# Patient Record
Sex: FEMALE | Race: WHITE | NOT HISPANIC OR LATINO | Employment: OTHER | URBAN - METROPOLITAN AREA
[De-identification: names, ages, dates, MRNs, and addresses within clinical notes are randomized per-mention and may not be internally consistent; named-entity substitution may affect disease eponyms.]

---

## 2018-01-05 ENCOUNTER — CONVERSION ENCOUNTER (OUTPATIENT)
Dept: INTERNAL MEDICINE | Facility: CLINIC | Age: 68
End: 2018-01-05

## 2018-01-05 ENCOUNTER — OFFICE VISIT CONVERTED (OUTPATIENT)
Dept: INTERNAL MEDICINE | Facility: CLINIC | Age: 68
End: 2018-01-05
Attending: INTERNAL MEDICINE

## 2018-03-19 ENCOUNTER — OFFICE VISIT CONVERTED (OUTPATIENT)
Dept: INTERNAL MEDICINE | Facility: CLINIC | Age: 68
End: 2018-03-19
Attending: INTERNAL MEDICINE

## 2018-06-27 ENCOUNTER — OFFICE VISIT CONVERTED (OUTPATIENT)
Dept: INTERNAL MEDICINE | Facility: CLINIC | Age: 68
End: 2018-06-27
Attending: INTERNAL MEDICINE

## 2018-07-30 ENCOUNTER — CONVERSION ENCOUNTER (OUTPATIENT)
Dept: SURGERY | Facility: CLINIC | Age: 68
End: 2018-07-30

## 2018-07-30 ENCOUNTER — OFFICE VISIT CONVERTED (OUTPATIENT)
Dept: UROLOGY | Facility: CLINIC | Age: 68
End: 2018-07-30
Attending: UROLOGY

## 2018-09-24 ENCOUNTER — OFFICE VISIT CONVERTED (OUTPATIENT)
Dept: INTERNAL MEDICINE | Facility: CLINIC | Age: 68
End: 2018-09-24
Attending: INTERNAL MEDICINE

## 2018-10-26 ENCOUNTER — OFFICE VISIT CONVERTED (OUTPATIENT)
Dept: PULMONOLOGY | Facility: CLINIC | Age: 68
End: 2018-10-26
Attending: INTERNAL MEDICINE

## 2018-12-27 ENCOUNTER — CONVERSION ENCOUNTER (OUTPATIENT)
Dept: INTERNAL MEDICINE | Facility: CLINIC | Age: 68
End: 2018-12-27

## 2018-12-27 ENCOUNTER — OFFICE VISIT CONVERTED (OUTPATIENT)
Dept: INTERNAL MEDICINE | Facility: CLINIC | Age: 68
End: 2018-12-27
Attending: INTERNAL MEDICINE

## 2019-01-25 ENCOUNTER — OFFICE VISIT CONVERTED (OUTPATIENT)
Dept: INTERNAL MEDICINE | Facility: CLINIC | Age: 69
End: 2019-01-25
Attending: PHYSICIAN ASSISTANT

## 2019-03-27 ENCOUNTER — OFFICE VISIT CONVERTED (OUTPATIENT)
Dept: INTERNAL MEDICINE | Facility: CLINIC | Age: 69
End: 2019-03-27
Attending: PHYSICIAN ASSISTANT

## 2019-03-27 ENCOUNTER — CONVERSION ENCOUNTER (OUTPATIENT)
Dept: INTERNAL MEDICINE | Facility: CLINIC | Age: 69
End: 2019-03-27

## 2019-04-18 ENCOUNTER — OFFICE VISIT CONVERTED (OUTPATIENT)
Dept: INTERNAL MEDICINE | Facility: CLINIC | Age: 69
End: 2019-04-18
Attending: NURSE PRACTITIONER

## 2019-04-18 ENCOUNTER — CONVERSION ENCOUNTER (OUTPATIENT)
Dept: INTERNAL MEDICINE | Facility: CLINIC | Age: 69
End: 2019-04-18

## 2019-06-18 ENCOUNTER — OFFICE VISIT CONVERTED (OUTPATIENT)
Dept: INTERNAL MEDICINE | Facility: CLINIC | Age: 69
End: 2019-06-18
Attending: PHYSICIAN ASSISTANT

## 2019-06-18 ENCOUNTER — CONVERSION ENCOUNTER (OUTPATIENT)
Dept: INTERNAL MEDICINE | Facility: CLINIC | Age: 69
End: 2019-06-18

## 2019-07-19 ENCOUNTER — HOSPITAL ENCOUNTER (OUTPATIENT)
Dept: CARDIOLOGY | Facility: HOSPITAL | Age: 69
Discharge: HOME | End: 2019-07-19
Attending: SPECIALIST

## 2019-09-18 ENCOUNTER — OFFICE VISIT CONVERTED (OUTPATIENT)
Dept: INTERNAL MEDICINE | Facility: CLINIC | Age: 69
End: 2019-09-18
Attending: INTERNAL MEDICINE

## 2019-09-18 ENCOUNTER — CONVERSION ENCOUNTER (OUTPATIENT)
Dept: INTERNAL MEDICINE | Facility: CLINIC | Age: 69
End: 2019-09-18

## 2019-09-18 ENCOUNTER — HOSPITAL ENCOUNTER (OUTPATIENT)
Dept: OTHER | Facility: HOSPITAL | Age: 69
Discharge: HOME | End: 2019-09-18
Attending: INTERNAL MEDICINE

## 2019-09-18 LAB
APPEARANCE UR: CLEAR
BILIRUB UR QL: NEGATIVE
COLOR UR: YELLOW
CONV BACTERIA: NEGATIVE
CONV COLLECTION SOURCE (UA): ABNORMAL
CONV UROBILINOGEN IN URINE BY AUTOMATED TEST STRIP: 0.2 {EHRLICHU}/DL (ref 0.1–1)
GLUCOSE UR QL: >=1000 MG/DL
HGB UR QL STRIP: ABNORMAL
KETONES UR QL STRIP: NEGATIVE MG/DL
LEUKOCYTE ESTERASE UR QL STRIP: ABNORMAL
NITRITE UR QL STRIP: NEGATIVE
PH UR STRIP.AUTO: 7.5 [PH] (ref 5–8)
PROT UR QL: NEGATIVE MG/DL
RBC #/AREA URNS HPF: ABNORMAL /[HPF]
SP GR UR: 1.01 (ref 1–1.03)
SQUAMOUS SPT QL MICRO: ABNORMAL /[HPF]
WBC #/AREA URNS HPF: ABNORMAL /[HPF]

## 2019-09-20 LAB — BACTERIA UR CULT: NORMAL

## 2019-09-21 LAB
C TRACH RRNA CVX QL NAA+PROBE: NOT DETECTED
CONV HIV COMBO AG/AB (HIV-1/O/2) WITH REFLEX: NEGATIVE
CONV TREPONEMA PALLIDUM (RPR) WITH FTA-ABS, TP-PA REFLEXES: NON REACTIVE
HSV I/II IGM: <0.91 RATIO (ref 0–0.9)
HSV1 IGG SER IA-ACNC: 54.1 INDEX (ref 0–0.9)
HSV2 IGG SER IA-ACNC: 21.1 INDEX (ref 0–0.9)
N GONORRHOEA DNA SPEC QL NAA+PROBE: NOT DETECTED

## 2019-10-29 ENCOUNTER — OFFICE VISIT CONVERTED (OUTPATIENT)
Dept: PULMONOLOGY | Facility: CLINIC | Age: 69
End: 2019-10-29
Attending: PHYSICIAN ASSISTANT

## 2019-10-30 ENCOUNTER — OFFICE VISIT CONVERTED (OUTPATIENT)
Dept: INTERNAL MEDICINE | Facility: CLINIC | Age: 69
End: 2019-10-30
Attending: INTERNAL MEDICINE

## 2019-10-30 ENCOUNTER — CONVERSION ENCOUNTER (OUTPATIENT)
Dept: INTERNAL MEDICINE | Facility: CLINIC | Age: 69
End: 2019-10-30

## 2019-10-30 ENCOUNTER — HOSPITAL ENCOUNTER (OUTPATIENT)
Dept: OTHER | Facility: HOSPITAL | Age: 69
Discharge: HOME | End: 2019-10-30
Attending: INTERNAL MEDICINE

## 2019-11-07 ENCOUNTER — HOSPITAL ENCOUNTER (OUTPATIENT)
Dept: GENERAL RADIOLOGY | Facility: HOSPITAL | Age: 69
Discharge: HOME | End: 2019-11-07
Attending: INTERNAL MEDICINE

## 2020-01-08 ENCOUNTER — OFFICE VISIT CONVERTED (OUTPATIENT)
Dept: INTERNAL MEDICINE | Facility: CLINIC | Age: 70
End: 2020-01-08
Attending: PHYSICIAN ASSISTANT

## 2020-01-08 ENCOUNTER — HOSPITAL ENCOUNTER (OUTPATIENT)
Dept: OTHER | Facility: HOSPITAL | Age: 70
Discharge: HOME | End: 2020-01-08
Attending: PHYSICIAN ASSISTANT

## 2020-01-11 LAB
AMOXICILLIN+CLAV SUSC ISLT: 4
AMPICILLIN SUSC ISLT: >=32
AMPICILLIN+SULBAC SUSC ISLT: >=32
BACTERIA UR CULT: ABNORMAL
CEFAZOLIN SUSC ISLT: <=4
CEFEPIME SUSC ISLT: <=1
CEFTAZIDIME SUSC ISLT: <=1
CEFTRIAXONE SUSC ISLT: <=1
CEFUROXIME ORAL SUSC ISLT: 4
CEFUROXIME PARENTER SUSC ISLT: 4
CIPROFLOXACIN SUSC ISLT: <=0.25
ERTAPENEM SUSC ISLT: <=0.5
GENTAMICIN SUSC ISLT: <=1
LEVOFLOXACIN SUSC ISLT: <=0.12
NITROFURANTOIN SUSC ISLT: <=16
TETRACYCLINE SUSC ISLT: <=1
TMP SMX SUSC ISLT: <=20
TOBRAMYCIN SUSC ISLT: <=1

## 2020-01-28 ENCOUNTER — CONVERSION ENCOUNTER (OUTPATIENT)
Dept: SURGERY | Facility: CLINIC | Age: 70
End: 2020-01-28

## 2020-01-28 ENCOUNTER — OFFICE VISIT CONVERTED (OUTPATIENT)
Dept: SURGERY | Facility: CLINIC | Age: 70
End: 2020-01-28
Attending: NURSE PRACTITIONER

## 2020-01-30 ENCOUNTER — HOSPITAL ENCOUNTER (OUTPATIENT)
Dept: GENERAL RADIOLOGY | Facility: HOSPITAL | Age: 70
Discharge: HOME | End: 2020-01-30
Attending: NURSE PRACTITIONER

## 2020-01-30 LAB
CREAT BLD-MCNC: 1 MG/DL (ref 0.6–1.4)
GFR SERPLBLD BASED ON 1.73 SQ M-ARVRAT: 58 ML/MIN/{1.73_M2}

## 2020-02-28 ENCOUNTER — PROCEDURE VISIT CONVERTED (OUTPATIENT)
Dept: UROLOGY | Facility: CLINIC | Age: 70
End: 2020-02-28
Attending: UROLOGY

## 2020-03-11 ENCOUNTER — OFFICE VISIT CONVERTED (OUTPATIENT)
Dept: CARDIOLOGY | Facility: CLINIC | Age: 70
End: 2020-03-11
Attending: INTERNAL MEDICINE

## 2020-03-18 ENCOUNTER — OFFICE VISIT CONVERTED (OUTPATIENT)
Dept: INTERNAL MEDICINE | Facility: CLINIC | Age: 70
End: 2020-03-18
Attending: INTERNAL MEDICINE

## 2020-03-26 ENCOUNTER — TELEMEDICINE CONVERTED (OUTPATIENT)
Dept: CARDIOLOGY | Facility: CLINIC | Age: 70
End: 2020-03-26
Attending: INTERNAL MEDICINE

## 2020-06-12 ENCOUNTER — HOSPITAL ENCOUNTER (OUTPATIENT)
Dept: OTHER | Facility: HOSPITAL | Age: 70
Discharge: HOME | End: 2020-06-12
Attending: INTERNAL MEDICINE

## 2020-06-12 LAB
ALBUMIN SERPL-MCNC: 4.6 G/DL (ref 3.5–5)
ALBUMIN/GLOB SERPL: 1.6 {RATIO} (ref 1.4–2.6)
ALP SERPL-CCNC: 67 U/L (ref 43–160)
ALT SERPL-CCNC: 19 U/L (ref 10–40)
ANION GAP SERPL CALC-SCNC: 15 MMOL/L (ref 8–19)
AST SERPL-CCNC: 22 U/L (ref 15–50)
BASOPHILS # BLD AUTO: 0.03 10*3/UL (ref 0–0.2)
BASOPHILS NFR BLD AUTO: 0.5 % (ref 0–3)
BILIRUB SERPL-MCNC: 0.42 MG/DL (ref 0.2–1.3)
BUN SERPL-MCNC: 26 MG/DL (ref 5–25)
BUN/CREAT SERPL: 23 {RATIO} (ref 6–20)
CALCIUM SERPL-MCNC: 9.7 MG/DL (ref 8.7–10.4)
CHLORIDE SERPL-SCNC: 103 MMOL/L (ref 99–111)
CHOLEST SERPL-MCNC: 145 MG/DL (ref 107–200)
CHOLEST/HDLC SERPL: 2.4 {RATIO} (ref 3–6)
CONV ABS IMM GRAN: 0.02 10*3/UL (ref 0–0.2)
CONV CO2: 25 MMOL/L (ref 22–32)
CONV IMMATURE GRAN: 0.3 % (ref 0–1.8)
CONV TOTAL PROTEIN: 7.4 G/DL (ref 6.3–8.2)
CREAT UR-MCNC: 1.11 MG/DL (ref 0.5–0.9)
DEPRECATED RDW RBC AUTO: 46.1 FL (ref 36.4–46.3)
EOSINOPHIL # BLD AUTO: 0.1 10*3/UL (ref 0–0.7)
EOSINOPHIL # BLD AUTO: 1.6 % (ref 0–7)
ERYTHROCYTE [DISTWIDTH] IN BLOOD BY AUTOMATED COUNT: 13.2 % (ref 11.7–14.4)
GFR SERPLBLD BASED ON 1.73 SQ M-ARVRAT: 51 ML/MIN/{1.73_M2}
GLOBULIN UR ELPH-MCNC: 2.8 G/DL (ref 2–3.5)
GLUCOSE SERPL-MCNC: 112 MG/DL (ref 65–99)
HCT VFR BLD AUTO: 35.8 % (ref 37–47)
HDLC SERPL-MCNC: 60 MG/DL (ref 40–60)
HGB BLD-MCNC: 11.1 G/DL (ref 12–16)
LDLC SERPL CALC-MCNC: 71 MG/DL (ref 70–100)
LYMPHOCYTES # BLD AUTO: 1.64 10*3/UL (ref 1–5)
LYMPHOCYTES NFR BLD AUTO: 25.4 % (ref 20–45)
MCH RBC QN AUTO: 29.6 PG (ref 27–31)
MCHC RBC AUTO-ENTMCNC: 31 G/DL (ref 33–37)
MCV RBC AUTO: 95.5 FL (ref 81–99)
MONOCYTES # BLD AUTO: 0.54 10*3/UL (ref 0.2–1.2)
MONOCYTES NFR BLD AUTO: 8.4 % (ref 3–10)
NEUTROPHILS # BLD AUTO: 4.12 10*3/UL (ref 2–8)
NEUTROPHILS NFR BLD AUTO: 63.8 % (ref 30–85)
NRBC CBCN: 0 % (ref 0–0.7)
OSMOLALITY SERPL CALC.SUM OF ELEC: 292 MOSM/KG (ref 273–304)
PLATELET # BLD AUTO: 241 10*3/UL (ref 130–400)
PMV BLD AUTO: 10.1 FL (ref 9.4–12.3)
POTASSIUM SERPL-SCNC: 5 MMOL/L (ref 3.5–5.3)
RBC # BLD AUTO: 3.75 10*6/UL (ref 4.2–5.4)
SODIUM SERPL-SCNC: 138 MMOL/L (ref 135–147)
TRIGL SERPL-MCNC: 72 MG/DL (ref 40–150)
TSH SERPL-ACNC: 1 M[IU]/L (ref 0.27–4.2)
VLDLC SERPL-MCNC: 14 MG/DL (ref 5–37)
WBC # BLD AUTO: 6.45 10*3/UL (ref 4.8–10.8)

## 2020-06-18 ENCOUNTER — TELEMEDICINE CONVERTED (OUTPATIENT)
Dept: INTERNAL MEDICINE | Facility: CLINIC | Age: 70
End: 2020-06-18
Attending: INTERNAL MEDICINE

## 2020-06-29 ENCOUNTER — OFFICE VISIT CONVERTED (OUTPATIENT)
Dept: CARDIOLOGY | Facility: CLINIC | Age: 70
End: 2020-06-29
Attending: INTERNAL MEDICINE

## 2020-09-22 ENCOUNTER — HOSPITAL ENCOUNTER (OUTPATIENT)
Dept: OTHER | Facility: HOSPITAL | Age: 70
Discharge: HOME | End: 2020-09-22
Attending: INTERNAL MEDICINE

## 2020-09-22 LAB
BASOPHILS # BLD AUTO: 0.04 10*3/UL (ref 0–0.2)
BASOPHILS NFR BLD AUTO: 0.6 % (ref 0–3)
CONV ABS IMM GRAN: 0.02 10*3/UL (ref 0–0.2)
CONV IMMATURE GRAN: 0.3 % (ref 0–1.8)
DEPRECATED RDW RBC AUTO: 43.4 FL (ref 36.4–46.3)
EOSINOPHIL # BLD AUTO: 0.13 10*3/UL (ref 0–0.7)
EOSINOPHIL # BLD AUTO: 1.8 % (ref 0–7)
ERYTHROCYTE [DISTWIDTH] IN BLOOD BY AUTOMATED COUNT: 12.5 % (ref 11.7–14.4)
HCT VFR BLD AUTO: 37 % (ref 37–47)
HGB BLD-MCNC: 11.5 G/DL (ref 12–16)
LYMPHOCYTES # BLD AUTO: 1.75 10*3/UL (ref 1–5)
LYMPHOCYTES NFR BLD AUTO: 24.1 % (ref 20–45)
MCH RBC QN AUTO: 29.2 PG (ref 27–31)
MCHC RBC AUTO-ENTMCNC: 31.1 G/DL (ref 33–37)
MCV RBC AUTO: 93.9 FL (ref 81–99)
MONOCYTES # BLD AUTO: 0.6 10*3/UL (ref 0.2–1.2)
MONOCYTES NFR BLD AUTO: 8.3 % (ref 3–10)
NEUTROPHILS # BLD AUTO: 4.72 10*3/UL (ref 2–8)
NEUTROPHILS NFR BLD AUTO: 64.9 % (ref 30–85)
NRBC CBCN: 0 % (ref 0–0.7)
PLATELET # BLD AUTO: 286 10*3/UL (ref 130–400)
PMV BLD AUTO: 9.6 FL (ref 9.4–12.3)
RBC # BLD AUTO: 3.94 10*6/UL (ref 4.2–5.4)
T4 FREE SERPL-MCNC: 0.8 NG/DL (ref 0.9–1.8)
TSH SERPL-ACNC: 1.99 M[IU]/L (ref 0.27–4.2)
WBC # BLD AUTO: 7.26 10*3/UL (ref 4.8–10.8)

## 2020-09-23 LAB
ALBUMIN SERPL-MCNC: 4.7 G/DL (ref 3.5–5)
ALBUMIN/GLOB SERPL: 1.8 {RATIO} (ref 1.4–2.6)
ALP SERPL-CCNC: 66 U/L (ref 43–160)
ALT SERPL-CCNC: 17 U/L (ref 10–40)
ANION GAP SERPL CALC-SCNC: 19 MMOL/L (ref 8–19)
AST SERPL-CCNC: 23 U/L (ref 15–50)
BILIRUB SERPL-MCNC: 0.42 MG/DL (ref 0.2–1.3)
BUN SERPL-MCNC: 20 MG/DL (ref 5–25)
BUN/CREAT SERPL: 19 {RATIO} (ref 6–20)
CALCIUM SERPL-MCNC: 9.7 MG/DL (ref 8.7–10.4)
CHLORIDE SERPL-SCNC: 100 MMOL/L (ref 99–111)
CHOLEST SERPL-MCNC: 156 MG/DL (ref 107–200)
CHOLEST/HDLC SERPL: 2.1 {RATIO} (ref 3–6)
CONV CO2: 26 MMOL/L (ref 22–32)
CONV TOTAL PROTEIN: 7.3 G/DL (ref 6.3–8.2)
CREAT UR-MCNC: 1.08 MG/DL (ref 0.5–0.9)
EST. AVERAGE GLUCOSE BLD GHB EST-MCNC: 137 MG/DL
GFR SERPLBLD BASED ON 1.73 SQ M-ARVRAT: 53 ML/MIN/{1.73_M2}
GLOBULIN UR ELPH-MCNC: 2.6 G/DL (ref 2–3.5)
GLUCOSE SERPL-MCNC: 103 MG/DL (ref 65–99)
HBA1C MFR BLD: 6.4 % (ref 3.5–5.7)
HDLC SERPL-MCNC: 75 MG/DL (ref 40–60)
LDLC SERPL CALC-MCNC: 65 MG/DL (ref 70–100)
OSMOLALITY SERPL CALC.SUM OF ELEC: 293 MOSM/KG (ref 273–304)
POTASSIUM SERPL-SCNC: 4.7 MMOL/L (ref 3.5–5.3)
SODIUM SERPL-SCNC: 140 MMOL/L (ref 135–147)
TRIGL SERPL-MCNC: 78 MG/DL (ref 40–150)
VLDLC SERPL-MCNC: 16 MG/DL (ref 5–37)

## 2020-09-28 ENCOUNTER — HOSPITAL ENCOUNTER (OUTPATIENT)
Dept: OTHER | Facility: HOSPITAL | Age: 70
Discharge: HOME | End: 2020-09-28
Attending: NURSE PRACTITIONER

## 2020-09-28 ENCOUNTER — CONVERSION ENCOUNTER (OUTPATIENT)
Dept: INTERNAL MEDICINE | Facility: CLINIC | Age: 70
End: 2020-09-28

## 2020-09-28 ENCOUNTER — OFFICE VISIT CONVERTED (OUTPATIENT)
Dept: INTERNAL MEDICINE | Facility: CLINIC | Age: 70
End: 2020-09-28
Attending: NURSE PRACTITIONER

## 2020-09-28 LAB
BASOPHILS # BLD AUTO: 0.03 10*3/UL (ref 0–0.2)
BASOPHILS NFR BLD AUTO: 0.4 % (ref 0–3)
CONV ABS IMM GRAN: 0.02 10*3/UL (ref 0–0.2)
CONV IMMATURE GRAN: 0.3 % (ref 0–1.8)
DEPRECATED RDW RBC AUTO: 44.1 FL (ref 36.4–46.3)
EOSINOPHIL # BLD AUTO: 0.12 10*3/UL (ref 0–0.7)
EOSINOPHIL # BLD AUTO: 1.7 % (ref 0–7)
ERYTHROCYTE [DISTWIDTH] IN BLOOD BY AUTOMATED COUNT: 12.6 % (ref 11.7–14.4)
FOLATE SERPL-MCNC: >20 NG/ML (ref 4.8–20)
HCT VFR BLD AUTO: 35 % (ref 37–47)
HGB BLD-MCNC: 10.6 G/DL (ref 12–16)
IRON SATN MFR SERPL: 13 % (ref 20–55)
IRON SERPL-MCNC: 51 UG/DL (ref 60–170)
LYMPHOCYTES # BLD AUTO: 1.71 10*3/UL (ref 1–5)
LYMPHOCYTES NFR BLD AUTO: 24.3 % (ref 20–45)
MCH RBC QN AUTO: 28.9 PG (ref 27–31)
MCHC RBC AUTO-ENTMCNC: 30.3 G/DL (ref 33–37)
MCV RBC AUTO: 95.4 FL (ref 81–99)
MONOCYTES # BLD AUTO: 0.61 10*3/UL (ref 0.2–1.2)
MONOCYTES NFR BLD AUTO: 8.7 % (ref 3–10)
NEUTROPHILS # BLD AUTO: 4.55 10*3/UL (ref 2–8)
NEUTROPHILS NFR BLD AUTO: 64.6 % (ref 30–85)
NRBC CBCN: 0 % (ref 0–0.7)
PLATELET # BLD AUTO: 267 10*3/UL (ref 130–400)
PMV BLD AUTO: 10 FL (ref 9.4–12.3)
RBC # BLD AUTO: 3.67 10*6/UL (ref 4.2–5.4)
TIBC SERPL-MCNC: 408 UG/DL (ref 245–450)
TRANSFERRIN SERPL-MCNC: 285 MG/DL (ref 250–380)
VIT B12 SERPL-MCNC: 452 PG/ML (ref 211–911)
WBC # BLD AUTO: 7.04 10*3/UL (ref 4.8–10.8)

## 2020-11-02 ENCOUNTER — OFFICE VISIT CONVERTED (OUTPATIENT)
Dept: CARDIOLOGY | Facility: CLINIC | Age: 70
End: 2020-11-02
Attending: INTERNAL MEDICINE

## 2020-11-02 ENCOUNTER — CONVERSION ENCOUNTER (OUTPATIENT)
Dept: CARDIOLOGY | Facility: CLINIC | Age: 70
End: 2020-11-02

## 2020-11-09 ENCOUNTER — HOSPITAL ENCOUNTER (OUTPATIENT)
Dept: GENERAL RADIOLOGY | Facility: HOSPITAL | Age: 70
Discharge: HOME | End: 2020-11-09
Attending: INTERNAL MEDICINE

## 2020-11-09 ENCOUNTER — OFFICE VISIT CONVERTED (OUTPATIENT)
Dept: PULMONOLOGY | Facility: CLINIC | Age: 70
End: 2020-11-09
Attending: INTERNAL MEDICINE

## 2020-11-12 ENCOUNTER — HOSPITAL ENCOUNTER (OUTPATIENT)
Dept: PREADMISSION TESTING | Facility: HOSPITAL | Age: 70
Discharge: HOME | End: 2020-11-12
Attending: INTERNAL MEDICINE

## 2020-11-14 LAB — SARS-COV-2 RNA SPEC QL NAA+PROBE: NOT DETECTED

## 2020-11-17 ENCOUNTER — HOSPITAL ENCOUNTER (OUTPATIENT)
Dept: INFUSION THERAPY | Facility: HOSPITAL | Age: 70
Setting detail: HOSPITAL OUTPATIENT SURGERY
Discharge: HOME | End: 2020-11-17
Attending: INTERNAL MEDICINE

## 2020-11-17 LAB
ALBUMIN SERPL-MCNC: 4.5 G/DL (ref 3.5–5)
ALBUMIN/GLOB SERPL: 1.6 {RATIO} (ref 1.4–2.6)
ALP SERPL-CCNC: 66 U/L (ref 43–160)
ALT SERPL-CCNC: 16 U/L (ref 10–40)
ANION GAP SERPL CALC-SCNC: 14 MMOL/L (ref 8–19)
AST SERPL-CCNC: 19 U/L (ref 15–50)
BASE EXCESS BLD CALC-SCNC: -3.4 MMOL/L
BASE EXCESS BLD CALC-SCNC: -5.1 MMOL/L
BASE EXCESS BLD CALC-SCNC: -5.6 MMOL/L
BASOPHILS # BLD AUTO: 0.04 10*3/UL (ref 0–0.2)
BASOPHILS NFR BLD AUTO: 0.6 % (ref 0–3)
BILIRUB SERPL-MCNC: 0.26 MG/DL (ref 0.2–1.3)
BNP SERPL-MCNC: 135 PG/ML (ref 0–900)
BUN SERPL-MCNC: 24 MG/DL (ref 5–25)
BUN/CREAT SERPL: 21 {RATIO} (ref 6–20)
CALCIUM SERPL-MCNC: 10 MG/DL (ref 8.7–10.4)
CHLORIDE SERPL-SCNC: 104 MMOL/L (ref 99–111)
COHGB MFR BLD: 0.3 % (ref 0–1.5)
CONV ABS IMM GRAN: 0.02 10*3/UL (ref 0–0.2)
CONV CO2: 25 MMOL/L (ref 22–32)
CONV FHHB: 1.4 % (ref 0–5)
CONV FHHB: 21.8 % (ref 0–5)
CONV FHHB: 23 % (ref 0–5)
CONV IMMATURE GRAN: 0.3 % (ref 0–1.8)
CONV SITE: ABNORMAL
CONV TOTAL PROTEIN: 7.3 G/DL (ref 6.3–8.2)
CREAT UR-MCNC: 1.17 MG/DL (ref 0.5–0.9)
DEPRECATED RDW RBC AUTO: 48.4 FL (ref 36.4–46.3)
EOSINOPHIL # BLD AUTO: 0.1 10*3/UL (ref 0–0.7)
EOSINOPHIL # BLD AUTO: 1.6 % (ref 0–7)
ERYTHROCYTE [DISTWIDTH] IN BLOOD BY AUTOMATED COUNT: 14.1 % (ref 11.7–14.4)
GFR SERPLBLD BASED ON 1.73 SQ M-ARVRAT: 48 ML/MIN/{1.73_M2}
GLOBULIN UR ELPH-MCNC: 2.8 G/DL (ref 2–3.5)
GLUCOSE SERPL-MCNC: 116 MG/DL (ref 65–99)
HBA1C MFR BLD: 11 % (ref 11.7–14.6)
HBA1C MFR BLD: 11.2 % (ref 11.7–14.6)
HBA1C MFR BLD: 11.4 % (ref 11.7–14.6)
HCO3 BLDA-SCNC: 19.8 MMOL/L (ref 22–26)
HCO3 BLDA-SCNC: 20.3 MMOL/L (ref 22–26)
HCO3 BLDA-SCNC: 22.9 MMOL/L (ref 22–26)
HCT VFR BLD AUTO: 34.1 % (ref 37–47)
HGB BLD-MCNC: 11 G/DL (ref 12–16)
INR PPP: 0.95 (ref 2–3)
LITERS PER MINUTE: 2 L/MIN
LYMPHOCYTES # BLD AUTO: 1.81 10*3/UL (ref 1–5)
LYMPHOCYTES NFR BLD AUTO: 29.2 % (ref 20–45)
Lab: ABNORMAL
MCH RBC QN AUTO: 30.4 PG (ref 27–31)
MCHC RBC AUTO-ENTMCNC: 32.3 G/DL (ref 33–37)
MCV RBC AUTO: 94.2 FL (ref 81–99)
METHGB MFR BLD: 0.3 % (ref 0–1.5)
METHGB MFR BLD: 0.4 % (ref 0–1.5)
METHGB MFR BLD: 0.5 % (ref 0–1.5)
MONOCYTES # BLD AUTO: 0.49 10*3/UL (ref 0.2–1.2)
MONOCYTES NFR BLD AUTO: 7.9 % (ref 3–10)
NEUTROPHILS # BLD AUTO: 3.73 10*3/UL (ref 2–8)
NEUTROPHILS NFR BLD AUTO: 60.4 % (ref 30–85)
NRBC CBCN: 0 % (ref 0–0.7)
OSMOLALITY SERPL CALC.SUM OF ELEC: 293 MOSM/KG (ref 273–304)
OXYHGB MFR BLD: 76.4 % (ref 94–98)
OXYHGB MFR BLD: 77.4 % (ref 94–98)
OXYHGB MFR BLD: 97.9 % (ref 94–98)
PCO2 BLD: 36.1 MM[HG] (ref 35–45)
PCO2 BLD: 41.7 MM[HG] (ref 35–45)
PCO2 BLD: 46.5 MM[HG] (ref 35–45)
PH UR: 7.31 [PH] (ref 7.35–7.45)
PH UR: 7.31 [PH] (ref 7.35–7.45)
PH UR: 7.36 [PH] (ref 7.35–7.45)
PLATELET # BLD AUTO: 229 10*3/UL (ref 130–400)
PMV BLD AUTO: 8.7 FL (ref 9.4–12.3)
PO2 BLD: 161.6 MM[HG] (ref 80–100)
PO2 BLD: 45.9 MM[HG] (ref 80–100)
PO2 BLD: 46.3 MM[HG] (ref 80–100)
POTASSIUM SERPL-SCNC: 3.9 MMOL/L (ref 3.5–5.3)
PROTHROMBIN TIME: 10.4 S (ref 9.4–12)
RBC # BLD AUTO: 3.62 10*6/UL (ref 4.2–5.4)
SAO2 % BLDCOA: 76.9 % (ref 95–99)
SAO2 % BLDCOA: 78 % (ref 95–99)
SAO2 % BLDCOA: 98.6 % (ref 95–99)
SODIUM SERPL-SCNC: 139 MMOL/L (ref 135–147)
SPECIMEN SOURCE: ABNORMAL
T4 FREE SERPL-MCNC: 0.8 NG/DL (ref 0.9–1.8)
TSH SERPL-ACNC: 1.64 M[IU]/L (ref 0.27–4.2)
WBC # BLD AUTO: 6.19 10*3/UL (ref 4.8–10.8)

## 2020-11-19 DIAGNOSIS — I35.0 AORTIC VALVE STENOSIS, ETIOLOGY OF CARDIAC VALVE DISEASE UNSPECIFIED: Primary | ICD-10-CM

## 2020-12-03 ENCOUNTER — OFFICE VISIT (OUTPATIENT)
Dept: CARDIOLOGY | Facility: CLINIC | Age: 70
End: 2020-12-03

## 2020-12-03 VITALS
WEIGHT: 156 LBS | HEART RATE: 73 BPM | BODY MASS INDEX: 26.63 KG/M2 | HEIGHT: 64 IN | SYSTOLIC BLOOD PRESSURE: 120 MMHG | DIASTOLIC BLOOD PRESSURE: 82 MMHG

## 2020-12-03 DIAGNOSIS — I10 ESSENTIAL HYPERTENSION: ICD-10-CM

## 2020-12-03 DIAGNOSIS — I35.0 AORTIC VALVE STENOSIS, ETIOLOGY OF CARDIAC VALVE DISEASE UNSPECIFIED: Primary | ICD-10-CM

## 2020-12-03 PROCEDURE — 99204 OFFICE O/P NEW MOD 45 MIN: CPT | Performed by: INTERNAL MEDICINE

## 2020-12-03 PROCEDURE — 93000 ELECTROCARDIOGRAM COMPLETE: CPT | Performed by: INTERNAL MEDICINE

## 2020-12-03 RX ORDER — ALENDRONATE SODIUM 70 MG/1
1 TABLET ORAL WEEKLY
COMMUNITY
Start: 2020-09-30 | End: 2021-08-02 | Stop reason: SDUPTHER

## 2020-12-03 RX ORDER — LANOLIN ALCOHOL/MO/W.PET/CERES
1 CREAM (GRAM) TOPICAL DAILY
COMMUNITY
Start: 2020-10-05 | End: 2021-06-15 | Stop reason: SDUPTHER

## 2020-12-03 RX ORDER — FERROUS SULFATE 325(65) MG
1 TABLET ORAL DAILY
COMMUNITY
Start: 2020-11-12 | End: 2021-08-02 | Stop reason: SDUPTHER

## 2020-12-03 RX ORDER — METOPROLOL TARTRATE 50 MG/1
1 TABLET, FILM COATED ORAL 2 TIMES DAILY
COMMUNITY
Start: 2020-11-02 | End: 2021-02-08 | Stop reason: HOSPADM

## 2020-12-03 RX ORDER — MULTIPLE VITAMINS W/ MINERALS TAB 9MG-400MCG
1 TAB ORAL DAILY
COMMUNITY

## 2020-12-03 RX ORDER — LORATADINE 10 MG/1
1 TABLET ORAL DAILY
COMMUNITY
Start: 2020-10-05 | End: 2021-06-15 | Stop reason: SDUPTHER

## 2020-12-03 RX ORDER — CHLORPHENIRAMINE MALEATE 4 MG/1
1 TABLET ORAL DAILY
COMMUNITY
Start: 2020-10-05 | End: 2021-08-02 | Stop reason: SDUPTHER

## 2020-12-03 RX ORDER — THYROID 60 MG
1 TABLET ORAL DAILY
COMMUNITY
Start: 2020-11-02 | End: 2021-10-21 | Stop reason: SDUPTHER

## 2020-12-03 RX ORDER — BIOTIN 10 MG
10 TABLET ORAL DAILY
COMMUNITY
End: 2023-01-20

## 2020-12-03 RX ORDER — AMITRIPTYLINE HYDROCHLORIDE 25 MG/1
1 TABLET, FILM COATED ORAL NIGHTLY
COMMUNITY
Start: 2020-11-04 | End: 2022-01-26 | Stop reason: SDUPTHER

## 2020-12-03 RX ORDER — ASPIRIN 81 MG/1
1 TABLET, COATED ORAL DAILY
COMMUNITY
Start: 2020-11-02 | End: 2021-02-08 | Stop reason: HOSPADM

## 2020-12-03 RX ORDER — LISINOPRIL 5 MG/1
1 TABLET ORAL EVERY EVENING
COMMUNITY
Start: 2020-11-02 | End: 2021-02-08 | Stop reason: HOSPADM

## 2020-12-03 RX ORDER — ATORVASTATIN CALCIUM 10 MG/1
1 TABLET, FILM COATED ORAL NIGHTLY
COMMUNITY
Start: 2020-09-29 | End: 2021-08-02 | Stop reason: SDUPTHER

## 2020-12-03 RX ORDER — FLUTICASONE PROPIONATE 50 MCG
1 SPRAY, SUSPENSION (ML) NASAL AS NEEDED
COMMUNITY
Start: 2020-09-29 | End: 2021-08-02 | Stop reason: SDUPTHER

## 2020-12-03 RX ORDER — METFORMIN HYDROCHLORIDE 500 MG/1
1 TABLET, EXTENDED RELEASE ORAL 2 TIMES DAILY
COMMUNITY
Start: 2020-11-06 | End: 2021-07-07 | Stop reason: SDUPTHER

## 2020-12-03 RX ORDER — CHLORAL HYDRATE 500 MG
1 CAPSULE ORAL DAILY
COMMUNITY
Start: 2020-10-05 | End: 2021-01-29

## 2020-12-03 NOTE — PROGRESS NOTES
Amelie Anderson  1950  Date of Office Visit: 12/03/20  Encounter Provider: Nayan Munoz MD  Place of Service: [facility] CARDIOLOGY      CHIEF COMPLAINT:  Severe degenerative aortic valve stenosis  Dyspnea on exertion    HISTORY OF PRESENT ILLNESS:This is a 70-year-old female with a family history reportedly of aortic valve disease and replacement, along with premature coronary artery disease in her family, hyperlipidemia, hypertension, who presents to me for evaluation of her aortic valve stenosis by Dr. Pederson. She has underwent evaluation including transthoracic echocardiogram with evidence of severe degenerative aortic valve stenosis. The mean gradient on my review on echo looks like in its worse envelope is around 35 mmHg with a peak velocity of about 3.6 m/s. The DOI is low at 0.23. The valve area is decreased as well at 0.7 cm2. The patient underwent a catheterization with measurement of the left ventricular pressure and gradient, and looks like had a mean gradient on that of around 40 mmHg on my review of the report. I do not have the wave forms for review.    She states that over the past 2 or 3 months, she has noticed worsening dyspnea on exertion that is at least moderate in intensity, and requires her to rest after walking about 200 feet. She has no dyspnea at rest. She denies orthopnea or PND. She has not had any chest pain. She is here for evaluation today of her aortic valve stenosis.         Review of Systems   Constitution: Negative for fever and malaise/fatigue.   HENT: Negative for nosebleeds and sore throat.    Eyes: Negative for blurred vision and double vision.   Cardiovascular: Positive for dyspnea on exertion. Negative for chest pain, claudication, palpitations and syncope.   Respiratory: Negative for cough, shortness of breath and snoring.    Endocrine: Negative for cold intolerance, heat intolerance and polydipsia.   Skin: Negative for itching,  poor wound healing and rash.   Musculoskeletal: Negative for joint pain, joint swelling, muscle weakness and myalgias.   Gastrointestinal: Negative for abdominal pain, melena, nausea and vomiting.   Neurological: Negative for light-headedness, loss of balance, seizures, vertigo and weakness.   Psychiatric/Behavioral: Negative for altered mental status and depression.       Past Medical History:   Diagnosis Date   • Iron deficiency    • Kidney cysts        The following portions of the patient's history were reviewed and updated as appropriate: Social history , Family history and Surgical history     Current Outpatient Medications on File Prior to Visit   Medication Sig Dispense Refill   • alendronate (FOSAMAX) 70 MG tablet Take 1 tablet by mouth 1 (One) Time Per Week.     • amitriptyline (ELAVIL) 25 MG tablet Take 1 tablet by mouth Daily.     • [place] Thyroid 60 MG tablet Take 1 tablet by mouth Daily.     • Aspirin Low Dose 81 MG EC tablet Take 1 tablet by mouth Daily.     • atorvastatin (LIPITOR) 10 MG tablet Take 1 tablet by mouth Daily.     • Biotin 10 MG tablet Take  by mouth.     • Calcium Carbonate-Vitamin D (calcium-vitamin D) 500-200 MG-UNIT tablet per tablet Take 1 tablet by mouth Daily.     • chlorpheniramine (CHLOR-TRIMETON) 4 MG tablet Take 1 tablet by mouth 2 (two) times a day.     • FeroSul 325 (65 Fe) MG tablet Take 1 tablet by mouth Daily.     • fluticasone (FLONASE) 50 MCG/ACT nasal spray      • lisinopril (PRINIVIL,ZESTRIL) 5 MG tablet Take 1 tablet by mouth Daily.     • loratadine (CLARITIN) 10 MG tablet Take 1 tablet by mouth Daily.     • metFORMIN ER (GLUCOPHAGE-XR) 500 MG 24 hr tablet Take 1 tablet by mouth 2 (two) times a day.     • metoprolol tartrate (LOPRESSOR) 50 MG tablet Take 1 tablet by mouth 2 (two) times a day.     • multivitamin with minerals tablet tablet Take 1 tablet by mouth Daily.     • Omega-3 Fatty Acids (fish oil) 1000 MG capsule capsule Take 1 capsule by mouth Daily.     •  "Potassium 99 MG tablet Take  by mouth.     • sertraline (ZOLOFT) 50 MG tablet Take 1 tablet by mouth Daily.     • vitamin B-6 (PYRIDOXINE) 50 MG tablet Take 1 tablet by mouth Daily.       No current facility-administered medications on file prior to visit.        No Known Allergies    Vitals:    12/03/20 1510   BP: 120/82   Pulse: 73   Weight: 70.8 kg (156 lb)   Height: 162.6 cm (64\")     Constitutional:       Appearance: Well-developed.   Eyes:      General: No scleral icterus.     Conjunctiva/sclera: Conjunctivae normal.   HENT:      Head: Normocephalic and atraumatic.   Neck:      Musculoskeletal: Normal range of motion and neck supple.      Thyroid: No thyromegaly.      Vascular: Normal carotid pulses. No carotid bruit, hepatojugular reflux or JVD.      Trachea: No tracheal deviation.   Pulmonary:      Effort: No respiratory distress.      Breath sounds: Normal breath sounds. No decreased breath sounds. No wheezing. No rhonchi. No rales.   Chest:      Chest wall: Not tender to palpatation.   Cardiovascular:      Normal rate. Regular rhythm.      No gallop.   Pulses:     Carotid: 2+ bilaterally.     Radial: 2+ bilaterally.     Femoral: 2+ bilaterally.     Dorsalis pedis: 2+ bilaterally.     Posterior tibial: 2+ bilaterally.  Edema:     Peripheral edema absent.   Abdominal:      General: Bowel sounds are normal. There is no distension.      Palpations: Abdomen is soft.      Tenderness: There is no abdominal tenderness. There is no rebound.   Musculoskeletal:         General: No deformity.   Skin:     Findings: No erythema or rash.   Neurological:      Mental Status: Alert and oriented to person, place, and time.      Sensory: No sensory deficit.   Psychiatric:         Behavior: Behavior normal.             ECG 12 Lead    Date/Time: 12/3/2020 3:44 PM  Performed by: Nayan Munoz MD  Authorized by: Nayan Munoz MD   Comparison: compared with previous ECG from 11/2/2020  Similar to previous " ECG  Rhythm: sinus rhythm  Rate: normal  QRS axis: normal    Clinical impression: normal ECG                  DISCUSSION/SUMMARYVery pleasant 70-year-old female who presents to me for evaluation of aortic valve stenosis. She does, in my opinion, have severe aortic valve stenosis. It is difficult to say, however, I think her valve looks tricuspid. It is a little unusual at the age of 70 to have severe degenerative AS and a tricuspid valve, but that is what it looks like based on the initial evaluation. She has no flow limiting CAD that needs revascularization.     I think the first step for us would to have her see Dr. Eckert or Dr. Coronado as part of our valve team. I do worry about transcatheter aortic valve replacement in her as she is young and smaller, and I think that we are going to have to consider not only our first valve approach, but remain thoughtful about what is going to have to happen when she needs a valve-in-valve, as that is likely with her young age.     I think that in my opinion the first approach should be surgical; however, I will defer that to the surgical team. I discussed my concerns with her about placing a transcatheter valve and she is aware.

## 2020-12-09 ENCOUNTER — OFFICE VISIT (OUTPATIENT)
Dept: CARDIAC SURGERY | Facility: CLINIC | Age: 70
End: 2020-12-09

## 2020-12-09 VITALS
HEART RATE: 70 BPM | DIASTOLIC BLOOD PRESSURE: 75 MMHG | WEIGHT: 157.5 LBS | HEIGHT: 64 IN | SYSTOLIC BLOOD PRESSURE: 108 MMHG | RESPIRATION RATE: 20 BRPM | TEMPERATURE: 97.3 F | BODY MASS INDEX: 26.89 KG/M2 | OXYGEN SATURATION: 99 %

## 2020-12-09 DIAGNOSIS — I50.32 CHRONIC DIASTOLIC CONGESTIVE HEART FAILURE (HCC): ICD-10-CM

## 2020-12-09 DIAGNOSIS — I35.0 AORTIC VALVE STENOSIS, SEVERE: Primary | ICD-10-CM

## 2020-12-09 DIAGNOSIS — I10 ESSENTIAL HYPERTENSION: ICD-10-CM

## 2020-12-09 PROCEDURE — 99205 OFFICE O/P NEW HI 60 MIN: CPT | Performed by: THORACIC SURGERY (CARDIOTHORACIC VASCULAR SURGERY)

## 2020-12-12 NOTE — PROGRESS NOTES
BCS CONSULT    Reason for Consultation: Surgical opinion regarding aortic valve stenosis.    History of Present Illness:     This is a very pleasant 70 year old woman with worsening dyspnea on even mild exertion (NYHA III); her symptoms resolve with rest. She has also noted periodic lower extremity edema over the last two months that improves with leg elevation. She denies any chest pain, diaphoresis, nausea, emesis, light headedness, syncope, disorientation, or difficulties with concentration.     A transthoracic echocardiogram on 11/2/2020 (I reviewed and independently interpreted these images) showed a mean aortic valve gradient over 30 mmHg, a Vmax over 3.5 m/s, and an ROSIE of 0.7 cm2. Images also showed mild AI, mild MR, no TR, and a LVEF of 50%.    Right and left heart catheterization on 11/17/2020 (I reviewed and interpreted these images independently) showed a dominant right coronary system and no significant coronary artery disease. The aortic valve appears to be trileaflet and there does not appear to be any regurgitation on root injection. The RVSBP was measures at 28 mmHg and the PA pressures were measured at 22/10 mmHg. This was performed by right femoral access.    Review of Systems   Constitutional: Positive for activity change and fatigue. Negative for appetite change, chills, diaphoresis, fever and unexpected weight change.   HENT: Positive for dental problem. Negative for congestion, drooling, ear discharge, ear pain, facial swelling, hearing loss, mouth sores, nosebleeds, postnasal drip, rhinorrhea, sinus pressure, sinus pain, sneezing, sore throat, tinnitus, trouble swallowing and voice change.    Eyes: Negative for photophobia, pain, discharge, redness, itching and visual disturbance.   Respiratory: Positive for chest tightness and shortness of breath. Negative for apnea, cough, choking, wheezing and stridor.    Cardiovascular: Positive for leg swelling. Negative for chest pain and palpitations.    Gastrointestinal: Negative for abdominal distention, abdominal pain, anal bleeding, blood in stool, constipation, diarrhea, nausea, rectal pain and vomiting.   Endocrine: Negative for cold intolerance, heat intolerance, polydipsia, polyphagia and polyuria.   Genitourinary: Negative for difficulty urinating, dysuria, flank pain and hematuria.   Musculoskeletal: Negative for arthralgias, back pain, gait problem, joint swelling, myalgias, neck pain and neck stiffness.   Skin: Negative for color change, pallor, rash and wound.   Allergic/Immunologic: Negative for environmental allergies, food allergies and immunocompromised state.   Neurological: Negative for dizziness, tremors, seizures, syncope, facial asymmetry, speech difficulty, weakness, light-headedness, numbness and headaches.   Hematological: Negative for adenopathy. Does not bruise/bleed easily.   Psychiatric/Behavioral: Negative for agitation, behavioral problems, confusion, decreased concentration, dysphoric mood, hallucinations, self-injury, sleep disturbance and suicidal ideas. The patient is not nervous/anxious and is not hyperactive.         Past Medical History:   Diagnosis Date   • Iron deficiency    • Kidney cysts      Past Surgical History:   Procedure Laterality Date   • CARDIAC CATHETERIZATION  2020    no stents   • CARDIAC CATHETERIZATION      no stents   • HYSTERECTOMY       Family History   Problem Relation Age of Onset   • Aortic stenosis Mother    • Valvular heart disease Mother    • Heart attack Father 30   • Coronary artery disease Father    • Aortic stenosis Sister    • Valvular heart disease Sister    • Coronary artery disease Paternal Aunt    • Coronary artery disease Paternal Uncle      Social History     Tobacco Use   • Smoking status: Former Smoker     Packs/day: 1.00     Years: 48.00     Pack years: 48.00     Start date:      Quit date: 2016     Years since quittin.9   • Smokeless tobacco: Never Used   Substance  Use Topics   • Alcohol use: Not Currently   • Drug use: Never     (Not in a hospital admission)      Current Outpatient Medications:   •  alendronate (FOSAMAX) 70 MG tablet, Take 1 tablet by mouth 1 (One) Time Per Week., Disp: , Rfl:   •  amitriptyline (ELAVIL) 25 MG tablet, Take 1 tablet by mouth Daily., Disp: , Rfl:   •  [place] Thyroid 60 MG tablet, Take 1 tablet by mouth Daily., Disp: , Rfl:   •  Aspirin Low Dose 81 MG EC tablet, Take 1 tablet by mouth Daily., Disp: , Rfl:   •  atorvastatin (LIPITOR) 10 MG tablet, Take 1 tablet by mouth Daily., Disp: , Rfl:   •  Biotin 10 MG tablet, Take  by mouth., Disp: , Rfl:   •  Calcium Carbonate-Vitamin D (calcium-vitamin D) 500-200 MG-UNIT tablet per tablet, Take 1 tablet by mouth Daily., Disp: , Rfl:   •  chlorpheniramine (CHLOR-TRIMETON) 4 MG tablet, Take 1 tablet by mouth 2 (two) times a day., Disp: , Rfl:   •  FeroSul 325 (65 Fe) MG tablet, Take 1 tablet by mouth Daily., Disp: , Rfl:   •  fluticasone (FLONASE) 50 MCG/ACT nasal spray, , Disp: , Rfl:   •  lisinopril (PRINIVIL,ZESTRIL) 5 MG tablet, Take 1 tablet by mouth Daily., Disp: , Rfl:   •  loratadine (CLARITIN) 10 MG tablet, Take 1 tablet by mouth Daily., Disp: , Rfl:   •  metFORMIN ER (GLUCOPHAGE-XR) 500 MG 24 hr tablet, Take 1 tablet by mouth 2 (two) times a day., Disp: , Rfl:   •  metoprolol tartrate (LOPRESSOR) 50 MG tablet, Take 1 tablet by mouth 2 (two) times a day., Disp: , Rfl:   •  multivitamin with minerals tablet tablet, Take 1 tablet by mouth Daily., Disp: , Rfl:   •  Omega-3 Fatty Acids (fish oil) 1000 MG capsule capsule, Take 1 capsule by mouth Daily., Disp: , Rfl:   •  Potassium 99 MG tablet, Take  by mouth., Disp: , Rfl:   •  sertraline (ZOLOFT) 50 MG tablet, Take 1 tablet by mouth Daily., Disp: , Rfl:   •  vitamin B-6 (PYRIDOXINE) 50 MG tablet, Take 1 tablet by mouth Daily., Disp: , Rfl:     Allergies:  Patient has no known allergies.    Objective      Vital Signs       Flowsheet Rows      First  "Filed Value   Admission Height  162.6 cm (64\") Documented at 12/09/2020 1242   Admission Weight  71.4 kg (157 lb 8 oz) Documented at 12/09/2020 1242        162.6 cm (64\")    Physical Exam  Vitals signs reviewed.   Constitutional:       General: She is not in acute distress.     Appearance: She is normal weight. She is not ill-appearing, toxic-appearing or diaphoretic.   HENT:      Head: Normocephalic and atraumatic.      Nose: Nose normal. No congestion or rhinorrhea.      Mouth/Throat:      Mouth: Mucous membranes are moist.      Dentition: Gingival swelling present.      Tongue: No lesions. Tongue does not deviate from midline.      Pharynx: Oropharynx is clear. No oropharyngeal exudate or posterior oropharyngeal erythema.   Eyes:      General: No scleral icterus.     Extraocular Movements: Extraocular movements intact.      Pupils: Pupils are equal, round, and reactive to light.   Neck:      Musculoskeletal: Normal range of motion and neck supple. Normal range of motion. No edema, erythema, neck rigidity, crepitus or muscular tenderness.      Thyroid: No thyroid mass or thyroid tenderness.      Vascular: Normal carotid pulses. No carotid bruit, hepatojugular reflux or JVD.      Trachea: Phonation normal. No tracheal tenderness, tracheostomy or tracheal deviation.   Cardiovascular:      Rate and Rhythm: Normal rate and regular rhythm.      Pulses:           Radial pulses are 2+ on the right side and 2+ on the left side.        Femoral pulses are 2+ on the right side and 2+ on the left side.       Dorsalis pedis pulses are 1+ on the right side and 1+ on the left side.      Heart sounds: Murmur present. Systolic murmur present with a grade of 4/6. No friction rub. No gallop.    Pulmonary:      Effort: Pulmonary effort is normal.      Breath sounds: No stridor. No wheezing, rhonchi or rales.   Chest:      Chest wall: No deformity or tenderness.      Comments: No sternal abnormality.  Abdominal:      General: Abdomen " is flat. Bowel sounds are normal.      Tenderness: There is no right CVA tenderness or left CVA tenderness.   Musculoskeletal: Normal range of motion.         General: No swelling, tenderness, deformity or signs of injury.      Right lower leg: No edema.      Left lower leg: No edema.   Lymphadenopathy:      Cervical: No cervical adenopathy.   Skin:     General: Skin is warm.      Capillary Refill: Capillary refill takes 2 to 3 seconds.      Coloration: Skin is not jaundiced or pale.      Findings: No bruising, erythema, lesion or rash.   Neurological:      General: No focal deficit present.      Mental Status: She is alert. Mental status is at baseline. She is disoriented.      GCS: GCS eye subscore is 4. GCS verbal subscore is 5. GCS motor subscore is 6.      Cranial Nerves: Cranial nerves are intact. No cranial nerve deficit.      Sensory: Sensation is intact. No sensory deficit.      Motor: Motor function is intact. No weakness.      Coordination: Coordination is intact. Coordination normal.      Gait: Gait normal.      Deep Tendon Reflexes: Reflexes normal.   Psychiatric:         Attention and Perception: Attention and perception normal.         Mood and Affect: Mood and affect normal.         Speech: Speech normal.         Behavior: Behavior normal. Behavior is cooperative.         Thought Content: Thought content normal. Thought content does not include homicidal or suicidal ideation.         Cognition and Memory: Cognition and memory normal.         Judgment: Judgment normal.         Results Review:       Assessment/Plan:     This is a pleasant 70 year old woman with severe aortic valve stenosis and chronic diastolic congestive heart failure, NYHA III. She qualifies for aortic valve intervention. Given her relatively young age and lack of any prohibitive co-morbidity, she should undergo an aortic valve replacement surgery rather than a TAVR. I explained this to the patient and her ; they are in  agreement.    She currently has some minor gum issues; she will follow up with her dentist in the near future; once cleared by dentistry, we should proceed with an aortic valve replacement surgery.    Thank you for this kind consultation.    Jesse Coronado MD  12/12/20  12:56 EST

## 2020-12-14 ENCOUNTER — TELEPHONE (OUTPATIENT)
Dept: CARDIAC SURGERY | Facility: CLINIC | Age: 70
End: 2020-12-14

## 2020-12-14 NOTE — TELEPHONE ENCOUNTER
Patient needs to have 4 teeth pulled and this couldn't be scheduled until 1/1/21. We will discuss with Dr Coronado and call her back to discuss a surgery date

## 2021-01-04 ENCOUNTER — TELEPHONE (OUTPATIENT)
Dept: CARDIAC SURGERY | Facility: CLINIC | Age: 71
End: 2021-01-04

## 2021-01-04 NOTE — TELEPHONE ENCOUNTER
Patient called office I returned her call but was unable to reach her or leave a message I'll continue to try and reach her

## 2021-01-04 NOTE — TELEPHONE ENCOUNTER
SENT TO DR [facility] TO NOTIFY Pt would like for one of you to give her a call back at [phone]. She states she saw the dentist today and is going to need some extractions before she can be cleared by the dentist for heart surgery. She is scheduled for the extractions on Jan 21, 2021. She has some questions to make sure she will be healed in time for surgery and if it will still be too soon following her extractions.

## 2021-01-05 ENCOUNTER — TELEPHONE (OUTPATIENT)
Dept: CARDIAC SURGERY | Facility: CLINIC | Age: 71
End: 2021-01-05

## 2021-01-05 NOTE — TELEPHONE ENCOUNTER
I spoke with patient who is asking when her surgery can be scheduled after her four teeth are extracted 1/21/21. I will discuss with Dr Coronado and call her back

## 2021-01-07 ENCOUNTER — HOSPITAL ENCOUNTER (OUTPATIENT)
Dept: OTHER | Facility: HOSPITAL | Age: 71
Discharge: HOME | End: 2021-01-07
Attending: NURSE PRACTITIONER

## 2021-01-07 LAB
BASOPHILS # BLD AUTO: 0.03 10*3/UL (ref 0–0.2)
BASOPHILS NFR BLD AUTO: 0.5 % (ref 0–3)
CONV ABS IMM GRAN: 0.02 10*3/UL (ref 0–0.2)
CONV IMMATURE GRAN: 0.3 % (ref 0–1.8)
DEPRECATED RDW RBC AUTO: 43.9 FL (ref 36.4–46.3)
EOSINOPHIL # BLD AUTO: 0.11 10*3/UL (ref 0–0.7)
EOSINOPHIL # BLD AUTO: 1.8 % (ref 0–7)
ERYTHROCYTE [DISTWIDTH] IN BLOOD BY AUTOMATED COUNT: 12.7 % (ref 11.7–14.4)
HCT VFR BLD AUTO: 41.6 % (ref 37–47)
HGB BLD-MCNC: 13.1 G/DL (ref 12–16)
IRON SATN MFR SERPL: 15 % (ref 20–55)
IRON SERPL-MCNC: 62 UG/DL (ref 60–170)
LYMPHOCYTES # BLD AUTO: 1.45 10*3/UL (ref 1–5)
LYMPHOCYTES NFR BLD AUTO: 23.2 % (ref 20–45)
MCH RBC QN AUTO: 29.6 PG (ref 27–31)
MCHC RBC AUTO-ENTMCNC: 31.5 G/DL (ref 33–37)
MCV RBC AUTO: 93.9 FL (ref 81–99)
MONOCYTES # BLD AUTO: 0.45 10*3/UL (ref 0.2–1.2)
MONOCYTES NFR BLD AUTO: 7.2 % (ref 3–10)
NEUTROPHILS # BLD AUTO: 4.18 10*3/UL (ref 2–8)
NEUTROPHILS NFR BLD AUTO: 67 % (ref 30–85)
NRBC CBCN: 0 % (ref 0–0.7)
PLATELET # BLD AUTO: 262 10*3/UL (ref 130–400)
PMV BLD AUTO: 9.6 FL (ref 9.4–12.3)
RBC # BLD AUTO: 4.43 10*6/UL (ref 4.2–5.4)
T4 FREE SERPL-MCNC: 0.8 NG/DL (ref 0.9–1.8)
TIBC SERPL-MCNC: 419 UG/DL (ref 245–450)
TRANSFERRIN SERPL-MCNC: 293 MG/DL (ref 250–380)
TSH SERPL-ACNC: 2.6 M[IU]/L (ref 0.27–4.2)
WBC # BLD AUTO: 6.24 10*3/UL (ref 4.8–10.8)

## 2021-01-12 ENCOUNTER — TELEPHONE (OUTPATIENT)
Dept: CARDIAC SURGERY | Facility: CLINIC | Age: 71
End: 2021-01-12

## 2021-01-12 ENCOUNTER — PREP FOR SURGERY (OUTPATIENT)
Dept: OTHER | Facility: HOSPITAL | Age: 71
End: 2021-01-12

## 2021-01-12 DIAGNOSIS — I35.0 AORTIC STENOSIS: Primary | ICD-10-CM

## 2021-01-12 DIAGNOSIS — I65.1 OCCLUSION AND STENOSIS OF BASILAR ARTERY: ICD-10-CM

## 2021-01-12 DIAGNOSIS — I11.0 HYPERTENSIVE HEART DISEASE WITH HEART FAILURE (HCC): ICD-10-CM

## 2021-01-12 DIAGNOSIS — R93.3 ABNORMAL FINDINGS ON DIAGNOSTIC IMAGING OF OTHER PARTS OF DIGESTIVE TRACT: ICD-10-CM

## 2021-01-12 DIAGNOSIS — R79.9 ABNORMAL FINDING OF BLOOD CHEMISTRY, UNSPECIFIED: ICD-10-CM

## 2021-01-12 DIAGNOSIS — R79.1 ABNORMAL COAGULATION PROFILE: ICD-10-CM

## 2021-01-12 RX ORDER — CHLORHEXIDINE GLUCONATE 0.12 MG/ML
15 RINSE ORAL EVERY 12 HOURS
Status: CANCELLED | OUTPATIENT
Start: 2021-01-12 | End: 2021-01-13

## 2021-01-12 RX ORDER — CHLORHEXIDINE GLUCONATE 500 MG/1
1 CLOTH TOPICAL EVERY 12 HOURS PRN
Status: CANCELLED | OUTPATIENT
Start: 2021-02-02

## 2021-01-12 RX ORDER — CHLORHEXIDINE GLUCONATE 0.12 MG/ML
15 RINSE ORAL ONCE
Status: CANCELLED | OUTPATIENT
Start: 2021-02-02 | End: 2021-01-12

## 2021-01-12 RX ORDER — CHLORHEXIDINE GLUCONATE 500 MG/1
1 CLOTH TOPICAL EVERY 12 HOURS PRN
Status: CANCELLED | OUTPATIENT
Start: 2021-01-12

## 2021-01-12 RX ORDER — CEFAZOLIN SODIUM 2 G/100ML
2 INJECTION, SOLUTION INTRAVENOUS
Status: CANCELLED | OUTPATIENT
Start: 2021-02-03 | End: 2021-02-04

## 2021-01-12 NOTE — TELEPHONE ENCOUNTER
Spoke with , pt requesting surgery date. Pt is scheduled for dental extractions on 1/21/21. Per  surgery can be scheduled first week of February. Pt voiced understanding and was agreeable. Pt scheduled PAT and surgery with Cindy.    PAT scheduled 2/3/2021  Surgery Scheduled 2/5/2021

## 2021-01-13 ENCOUNTER — OFFICE VISIT CONVERTED (OUTPATIENT)
Dept: CARDIOLOGY | Facility: CLINIC | Age: 71
End: 2021-01-13
Attending: INTERNAL MEDICINE

## 2021-01-13 ENCOUNTER — TELEPHONE (OUTPATIENT)
Dept: CARDIAC SURGERY | Facility: CLINIC | Age: 71
End: 2021-01-13

## 2021-01-13 NOTE — TELEPHONE ENCOUNTER
Spoke with patient with PAT and surgery times. PAT is on 2-3-2021 at 0830 with all testing to follow.  Surgery is scheduled for 2-5-2021 at 0730 with an arrival time of 0500.  She is no longer taking FISH OIL.   She expressed a verbal understanding of these instructions.  She was instructed to call the office with any further questions.

## 2021-01-14 ENCOUNTER — HOSPITAL ENCOUNTER (OUTPATIENT)
Dept: OTHER | Facility: HOSPITAL | Age: 71
Discharge: HOME | End: 2021-01-14
Attending: INTERNAL MEDICINE

## 2021-01-14 ENCOUNTER — OFFICE VISIT CONVERTED (OUTPATIENT)
Dept: INTERNAL MEDICINE | Facility: CLINIC | Age: 71
End: 2021-01-14
Attending: INTERNAL MEDICINE

## 2021-01-14 LAB
ALBUMIN SERPL-MCNC: 4.6 G/DL (ref 3.5–5)
ALBUMIN/GLOB SERPL: 1.5 {RATIO} (ref 1.4–2.6)
ALP SERPL-CCNC: 77 U/L (ref 43–160)
ALT SERPL-CCNC: 16 U/L (ref 10–40)
ANION GAP SERPL CALC-SCNC: 14 MMOL/L (ref 8–19)
AST SERPL-CCNC: 18 U/L (ref 15–50)
BILIRUB SERPL-MCNC: 0.33 MG/DL (ref 0.2–1.3)
BUN SERPL-MCNC: 27 MG/DL (ref 5–25)
BUN/CREAT SERPL: 23 {RATIO} (ref 6–20)
CALCIUM SERPL-MCNC: 10.3 MG/DL (ref 8.7–10.4)
CHLORIDE SERPL-SCNC: 101 MMOL/L (ref 99–111)
CHOLEST SERPL-MCNC: 168 MG/DL (ref 107–200)
CHOLEST/HDLC SERPL: 2.4 {RATIO} (ref 3–6)
CONV CO2: 26 MMOL/L (ref 22–32)
CONV TOTAL PROTEIN: 7.6 G/DL (ref 6.3–8.2)
CREAT UR-MCNC: 1.2 MG/DL (ref 0.5–0.9)
EST. AVERAGE GLUCOSE BLD GHB EST-MCNC: 143 MG/DL
GFR SERPLBLD BASED ON 1.73 SQ M-ARVRAT: 46 ML/MIN/{1.73_M2}
GLOBULIN UR ELPH-MCNC: 3 G/DL (ref 2–3.5)
GLUCOSE SERPL-MCNC: 102 MG/DL (ref 65–99)
HBA1C MFR BLD: 6.6 % (ref 3.5–5.7)
HDLC SERPL-MCNC: 70 MG/DL (ref 40–60)
LDLC SERPL CALC-MCNC: 80 MG/DL (ref 70–100)
OSMOLALITY SERPL CALC.SUM OF ELEC: 287 MOSM/KG (ref 273–304)
POTASSIUM SERPL-SCNC: 4.7 MMOL/L (ref 3.5–5.3)
SODIUM SERPL-SCNC: 136 MMOL/L (ref 135–147)
TRIGL SERPL-MCNC: 89 MG/DL (ref 40–150)
VLDLC SERPL-MCNC: 18 MG/DL (ref 5–37)

## 2021-01-15 ENCOUNTER — TELEPHONE (OUTPATIENT)
Dept: CARDIAC SURGERY | Facility: CLINIC | Age: 71
End: 2021-01-15

## 2021-01-15 NOTE — TELEPHONE ENCOUNTER
Patient returned my call. After a message from Dr. Pederson we have moved her surgery up from 2-5--21 to 2-1-2021. Arrival time is 0500 with an 0730 start time. PAT is now on 1- at 0730 with all testing to follow. She expressed a verbal understanding of these new dates and times.  She was instructed to call the office with any further questions.

## 2021-01-26 ENCOUNTER — HOSPITAL ENCOUNTER (OUTPATIENT)
Dept: CARDIOLOGY | Facility: HOSPITAL | Age: 71
Discharge: HOME | End: 2021-01-26
Attending: INTERNAL MEDICINE

## 2021-01-29 ENCOUNTER — HOSPITAL ENCOUNTER (OUTPATIENT)
Dept: CARDIOLOGY | Facility: HOSPITAL | Age: 71
Discharge: HOME | End: 2021-01-29

## 2021-01-29 ENCOUNTER — TRANSCRIBE ORDERS (OUTPATIENT)
Dept: PREADMISSION TESTING | Facility: HOSPITAL | Age: 71
End: 2021-01-29

## 2021-01-29 ENCOUNTER — ANESTHESIA EVENT (OUTPATIENT)
Dept: PERIOP | Facility: HOSPITAL | Age: 71
End: 2021-01-29

## 2021-01-29 ENCOUNTER — APPOINTMENT (OUTPATIENT)
Dept: PREADMISSION TESTING | Facility: HOSPITAL | Age: 71
End: 2021-01-29

## 2021-01-29 ENCOUNTER — HOSPITAL ENCOUNTER (OUTPATIENT)
Dept: GENERAL RADIOLOGY | Facility: HOSPITAL | Age: 71
Discharge: HOME | End: 2021-01-29

## 2021-01-29 VITALS
RESPIRATION RATE: 20 BRPM | SYSTOLIC BLOOD PRESSURE: 115 MMHG | WEIGHT: 159.1 LBS | HEART RATE: 68 BPM | HEIGHT: 64 IN | DIASTOLIC BLOOD PRESSURE: 74 MMHG | TEMPERATURE: 98.1 F | BODY MASS INDEX: 27.16 KG/M2 | OXYGEN SATURATION: 98 %

## 2021-01-29 DIAGNOSIS — I35.0 AORTIC STENOSIS: ICD-10-CM

## 2021-01-29 DIAGNOSIS — I65.1 OCCLUSION AND STENOSIS OF BASILAR ARTERY: ICD-10-CM

## 2021-01-29 DIAGNOSIS — I11.0 HYPERTENSIVE HEART DISEASE WITH HEART FAILURE (HCC): ICD-10-CM

## 2021-01-29 DIAGNOSIS — R93.3 ABNORMAL FINDINGS ON DIAGNOSTIC IMAGING OF OTHER PARTS OF DIGESTIVE TRACT: ICD-10-CM

## 2021-01-29 DIAGNOSIS — R79.9 ABNORMAL FINDING OF BLOOD CHEMISTRY, UNSPECIFIED: ICD-10-CM

## 2021-01-29 DIAGNOSIS — R79.1 ABNORMAL COAGULATION PROFILE: ICD-10-CM

## 2021-01-29 DIAGNOSIS — Z01.818 OTHER SPECIFIED PRE-OPERATIVE EXAMINATION: Primary | ICD-10-CM

## 2021-01-29 LAB
ABO GROUP BLD: NORMAL
ALBUMIN SERPL-MCNC: 4.8 G/DL (ref 3.5–5.2)
ALBUMIN/GLOB SERPL: 2 G/DL
ALP SERPL-CCNC: 71 U/L (ref 39–117)
ALT SERPL W P-5'-P-CCNC: 19 U/L (ref 1–33)
ANION GAP SERPL CALCULATED.3IONS-SCNC: 6.4 MMOL/L (ref 5–15)
APTT PPP: 28.7 SECONDS (ref 22.7–35.4)
AST SERPL-CCNC: 21 U/L (ref 1–32)
BACTERIA UR QL AUTO: ABNORMAL /HPF
BACTERIA UR QL AUTO: ABNORMAL /HPF
BASOPHILS # BLD AUTO: 0.02 10*3/MM3 (ref 0–0.2)
BASOPHILS NFR BLD AUTO: 0.3 % (ref 0–1.5)
BH CV XLRA MEAS LEFT CCA RATIO VEL: -82.6 CM/SEC
BH CV XLRA MEAS LEFT DIST CCA EDV: -23.6 CM/SEC
BH CV XLRA MEAS LEFT DIST CCA PSV: -82.6 CM/SEC
BH CV XLRA MEAS LEFT DIST ICA EDV: -24.7 CM/SEC
BH CV XLRA MEAS LEFT DIST ICA PSV: -66.7 CM/SEC
BH CV XLRA MEAS LEFT ICA RATIO VEL: -79.1 CM/SEC
BH CV XLRA MEAS LEFT ICA/CCA RATIO: 0.96
BH CV XLRA MEAS LEFT MID ICA EDV: -32.4 CM/SEC
BH CV XLRA MEAS LEFT MID ICA PSV: -79.1 CM/SEC
BH CV XLRA MEAS LEFT PROX CCA EDV: 23.4 CM/SEC
BH CV XLRA MEAS LEFT PROX CCA PSV: 101.4 CM/SEC
BH CV XLRA MEAS LEFT PROX ECA EDV: -11.5 CM/SEC
BH CV XLRA MEAS LEFT PROX ECA PSV: -70.2 CM/SEC
BH CV XLRA MEAS LEFT PROX ICA EDV: -21.6 CM/SEC
BH CV XLRA MEAS LEFT PROX ICA PSV: -56 CM/SEC
BH CV XLRA MEAS LEFT PROX SCLA PSV: 93.8 CM/SEC
BH CV XLRA MEAS LEFT VERTEBRAL A EDV: -11.2 CM/SEC
BH CV XLRA MEAS LEFT VERTEBRAL A PSV: -33.6 CM/SEC
BH CV XLRA MEAS RIGHT CCA RATIO VEL: 50.9 CM/SEC
BH CV XLRA MEAS RIGHT DIST CCA EDV: 16.2 CM/SEC
BH CV XLRA MEAS RIGHT DIST CCA PSV: 50.9 CM/SEC
BH CV XLRA MEAS RIGHT DIST ICA EDV: -22.6 CM/SEC
BH CV XLRA MEAS RIGHT DIST ICA PSV: -76.9 CM/SEC
BH CV XLRA MEAS RIGHT ICA RATIO VEL: -76.9 CM/SEC
BH CV XLRA MEAS RIGHT ICA/CCA RATIO: -1.5
BH CV XLRA MEAS RIGHT MID ICA EDV: 23.7 CM/SEC
BH CV XLRA MEAS RIGHT MID ICA PSV: 72 CM/SEC
BH CV XLRA MEAS RIGHT PROX CCA EDV: -19.3 CM/SEC
BH CV XLRA MEAS RIGHT PROX CCA PSV: -91.9 CM/SEC
BH CV XLRA MEAS RIGHT PROX ECA EDV: -12.7 CM/SEC
BH CV XLRA MEAS RIGHT PROX ECA PSV: -58.4 CM/SEC
BH CV XLRA MEAS RIGHT PROX ICA EDV: -14.9 CM/SEC
BH CV XLRA MEAS RIGHT PROX ICA PSV: -54.9 CM/SEC
BH CV XLRA MEAS RIGHT PROX SCLA EDV: 8.7 CM/SEC
BH CV XLRA MEAS RIGHT PROX SCLA PSV: 66.5 CM/SEC
BH CV XLRA MEAS RIGHT VERTEBRAL A EDV: -13.3 CM/SEC
BH CV XLRA MEAS RIGHT VERTEBRAL A PSV: -43.9 CM/SEC
BILIRUB SERPL-MCNC: 0.3 MG/DL (ref 0–1.2)
BILIRUB UR QL STRIP: NEGATIVE
BILIRUB UR QL STRIP: NEGATIVE
BLD GP AB SCN SERPL QL: NEGATIVE
BUN SERPL-MCNC: 21 MG/DL (ref 8–23)
BUN/CREAT SERPL: 19.8 (ref 7–25)
CALCIUM SPEC-SCNC: 10.6 MG/DL (ref 8.6–10.5)
CHLORIDE SERPL-SCNC: 99 MMOL/L (ref 98–107)
CHOLEST SERPL-MCNC: 144 MG/DL (ref 0–200)
CLARITY UR: CLEAR
CLARITY UR: CLEAR
CLOSE TME COLL+ADP + EPINEP PNL BLD: 97 %
CO2 SERPL-SCNC: 29.6 MMOL/L (ref 22–29)
COLOR UR: YELLOW
COLOR UR: YELLOW
CREAT SERPL-MCNC: 1.06 MG/DL (ref 0.57–1)
DEPRECATED RDW RBC AUTO: 40.4 FL (ref 37–54)
EOSINOPHIL # BLD AUTO: 0.1 10*3/MM3 (ref 0–0.4)
EOSINOPHIL NFR BLD AUTO: 1.5 % (ref 0.3–6.2)
ERYTHROCYTE [DISTWIDTH] IN BLOOD BY AUTOMATED COUNT: 12.3 % (ref 12.3–15.4)
GFR SERPL CREATININE-BSD FRML MDRD: 52 ML/MIN/1.73
GLOBULIN UR ELPH-MCNC: 2.4 GM/DL
GLUCOSE SERPL-MCNC: 104 MG/DL (ref 65–99)
GLUCOSE UR STRIP-MCNC: NEGATIVE MG/DL
GLUCOSE UR STRIP-MCNC: NEGATIVE MG/DL
HBA1C MFR BLD: 7.05 % (ref 4.8–5.6)
HCT VFR BLD AUTO: 40.6 % (ref 34–46.6)
HDLC SERPL-MCNC: 65 MG/DL (ref 40–60)
HGB BLD-MCNC: 13 G/DL (ref 12–15.9)
HGB UR QL STRIP.AUTO: ABNORMAL
HGB UR QL STRIP.AUTO: ABNORMAL
HYALINE CASTS UR QL AUTO: ABNORMAL /LPF
HYALINE CASTS UR QL AUTO: ABNORMAL /LPF
IMM GRANULOCYTES # BLD AUTO: 0.03 10*3/MM3 (ref 0–0.05)
IMM GRANULOCYTES NFR BLD AUTO: 0.5 % (ref 0–0.5)
INR PPP: 0.95 (ref 0.9–1.1)
KETONES UR QL STRIP: NEGATIVE
KETONES UR QL STRIP: NEGATIVE
LDLC SERPL CALC-MCNC: 61 MG/DL (ref 0–100)
LDLC/HDLC SERPL: 0.91 {RATIO}
LEFT ARM BP: NORMAL MMHG
LEUKOCYTE ESTERASE UR QL STRIP.AUTO: ABNORMAL
LEUKOCYTE ESTERASE UR QL STRIP.AUTO: NEGATIVE
LYMPHOCYTES # BLD AUTO: 1.81 10*3/MM3 (ref 0.7–3.1)
LYMPHOCYTES NFR BLD AUTO: 27.3 % (ref 19.6–45.3)
MAGNESIUM SERPL-MCNC: 2.3 MG/DL (ref 1.6–2.4)
MCH RBC QN AUTO: 29 PG (ref 26.6–33)
MCHC RBC AUTO-ENTMCNC: 32 G/DL (ref 31.5–35.7)
MCV RBC AUTO: 90.4 FL (ref 79–97)
MONOCYTES # BLD AUTO: 0.53 10*3/MM3 (ref 0.1–0.9)
MONOCYTES NFR BLD AUTO: 8 % (ref 5–12)
NEUTROPHILS NFR BLD AUTO: 4.14 10*3/MM3 (ref 1.7–7)
NEUTROPHILS NFR BLD AUTO: 62.4 % (ref 42.7–76)
NITRITE UR QL STRIP: NEGATIVE
NITRITE UR QL STRIP: POSITIVE
NRBC BLD AUTO-RTO: 0 /100 WBC (ref 0–0.2)
NT-PROBNP SERPL-MCNC: 92.1 PG/ML (ref 0–900)
PH UR STRIP.AUTO: 7 [PH] (ref 5–8)
PH UR STRIP.AUTO: 8 [PH] (ref 5–8)
PLATELET # BLD AUTO: 220 10*3/MM3 (ref 140–450)
PMV BLD AUTO: 8.9 FL (ref 6–12)
POTASSIUM SERPL-SCNC: 4.2 MMOL/L (ref 3.5–5.2)
PROT SERPL-MCNC: 7.2 G/DL (ref 6–8.5)
PROT UR QL STRIP: NEGATIVE
PROT UR QL STRIP: NEGATIVE
PROTHROMBIN TIME: 12.5 SECONDS (ref 11.7–14.2)
QT INTERVAL: 454 MS
RBC # BLD AUTO: 4.49 10*6/MM3 (ref 3.77–5.28)
RBC # UR: ABNORMAL /HPF
RBC # UR: ABNORMAL /HPF
REF LAB TEST METHOD: ABNORMAL
REF LAB TEST METHOD: ABNORMAL
RH BLD: POSITIVE
RIGHT ARM BP: NORMAL MMHG
SODIUM SERPL-SCNC: 135 MMOL/L (ref 136–145)
SP GR UR STRIP: 1.01 (ref 1–1.03)
SP GR UR STRIP: 1.01 (ref 1–1.03)
SQUAMOUS #/AREA URNS HPF: ABNORMAL /HPF
SQUAMOUS #/AREA URNS HPF: ABNORMAL /HPF
T&S EXPIRATION DATE: NORMAL
TRIGL SERPL-MCNC: 100 MG/DL (ref 0–150)
UROBILINOGEN UR QL STRIP: ABNORMAL
UROBILINOGEN UR QL STRIP: ABNORMAL
VLDLC SERPL-MCNC: 18 MG/DL (ref 5–40)
WBC # BLD AUTO: 6.63 10*3/MM3 (ref 3.4–10.8)
WBC UR QL AUTO: ABNORMAL /HPF
WBC UR QL AUTO: ABNORMAL /HPF

## 2021-01-29 PROCEDURE — 86923 COMPATIBILITY TEST ELECTRIC: CPT

## 2021-01-29 PROCEDURE — 85730 THROMBOPLASTIN TIME PARTIAL: CPT

## 2021-01-29 PROCEDURE — U0004 COV-19 TEST NON-CDC HGH THRU: HCPCS | Performed by: NURSE PRACTITIONER

## 2021-01-29 PROCEDURE — 86900 BLOOD TYPING SEROLOGIC ABO: CPT

## 2021-01-29 PROCEDURE — 85025 COMPLETE CBC W/AUTO DIFF WBC: CPT

## 2021-01-29 PROCEDURE — 36415 COLL VENOUS BLD VENIPUNCTURE: CPT

## 2021-01-29 PROCEDURE — 71046 X-RAY EXAM CHEST 2 VIEWS: CPT

## 2021-01-29 PROCEDURE — 83880 ASSAY OF NATRIURETIC PEPTIDE: CPT

## 2021-01-29 PROCEDURE — 81001 URINALYSIS AUTO W/SCOPE: CPT

## 2021-01-29 PROCEDURE — 83735 ASSAY OF MAGNESIUM: CPT

## 2021-01-29 PROCEDURE — 83036 HEMOGLOBIN GLYCOSYLATED A1C: CPT

## 2021-01-29 PROCEDURE — 80061 LIPID PANEL: CPT

## 2021-01-29 PROCEDURE — 80053 COMPREHEN METABOLIC PANEL: CPT

## 2021-01-29 PROCEDURE — 93010 ELECTROCARDIOGRAM REPORT: CPT | Performed by: INTERNAL MEDICINE

## 2021-01-29 PROCEDURE — 93005 ELECTROCARDIOGRAM TRACING: CPT

## 2021-01-29 PROCEDURE — C9803 HOPD COVID-19 SPEC COLLECT: HCPCS | Performed by: NURSE PRACTITIONER

## 2021-01-29 PROCEDURE — 63710000001 MUPIROCIN 2 % OINTMENT: Performed by: NURSE PRACTITIONER

## 2021-01-29 PROCEDURE — 86901 BLOOD TYPING SEROLOGIC RH(D): CPT

## 2021-01-29 PROCEDURE — 86850 RBC ANTIBODY SCREEN: CPT

## 2021-01-29 PROCEDURE — 85576 BLOOD PLATELET AGGREGATION: CPT

## 2021-01-29 PROCEDURE — 85610 PROTHROMBIN TIME: CPT

## 2021-01-29 PROCEDURE — A9270 NON-COVERED ITEM OR SERVICE: HCPCS | Performed by: NURSE PRACTITIONER

## 2021-01-29 PROCEDURE — 93880 EXTRACRANIAL BILAT STUDY: CPT

## 2021-01-29 PROCEDURE — 63710000001 CHLORHEXIDINE 0.12 % SOLUTION: Performed by: NURSE PRACTITIONER

## 2021-01-29 PROCEDURE — S0260 H&P FOR SURGERY: HCPCS | Performed by: NURSE PRACTITIONER

## 2021-01-29 RX ORDER — CHLORHEXIDINE GLUCONATE 500 MG/1
1 CLOTH TOPICAL EVERY 12 HOURS PRN
Status: ACTIVE | OUTPATIENT
Start: 2021-01-29

## 2021-01-29 RX ORDER — CHLORHEXIDINE GLUCONATE 0.12 MG/ML
15 RINSE ORAL 2 TIMES DAILY
COMMUNITY
End: 2021-02-08 | Stop reason: HOSPADM

## 2021-01-29 RX ORDER — LEVOFLOXACIN 500 MG/1
500 TABLET, FILM COATED ORAL DAILY
Qty: 5 TABLET | Refills: 0 | Status: SHIPPED | OUTPATIENT
Start: 2021-01-29 | End: 2021-02-08 | Stop reason: HOSPADM

## 2021-01-29 RX ORDER — CHLORHEXIDINE GLUCONATE 0.12 MG/ML
15 RINSE ORAL EVERY 12 HOURS
Status: DISPENSED | OUTPATIENT
Start: 2021-01-29 | End: 2021-01-30

## 2021-01-30 ENCOUNTER — LAB (OUTPATIENT)
Dept: LAB | Facility: HOSPITAL | Age: 71
End: 2021-01-30

## 2021-01-30 DIAGNOSIS — Z01.818 OTHER SPECIFIED PRE-OPERATIVE EXAMINATION: ICD-10-CM

## 2021-01-30 LAB — SARS-COV-2 RNA RESP QL NAA+PROBE: NOT DETECTED

## 2021-01-30 PROCEDURE — C9803 HOPD COVID-19 SPEC COLLECT: HCPCS

## 2021-01-30 PROCEDURE — U0004 COV-19 TEST NON-CDC HGH THRU: HCPCS

## 2021-02-01 LAB — SARS-COV-2 RNA RESP QL NAA+PROBE: NOT DETECTED

## 2021-02-02 ENCOUNTER — ANCILLARY PROCEDURE (OUTPATIENT)
Dept: PERIOP | Facility: HOSPITAL | Age: 71
End: 2021-02-02

## 2021-02-02 ENCOUNTER — ANESTHESIA (OUTPATIENT)
Dept: PERIOP | Facility: HOSPITAL | Age: 71
End: 2021-02-02

## 2021-02-02 ENCOUNTER — APPOINTMENT (OUTPATIENT)
Dept: GENERAL RADIOLOGY | Facility: HOSPITAL | Age: 71
End: 2021-02-02

## 2021-02-02 ENCOUNTER — HOSPITAL ENCOUNTER (INPATIENT)
Facility: HOSPITAL | Age: 71
LOS: 6 days | Discharge: HOME HEALTH | End: 2021-02-08
Attending: THORACIC SURGERY (CARDIOTHORACIC VASCULAR SURGERY) | Admitting: THORACIC SURGERY (CARDIOTHORACIC VASCULAR SURGERY)

## 2021-02-02 DIAGNOSIS — I35.0 AORTIC STENOSIS: ICD-10-CM

## 2021-02-02 DIAGNOSIS — Z95.2 S/P AVR (AORTIC VALVE REPLACEMENT): Primary | ICD-10-CM

## 2021-02-02 LAB
ABO GROUP BLD: NORMAL
ALBUMIN SERPL-MCNC: 4.2 G/DL (ref 3.5–5.2)
ALBUMIN SERPL-MCNC: 4.8 G/DL (ref 3.5–5.2)
ANION GAP SERPL CALCULATED.3IONS-SCNC: 11.9 MMOL/L (ref 5–15)
ANION GAP SERPL CALCULATED.3IONS-SCNC: 9.2 MMOL/L (ref 5–15)
APTT PPP: 34.9 SECONDS (ref 22.7–35.4)
ARTERIAL PATENCY WRIST A: ABNORMAL
ATMOSPHERIC PRESS: 753.3 MMHG
ATMOSPHERIC PRESS: 753.6 MMHG
ATMOSPHERIC PRESS: 753.7 MMHG
ATMOSPHERIC PRESS: 755.5 MMHG
ATMOSPHERIC PRESS: 755.8 MMHG
ATMOSPHERIC PRESS: 756 MMHG
BACTERIA UR QL AUTO: NORMAL /HPF
BASE EXCESS BLDA CALC-SCNC: -1.5 MMOL/L (ref 0–2)
BASE EXCESS BLDA CALC-SCNC: -2 MMOL/L (ref 0–2)
BASE EXCESS BLDA CALC-SCNC: -2 MMOL/L (ref 0–2)
BASE EXCESS BLDA CALC-SCNC: -2.3 MMOL/L (ref 0–2)
BASE EXCESS BLDA CALC-SCNC: -2.3 MMOL/L (ref 0–2)
BASE EXCESS BLDA CALC-SCNC: -3.2 MMOL/L (ref 0–2)
BASOPHILS # BLD AUTO: 0.02 10*3/MM3 (ref 0–0.2)
BASOPHILS NFR BLD AUTO: 0.2 % (ref 0–1.5)
BDY SITE: ABNORMAL
BILIRUB UR QL STRIP: NEGATIVE
BUN SERPL-MCNC: 17 MG/DL (ref 8–23)
BUN SERPL-MCNC: 18 MG/DL (ref 8–23)
BUN/CREAT SERPL: 14.3 (ref 7–25)
BUN/CREAT SERPL: 16.2 (ref 7–25)
CA-I BLD-MCNC: 5.4 MG/DL (ref 4.6–5.4)
CA-I SERPL ISE-MCNC: 1.35 MMOL/L (ref 1.15–1.35)
CALCIUM SPEC-SCNC: 9 MG/DL (ref 8.6–10.5)
CALCIUM SPEC-SCNC: 9.1 MG/DL (ref 8.6–10.5)
CHLORIDE SERPL-SCNC: 109 MMOL/L (ref 98–107)
CHLORIDE SERPL-SCNC: 110 MMOL/L (ref 98–107)
CLARITY UR: CLEAR
CO2 SERPL-SCNC: 19.1 MMOL/L (ref 22–29)
CO2 SERPL-SCNC: 22.8 MMOL/L (ref 22–29)
COLOR UR: YELLOW
CREAT SERPL-MCNC: 1.11 MG/DL (ref 0.57–1)
CREAT SERPL-MCNC: 1.19 MG/DL (ref 0.57–1)
DEPRECATED RDW RBC AUTO: 40.8 FL (ref 37–54)
DEPRECATED RDW RBC AUTO: 42.8 FL (ref 37–54)
EOSINOPHIL # BLD AUTO: 0.04 10*3/MM3 (ref 0–0.4)
EOSINOPHIL NFR BLD AUTO: 0.4 % (ref 0.3–6.2)
ERYTHROCYTE [DISTWIDTH] IN BLOOD BY AUTOMATED COUNT: 12.5 % (ref 12.3–15.4)
ERYTHROCYTE [DISTWIDTH] IN BLOOD BY AUTOMATED COUNT: 13.5 % (ref 12.3–15.4)
FIBRINOGEN PPP-MCNC: 205 MG/DL (ref 219–464)
GFR SERPL CREATININE-BSD FRML MDRD: 45 ML/MIN/1.73
GFR SERPL CREATININE-BSD FRML MDRD: 49 ML/MIN/1.73
GLUCOSE BLDC GLUCOMTR-MCNC: 118 MG/DL (ref 70–130)
GLUCOSE BLDC GLUCOMTR-MCNC: 119 MG/DL (ref 70–130)
GLUCOSE BLDC GLUCOMTR-MCNC: 128 MG/DL (ref 70–130)
GLUCOSE BLDC GLUCOMTR-MCNC: 149 MG/DL (ref 70–130)
GLUCOSE BLDC GLUCOMTR-MCNC: 158 MG/DL (ref 70–130)
GLUCOSE BLDC GLUCOMTR-MCNC: 159 MG/DL (ref 70–130)
GLUCOSE BLDC GLUCOMTR-MCNC: 191 MG/DL (ref 70–130)
GLUCOSE BLDC GLUCOMTR-MCNC: 200 MG/DL (ref 70–130)
GLUCOSE BLDC GLUCOMTR-MCNC: 81 MG/DL (ref 70–130)
GLUCOSE BLDC GLUCOMTR-MCNC: 98 MG/DL (ref 70–130)
GLUCOSE SERPL-MCNC: 145 MG/DL (ref 65–99)
GLUCOSE SERPL-MCNC: 90 MG/DL (ref 65–99)
GLUCOSE UR STRIP-MCNC: NEGATIVE MG/DL
HCO3 BLDA-SCNC: 22.6 MMOL/L (ref 22–28)
HCO3 BLDA-SCNC: 22.7 MMOL/L (ref 22–28)
HCO3 BLDA-SCNC: 22.9 MMOL/L (ref 22–28)
HCO3 BLDA-SCNC: 23.9 MMOL/L (ref 22–28)
HCO3 BLDA-SCNC: 24.5 MMOL/L (ref 22–28)
HCO3 BLDA-SCNC: 24.6 MMOL/L (ref 22–28)
HCT VFR BLD AUTO: 22.4 % (ref 34–46.6)
HCT VFR BLD AUTO: 25.5 % (ref 34–46.6)
HCT VFR BLD AUTO: 26.3 % (ref 34–46.6)
HGB BLD-MCNC: 7.5 G/DL (ref 12–15.9)
HGB BLD-MCNC: 8.8 G/DL (ref 12–15.9)
HGB BLD-MCNC: 9 G/DL (ref 12–15.9)
HGB UR QL STRIP.AUTO: ABNORMAL
IMM GRANULOCYTES # BLD AUTO: 0.04 10*3/MM3 (ref 0–0.05)
IMM GRANULOCYTES NFR BLD AUTO: 0.4 % (ref 0–0.5)
INHALED O2 CONCENTRATION: 100 %
INHALED O2 CONCENTRATION: 40 %
INR PPP: 1.58 (ref 0.9–1.1)
KETONES UR QL STRIP: NEGATIVE
LEUKOCYTE ESTERASE UR QL STRIP.AUTO: NEGATIVE
LYMPHOCYTES # BLD AUTO: 1.17 10*3/MM3 (ref 0.7–3.1)
LYMPHOCYTES NFR BLD AUTO: 10.5 % (ref 19.6–45.3)
MAGNESIUM SERPL-MCNC: 2.2 MG/DL (ref 1.6–2.4)
MAGNESIUM SERPL-MCNC: 2.4 MG/DL (ref 1.6–2.4)
MCH RBC QN AUTO: 30.1 PG (ref 26.6–33)
MCH RBC QN AUTO: 30.5 PG (ref 26.6–33)
MCHC RBC AUTO-ENTMCNC: 34.2 G/DL (ref 31.5–35.7)
MCHC RBC AUTO-ENTMCNC: 34.5 G/DL (ref 31.5–35.7)
MCV RBC AUTO: 87.3 FL (ref 79–97)
MCV RBC AUTO: 89.2 FL (ref 79–97)
MODALITY: ABNORMAL
MONOCYTES # BLD AUTO: 0.76 10*3/MM3 (ref 0.1–0.9)
MONOCYTES NFR BLD AUTO: 6.8 % (ref 5–12)
NEUTROPHILS NFR BLD AUTO: 81.7 % (ref 42.7–76)
NEUTROPHILS NFR BLD AUTO: 9.07 10*3/MM3 (ref 1.7–7)
NITRITE UR QL STRIP: NEGATIVE
NRBC BLD AUTO-RTO: 0 /100 WBC (ref 0–0.2)
O2 A-A PPRESDIFF RESPIRATORY: 0.7 MMHG
O2 A-A PPRESDIFF RESPIRATORY: 0.8 MMHG
PCO2 BLDA: 37.9 MM HG (ref 35–45)
PCO2 BLDA: 39 MM HG (ref 35–45)
PCO2 BLDA: 44.7 MM HG (ref 35–45)
PCO2 BLDA: 45.2 MM HG (ref 35–45)
PCO2 BLDA: 47.9 MM HG (ref 35–45)
PCO2 BLDA: 50.1 MM HG (ref 35–45)
PEEP RESPIRATORY: 10 CM[H2O]
PEEP RESPIRATORY: 10 CM[H2O]
PEEP RESPIRATORY: 7.5 CM[H2O]
PH BLDA: 7.3 PH UNITS (ref 7.35–7.45)
PH BLDA: 7.32 PH UNITS (ref 7.35–7.45)
PH BLDA: 7.32 PH UNITS (ref 7.35–7.45)
PH BLDA: 7.33 PH UNITS (ref 7.35–7.45)
PH BLDA: 7.37 PH UNITS (ref 7.35–7.45)
PH BLDA: 7.38 PH UNITS (ref 7.35–7.45)
PH UR STRIP.AUTO: 5.5 [PH] (ref 5–8)
PHOSPHATE SERPL-MCNC: 1 MG/DL (ref 2.5–4.5)
PHOSPHATE SERPL-MCNC: 1.9 MG/DL (ref 2.5–4.5)
PLATELET # BLD AUTO: 114 10*3/MM3 (ref 140–450)
PLATELET # BLD AUTO: 91 10*3/MM3 (ref 140–450)
PMV BLD AUTO: 9.1 FL (ref 6–12)
PMV BLD AUTO: 9.3 FL (ref 6–12)
PO2 BLDA: 161.5 MM HG (ref 80–100)
PO2 BLDA: 171.5 MM HG (ref 80–100)
PO2 BLDA: 172.6 MM HG (ref 80–100)
PO2 BLDA: 187.8 MM HG (ref 80–100)
PO2 BLDA: 192.3 MM HG (ref 80–100)
PO2 BLDA: 577.8 MM HG (ref 80–100)
POTASSIUM SERPL-SCNC: 3.6 MMOL/L (ref 3.5–5.2)
POTASSIUM SERPL-SCNC: 4.5 MMOL/L (ref 3.5–5.2)
PROT UR QL STRIP: NEGATIVE
PROTHROMBIN TIME: 18.6 SECONDS (ref 11.7–14.2)
PSV: 8 CMH2O
QT INTERVAL: 408 MS
RBC # BLD AUTO: 2.92 10*6/MM3 (ref 3.77–5.28)
RBC # BLD AUTO: 2.95 10*6/MM3 (ref 3.77–5.28)
RBC # UR: NORMAL /HPF
REF LAB TEST METHOD: NORMAL
RH BLD: POSITIVE
SAO2 % BLDCOA: 100 % (ref 92–99)
SAO2 % BLDCOA: 99.3 % (ref 92–99)
SAO2 % BLDCOA: 99.4 % (ref 92–99)
SAO2 % BLDCOA: 99.5 % (ref 92–99)
SAO2 % BLDCOA: 99.5 % (ref 92–99)
SAO2 % BLDCOA: 99.7 % (ref 92–99)
SET MECH RESP RATE: 14
SET MECH RESP RATE: 14
SET MECH RESP RATE: 16
SODIUM SERPL-SCNC: 141 MMOL/L (ref 136–145)
SODIUM SERPL-SCNC: 141 MMOL/L (ref 136–145)
SP GR UR STRIP: 1.01 (ref 1–1.03)
SQUAMOUS #/AREA URNS HPF: NORMAL /HPF
TOTAL RATE: 14 BREATHS/MINUTE
TOTAL RATE: 15 BREATHS/MINUTE
TOTAL RATE: 16 BREATHS/MINUTE
TOTAL RATE: 17 BREATHS/MINUTE
UROBILINOGEN UR QL STRIP: ABNORMAL
VENTILATOR MODE: ABNORMAL
VENTILATOR MODE: AC
VT ON VENT VENT: 0.55 ML
VT ON VENT VENT: 0.6 ML
VT ON VENT VENT: 509 ML
VT ON VENT VENT: 600 ML
WBC # BLD AUTO: 10.6 10*3/MM3 (ref 3.4–10.8)
WBC # BLD AUTO: 11.1 10*3/MM3 (ref 3.4–10.8)
WBC UR QL AUTO: NORMAL /HPF

## 2021-02-02 PROCEDURE — 25010000003 CEFAZOLIN IN DEXTROSE 2-4 GM/100ML-% SOLUTION: Performed by: NURSE PRACTITIONER

## 2021-02-02 PROCEDURE — 82803 BLOOD GASES ANY COMBINATION: CPT

## 2021-02-02 PROCEDURE — 25010000002 MIDAZOLAM PER 1 MG: Performed by: ANESTHESIOLOGY

## 2021-02-02 PROCEDURE — 83735 ASSAY OF MAGNESIUM: CPT | Performed by: NURSE PRACTITIONER

## 2021-02-02 PROCEDURE — 3E080GC INTRODUCTION OF OTHER THERAPEUTIC SUBSTANCE INTO HEART, OPEN APPROACH: ICD-10-PCS | Performed by: THORACIC SURGERY (CARDIOTHORACIC VASCULAR SURGERY)

## 2021-02-02 PROCEDURE — P9041 ALBUMIN (HUMAN),5%, 50ML: HCPCS | Performed by: NURSE PRACTITIONER

## 2021-02-02 PROCEDURE — 25010000002 HEPARIN (PORCINE) PER 1000 UNITS

## 2021-02-02 PROCEDURE — 86900 BLOOD TYPING SEROLOGIC ABO: CPT

## 2021-02-02 PROCEDURE — 25010000003 POTASSIUM CHLORIDE PER 2 MEQ: Performed by: NURSE PRACTITIONER

## 2021-02-02 PROCEDURE — 94799 UNLISTED PULMONARY SVC/PX: CPT

## 2021-02-02 PROCEDURE — B245ZZ4 ULTRASONOGRAPHY OF LEFT HEART, TRANSESOPHAGEAL: ICD-10-PCS | Performed by: THORACIC SURGERY (CARDIOTHORACIC VASCULAR SURGERY)

## 2021-02-02 PROCEDURE — 85610 PROTHROMBIN TIME: CPT | Performed by: NURSE PRACTITIONER

## 2021-02-02 PROCEDURE — 25010000002 PROPOFOL 10 MG/ML EMULSION: Performed by: ANESTHESIOLOGY

## 2021-02-02 PROCEDURE — 88305 TISSUE EXAM BY PATHOLOGIST: CPT | Performed by: THORACIC SURGERY (CARDIOTHORACIC VASCULAR SURGERY)

## 2021-02-02 PROCEDURE — 25010000002 METOCLOPRAMIDE PER 10 MG: Performed by: NURSE PRACTITIONER

## 2021-02-02 PROCEDURE — C1713 ANCHOR/SCREW BN/BN,TIS/BN: HCPCS | Performed by: THORACIC SURGERY (CARDIOTHORACIC VASCULAR SURGERY)

## 2021-02-02 PROCEDURE — 85018 HEMOGLOBIN: CPT

## 2021-02-02 PROCEDURE — 5A1221Z PERFORMANCE OF CARDIAC OUTPUT, CONTINUOUS: ICD-10-PCS | Performed by: THORACIC SURGERY (CARDIOTHORACIC VASCULAR SURGERY)

## 2021-02-02 PROCEDURE — 82962 GLUCOSE BLOOD TEST: CPT

## 2021-02-02 PROCEDURE — 25010000002 ALBUMIN HUMAN 5% PER 50 ML: Performed by: NURSE PRACTITIONER

## 2021-02-02 PROCEDURE — 85730 THROMBOPLASTIN TIME PARTIAL: CPT | Performed by: NURSE PRACTITIONER

## 2021-02-02 PROCEDURE — 25010000002 MAGNESIUM SULFATE IN D5W 1G/100ML (PREMIX) 1-5 GM/100ML-% SOLUTION: Performed by: NURSE PRACTITIONER

## 2021-02-02 PROCEDURE — 25010000002 PHENYLEPHRINE PER 1 ML: Performed by: ANESTHESIOLOGY

## 2021-02-02 PROCEDURE — 85014 HEMATOCRIT: CPT | Performed by: THORACIC SURGERY (CARDIOTHORACIC VASCULAR SURGERY)

## 2021-02-02 PROCEDURE — C1751 CATH, INF, PER/CENT/MIDLINE: HCPCS | Performed by: ANESTHESIOLOGY

## 2021-02-02 PROCEDURE — 86901 BLOOD TYPING SEROLOGIC RH(D): CPT

## 2021-02-02 PROCEDURE — 86927 PLASMA FRESH FROZEN: CPT

## 2021-02-02 PROCEDURE — 85018 HEMOGLOBIN: CPT | Performed by: THORACIC SURGERY (CARDIOTHORACIC VASCULAR SURGERY)

## 2021-02-02 PROCEDURE — 93005 ELECTROCARDIOGRAM TRACING: CPT | Performed by: NURSE PRACTITIONER

## 2021-02-02 PROCEDURE — 33405 REPLACEMENT AORTIC VALVE OPN: CPT | Performed by: PHYSICIAN ASSISTANT

## 2021-02-02 PROCEDURE — 36430 TRANSFUSION BLD/BLD COMPNT: CPT

## 2021-02-02 PROCEDURE — 25010000002 MORPHINE PER 10 MG: Performed by: NURSE PRACTITIONER

## 2021-02-02 PROCEDURE — C1729 CATH, DRAINAGE: HCPCS | Performed by: THORACIC SURGERY (CARDIOTHORACIC VASCULAR SURGERY)

## 2021-02-02 PROCEDURE — 85027 COMPLETE CBC AUTOMATED: CPT | Performed by: NURSE PRACTITIONER

## 2021-02-02 PROCEDURE — 25010000002 HEPARIN (PORCINE) PER 1000 UNITS: Performed by: ANESTHESIOLOGY

## 2021-02-02 PROCEDURE — 81001 URINALYSIS AUTO W/SCOPE: CPT | Performed by: NURSE PRACTITIONER

## 2021-02-02 PROCEDURE — P9012 CRYOPRECIPITATE EACH UNIT: HCPCS

## 2021-02-02 PROCEDURE — 02RF08Z REPLACEMENT OF AORTIC VALVE WITH ZOOPLASTIC TISSUE, OPEN APPROACH: ICD-10-PCS | Performed by: THORACIC SURGERY (CARDIOTHORACIC VASCULAR SURGERY)

## 2021-02-02 PROCEDURE — 71045 X-RAY EXAM CHEST 1 VIEW: CPT

## 2021-02-02 PROCEDURE — P9016 RBC LEUKOCYTES REDUCED: HCPCS

## 2021-02-02 PROCEDURE — 85025 COMPLETE CBC W/AUTO DIFF WBC: CPT | Performed by: NURSE PRACTITIONER

## 2021-02-02 PROCEDURE — P9035 PLATELET PHERES LEUKOREDUCED: HCPCS

## 2021-02-02 PROCEDURE — 85014 HEMATOCRIT: CPT

## 2021-02-02 PROCEDURE — 33405 REPLACEMENT AORTIC VALVE OPN: CPT | Performed by: THORACIC SURGERY (CARDIOTHORACIC VASCULAR SURGERY)

## 2021-02-02 PROCEDURE — 93010 ELECTROCARDIOGRAM REPORT: CPT | Performed by: INTERNAL MEDICINE

## 2021-02-02 PROCEDURE — 82947 ASSAY GLUCOSE BLOOD QUANT: CPT

## 2021-02-02 PROCEDURE — 25010000002 ONDANSETRON PER 1 MG: Performed by: ANESTHESIOLOGY

## 2021-02-02 PROCEDURE — 25010000003 INSULIN REGULAR HUMAN PER 5 UNITS: Performed by: NURSE PRACTITIONER

## 2021-02-02 PROCEDURE — 88311 DECALCIFY TISSUE: CPT | Performed by: THORACIC SURGERY (CARDIOTHORACIC VASCULAR SURGERY)

## 2021-02-02 PROCEDURE — 25010000002 FENTANYL CITRATE (PF) 100 MCG/2ML SOLUTION: Performed by: ANESTHESIOLOGY

## 2021-02-02 PROCEDURE — 85347 COAGULATION TIME ACTIVATED: CPT

## 2021-02-02 PROCEDURE — 85384 FIBRINOGEN ACTIVITY: CPT | Performed by: NURSE PRACTITIONER

## 2021-02-02 PROCEDURE — 80069 RENAL FUNCTION PANEL: CPT | Performed by: NURSE PRACTITIONER

## 2021-02-02 PROCEDURE — 93318 ECHO TRANSESOPHAGEAL INTRAOP: CPT | Performed by: ANESTHESIOLOGY

## 2021-02-02 PROCEDURE — 25010000002 PROTAMINE SULFATE PER 10 MG: Performed by: ANESTHESIOLOGY

## 2021-02-02 PROCEDURE — 82330 ASSAY OF CALCIUM: CPT | Performed by: NURSE PRACTITIONER

## 2021-02-02 PROCEDURE — 25010000002 ONDANSETRON PER 1 MG: Performed by: NURSE PRACTITIONER

## 2021-02-02 PROCEDURE — 25010000002 VANCOMYCIN 1 G RECONSTITUTED SOLUTION

## 2021-02-02 PROCEDURE — 63710000001 INSULIN REGULAR HUMAN PER 5 UNITS: Performed by: ANESTHESIOLOGY

## 2021-02-02 PROCEDURE — 94002 VENT MGMT INPAT INIT DAY: CPT

## 2021-02-02 DEVICE — SS SUTURE, 4 PER SLEEVE
Type: IMPLANTABLE DEVICE | Site: STERNUM | Status: FUNCTIONAL
Brand: MYO/WIRE II

## 2021-02-02 DEVICE — COR-KNOT MINI® COMBO KITBASE PACKAGE TYPE - KITEACH STERILE PACKAGE KIT CONTAINS (2) SINGLE PATIENT USE COR-KNOT MINI® DEVICES AND (12) COR-KNOT® QUICK LOADS®.
Type: IMPLANTABLE DEVICE | Site: HEART | Status: FUNCTIONAL
Brand: COR-KNOT MINI®

## 2021-02-02 DEVICE — ABSORBABLE HEMOSTAT (OXIDIZED REGENERATED CELLULOSE, U.S.P.)
Type: IMPLANTABLE DEVICE | Site: HEART | Status: FUNCTIONAL
Brand: SURGICEL

## 2021-02-02 DEVICE — VLV PERICARD PERIMOUNT MAGNA EASE 25: Type: IMPLANTABLE DEVICE | Site: HEART | Status: FUNCTIONAL

## 2021-02-02 RX ORDER — POTASSIUM CHLORIDE 7.45 MG/ML
10 INJECTION INTRAVENOUS
Status: DISCONTINUED | OUTPATIENT
Start: 2021-02-02 | End: 2021-02-08 | Stop reason: HOSPADM

## 2021-02-02 RX ORDER — NITROGLYCERIN 20 MG/100ML
5-200 INJECTION INTRAVENOUS
Status: DISCONTINUED | OUTPATIENT
Start: 2021-02-02 | End: 2021-02-03

## 2021-02-02 RX ORDER — SODIUM CHLORIDE 9 MG/ML
30 INJECTION, SOLUTION INTRAVENOUS CONTINUOUS
Status: DISCONTINUED | OUTPATIENT
Start: 2021-02-02 | End: 2021-02-03

## 2021-02-02 RX ORDER — CEFAZOLIN SODIUM 2 G/100ML
2 INJECTION, SOLUTION INTRAVENOUS
Status: COMPLETED | OUTPATIENT
Start: 2021-02-02 | End: 2021-02-02

## 2021-02-02 RX ORDER — CHLORHEXIDINE GLUCONATE 0.12 MG/ML
15 RINSE ORAL ONCE
Status: COMPLETED | OUTPATIENT
Start: 2021-02-02 | End: 2021-02-02

## 2021-02-02 RX ORDER — MIDAZOLAM HYDROCHLORIDE 1 MG/ML
0.5 INJECTION INTRAMUSCULAR; INTRAVENOUS
Status: COMPLETED | OUTPATIENT
Start: 2021-02-02 | End: 2021-02-02

## 2021-02-02 RX ORDER — HEPARIN SODIUM 1000 [USP'U]/ML
INJECTION, SOLUTION INTRAVENOUS; SUBCUTANEOUS AS NEEDED
Status: DISCONTINUED | OUTPATIENT
Start: 2021-02-02 | End: 2021-02-02 | Stop reason: SURG

## 2021-02-02 RX ORDER — ACETAMINOPHEN 160 MG/5ML
650 SOLUTION ORAL EVERY 4 HOURS
Status: DISCONTINUED | OUTPATIENT
Start: 2021-02-02 | End: 2021-02-03

## 2021-02-02 RX ORDER — NICARDIPINE HYDROCHLORIDE 2.5 MG/ML
INJECTION INTRAVENOUS AS NEEDED
Status: DISCONTINUED | OUTPATIENT
Start: 2021-02-02 | End: 2021-02-02 | Stop reason: SURG

## 2021-02-02 RX ORDER — MILRINONE LACTATE 0.2 MG/ML
.25-.75 INJECTION, SOLUTION INTRAVENOUS CONTINUOUS PRN
Status: DISCONTINUED | OUTPATIENT
Start: 2021-02-02 | End: 2021-02-03

## 2021-02-02 RX ORDER — MAGNESIUM SULFATE 1 G/100ML
1 INJECTION INTRAVENOUS EVERY 8 HOURS
Status: DISCONTINUED | OUTPATIENT
Start: 2021-02-02 | End: 2021-02-03

## 2021-02-02 RX ORDER — NOREPINEPHRINE BIT/0.9 % NACL 8 MG/250ML
.02-.3 INFUSION BOTTLE (ML) INTRAVENOUS CONTINUOUS PRN
Status: DISCONTINUED | OUTPATIENT
Start: 2021-02-02 | End: 2021-02-03

## 2021-02-02 RX ORDER — ACETAMINOPHEN 325 MG/1
650 TABLET ORAL EVERY 4 HOURS
Status: DISCONTINUED | OUTPATIENT
Start: 2021-02-02 | End: 2021-02-03

## 2021-02-02 RX ORDER — OXYCODONE HYDROCHLORIDE 5 MG/1
10 TABLET ORAL EVERY 4 HOURS PRN
Status: DISCONTINUED | OUTPATIENT
Start: 2021-02-02 | End: 2021-02-04

## 2021-02-02 RX ORDER — DOPAMINE HYDROCHLORIDE 160 MG/100ML
2-20 INJECTION, SOLUTION INTRAVENOUS CONTINUOUS PRN
Status: DISCONTINUED | OUTPATIENT
Start: 2021-02-02 | End: 2021-02-03

## 2021-02-02 RX ORDER — ACETAMINOPHEN 325 MG/1
650 TABLET ORAL EVERY 4 HOURS PRN
Status: DISCONTINUED | OUTPATIENT
Start: 2021-02-03 | End: 2021-02-05

## 2021-02-02 RX ORDER — FENTANYL CITRATE 50 UG/ML
INJECTION, SOLUTION INTRAMUSCULAR; INTRAVENOUS AS NEEDED
Status: DISCONTINUED | OUTPATIENT
Start: 2021-02-02 | End: 2021-02-02 | Stop reason: SURG

## 2021-02-02 RX ORDER — ACETAMINOPHEN 650 MG/1
650 SUPPOSITORY RECTAL EVERY 4 HOURS
Status: DISCONTINUED | OUTPATIENT
Start: 2021-02-02 | End: 2021-02-03

## 2021-02-02 RX ORDER — ACETAMINOPHEN 650 MG/1
650 SUPPOSITORY RECTAL EVERY 4 HOURS PRN
Status: DISCONTINUED | OUTPATIENT
Start: 2021-02-03 | End: 2021-02-05

## 2021-02-02 RX ORDER — SODIUM CHLORIDE 9 MG/ML
30 INJECTION, SOLUTION INTRAVENOUS CONTINUOUS PRN
Status: DISCONTINUED | OUTPATIENT
Start: 2021-02-02 | End: 2021-02-03

## 2021-02-02 RX ORDER — LIDOCAINE HYDROCHLORIDE 20 MG/ML
INJECTION, SOLUTION INFILTRATION; PERINEURAL AS NEEDED
Status: DISCONTINUED | OUTPATIENT
Start: 2021-02-02 | End: 2021-02-02 | Stop reason: SURG

## 2021-02-02 RX ORDER — AMINOCAPROIC ACID 250 MG/ML
INJECTION, SOLUTION INTRAVENOUS AS NEEDED
Status: DISCONTINUED | OUTPATIENT
Start: 2021-02-02 | End: 2021-02-02 | Stop reason: SURG

## 2021-02-02 RX ORDER — ALBUMIN, HUMAN INJ 5% 5 %
1500 SOLUTION INTRAVENOUS AS NEEDED
Status: DISCONTINUED | OUTPATIENT
Start: 2021-02-02 | End: 2021-02-03

## 2021-02-02 RX ORDER — SODIUM CHLORIDE 0.9 % (FLUSH) 0.9 %
3-10 SYRINGE (ML) INJECTION AS NEEDED
Status: DISCONTINUED | OUTPATIENT
Start: 2021-02-02 | End: 2021-02-02 | Stop reason: HOSPADM

## 2021-02-02 RX ORDER — CHLORHEXIDINE GLUCONATE 500 MG/1
1 CLOTH TOPICAL EVERY 12 HOURS PRN
Status: DISCONTINUED | OUTPATIENT
Start: 2021-02-02 | End: 2021-02-02 | Stop reason: HOSPADM

## 2021-02-02 RX ORDER — AMOXICILLIN 250 MG
2 CAPSULE ORAL NIGHTLY
Status: DISCONTINUED | OUTPATIENT
Start: 2021-02-03 | End: 2021-02-08 | Stop reason: HOSPADM

## 2021-02-02 RX ORDER — POTASSIUM CHLORIDE 29.8 MG/ML
20 INJECTION INTRAVENOUS
Status: COMPLETED | OUTPATIENT
Start: 2021-02-02 | End: 2021-02-02

## 2021-02-02 RX ORDER — ATORVASTATIN CALCIUM 20 MG/1
40 TABLET, FILM COATED ORAL NIGHTLY
Status: DISCONTINUED | OUTPATIENT
Start: 2021-02-02 | End: 2021-02-04

## 2021-02-02 RX ORDER — ONDANSETRON 2 MG/ML
4 INJECTION INTRAMUSCULAR; INTRAVENOUS EVERY 6 HOURS PRN
Status: DISCONTINUED | OUTPATIENT
Start: 2021-02-02 | End: 2021-02-03

## 2021-02-02 RX ORDER — PROPOFOL 10 MG/ML
VIAL (ML) INTRAVENOUS AS NEEDED
Status: DISCONTINUED | OUTPATIENT
Start: 2021-02-02 | End: 2021-02-02 | Stop reason: SURG

## 2021-02-02 RX ORDER — PROPOFOL 10 MG/ML
VIAL (ML) INTRAVENOUS CONTINUOUS PRN
Status: DISCONTINUED | OUTPATIENT
Start: 2021-02-02 | End: 2021-02-02 | Stop reason: SURG

## 2021-02-02 RX ORDER — FAMOTIDINE 10 MG/ML
20 INJECTION, SOLUTION INTRAVENOUS ONCE
Status: COMPLETED | OUTPATIENT
Start: 2021-02-02 | End: 2021-02-02

## 2021-02-02 RX ORDER — NALOXONE HCL 0.4 MG/ML
0.4 VIAL (ML) INJECTION
Status: DISCONTINUED | OUTPATIENT
Start: 2021-02-02 | End: 2021-02-08 | Stop reason: HOSPADM

## 2021-02-02 RX ORDER — POTASSIUM CHLORIDE 29.8 MG/ML
20 INJECTION INTRAVENOUS
Status: DISCONTINUED | OUTPATIENT
Start: 2021-02-02 | End: 2021-02-03

## 2021-02-02 RX ORDER — SODIUM CHLORIDE 0.9 % (FLUSH) 0.9 %
30 SYRINGE (ML) INJECTION ONCE AS NEEDED
Status: DISCONTINUED | OUTPATIENT
Start: 2021-02-02 | End: 2021-02-03

## 2021-02-02 RX ORDER — PROTAMINE SULFATE 10 MG/ML
INJECTION, SOLUTION INTRAVENOUS AS NEEDED
Status: DISCONTINUED | OUTPATIENT
Start: 2021-02-02 | End: 2021-02-02 | Stop reason: SURG

## 2021-02-02 RX ORDER — ASPIRIN 325 MG
325 TABLET, DELAYED RELEASE (ENTERIC COATED) ORAL DAILY
Status: DISCONTINUED | OUTPATIENT
Start: 2021-02-03 | End: 2021-02-08 | Stop reason: HOSPADM

## 2021-02-02 RX ORDER — HYDROCODONE BITARTRATE AND ACETAMINOPHEN 5; 325 MG/1; MG/1
2 TABLET ORAL EVERY 4 HOURS PRN
Status: DISCONTINUED | OUTPATIENT
Start: 2021-02-02 | End: 2021-02-08 | Stop reason: HOSPADM

## 2021-02-02 RX ORDER — DEXTROSE MONOHYDRATE 25 G/50ML
25-50 INJECTION, SOLUTION INTRAVENOUS
Status: DISCONTINUED | OUTPATIENT
Start: 2021-02-02 | End: 2021-02-02 | Stop reason: HOSPADM

## 2021-02-02 RX ORDER — MAGNESIUM HYDROXIDE 1200 MG/15ML
LIQUID ORAL AS NEEDED
Status: DISCONTINUED | OUTPATIENT
Start: 2021-02-02 | End: 2021-02-02 | Stop reason: HOSPADM

## 2021-02-02 RX ORDER — SODIUM CHLORIDE 9 MG/ML
INJECTION, SOLUTION INTRAVENOUS CONTINUOUS PRN
Status: DISCONTINUED | OUTPATIENT
Start: 2021-02-02 | End: 2021-02-02 | Stop reason: SURG

## 2021-02-02 RX ORDER — ONDANSETRON 2 MG/ML
INJECTION INTRAMUSCULAR; INTRAVENOUS AS NEEDED
Status: DISCONTINUED | OUTPATIENT
Start: 2021-02-02 | End: 2021-02-02 | Stop reason: SURG

## 2021-02-02 RX ORDER — CEFAZOLIN SODIUM 2 G/100ML
2 INJECTION, SOLUTION INTRAVENOUS EVERY 8 HOURS
Status: COMPLETED | OUTPATIENT
Start: 2021-02-02 | End: 2021-02-04

## 2021-02-02 RX ORDER — BUPIVACAINE HCL/0.9 % NACL/PF 0.125 %
PLASTIC BAG, INJECTION (ML) EPIDURAL AS NEEDED
Status: DISCONTINUED | OUTPATIENT
Start: 2021-02-02 | End: 2021-02-02 | Stop reason: SURG

## 2021-02-02 RX ORDER — ACETAMINOPHEN 160 MG/5ML
650 SOLUTION ORAL EVERY 4 HOURS PRN
Status: DISCONTINUED | OUTPATIENT
Start: 2021-02-03 | End: 2021-02-05

## 2021-02-02 RX ORDER — MORPHINE SULFATE 2 MG/ML
1 INJECTION, SOLUTION INTRAMUSCULAR; INTRAVENOUS EVERY 4 HOURS PRN
Status: DISCONTINUED | OUTPATIENT
Start: 2021-02-02 | End: 2021-02-05

## 2021-02-02 RX ORDER — MIDAZOLAM HYDROCHLORIDE 1 MG/ML
1 INJECTION INTRAMUSCULAR; INTRAVENOUS
Status: COMPLETED | OUTPATIENT
Start: 2021-02-02 | End: 2021-02-02

## 2021-02-02 RX ORDER — DEXMEDETOMIDINE HYDROCHLORIDE 4 UG/ML
INJECTION, SOLUTION INTRAVENOUS CONTINUOUS PRN
Status: DISCONTINUED | OUTPATIENT
Start: 2021-02-02 | End: 2021-02-02 | Stop reason: SURG

## 2021-02-02 RX ORDER — CHLORHEXIDINE GLUCONATE 0.12 MG/ML
15 RINSE ORAL EVERY 12 HOURS
Status: DISCONTINUED | OUTPATIENT
Start: 2021-02-02 | End: 2021-02-04

## 2021-02-02 RX ORDER — LIDOCAINE HYDROCHLORIDE 10 MG/ML
0.5 INJECTION, SOLUTION EPIDURAL; INFILTRATION; INTRACAUDAL; PERINEURAL ONCE AS NEEDED
Status: DISCONTINUED | OUTPATIENT
Start: 2021-02-02 | End: 2021-02-02 | Stop reason: HOSPADM

## 2021-02-02 RX ORDER — SODIUM CHLORIDE 0.9 % (FLUSH) 0.9 %
3 SYRINGE (ML) INJECTION EVERY 12 HOURS SCHEDULED
Status: DISCONTINUED | OUTPATIENT
Start: 2021-02-02 | End: 2021-02-02 | Stop reason: HOSPADM

## 2021-02-02 RX ORDER — FENTANYL CITRATE 50 UG/ML
50 INJECTION, SOLUTION INTRAMUSCULAR; INTRAVENOUS
Status: DISCONTINUED | OUTPATIENT
Start: 2021-02-02 | End: 2021-02-02 | Stop reason: HOSPADM

## 2021-02-02 RX ORDER — MORPHINE SULFATE 2 MG/ML
4 INJECTION, SOLUTION INTRAMUSCULAR; INTRAVENOUS
Status: DISCONTINUED | OUTPATIENT
Start: 2021-02-02 | End: 2021-02-03

## 2021-02-02 RX ORDER — SODIUM CHLORIDE, SODIUM LACTATE, POTASSIUM CHLORIDE, CALCIUM CHLORIDE 600; 310; 30; 20 MG/100ML; MG/100ML; MG/100ML; MG/100ML
9 INJECTION, SOLUTION INTRAVENOUS CONTINUOUS
Status: DISCONTINUED | OUTPATIENT
Start: 2021-02-02 | End: 2021-02-03

## 2021-02-02 RX ORDER — METOCLOPRAMIDE HYDROCHLORIDE 5 MG/ML
10 INJECTION INTRAMUSCULAR; INTRAVENOUS EVERY 6 HOURS
Status: COMPLETED | OUTPATIENT
Start: 2021-02-02 | End: 2021-02-03

## 2021-02-02 RX ORDER — MIDAZOLAM HYDROCHLORIDE 1 MG/ML
2 INJECTION INTRAMUSCULAR; INTRAVENOUS
Status: DISCONTINUED | OUTPATIENT
Start: 2021-02-02 | End: 2021-02-03

## 2021-02-02 RX ORDER — ROCURONIUM BROMIDE 10 MG/ML
INJECTION, SOLUTION INTRAVENOUS AS NEEDED
Status: DISCONTINUED | OUTPATIENT
Start: 2021-02-02 | End: 2021-02-02 | Stop reason: SURG

## 2021-02-02 RX ADMIN — FENTANYL CITRATE 250 MCG: 50 INJECTION INTRAMUSCULAR; INTRAVENOUS at 11:39

## 2021-02-02 RX ADMIN — ALBUMIN HUMAN 250 ML: 0.05 INJECTION, SOLUTION INTRAVENOUS at 14:26

## 2021-02-02 RX ADMIN — MUPIROCIN 1 APPLICATION: 20 OINTMENT TOPICAL at 20:47

## 2021-02-02 RX ADMIN — PHENYLEPHRINE-NACL PREF SYR 1 MG/10ML-0.9% (100 MCG/ML) 50 MCG: 1-0.9/1 SOLUTION PREFILLED SYRINGE at 10:22

## 2021-02-02 RX ADMIN — NICARDIPINE HYDROCHLORIDE 0.2 MG: 25 INJECTION INTRAVENOUS at 12:15

## 2021-02-02 RX ADMIN — CEFAZOLIN SODIUM 2 G: 2 INJECTION, SOLUTION INTRAVENOUS at 19:46

## 2021-02-02 RX ADMIN — PHENYLEPHRINE HYDROCHLORIDE 0.5 MCG/KG/MIN: 10 INJECTION, SOLUTION INTRAMUSCULAR; INTRAVENOUS; SUBCUTANEOUS at 09:10

## 2021-02-02 RX ADMIN — SODIUM CHLORIDE 30 ML/HR: 9 INJECTION, SOLUTION INTRAVENOUS at 14:05

## 2021-02-02 RX ADMIN — ALBUMIN HUMAN 250 ML: 0.05 INJECTION, SOLUTION INTRAVENOUS at 15:57

## 2021-02-02 RX ADMIN — DEXMEDETOMIDINE HYDROCHLORIDE 0.2 MCG/KG/HR: 100 INJECTION, SOLUTION, CONCENTRATE INTRAVENOUS at 21:10

## 2021-02-02 RX ADMIN — FENTANYL CITRATE 50 MCG: 50 INJECTION INTRAMUSCULAR; INTRAVENOUS at 08:59

## 2021-02-02 RX ADMIN — SODIUM CHLORIDE: 9 INJECTION, SOLUTION INTRAVENOUS at 09:25

## 2021-02-02 RX ADMIN — NICARDIPINE HYDROCHLORIDE 0.2 MG: 25 INJECTION INTRAVENOUS at 10:04

## 2021-02-02 RX ADMIN — CHLORHEXIDINE GLUCONATE 15 ML: 1.2 RINSE ORAL at 07:13

## 2021-02-02 RX ADMIN — PROPOFOL 40 MCG/KG/MIN: 10 INJECTION, EMULSION INTRAVENOUS at 10:09

## 2021-02-02 RX ADMIN — PHENYLEPHRINE-NACL PREF SYR 1 MG/10ML-0.9% (100 MCG/ML) 100 MCG: 1-0.9/1 SOLUTION PREFILLED SYRINGE at 09:48

## 2021-02-02 RX ADMIN — POTASSIUM CHLORIDE 20 MEQ: 29.8 INJECTION, SOLUTION INTRAVENOUS at 15:59

## 2021-02-02 RX ADMIN — MORPHINE SULFATE 4 MG: 2 INJECTION, SOLUTION INTRAMUSCULAR; INTRAVENOUS at 16:09

## 2021-02-02 RX ADMIN — POTASSIUM CHLORIDE 20 MEQ: 29.8 INJECTION, SOLUTION INTRAVENOUS at 17:30

## 2021-02-02 RX ADMIN — PHENYLEPHRINE-NACL PREF SYR 1 MG/10ML-0.9% (100 MCG/ML) 50 MCG: 1-0.9/1 SOLUTION PREFILLED SYRINGE at 12:35

## 2021-02-02 RX ADMIN — FENTANYL CITRATE 50 MCG: 50 INJECTION, SOLUTION INTRAMUSCULAR; INTRAVENOUS at 08:34

## 2021-02-02 RX ADMIN — MIDAZOLAM 1 MG: 1 INJECTION INTRAMUSCULAR; INTRAVENOUS at 07:19

## 2021-02-02 RX ADMIN — PHENYLEPHRINE-NACL PREF SYR 1 MG/10ML-0.9% (100 MCG/ML) 100 MCG: 1-0.9/1 SOLUTION PREFILLED SYRINGE at 10:20

## 2021-02-02 RX ADMIN — METOPROLOL TARTRATE 12.5 MG: 25 TABLET ORAL at 07:13

## 2021-02-02 RX ADMIN — PROPOFOL 100 MG: 10 INJECTION, EMULSION INTRAVENOUS at 08:59

## 2021-02-02 RX ADMIN — SODIUM CHLORIDE 2.5 MG/HR: 9 INJECTION, SOLUTION INTRAVENOUS at 10:06

## 2021-02-02 RX ADMIN — FENTANYL CITRATE 100 MCG: 50 INJECTION INTRAMUSCULAR; INTRAVENOUS at 10:02

## 2021-02-02 RX ADMIN — MAGNESIUM SULFATE HEPTAHYDRATE 1 G: 1 INJECTION, SOLUTION INTRAVENOUS at 16:13

## 2021-02-02 RX ADMIN — AMINOCAPROIC ACID 10 G: 250 INJECTION, SOLUTION INTRAVENOUS at 09:42

## 2021-02-02 RX ADMIN — LIDOCAINE HYDROCHLORIDE 60 MG: 20 INJECTION, SOLUTION INFILTRATION; PERINEURAL at 08:59

## 2021-02-02 RX ADMIN — FAMOTIDINE 20 MG: 10 INJECTION INTRAVENOUS at 07:13

## 2021-02-02 RX ADMIN — METOCLOPRAMIDE HYDROCHLORIDE 10 MG: 5 INJECTION INTRAMUSCULAR; INTRAVENOUS at 19:46

## 2021-02-02 RX ADMIN — ONDANSETRON HYDROCHLORIDE 4 MG: 2 SOLUTION INTRAMUSCULAR; INTRAVENOUS at 12:26

## 2021-02-02 RX ADMIN — ROCURONIUM BROMIDE 40 MG: 10 INJECTION INTRAVENOUS at 11:22

## 2021-02-02 RX ADMIN — FENTANYL CITRATE 100 MCG: 50 INJECTION INTRAMUSCULAR; INTRAVENOUS at 10:08

## 2021-02-02 RX ADMIN — PHENYLEPHRINE-NACL PREF SYR 1 MG/10ML-0.9% (100 MCG/ML) 50 MCG: 1-0.9/1 SOLUTION PREFILLED SYRINGE at 10:13

## 2021-02-02 RX ADMIN — ONDANSETRON 4 MG: 2 INJECTION INTRAMUSCULAR; INTRAVENOUS at 19:13

## 2021-02-02 RX ADMIN — Medication 0.01 MCG/KG/MIN: at 15:55

## 2021-02-02 RX ADMIN — SODIUM CHLORIDE 2 UNITS/HR: 900 INJECTION, SOLUTION INTRAVENOUS at 11:00

## 2021-02-02 RX ADMIN — HEPARIN SODIUM 25000 UNITS: 1000 INJECTION, SOLUTION INTRAVENOUS; SUBCUTANEOUS at 10:07

## 2021-02-02 RX ADMIN — CEFAZOLIN SODIUM 2 G: 2 INJECTION, SOLUTION INTRAVENOUS at 09:44

## 2021-02-02 RX ADMIN — MIDAZOLAM 1 MG: 1 INJECTION INTRAMUSCULAR; INTRAVENOUS at 08:34

## 2021-02-02 RX ADMIN — CEFAZOLIN SODIUM 2 G: 2 INJECTION, SOLUTION INTRAVENOUS at 12:23

## 2021-02-02 RX ADMIN — SODIUM CHLORIDE 1.8 UNITS/HR: 9 INJECTION, SOLUTION INTRAVENOUS at 15:32

## 2021-02-02 RX ADMIN — METOCLOPRAMIDE HYDROCHLORIDE 10 MG: 5 INJECTION INTRAMUSCULAR; INTRAVENOUS at 16:03

## 2021-02-02 RX ADMIN — PROTAMINE SULFATE 350 MG: 10 INJECTION, SOLUTION INTRAVENOUS at 12:06

## 2021-02-02 RX ADMIN — SODIUM CHLORIDE: 9 INJECTION, SOLUTION INTRAVENOUS at 08:50

## 2021-02-02 RX ADMIN — DEXMEDETOMIDINE HYDROCHLORIDE 1 MCG/KG/HR: 4 INJECTION, SOLUTION INTRAVENOUS at 09:25

## 2021-02-02 RX ADMIN — AMINOCAPROIC ACID 10 G: 250 INJECTION, SOLUTION INTRAVENOUS at 12:11

## 2021-02-02 RX ADMIN — ROCURONIUM BROMIDE 60 MG: 10 INJECTION INTRAVENOUS at 09:01

## 2021-02-02 RX ADMIN — MAGNESIUM SULFATE HEPTAHYDRATE 1 G: 1 INJECTION, SOLUTION INTRAVENOUS at 22:57

## 2021-02-02 NOTE — ANESTHESIA PROCEDURE NOTES
Central Line      Patient reassessed immediately prior to procedure    Patient location during procedure: OR  Start time: 2/2/2021 9:10 AM  Stop Time:2/2/2021 9:20 AM  Indications: vascular access and central pressure monitoring  Staff  Anesthesiologist: Prashanth Holden MD  Preanesthetic Checklist  Completed: patient identified and risks and benefits discussed  Central Line Prep  Sterile Tech:cap, gloves, gown, mask and sterile barriers  Prep: chloraprep  Patient monitoring: blood pressure monitoring, continuous pulse oximetry and EKG  Central Line Procedure  Laterality:right  Location:internal jugular  Catheter Type:Cordis and double lumen  Catheter Size:9 Fr  Guidance:ultrasound guided  PROCEDURE NOTE/ULTRASOUND INTERPRETATION.  Using ultrasound guidance the potential vascular sites for insertion of the catheter were visualized to determine the patency of the vessel to be used for vascular access.  After selecting the appropriate site for insertion, the needle was visualized under ultrasound being inserted into the internal jugular vein, followed by ultrasound confirmation of wire and catheter placement. There were no abnormalities seen on ultrasound; an image was taken; and the patient tolerated the procedure with no complications. Images: still images not obtained  Assessment  Post procedure:biopatch applied, line sutured, occlusive dressing applied and secured with tape  Assessement:blood return through all ports  Complications:no  Patient Tolerance:patient tolerated the procedure well with no apparent complications  Additional Notes  Ultrasound Interpretation:  Using ultrasound guidance the potential vascular sites for insertion of the catheter were visualized to determine the patency of the vessel to be used for vascular access.  After selecting the appropriate site for insertion, the needle was visualized under ultrasound being inserted into the vessel, followed by ultrasound confirmation of wire and  catheter placement.  There were no abnormalities seen on ultrasound; an image was taken/ and the patient tolerated the procedure with no complications.

## 2021-02-02 NOTE — ANESTHESIA POSTPROCEDURE EVALUATION
Patient: Amelie Anderson    Procedure Summary     Date: 02/02/21 Room / Location: [facility] OR [address] [place] [address] MAIN OR    Anesthesia Start: 0850 Anesthesia Stop: 1334    Procedure: VARUN STERNOTOMY AORTIC VALVE REPLACEMENT AND PRP (N/A Chest) Diagnosis:       Aortic stenosis      (Aortic stenosis [I35.0])    Surgeon: Jesse Coronado MD Provider: Prashanth Holden MD    Anesthesia Type: general ASA Status: 4          Anesthesia Type: general    Vitals  Vitals Value Taken Time   BP 75/55 02/02/21 1336   Temp     Pulse 89 02/02/21 1347   Resp 14 02/02/21 1345   SpO2 100 % 02/02/21 1347   Vitals shown include unvalidated device data.        Post Anesthesia Care and Evaluation    Patient location during evaluation: bedside  Pain management: adequate  Airway patency: patent  Anesthetic complications: No anesthetic complications    Cardiovascular status: acceptable  Respiratory status: acceptable  Hydration status: acceptable

## 2021-02-02 NOTE — ANESTHESIA PROCEDURE NOTES
Arterial Line      Patient reassessed immediately prior to procedure    Start time: 2/2/2021 8:30 AM  Stop Time:2/2/2021 8:35 AM       Performed By   Anesthesiologist: Prashanth Holden MD  Preanesthetic Checklist  Completed: patient identified, surgical consent, pre-op evaluation, timeout performed and risks and benefits discussed  Arterial Line Prep   Sterile Tech: cap, gloves and mask  Prep: ChloraPrep  Patient monitoring: blood pressure monitoring, continuous pulse oximetry and EKG  Arterial Line Procedure   Laterality:left  Location:  radial artery  Catheter size: 20 G   Guidance: ultrasound guided  PROCEDURE NOTE/ULTRASOUND INTERPRETATION.  Using ultrasound guidance the potential vascular sites for insertion of the catheter were visualized to determine the patency of the vessel to be used for vascular access.  After selecting the appropriate site for insertion, the needle was visualized under ultrasound being inserted into the radial artery, followed by ultrasound confirmation of wire and catheter placement. There were no abnormalities seen on ultrasound; an image was taken; and the patient tolerated the procedure with no complications.   Number of attempts: 1  Successful placement: yes  Post Assessment   Dressing Type: occlusive dressing applied and secured with tape.   Complications no  Patient Tolerance: patient tolerated the procedure well with no apparent complications

## 2021-02-02 NOTE — ANESTHESIA PROCEDURE NOTES
Central Line      Patient reassessed immediately prior to procedure    Patient location during procedure: OR  Start time: 2/2/2021 9:20 AM  Stop Time:2/2/2021 9:25 AM  Staff  Anesthesiologist: Prashanth Holden MD  Preanesthetic Checklist  Completed: patient identified, surgical consent, pre-op evaluation, timeout performed and risks and benefits discussed  Central Line Prep  Sterile Tech:cap, gloves, gown, mask and sterile barriers  Prep: chloraprep  Patient monitoring: blood pressure monitoring, continuous pulse oximetry and EKG  Central Line Procedure  Laterality:right  Location:internal jugular  Catheter Type:[place]-Shannan  Assessment  Post procedure:biopatch applied, line sutured and occlusive dressing applied  Assessement:blood return through all ports and free fluid flow  Patient Tolerance:patient tolerated the procedure well with no apparent complications

## 2021-02-02 NOTE — OP NOTE
BCS OPERATIVE NOTE    Date of procedure:2/2/2021    Pre-op Diagnosis: Severe aortic valve stenosis, chronic diastolic congestive heart failure New York Heart Association grade 3, hypercholesterolemia, hypertension, osteoporosis.    Post-op Diagnosis:Same.    Procedure: Aortic valve replacement using 25 mm magna pericardial valve, intraoperative transesophageal echocardiogram, PRP application to sternum.    Surgeon: Jesse Coronado MD    Assistant: BASSEM Breaux (Assistant provided specialized assistance during this surgical procedure).    Cardiologist: KARY Pederson MD    Anesthesia: GET    Findings: Minimal atherosclerotic plaque on aortic palpation, consistent with a transesophageal echocardiogram.  Severe aortic valve stenosis based on transesophageal echocardiogram.  Congenital tricuspid valve with severe calcification extending to the aortic valve annulus.  Bilateral coronary ostia in normal positions.  Coronary ostia spared during procedure.  Post implantation transesophageal echocardiogram showed good function of the valve without any paravalvular compromise.    Aortic cross-clamp time: 67 min    Cardiopulmonary bypass time: 94 min    Cellsaver/EBL: 250 ml    Antegrade and retrograde cardioplegia.    Arterial cannula: 20 Colomb[place] to aorta    Venous cannula: 29/37 Colombian to right atrium    Specimen: Aortic valve leaflets.    History of present illness: This a pleasant 70-year-old woman with severe aortic valve stenosis.  She was initially considered for possible TAVR.  Upon evaluation, however, it was felt that she would benefit from traditional aortic valve replacement surgery.  Following dental clearance which included multiple extractions she was admitted electively for an aortic valve replacement.  Her STS morbidity and mortality risks were calculated and discussed prior to surgery.    Details: The patient was identified in the prep and holding area.  Following induction she was intubated.  She received the  PA catheter, radial arterial line, and Trotter catheter.  She was prepped and draped.  Midline sternotomy was performed.  The sternum was retracted.  The pericardium was opened and a well was created.  Aorta was palpated and found to be free of any worrisome atherosclerotic plaque.  This was consistent with a transesophageal echocardiogram.  VARUN also showed severe aortic valve stenosis and very mild aortic valve regurgitation.  The patient's left ventricular ejection fraction was close to 50%.  The patient was fully heparinized.  ACT was eventually confirmed.  Descending aorta and right atrium were cannulated.  Antegrade and retrograde cardioplegia lines were inserted.      Cardiopulmonary bypass instituted and the patient was cooled to 34 °C.  An aortic cross-clamp was applied.  The patient received 800 mL of antegrade and then 400 mL of retrograde cardioplegia, resulting diastolic arrest.  At regular intervals additional retrograde cardioplegia doses were given.  CO2 was also used to insufflate the field.    A transverse aortotomy was created and extended both medially and laterally, exposing underlying aortic valve.  This was a tricuspid aortic valve.  All 3 leaflets were severely calcified and this calcification extended into the aortic annulus.  The leaflets were excised.  Residual calcium was debrided off the residual aortic annulus; this required extensive time as there was an excessive amount of calcification.  Eventually all calcium was removed.  The bilateral coronary ostia were identified in the normal anatomic positions and these were spared.      Annular sutures were placed onto the aortic annulus.  These were 2 Ethibond horizontal mattress pledgeted sutures with the pledgets in the subannular position.  The annulus was sized and a 25 mm prosthesis was chosen.  The sutures were placed through the sewing ring of the prosthesis.  The procedure was then lowered down onto the aortic valve annulus.  This was  noted to sit well on the annulus.  The sutures were secured with a core knot device.  The procedure was again confirmed to sit well on the annulus and the bilateral coronary ostia were confirmed to have been spared.  The aortotomy then closed with a doubled over 4 Prolene suture.    After a hotshot antegrade cardioplegia dose the aortic cross-clamp was removed.  Several defibrillation shocks were required to recover the patient to sinus rhythm.  The aortotomy appeared to be hemostatic.  The retrograde cardioplegia line was removed.  Pacing wires were applied to the right atrium and right ventricle.  After sufficient de-airing and evaluation of the aortic valve prosthesis by transesophageal echocardiogram, the antegrade cardioplegia line/vent was removed.  It should be noted that the aortic valve prosthesis was noted to function well without any evidence of paravalvular compromise.  The patient was therefore weaned off cardiopulmonary bypass.    The venous line was removed.  After protamine dose the arterial line was removed.  Cannulation sites were reinforced.  The pericardium was approximated loosely.  Mediastinal chest tubes were placed.  Steel wires were used to close the sternum.  Sponge counts, instrument counts, and needle counts were correct.  The fascia layer, subdermal layer, and subcuticular were closed.  A sterile bandage was applied.  The patient was transferred to the CVR.    Jesse Coronado MD  2/2/2021  13:36 EST

## 2021-02-02 NOTE — ANESTHESIA PROCEDURE NOTES
Airway  Date/Time: 2/2/2021 9:06 AM    General Information and Staff    Patient location during procedure: OR  Anesthesiologist: Prashanth Holden MD    Indications and Patient Condition    Preoxygenated: yes  Mask difficulty assessment: 2 - vent by mask + OA or adjuvant +/- NMBA    Final Airway Details  Final airway type: endotracheal airway      Successful airway: ETT  Cuffed: yes   Successful intubation technique: direct laryngoscopy  Facilitating devices/methods: intubating stylet  Endotracheal tube insertion site: oral  Blade: Busby  Blade size: 2  ETT size (mm): 7.5  Cormack-Lehane Classification: grade I - full view of glottis  Placement verified by: capnometry   Measured from: teeth  ETT/EBT  to teeth (cm): 21  Number of attempts at approach: 1  Assessment: lips, teeth, and gum same as pre-op and atraumatic intubation

## 2021-02-02 NOTE — ANESTHESIA PROCEDURE NOTES
Procedure Performed: Emergent/Open-Heart Anesthesia VARUN       Start Time:  2/2/2021 9:41 AM       End Time:   2/2/2021 11:11 AM      General Procedure Information  Diagnostic Indications for Echo:  assessment of ascending aorta and assessment of surgical repair  Physician Requesting Echo: Jesse Coronado MD  Location performed:  OR  Intubated  Bite block not placed  Heart visualized  Probe Insertion:  Easy  Probe Type:  Multiplane  Modalities:  2D only, color flow mapping and continuous wave Doppler    Echocardiographic and Doppler Measurements    Ventricles    Right Ventricle:  Cavity size normal.  Global function normal.    Left Ventricle:  Cavity size normal.  Hypertrophy present.  Global Function normal.  Ejection Fraction 60%.          Valves    Aortic Valve:  Stenosis severe.  Area: 0.9 cm².  Mean Gradient: mean 20 mmHg.  Regurgitation trace.  Leaflets calcified and thickened.  Leaflet motions restricted.      Mitral Valve:  Annulus normal.  Stenosis not present.  Regurgitation absent.  Leaflets normal.  Leaflet motions normal.      Tricuspid Valve:  Annulus normal.  Regurgitation mild.  Leaflets normal.          Aorta    Ascending Aorta:  Size normal.          Atria      Left Atrium:  Left atrial appendage normal.      Septa    Atrial Septum:  Intra-atrial septal morphology normal.              Other Findings  Pericardium:  normal  Pleural Effusion:  none      Anesthesia Information  Performed Personally      Echocardiogram Comments:       Diagnostic VARUN.  Diagnosis: non-rheumatic tricuspid regurgitation.  Pre-CPB, ROSIE by continuity equation 0.9 sq cm, trileaflet aortic valve.  Mean gradient after AVR was 6 mm Hg.  No PVL seen.

## 2021-02-03 ENCOUNTER — APPOINTMENT (OUTPATIENT)
Dept: GENERAL RADIOLOGY | Facility: HOSPITAL | Age: 71
End: 2021-02-03

## 2021-02-03 ENCOUNTER — APPOINTMENT (OUTPATIENT)
Dept: CARDIOLOGY | Facility: HOSPITAL | Age: 71
End: 2021-02-03

## 2021-02-03 LAB
ALBUMIN SERPL-MCNC: 4.5 G/DL (ref 3.5–5.2)
ANION GAP SERPL CALCULATED.3IONS-SCNC: 11.6 MMOL/L (ref 5–15)
BASOPHILS # BLD AUTO: 0.01 10*3/MM3 (ref 0–0.2)
BASOPHILS NFR BLD AUTO: 0.1 % (ref 0–1.5)
BH BB BLOOD EXPIRATION DATE: NORMAL
BH BB BLOOD TYPE BARCODE: 6200
BH BB DISPENSE STATUS: NORMAL
BH BB PRODUCT CODE: NORMAL
BH BB UNIT NUMBER: NORMAL
BUN SERPL-MCNC: 17 MG/DL (ref 8–23)
BUN/CREAT SERPL: 19.1 (ref 7–25)
CALCIUM SPEC-SCNC: 8.6 MG/DL (ref 8.6–10.5)
CHLORIDE SERPL-SCNC: 112 MMOL/L (ref 98–107)
CO2 SERPL-SCNC: 21.4 MMOL/L (ref 22–29)
CREAT SERPL-MCNC: 0.89 MG/DL (ref 0.57–1)
DEPRECATED RDW RBC AUTO: 42.5 FL (ref 37–54)
EOSINOPHIL # BLD AUTO: 0 10*3/MM3 (ref 0–0.4)
EOSINOPHIL NFR BLD AUTO: 0 % (ref 0.3–6.2)
ERYTHROCYTE [DISTWIDTH] IN BLOOD BY AUTOMATED COUNT: 13.5 % (ref 12.3–15.4)
GFR SERPL CREATININE-BSD FRML MDRD: 63 ML/MIN/1.73
GLUCOSE BLDC GLUCOMTR-MCNC: 111 MG/DL (ref 70–130)
GLUCOSE BLDC GLUCOMTR-MCNC: 112 MG/DL (ref 70–130)
GLUCOSE BLDC GLUCOMTR-MCNC: 124 MG/DL (ref 70–130)
GLUCOSE BLDC GLUCOMTR-MCNC: 124 MG/DL (ref 70–130)
GLUCOSE BLDC GLUCOMTR-MCNC: 128 MG/DL (ref 70–130)
GLUCOSE BLDC GLUCOMTR-MCNC: 166 MG/DL (ref 70–130)
GLUCOSE BLDC GLUCOMTR-MCNC: 167 MG/DL (ref 70–130)
GLUCOSE BLDC GLUCOMTR-MCNC: 185 MG/DL (ref 70–130)
GLUCOSE SERPL-MCNC: 106 MG/DL (ref 65–99)
HCT VFR BLD AUTO: 24.4 % (ref 34–46.6)
HGB BLD-MCNC: 8.1 G/DL (ref 12–15.9)
IMM GRANULOCYTES # BLD AUTO: 0.05 10*3/MM3 (ref 0–0.05)
IMM GRANULOCYTES NFR BLD AUTO: 0.5 % (ref 0–0.5)
INR PPP: 1.19 (ref 0.9–1.1)
LAB AP CASE REPORT: NORMAL
LYMPHOCYTES # BLD AUTO: 0.47 10*3/MM3 (ref 0.7–3.1)
LYMPHOCYTES NFR BLD AUTO: 4.8 % (ref 19.6–45.3)
MAGNESIUM SERPL-MCNC: 2.5 MG/DL (ref 1.6–2.4)
MCH RBC QN AUTO: 28.9 PG (ref 26.6–33)
MCHC RBC AUTO-ENTMCNC: 33.2 G/DL (ref 31.5–35.7)
MCV RBC AUTO: 87.1 FL (ref 79–97)
MONOCYTES # BLD AUTO: 0.66 10*3/MM3 (ref 0.1–0.9)
MONOCYTES NFR BLD AUTO: 6.8 % (ref 5–12)
NEUTROPHILS NFR BLD AUTO: 8.57 10*3/MM3 (ref 1.7–7)
NEUTROPHILS NFR BLD AUTO: 87.8 % (ref 42.7–76)
NRBC BLD AUTO-RTO: 0 /100 WBC (ref 0–0.2)
PATH REPORT.FINAL DX SPEC: NORMAL
PATH REPORT.GROSS SPEC: NORMAL
PHOSPHATE SERPL-MCNC: 3.6 MG/DL (ref 2.5–4.5)
PLATELET # BLD AUTO: 108 10*3/MM3 (ref 140–450)
PMV BLD AUTO: 9.1 FL (ref 6–12)
POTASSIUM SERPL-SCNC: 3.8 MMOL/L (ref 3.5–5.2)
PROTHROMBIN TIME: 14.9 SECONDS (ref 11.7–14.2)
QT INTERVAL: 377 MS
RBC # BLD AUTO: 2.8 10*6/MM3 (ref 3.77–5.28)
SODIUM SERPL-SCNC: 145 MMOL/L (ref 136–145)
UNIT  ABO: NORMAL
UNIT  RH: NORMAL
WBC # BLD AUTO: 9.76 10*3/MM3 (ref 3.4–10.8)

## 2021-02-03 PROCEDURE — 25010000002 ALBUMIN HUMAN 5% PER 50 ML: Performed by: NURSE PRACTITIONER

## 2021-02-03 PROCEDURE — 71045 X-RAY EXAM CHEST 1 VIEW: CPT

## 2021-02-03 PROCEDURE — 93005 ELECTROCARDIOGRAM TRACING: CPT | Performed by: NURSE PRACTITIONER

## 2021-02-03 PROCEDURE — P9041 ALBUMIN (HUMAN),5%, 50ML: HCPCS | Performed by: NURSE PRACTITIONER

## 2021-02-03 PROCEDURE — 82962 GLUCOSE BLOOD TEST: CPT

## 2021-02-03 PROCEDURE — 25010000003 CEFAZOLIN IN DEXTROSE 2-4 GM/100ML-% SOLUTION: Performed by: NURSE PRACTITIONER

## 2021-02-03 PROCEDURE — 83735 ASSAY OF MAGNESIUM: CPT | Performed by: NURSE PRACTITIONER

## 2021-02-03 PROCEDURE — 97110 THERAPEUTIC EXERCISES: CPT

## 2021-02-03 PROCEDURE — 85610 PROTHROMBIN TIME: CPT | Performed by: NURSE PRACTITIONER

## 2021-02-03 PROCEDURE — 25010000002 ENOXAPARIN PER 10 MG: Performed by: THORACIC SURGERY (CARDIOTHORACIC VASCULAR SURGERY)

## 2021-02-03 PROCEDURE — 80069 RENAL FUNCTION PANEL: CPT | Performed by: NURSE PRACTITIONER

## 2021-02-03 PROCEDURE — 85025 COMPLETE CBC W/AUTO DIFF WBC: CPT | Performed by: NURSE PRACTITIONER

## 2021-02-03 PROCEDURE — 97162 PT EVAL MOD COMPLEX 30 MIN: CPT

## 2021-02-03 PROCEDURE — 63710000001 INSULIN LISPRO (HUMAN) PER 5 UNITS: Performed by: THORACIC SURGERY (CARDIOTHORACIC VASCULAR SURGERY)

## 2021-02-03 PROCEDURE — 99024 POSTOP FOLLOW-UP VISIT: CPT | Performed by: NURSE PRACTITIONER

## 2021-02-03 PROCEDURE — 25010000002 ONDANSETRON PER 1 MG: Performed by: NURSE PRACTITIONER

## 2021-02-03 PROCEDURE — 93010 ELECTROCARDIOGRAM REPORT: CPT | Performed by: INTERNAL MEDICINE

## 2021-02-03 PROCEDURE — 25010000002 METOCLOPRAMIDE PER 10 MG: Performed by: NURSE PRACTITIONER

## 2021-02-03 PROCEDURE — 25010000002 MORPHINE PER 10 MG: Performed by: NURSE PRACTITIONER

## 2021-02-03 PROCEDURE — 25010000002 CALCIUM GLUCONATE PER 10 ML: Performed by: NURSE PRACTITIONER

## 2021-02-03 RX ORDER — CETIRIZINE HYDROCHLORIDE 10 MG/1
10 TABLET ORAL DAILY
Status: DISCONTINUED | OUTPATIENT
Start: 2021-02-03 | End: 2021-02-08 | Stop reason: HOSPADM

## 2021-02-03 RX ORDER — TRAMADOL HYDROCHLORIDE 50 MG/1
50 TABLET ORAL EVERY 6 HOURS PRN
Status: DISCONTINUED | OUTPATIENT
Start: 2021-02-03 | End: 2021-02-05

## 2021-02-03 RX ORDER — CALCIUM GLUCONATE 94 MG/ML
2 INJECTION, SOLUTION INTRAVENOUS ONCE
Status: DISCONTINUED | OUTPATIENT
Start: 2021-02-03 | End: 2021-02-03

## 2021-02-03 RX ORDER — INSULIN LISPRO 100 [IU]/ML
0-7 INJECTION, SOLUTION INTRAVENOUS; SUBCUTANEOUS
Status: DISCONTINUED | OUTPATIENT
Start: 2021-02-03 | End: 2021-02-08 | Stop reason: HOSPADM

## 2021-02-03 RX ORDER — LEVOTHYROXINE AND LIOTHYRONINE 38; 9 UG/1; UG/1
60 TABLET ORAL DAILY
Status: DISCONTINUED | OUTPATIENT
Start: 2021-02-03 | End: 2021-02-08 | Stop reason: HOSPADM

## 2021-02-03 RX ORDER — GABAPENTIN 100 MG/1
100 CAPSULE ORAL EVERY 12 HOURS SCHEDULED
Status: DISCONTINUED | OUTPATIENT
Start: 2021-02-03 | End: 2021-02-04

## 2021-02-03 RX ORDER — ALBUMIN, HUMAN INJ 5% 5 %
250 SOLUTION INTRAVENOUS ONCE
Status: COMPLETED | OUTPATIENT
Start: 2021-02-03 | End: 2021-02-03

## 2021-02-03 RX ORDER — DEXTROSE MONOHYDRATE 25 G/50ML
25 INJECTION, SOLUTION INTRAVENOUS
Status: DISCONTINUED | OUTPATIENT
Start: 2021-02-03 | End: 2021-02-08 | Stop reason: HOSPADM

## 2021-02-03 RX ORDER — LANOLIN ALCOHOL/MO/W.PET/CERES
50 CREAM (GRAM) TOPICAL DAILY
Status: DISCONTINUED | OUTPATIENT
Start: 2021-02-03 | End: 2021-02-08 | Stop reason: HOSPADM

## 2021-02-03 RX ORDER — AMITRIPTYLINE HYDROCHLORIDE 25 MG/1
25 TABLET, FILM COATED ORAL NIGHTLY
Status: DISCONTINUED | OUTPATIENT
Start: 2021-02-03 | End: 2021-02-08 | Stop reason: HOSPADM

## 2021-02-03 RX ORDER — AMIODARONE HYDROCHLORIDE 200 MG/1
200 TABLET ORAL EVERY 12 HOURS SCHEDULED
Status: DISCONTINUED | OUTPATIENT
Start: 2021-02-03 | End: 2021-02-05

## 2021-02-03 RX ORDER — NICOTINE POLACRILEX 4 MG
15 LOZENGE BUCCAL
Status: DISCONTINUED | OUTPATIENT
Start: 2021-02-03 | End: 2021-02-08 | Stop reason: HOSPADM

## 2021-02-03 RX ADMIN — SERTRALINE HYDROCHLORIDE 50 MG: 50 TABLET, FILM COATED ORAL at 18:01

## 2021-02-03 RX ADMIN — ACETAMINOPHEN 650 MG: 325 TABLET, FILM COATED ORAL at 09:48

## 2021-02-03 RX ADMIN — AMITRIPTYLINE HYDROCHLORIDE 25 MG: 25 TABLET, FILM COATED ORAL at 20:11

## 2021-02-03 RX ADMIN — AMIODARONE HYDROCHLORIDE 200 MG: 200 TABLET ORAL at 09:16

## 2021-02-03 RX ADMIN — OXYCODONE 10 MG: 5 TABLET ORAL at 21:23

## 2021-02-03 RX ADMIN — MUPIROCIN 1 APPLICATION: 20 OINTMENT TOPICAL at 09:16

## 2021-02-03 RX ADMIN — ONDANSETRON 4 MG: 2 INJECTION INTRAMUSCULAR; INTRAVENOUS at 01:50

## 2021-02-03 RX ADMIN — METOCLOPRAMIDE HYDROCHLORIDE 10 MG: 5 INJECTION INTRAMUSCULAR; INTRAVENOUS at 09:17

## 2021-02-03 RX ADMIN — CALCIUM GLUCONATE 2 G: 98 INJECTION, SOLUTION INTRAVENOUS at 08:24

## 2021-02-03 RX ADMIN — METOCLOPRAMIDE HYDROCHLORIDE 10 MG: 5 INJECTION INTRAMUSCULAR; INTRAVENOUS at 02:58

## 2021-02-03 RX ADMIN — OXYCODONE 10 MG: 5 TABLET ORAL at 17:03

## 2021-02-03 RX ADMIN — CHLORHEXIDINE GLUCONATE 15 ML: 1.2 RINSE ORAL at 20:13

## 2021-02-03 RX ADMIN — ACETAMINOPHEN 650 MG: 325 TABLET, FILM COATED ORAL at 16:13

## 2021-02-03 RX ADMIN — CHLORHEXIDINE GLUCONATE 15 ML: 1.2 RINSE ORAL at 06:46

## 2021-02-03 RX ADMIN — MORPHINE SULFATE 4 MG: 2 INJECTION, SOLUTION INTRAMUSCULAR; INTRAVENOUS at 01:50

## 2021-02-03 RX ADMIN — ATORVASTATIN CALCIUM 40 MG: 20 TABLET, FILM COATED ORAL at 20:16

## 2021-02-03 RX ADMIN — METOPROLOL TARTRATE 12.5 MG: 25 TABLET, FILM COATED ORAL at 20:14

## 2021-02-03 RX ADMIN — AMIODARONE HYDROCHLORIDE 200 MG: 200 TABLET ORAL at 20:16

## 2021-02-03 RX ADMIN — CEFAZOLIN SODIUM 2 G: 2 INJECTION, SOLUTION INTRAVENOUS at 12:07

## 2021-02-03 RX ADMIN — HYDROCODONE BITARTRATE AND ACETAMINOPHEN 2 TABLET: 5; 325 TABLET ORAL at 06:07

## 2021-02-03 RX ADMIN — TRAMADOL HYDROCHLORIDE 50 MG: 50 TABLET, FILM COATED ORAL at 20:46

## 2021-02-03 RX ADMIN — GABAPENTIN 100 MG: 100 CAPSULE ORAL at 20:16

## 2021-02-03 RX ADMIN — ASPIRIN 325 MG: 325 TABLET, COATED ORAL at 09:16

## 2021-02-03 RX ADMIN — TRAMADOL HYDROCHLORIDE 50 MG: 50 TABLET, FILM COATED ORAL at 10:48

## 2021-02-03 RX ADMIN — MORPHINE SULFATE 1 MG: 2 INJECTION, SOLUTION INTRAMUSCULAR; INTRAVENOUS at 04:53

## 2021-02-03 RX ADMIN — INSULIN LISPRO 2 UNITS: 100 INJECTION, SOLUTION INTRAVENOUS; SUBCUTANEOUS at 18:37

## 2021-02-03 RX ADMIN — CEFAZOLIN SODIUM 2 G: 2 INJECTION, SOLUTION INTRAVENOUS at 04:54

## 2021-02-03 RX ADMIN — GABAPENTIN 100 MG: 100 CAPSULE ORAL at 09:48

## 2021-02-03 RX ADMIN — ALBUMIN HUMAN 250 ML: 0.05 INJECTION, SOLUTION INTRAVENOUS at 08:24

## 2021-02-03 RX ADMIN — CEFAZOLIN SODIUM 2 G: 2 INJECTION, SOLUTION INTRAVENOUS at 20:17

## 2021-02-03 RX ADMIN — LEVOTHYROXINE, LIOTHYRONINE 60 MG: 38; 9 TABLET ORAL at 18:01

## 2021-02-03 RX ADMIN — MUPIROCIN 1 APPLICATION: 20 OINTMENT TOPICAL at 20:17

## 2021-02-03 RX ADMIN — CETIRIZINE HYDROCHLORIDE ALLERGY 10 MG: 10 TABLET ORAL at 20:11

## 2021-02-03 RX ADMIN — OXYCODONE 5 MG: 5 TABLET ORAL at 11:45

## 2021-02-03 RX ADMIN — METOPROLOL TARTRATE 12.5 MG: 25 TABLET, FILM COATED ORAL at 10:39

## 2021-02-03 RX ADMIN — OXYCODONE 5 MG: 5 TABLET ORAL at 12:45

## 2021-02-03 RX ADMIN — DOCUSATE SODIUM 50MG AND SENNOSIDES 8.6MG 2 TABLET: 8.6; 5 TABLET, FILM COATED ORAL at 20:17

## 2021-02-03 RX ADMIN — ENOXAPARIN SODIUM 40 MG: 40 INJECTION SUBCUTANEOUS at 18:02

## 2021-02-03 NOTE — PROGRESS NOTES
LOS: 1 day   Patient Care Team:  Fabi Bill MD as PCP - General (Pediatrics)  Ant Pederson MD as Surgeon (Orthopedic Surgery)  Jason Perrin MD as Consulting Physician (Nephrology)  Dorothy Aldana MD (Urology)    Chief Complaint: post op    Subjective      Vital Signs  Temp:  [97 °F (36.1 °C)-98.1 °F (36.7 °C)] 98.1 °F (36.7 °C)  Heart Rate:  [62-98] 85  Resp:  [14-16] 16  BP: ()/(42-77) 115/62  Arterial Line BP: ()/(44-72) 116/44  FiO2 (%):  [39 %-98 %] 40 %  Body mass index is 27.14 kg/m².    Intake/Output Summary (Last 24 hours) at 2/3/2021 0708  Last data filed at 2/3/2021 0600  Gross per 24 hour   Intake 4105.81 ml   Output 4985 ml   Net -879.19 ml     No intake/output data recorded.    Chest tube drainage last 8 hours 100        02/02/21  0658   Weight: 71.7 kg (158 lb 2 oz)         Objective    Results Review:        WBC WBC   Date Value Ref Range Status   02/03/2021 9.76 3.40 - 10.80 10*3/mm3 Final   02/02/2021 10.60 3.40 - 10.80 10*3/mm3 Final   02/02/2021 11.10 (H) 3.40 - 10.80 10*3/mm3 Final      HGB Hemoglobin   Date Value Ref Range Status   02/03/2021 8.1 (L) 12.0 - 15.9 g/dL Final   02/02/2021 8.8 (L) 12.0 - 15.9 g/dL Final   02/02/2021 7.5 (L) 12.0 - 15.9 g/dL Final   02/02/2021 9.0 (L) 12.0 - 15.9 g/dL Final      HCT Hematocrit   Date Value Ref Range Status   02/03/2021 24.4 (L) 34.0 - 46.6 % Final   02/02/2021 25.5 (L) 34.0 - 46.6 % Final   02/02/2021 22.4 (L) 34.0 - 46.6 % Final   02/02/2021 26.3 (L) 34.0 - 46.6 % Final      Platelets Platelets   Date Value Ref Range Status   02/03/2021 108 (L) 140 - 450 10*3/mm3 Final   02/02/2021 114 (L) 140 - 450 10*3/mm3 Final   02/02/2021 91 (L) 140 - 450 10*3/mm3 Final        PT/INR:    Protime   Date Value Ref Range Status   02/03/2021 14.9 (H) 11.7 - 14.2 Seconds Final   02/02/2021 18.6 (H) 11.7 - 14.2 Seconds Final   /  INR   Date Value Ref Range Status   02/03/2021 1.19 (H) 0.90 - 1.10 Final   02/02/2021 1.58 (H) 0.90  - 1.10 Final       Sodium Sodium   Date Value Ref Range Status   02/03/2021 145 136 - 145 mmol/L Final   02/02/2021 141 136 - 145 mmol/L Final   02/02/2021 141 136 - 145 mmol/L Final      Potassium Potassium   Date Value Ref Range Status   02/03/2021 3.8 3.5 - 5.2 mmol/L Final   02/02/2021 4.5 3.5 - 5.2 mmol/L Final   02/02/2021 3.6 3.5 - 5.2 mmol/L Final     Comment:     Slight hemolysis detected by analyzer. Results may be affected.      Chloride Chloride   Date Value Ref Range Status   02/03/2021 112 (H) 98 - 107 mmol/L Final   02/02/2021 109 (H) 98 - 107 mmol/L Final   02/02/2021 110 (H) 98 - 107 mmol/L Final      Bicarbonate CO2   Date Value Ref Range Status   02/03/2021 21.4 (L) 22.0 - 29.0 mmol/L Final   02/02/2021 22.8 22.0 - 29.0 mmol/L Final   02/02/2021 19.1 (L) 22.0 - 29.0 mmol/L Final      BUN BUN   Date Value Ref Range Status   02/03/2021 17 8 - 23 mg/dL Final   02/02/2021 18 8 - 23 mg/dL Final   02/02/2021 17 8 - 23 mg/dL Final      Creatinine Creatinine   Date Value Ref Range Status   02/03/2021 0.89 0.57 - 1.00 mg/dL Final   02/02/2021 1.11 (H) 0.57 - 1.00 mg/dL Final   02/02/2021 1.19 (H) 0.57 - 1.00 mg/dL Final      Calcium Calcium   Date Value Ref Range Status   02/03/2021 8.6 8.6 - 10.5 mg/dL Final   02/02/2021 9.0 8.6 - 10.5 mg/dL Final   02/02/2021 9.1 8.6 - 10.5 mg/dL Final      Magnesium Magnesium   Date Value Ref Range Status   02/03/2021 2.5 (H) 1.6 - 2.4 mg/dL Final   02/02/2021 2.4 1.6 - 2.4 mg/dL Final   02/02/2021 2.2 1.6 - 2.4 mg/dL Final          amiodarone, 200 mg, Oral, Q12H  aspirin, 325 mg, Oral, Daily  atorvastatin, 40 mg, Oral, Nightly  ceFAZolin, 2 g, Intravenous, Q8H  chlorhexidine, 15 mL, Mouth/Throat, Q12H  enoxaparin, 40 mg, Subcutaneous, Daily  magnesium sulfate, 1 g, Intravenous, Q8H  metoclopramide, 10 mg, Intravenous, Q6H  metoprolol tartrate, 12.5 mg, Oral, Q12H  mupirocin, , Each Nare, BID  senna-docusate sodium, 2 tablet, Oral, Nightly      clevidipine, 2-32  mg/hr  dexmedetomidine, 0.2-1.5 mcg/kg/hr, Last Rate: 0.2 mcg/kg/hr (02/02/21 2110)  DOPamine, 2-20 mcg/kg/min  EPINEPHrine, 0.02-0.3 mcg/kg/min  insulin, 0-50 Units/hr, Last Rate: 2.4 Units/hr (02/02/21 2130)  lactated ringers, 9 mL/hr  milrinone, 0.25-0.75 mcg/kg/min  niCARdipine, 5-15 mg/hr  nitroglycerin, 5-200 mcg/min  norepinephrine, 0.02-0.3 mcg/kg/min, Last Rate: 0.01 mcg/kg/min (02/02/21 1740)  phenylephrine, 0.2-3 mcg/kg/min  propofol, 5-50 mcg/kg/min, Last Rate: Stopped (02/02/21 1430)  sodium chloride, 30 mL/hr, Last Rate: 30 mL/hr (02/02/21 1405)  sodium chloride, 30 mL/hr  vasopressin, 0.02-0.1 Units/min            Patient Active Problem List   Diagnosis Code   • Essential hypertension I10   • Aortic valve stenosis I35.0   • Aortic stenosis I35.0       Assessment & Plan    -Aortic stenosis--s/p AVR (tissue)- POD#1 Cliff  -HTN  -Chronic iron deficiency anemia  -Hypothyroidism  -DM II---HbA1C 7.0  -expected acute blood loss anemia     Borderline blood pressure this morning  Will give a touch of volume and replete calcium  Encourage pulmonary toilet   Mobilize   Transfer to stepdown      ERNIE Matthews  02/03/21  07:08 EST

## 2021-02-03 NOTE — PLAN OF CARE
Goal Outcome Evaluation:  Plan of Care Reviewed With: patient     Outcome Summary: Pt is a 69 yo F who is post-op AVR.Pt presents to PT with impaired functional mobility and gait secondary to generalized weakness, impaired balance, and decreased activity tolerance. Pt may benefit from skilled PT to address strength, mobility, and gait.Patient was not wearing a face mask during this therapy encounter. Therapist used appropriate personal protective equipment including eye protection, mask, and gloves.  Mask used was standard procedure mask. Appropriate PPE was worn during the entire therapy session. Hand hygiene was completed before and after therapy session. Patient is not in enhanced droplet precautions.

## 2021-02-04 ENCOUNTER — APPOINTMENT (OUTPATIENT)
Dept: GENERAL RADIOLOGY | Facility: HOSPITAL | Age: 71
End: 2021-02-04

## 2021-02-04 LAB
ANION GAP SERPL CALCULATED.3IONS-SCNC: 11.6 MMOL/L (ref 5–15)
BUN SERPL-MCNC: 18 MG/DL (ref 8–23)
BUN/CREAT SERPL: 22.5 (ref 7–25)
CALCIUM SPEC-SCNC: 9 MG/DL (ref 8.6–10.5)
CHLORIDE SERPL-SCNC: 104 MMOL/L (ref 98–107)
CO2 SERPL-SCNC: 23.4 MMOL/L (ref 22–29)
CREAT SERPL-MCNC: 0.8 MG/DL (ref 0.57–1)
DEPRECATED RDW RBC AUTO: 45.1 FL (ref 37–54)
ERYTHROCYTE [DISTWIDTH] IN BLOOD BY AUTOMATED COUNT: 13.6 % (ref 12.3–15.4)
GFR SERPL CREATININE-BSD FRML MDRD: 71 ML/MIN/1.73
GLUCOSE BLDC GLUCOMTR-MCNC: 157 MG/DL (ref 70–130)
GLUCOSE BLDC GLUCOMTR-MCNC: 168 MG/DL (ref 70–130)
GLUCOSE BLDC GLUCOMTR-MCNC: 171 MG/DL (ref 70–130)
GLUCOSE BLDC GLUCOMTR-MCNC: 210 MG/DL (ref 70–130)
GLUCOSE SERPL-MCNC: 164 MG/DL (ref 65–99)
HCT VFR BLD AUTO: 24.4 % (ref 34–46.6)
HGB BLD-MCNC: 8.1 G/DL (ref 12–15.9)
MCH RBC QN AUTO: 30 PG (ref 26.6–33)
MCHC RBC AUTO-ENTMCNC: 33.2 G/DL (ref 31.5–35.7)
MCV RBC AUTO: 90.4 FL (ref 79–97)
PLATELET # BLD AUTO: 102 10*3/MM3 (ref 140–450)
PMV BLD AUTO: 9.5 FL (ref 6–12)
POTASSIUM SERPL-SCNC: 4.1 MMOL/L (ref 3.5–5.2)
QT INTERVAL: 361 MS
RBC # BLD AUTO: 2.7 10*6/MM3 (ref 3.77–5.28)
SODIUM SERPL-SCNC: 139 MMOL/L (ref 136–145)
WBC # BLD AUTO: 12.01 10*3/MM3 (ref 3.4–10.8)

## 2021-02-04 PROCEDURE — 93010 ELECTROCARDIOGRAM REPORT: CPT | Performed by: INTERNAL MEDICINE

## 2021-02-04 PROCEDURE — 25010000002 ENOXAPARIN PER 10 MG: Performed by: THORACIC SURGERY (CARDIOTHORACIC VASCULAR SURGERY)

## 2021-02-04 PROCEDURE — 82962 GLUCOSE BLOOD TEST: CPT

## 2021-02-04 PROCEDURE — 85027 COMPLETE CBC AUTOMATED: CPT | Performed by: THORACIC SURGERY (CARDIOTHORACIC VASCULAR SURGERY)

## 2021-02-04 PROCEDURE — 71045 X-RAY EXAM CHEST 1 VIEW: CPT

## 2021-02-04 PROCEDURE — 97530 THERAPEUTIC ACTIVITIES: CPT

## 2021-02-04 PROCEDURE — 99024 POSTOP FOLLOW-UP VISIT: CPT | Performed by: NURSE PRACTITIONER

## 2021-02-04 PROCEDURE — 63710000001 INSULIN LISPRO (HUMAN) PER 5 UNITS: Performed by: THORACIC SURGERY (CARDIOTHORACIC VASCULAR SURGERY)

## 2021-02-04 PROCEDURE — 94799 UNLISTED PULMONARY SVC/PX: CPT

## 2021-02-04 PROCEDURE — 25010000003 CEFAZOLIN IN DEXTROSE 2-4 GM/100ML-% SOLUTION: Performed by: NURSE PRACTITIONER

## 2021-02-04 PROCEDURE — 80048 BASIC METABOLIC PNL TOTAL CA: CPT | Performed by: THORACIC SURGERY (CARDIOTHORACIC VASCULAR SURGERY)

## 2021-02-04 PROCEDURE — 93005 ELECTROCARDIOGRAM TRACING: CPT | Performed by: NURSE PRACTITIONER

## 2021-02-04 RX ORDER — ATENOLOL 25 MG/1
25 TABLET ORAL
Status: DISCONTINUED | OUTPATIENT
Start: 2021-02-04 | End: 2021-02-08 | Stop reason: HOSPADM

## 2021-02-04 RX ADMIN — SERTRALINE HYDROCHLORIDE 50 MG: 50 TABLET, FILM COATED ORAL at 08:03

## 2021-02-04 RX ADMIN — CETIRIZINE HYDROCHLORIDE ALLERGY 10 MG: 10 TABLET ORAL at 08:03

## 2021-02-04 RX ADMIN — MUPIROCIN 1 APPLICATION: 20 OINTMENT TOPICAL at 08:03

## 2021-02-04 RX ADMIN — INSULIN LISPRO 2 UNITS: 100 INJECTION, SOLUTION INTRAVENOUS; SUBCUTANEOUS at 16:41

## 2021-02-04 RX ADMIN — AMIODARONE HYDROCHLORIDE 200 MG: 200 TABLET ORAL at 08:03

## 2021-02-04 RX ADMIN — Medication 50 MG: at 08:03

## 2021-02-04 RX ADMIN — LEVOTHYROXINE, LIOTHYRONINE 60 MG: 38; 9 TABLET ORAL at 08:03

## 2021-02-04 RX ADMIN — MUPIROCIN 1 APPLICATION: 20 OINTMENT TOPICAL at 20:11

## 2021-02-04 RX ADMIN — GABAPENTIN 100 MG: 100 CAPSULE ORAL at 08:03

## 2021-02-04 RX ADMIN — METOPROLOL TARTRATE 12.5 MG: 25 TABLET, FILM COATED ORAL at 08:03

## 2021-02-04 RX ADMIN — OXYCODONE 10 MG: 5 TABLET ORAL at 06:48

## 2021-02-04 RX ADMIN — AMITRIPTYLINE HYDROCHLORIDE 25 MG: 25 TABLET, FILM COATED ORAL at 20:11

## 2021-02-04 RX ADMIN — CHLORHEXIDINE GLUCONATE 15 ML: 1.2 RINSE ORAL at 20:11

## 2021-02-04 RX ADMIN — INSULIN LISPRO 2 UNITS: 100 INJECTION, SOLUTION INTRAVENOUS; SUBCUTANEOUS at 06:49

## 2021-02-04 RX ADMIN — HYDROCODONE BITARTRATE AND ACETAMINOPHEN 2 TABLET: 5; 325 TABLET ORAL at 20:11

## 2021-02-04 RX ADMIN — ATENOLOL 25 MG: 25 TABLET ORAL at 12:50

## 2021-02-04 RX ADMIN — ASPIRIN 325 MG: 325 TABLET, COATED ORAL at 08:03

## 2021-02-04 RX ADMIN — INSULIN LISPRO 3 UNITS: 100 INJECTION, SOLUTION INTRAVENOUS; SUBCUTANEOUS at 12:51

## 2021-02-04 RX ADMIN — CEFAZOLIN SODIUM 2 G: 2 INJECTION, SOLUTION INTRAVENOUS at 03:30

## 2021-02-04 RX ADMIN — TRAMADOL HYDROCHLORIDE 50 MG: 50 TABLET, FILM COATED ORAL at 08:10

## 2021-02-04 RX ADMIN — ENOXAPARIN SODIUM 40 MG: 40 INJECTION SUBCUTANEOUS at 16:42

## 2021-02-04 RX ADMIN — DOCUSATE SODIUM 50MG AND SENNOSIDES 8.6MG 2 TABLET: 8.6; 5 TABLET, FILM COATED ORAL at 20:11

## 2021-02-04 RX ADMIN — CHLORHEXIDINE GLUCONATE 15 ML: 1.2 RINSE ORAL at 06:49

## 2021-02-04 NOTE — THERAPY TREATMENT NOTE
Patient Name: Amelie Anderson  : 1950    MRN: [id number]                              Today's Date: 2021       Admit Date: 2021    Visit Dx:     ICD-10-CM ICD-9-CM   1. S/P AVR (aortic valve replacement)  Z95.2 V43.3   2. Aortic stenosis  I35.0 424.1     Patient Active Problem List   Diagnosis   • Essential hypertension   • Aortic valve stenosis   • Aortic stenosis     Past Medical History:   Diagnosis Date   • Anemia    • Aortic stenosis    • Cataract     BILAT   • Elevated cholesterol    • Environmental allergies    • GERD (gastroesophageal reflux disease)    • History of anesthesia complications     STATES WAS SLOW TO WAKE UP AFTER SURGERY   • History of TB (tuberculosis)     YEARS AGO WHEN FIRST CAME TO [facility], WAS TREATED FOR WITH INH   • Hypertension    • Iron deficiency    • Kidney cysts    • Osteoporosis    • PONV (postoperative nausea and vomiting)    • Seasonal allergies    • Urinary frequency      Past Surgical History:   Procedure Laterality Date   • AORTIC VALVE REPAIR/REPLACEMENT N/A 2021    Procedure: VARUN STERNOTOMY AORTIC VALVE REPLACEMENT AND PRP;  Surgeon: Jesse Coronado MD;  Location: [facility];  Service: Cardiothoracic;  Laterality: N/A;   • BREAST BIOPSY     • CARDIAC CATHETERIZATION  2020    no stents   • CARDIAC CATHETERIZATION      no stents   • HYSTERECTOMY     • TEETH EXTRACTION      ALL TEETH ON TOP, ALL BOTTOM TEETH EXCEPT 4   • TONSILLECTOMY       General Information     Row Name 21 0954          Physical Therapy Time and Intention    Document Type  therapy note (daily note)  -     Mode of Treatment  individual therapy;physical therapy  -     Row Name 21 0954          General Information    Existing Precautions/Restrictions  cardiac;fall;sternal  -     Row Name 21 0954          Cognition    Orientation Status (Cognition)  oriented x 3  -     Row Name 21 0954          Safety Issues, Functional Mobility    Impairments  Affecting Function (Mobility)  balance;endurance/activity tolerance;pain;shortness of breath;strength  -     Comment, Safety Issues/Impairments (Mobility)  pt seems slightly lethargic during exercises  -       User Key  (r) = Recorded By, (t) = Taken By, (c) = Cosigned By    Initials Name Provider Type    Blanca Murray PT Physical Therapist        Mobility     Row Name 02/04/21 0955          Bed Mobility    Bed Mobility  supine-sit;sit-supine  -     Supine-Sit [place] (Bed Mobility)  not tested  -     Sit-Supine [place] (Bed Mobility)  not tested  -     Comment (Bed Mobility)  sitting in chair  -     Row Name 02/04/21 0955          Sit-Stand Transfer    Sit-Stand [place] (Transfers)  verbal cues;nonverbal cues (demo/gesture);minimum assist (75% patient effort);2 person assist  -     Assistive Device (Sit-Stand Transfers)  -- HHA  -     Row Name 02/04/21 0955          Gait/Stairs (Locomotion)    [place] Level (Gait)  verbal cues;nonverbal cues (demo/gesture);minimum assist (75% patient effort);2 person assist  -     Assistive Device (Gait)  -- A  -     Distance in Feet (Gait)  30  -     Deviations/Abnormal Patterns (Gait)  jonah decreased;festinating/shuffling;gait speed decreased;stride length decreased  -     Bilateral Gait Deviations  forward flexed posture  -     Comment (Gait/Stairs)  Pt wth limited gait distance secondary to dizziness  -       User Key  (r) = Recorded By, (t) = Taken By, (c) = Cosigned By    Initials Name Provider Type    Blanca Murray PT Physical Therapist        Obj/Interventions     Row Name 02/04/21 0956          Motor Skills    Therapeutic Exercise  -- 10 reps cardiac protocol  -       User Key  (r) = Recorded By, (t) = Taken By, (c) = Cosigned By    Initials Name Provider Type    Blanca Murray PT Physical Therapist        Goals/Plan    No documentation.       Clinical Impression     Row Name 02/04/21 0957           Pain    Additional Documentation  Pain Scale: Numbers Pre/Post-Treatment (Group)  -     Row Name 02/04/21 0957          Pain Scale: Numbers Pre/Post-Treatment    Pretreatment Pain Rating  7/10  -     Posttreatment Pain Rating  7/10  -     Pain Location  chest  -     Pain Intervention(s)  Repositioned  -     Row Name 02/04/21 0957          Plan of Care Review    Plan of Care Reviewed With  patient  -     Outcome Summary  Pt demonstrates increased functional strength as she required less assistance and was able to participate in therapeutic exercises. Gait distance limited by fatigue and dizziness. PT will continue to follow to address strength, mobility, and gait.  -     Row Name 02/04/21 0957          Positioning and Restraints    Pre-Treatment Position  sitting in chair/recliner  -     Post Treatment Position  chair  -     In Chair  reclined;encouraged to call for assist;call light within reach  -       User Key  (r) = Recorded By, (t) = Taken By, (c) = Cosigned By    Initials Name Provider Type    Blanca Murray, PT Physical Therapist        Outcome Measures     Row Name 02/04/21 1000          How much help from another person do you currently need...    Turning from your back to your side while in flat bed without using bedrails?  3  -CH     Moving from lying on back to sitting on the side of a flat bed without bedrails?  3  -CH     Moving to and from a bed to a chair (including a wheelchair)?  3  -CH     Standing up from a chair using your arms (e.g., wheelchair, bedside chair)?  3  -CH     Climbing 3-5 steps with a railing?  1  -CH     To walk in hospital room?  3  -CH     AM-PAC 6 Clicks Score (PT)  16  -     Row Name 02/04/21 1000          Functional Assessment    Outcome Measure Options  AM-PAC 6 Clicks Basic Mobility (PT)  -       User Key  (r) = Recorded By, (t) = Taken By, (c) = Cosigned By    Initials Name Provider Type    Blanca Murray, PT Physical Therapist         Physical Therapy Education                 Title: PT OT SLP Therapies (Done)     Topic: Physical Therapy (Done)     Point: Mobility training (Done)     Learning Progress Summary           Patient Acceptance, E,TB,D, VU,NR by  at 2/4/2021 1000    Acceptance, E,TB,D, VU,NR by  at 2/3/2021 0944                   Point: Home exercise program (Done)     Learning Progress Summary           Patient Acceptance, E,TB,D, VU,NR by  at 2/4/2021 1000    Acceptance, E,TB,D, VU,NR by  at 2/3/2021 0944                   Point: Body mechanics (Done)     Learning Progress Summary           Patient Acceptance, E,TB,D, VU,NR by  at 2/4/2021 1000    Acceptance, E,TB,D, VU,NR by  at 2/3/2021 0944                   Point: Precautions (Done)     Learning Progress Summary           Patient Acceptance, E,TB,D, VU,NR by  at 2/4/2021 1000    Acceptance, E,TB,D, VU,NR by  at 2/3/2021 0944                               User Key     Initials Effective Dates Name Provider Type [facility] 04/03/18 -  Blanca Ennis PT Physical Therapist PT              PT Recommendation and Plan     Plan of Care Reviewed With: patient  Outcome Summary: Pt demonstrates increased functional strength as she required less assistance and was able to participate in therapeutic exercises. Gait distance limited by fatigue and dizziness. PT will continue to follow to address strength, mobility, and gait.     Time Calculation:   PT Charges     Row Name 02/04/21 1001             Time Calculation    Start Time  0856  -      Stop Time  0914  -      Time Calculation (min)  18 min  -      PT Received On  02/04/21  -      PT - Next Appointment  02/05/21  -         Time Calculation- PT    Total Timed Code Minutes- PT  16 minute(s)  -        User Key  (r) = Recorded By, (t) = Taken By, (c) = Cosigned By    Initials Name Provider Type     Blanca Ennis PT Physical Therapist        Therapy Charges for Today     Code Description  Service Date Service Provider Modifiers Qty    19883842716 HC PT EVAL MOD COMPLEXITY 2 2/3/2021 Blanca Ennis, PT GP 1    34658068248 HC PT THER PROC EA 15 MIN 2/3/2021 Blanca Ennis, PT GP 1    74442357753 HC PT THER SUPP EA 15 MIN 2/3/2021 Blanca Ennis, PT GP 1    16818639907 HC PT THERAPEUTIC ACT EA 15 MIN 2/4/2021 Blanca Ennis, PT GP 1          PT G-Codes  Outcome Measure Options: AM-PAC 6 Clicks Basic Mobility (PT)  AM-PAC 6 Clicks Score (PT): 16    Blanca Ennis, PT  2/4/2021

## 2021-02-04 NOTE — PLAN OF CARE
Goal Outcome Evaluation:  Plan of Care Reviewed With: patient     Outcome Summary: Pt demonstrates increased functional strength as she required less assistance and was able to participate in therapeutic exercises. Gait distance limited by fatigue and dizziness. PT will continue to follow to address strength, mobility, and gait.Patient was intermittently wearing a face mask during this therapy encounter. Therapist used appropriate personal protective equipment including eye protection, mask, and gloves.  Mask used was standard procedure mask. Appropriate PPE was worn during the entire therapy session. Hand hygiene was completed before and after therapy session. Patient is not in enhanced droplet precautions.

## 2021-02-04 NOTE — CONSULTS
Met with patient, discussed benefits of cardiac rehab. Provided phase II information along with the contact information for cardiac rehab here at [facility]. Patient familiar with program, states her  attended. Requested for referral to be sent.    Explained if receiving home health would not be able to attend cardiac rehab until finished with home health.

## 2021-02-04 NOTE — PROGRESS NOTES
LOS: 2 days   Patient Care Team:  Fabi Bill MD as PCP - General (Pediatrics)  Ant Pederson MD as Surgeon (Orthopedic Surgery)  Jason Perrin MD as Consulting Physician (Nephrology)  Dorothy Aldana MD (Urology)    Chief Complaint: post op    Subjective      Vital Signs  Temp:  [98.1 °F (36.7 °C)-98.4 °F (36.9 °C)] 98.2 °F (36.8 °C)  Heart Rate:  [74-97] 97  Resp:  [14-16] 14  BP: (107-120)/(57-65) 108/58  Body mass index is 27.41 kg/m².    Intake/Output Summary (Last 24 hours) at 2/4/2021 0834  Last data filed at 2/4/2021 0804  Gross per 24 hour   Intake 1105 ml   Output 1280 ml   Net -175 ml     No intake/output data recorded.    Chest tube drainage last 8 hours         02/02/21  0658 02/04/21  0651   Weight: 71.7 kg (158 lb 2 oz) 72.4 kg (159 lb 11.2 oz)         Objective    Results Review:        WBC WBC   Date Value Ref Range Status   02/04/2021 12.01 (H) 3.40 - 10.80 10*3/mm3 Final   02/03/2021 9.76 3.40 - 10.80 10*3/mm3 Final   02/02/2021 10.60 3.40 - 10.80 10*3/mm3 Final   02/02/2021 11.10 (H) 3.40 - 10.80 10*3/mm3 Final      HGB Hemoglobin   Date Value Ref Range Status   02/04/2021 8.1 (L) 12.0 - 15.9 g/dL Final   02/03/2021 8.1 (L) 12.0 - 15.9 g/dL Final   02/02/2021 8.8 (L) 12.0 - 15.9 g/dL Final   02/02/2021 7.5 (L) 12.0 - 15.9 g/dL Final   02/02/2021 9.0 (L) 12.0 - 15.9 g/dL Final      HCT Hematocrit   Date Value Ref Range Status   02/04/2021 24.4 (L) 34.0 - 46.6 % Final   02/03/2021 24.4 (L) 34.0 - 46.6 % Final   02/02/2021 25.5 (L) 34.0 - 46.6 % Final   02/02/2021 22.4 (L) 34.0 - 46.6 % Final   02/02/2021 26.3 (L) 34.0 - 46.6 % Final      Platelets Platelets   Date Value Ref Range Status   02/04/2021 102 (L) 140 - 450 10*3/mm3 Final   02/03/2021 108 (L) 140 - 450 10*3/mm3 Final   02/02/2021 114 (L) 140 - 450 10*3/mm3 Final   02/02/2021 91 (L) 140 - 450 10*3/mm3 Final        PT/INR:    Protime   Date Value Ref Range Status   02/03/2021 14.9 (H) 11.7 - 14.2 Seconds Final    02/02/2021 18.6 (H) 11.7 - 14.2 Seconds Final   /  INR   Date Value Ref Range Status   02/03/2021 1.19 (H) 0.90 - 1.10 Final   02/02/2021 1.58 (H) 0.90 - 1.10 Final       Sodium Sodium   Date Value Ref Range Status   02/04/2021 139 136 - 145 mmol/L Final   02/03/2021 145 136 - 145 mmol/L Final   02/02/2021 141 136 - 145 mmol/L Final   02/02/2021 141 136 - 145 mmol/L Final      Potassium Potassium   Date Value Ref Range Status   02/04/2021 4.1 3.5 - 5.2 mmol/L Final   02/03/2021 3.8 3.5 - 5.2 mmol/L Final   02/02/2021 4.5 3.5 - 5.2 mmol/L Final   02/02/2021 3.6 3.5 - 5.2 mmol/L Final     Comment:     Slight hemolysis detected by analyzer. Results may be affected.      Chloride Chloride   Date Value Ref Range Status   02/04/2021 104 98 - 107 mmol/L Final   02/03/2021 112 (H) 98 - 107 mmol/L Final   02/02/2021 109 (H) 98 - 107 mmol/L Final   02/02/2021 110 (H) 98 - 107 mmol/L Final      Bicarbonate CO2   Date Value Ref Range Status   02/04/2021 23.4 22.0 - 29.0 mmol/L Final   02/03/2021 21.4 (L) 22.0 - 29.0 mmol/L Final   02/02/2021 22.8 22.0 - 29.0 mmol/L Final   02/02/2021 19.1 (L) 22.0 - 29.0 mmol/L Final      BUN BUN   Date Value Ref Range Status   02/04/2021 18 8 - 23 mg/dL Final   02/03/2021 17 8 - 23 mg/dL Final   02/02/2021 18 8 - 23 mg/dL Final   02/02/2021 17 8 - 23 mg/dL Final      Creatinine Creatinine   Date Value Ref Range Status   02/04/2021 0.80 0.57 - 1.00 mg/dL Final   02/03/2021 0.89 0.57 - 1.00 mg/dL Final   02/02/2021 1.11 (H) 0.57 - 1.00 mg/dL Final   02/02/2021 1.19 (H) 0.57 - 1.00 mg/dL Final      Calcium Calcium   Date Value Ref Range Status   02/04/2021 9.0 8.6 - 10.5 mg/dL Final   02/03/2021 8.6 8.6 - 10.5 mg/dL Final   02/02/2021 9.0 8.6 - 10.5 mg/dL Final   02/02/2021 9.1 8.6 - 10.5 mg/dL Final      Magnesium Magnesium   Date Value Ref Range Status   02/03/2021 2.5 (H) 1.6 - 2.4 mg/dL Final   02/02/2021 2.4 1.6 - 2.4 mg/dL Final   02/02/2021 2.2 1.6 - 2.4 mg/dL Final          amiodarone,  200 mg, Oral, Q12H  amitriptyline, 25 mg, Oral, Nightly  aspirin, 325 mg, Oral, Daily  atorvastatin, 40 mg, Oral, Nightly  cetirizine, 10 mg, Oral, Daily  chlorhexidine, 15 mL, Mouth/Throat, Q12H  enoxaparin, 40 mg, Subcutaneous, Daily  gabapentin, 100 mg, Oral, Q12H  insulin lispro, 0-7 Units, Subcutaneous, TID AC  metoprolol tartrate, 12.5 mg, Oral, Q12H  mupirocin, , Each Nare, BID  senna-docusate sodium, 2 tablet, Oral, Nightly  sertraline, 50 mg, Oral, Daily  Thyroid, 60 mg, Oral, Daily  vitamin B-6, 50 mg, Oral, Daily               Patient Active Problem List   Diagnosis Code   • Essential hypertension I10   • Aortic valve stenosis I35.0   • Aortic stenosis I35.0       Assessment & Plan    -Aortic stenosis--s/p AVR (tissue)- POD#2 [place]  -HTN  -Chronic iron deficiency anemia  -Hypothyroidism  -DM II---HbA1C 7.0  -expected acute blood loss anemia      Looks good this morning  1L NC wean as able  Encourage pulmonary toilet   Discontinue chest tubes, central line and page  Blood pressure probably wouldn't tolerate an increase but HR 90-100s will switch beta blocker to atenolol to see if she would tolerate better  Mobilize   Routine care      ERNIE Matthews  02/04/21  08:34 EST

## 2021-02-05 ENCOUNTER — APPOINTMENT (OUTPATIENT)
Dept: GENERAL RADIOLOGY | Facility: HOSPITAL | Age: 71
End: 2021-02-05

## 2021-02-05 LAB
ACT BLD: 109 SECONDS (ref 82–152)
ACT BLD: 125 SECONDS (ref 82–152)
ACT BLD: 290 SECONDS (ref 82–152)
ACT BLD: 290 SECONDS (ref 82–152)
ACT BLD: 406 SECONDS (ref 82–152)
ACT BLD: 582 SECONDS (ref 82–152)
ANION GAP SERPL CALCULATED.3IONS-SCNC: 7.1 MMOL/L (ref 5–15)
BASE EXCESS BLDA CALC-SCNC: -1 MMOL/L (ref -5–5)
BASE EXCESS BLDA CALC-SCNC: -3 MMOL/L (ref -5–5)
BASE EXCESS BLDA CALC-SCNC: -3 MMOL/L (ref -5–5)
BASE EXCESS BLDA CALC-SCNC: 0 MMOL/L (ref -5–5)
BASE EXCESS BLDA CALC-SCNC: 1 MMOL/L (ref -5–5)
BUN SERPL-MCNC: 22 MG/DL (ref 8–23)
BUN/CREAT SERPL: 26.2 (ref 7–25)
CA-I BLDA-SCNC: ABNORMAL MMOL/L
CALCIUM SPEC-SCNC: 9.2 MG/DL (ref 8.6–10.5)
CHLORIDE SERPL-SCNC: 102 MMOL/L (ref 98–107)
CO2 BLDA-SCNC: 23 MMOL/L (ref 24–29)
CO2 BLDA-SCNC: 25 MMOL/L (ref 24–29)
CO2 BLDA-SCNC: 27 MMOL/L (ref 24–29)
CO2 BLDA-SCNC: 27 MMOL/L (ref 24–29)
CO2 BLDA-SCNC: 28 MMOL/L (ref 24–29)
CO2 SERPL-SCNC: 27.9 MMOL/L (ref 22–29)
CREAT SERPL-MCNC: 0.84 MG/DL (ref 0.57–1)
DEPRECATED RDW RBC AUTO: 42 FL (ref 37–54)
ERYTHROCYTE [DISTWIDTH] IN BLOOD BY AUTOMATED COUNT: 13.2 % (ref 12.3–15.4)
GFR SERPL CREATININE-BSD FRML MDRD: 67 ML/MIN/1.73
GLUCOSE BLDC GLUCOMTR-MCNC: 110 MG/DL (ref 70–130)
GLUCOSE BLDC GLUCOMTR-MCNC: 120 MG/DL (ref 70–130)
GLUCOSE BLDC GLUCOMTR-MCNC: 122 MG/DL (ref 70–130)
GLUCOSE BLDC GLUCOMTR-MCNC: 138 MG/DL (ref 70–130)
GLUCOSE BLDC GLUCOMTR-MCNC: 159 MG/DL (ref 70–130)
GLUCOSE BLDC GLUCOMTR-MCNC: 170 MG/DL (ref 70–130)
GLUCOSE BLDC GLUCOMTR-MCNC: 177 MG/DL (ref 70–130)
GLUCOSE BLDC GLUCOMTR-MCNC: 197 MG/DL (ref 70–130)
GLUCOSE BLDC GLUCOMTR-MCNC: 204 MG/DL (ref 70–130)
GLUCOSE SERPL-MCNC: 125 MG/DL (ref 65–99)
HCO3 BLDA-SCNC: 22 MMOL/L (ref 22–26)
HCO3 BLDA-SCNC: 23.8 MMOL/L (ref 22–26)
HCO3 BLDA-SCNC: 25.3 MMOL/L (ref 22–26)
HCO3 BLDA-SCNC: 25.5 MMOL/L (ref 22–26)
HCO3 BLDA-SCNC: 26.8 MMOL/L (ref 22–26)
HCT VFR BLD AUTO: 25.2 % (ref 34–46.6)
HCT VFR BLDA CALC: 22 % (ref 38–51)
HCT VFR BLDA CALC: 25 % (ref 38–51)
HCT VFR BLDA CALC: 26 % (ref 38–51)
HCT VFR BLDA CALC: 26 % (ref 38–51)
HCT VFR BLDA CALC: 29 % (ref 38–51)
HGB BLD-MCNC: 8.3 G/DL (ref 12–15.9)
HGB BLDA-MCNC: 7.5 G/DL (ref 12–17)
HGB BLDA-MCNC: 8.5 G/DL (ref 12–17)
HGB BLDA-MCNC: 8.8 G/DL (ref 12–17)
HGB BLDA-MCNC: 8.8 G/DL (ref 12–17)
HGB BLDA-MCNC: 9.9 G/DL (ref 12–17)
MCH RBC QN AUTO: 29 PG (ref 26.6–33)
MCHC RBC AUTO-ENTMCNC: 32.9 G/DL (ref 31.5–35.7)
MCV RBC AUTO: 88.1 FL (ref 79–97)
PCO2 BLDA: 37.8 MM HG (ref 35–45)
PCO2 BLDA: 47.1 MM HG (ref 35–45)
PCO2 BLDA: 47.7 MM HG (ref 35–45)
PCO2 BLDA: 50 MM HG (ref 35–45)
PCO2 BLDA: 54.2 MM HG (ref 35–45)
PH BLDA: 7.28 PH UNITS (ref 7.35–7.6)
PH BLDA: 7.31 PH UNITS (ref 7.35–7.6)
PH BLDA: 7.34 PH UNITS (ref 7.35–7.6)
PH BLDA: 7.34 PH UNITS (ref 7.35–7.6)
PH BLDA: 7.37 PH UNITS (ref 7.35–7.6)
PLATELET # BLD AUTO: 127 10*3/MM3 (ref 140–450)
PMV BLD AUTO: 9.6 FL (ref 6–12)
PO2 BLDA: 249 MMHG (ref 80–105)
PO2 BLDA: 398 MMHG (ref 80–105)
PO2 BLDA: 407 MMHG (ref 80–105)
PO2 BLDA: 43 MMHG (ref 80–105)
PO2 BLDA: 482 MMHG (ref 80–105)
POTASSIUM BLDA-SCNC: 3.8 MMOL/L (ref 3.5–4.9)
POTASSIUM BLDA-SCNC: 4.2 MMOL/L (ref 3.5–4.9)
POTASSIUM BLDA-SCNC: 5.1 MMOL/L (ref 3.5–4.9)
POTASSIUM BLDA-SCNC: 5.1 MMOL/L (ref 3.5–4.9)
POTASSIUM BLDA-SCNC: 5.3 MMOL/L (ref 3.5–4.9)
POTASSIUM SERPL-SCNC: 3.8 MMOL/L (ref 3.5–5.2)
RBC # BLD AUTO: 2.86 10*6/MM3 (ref 3.77–5.28)
SAO2 % BLDA: 100 % (ref 95–98)
SAO2 % BLDA: 73 % (ref 95–98)
SODIUM SERPL-SCNC: 137 MMOL/L (ref 136–145)
WBC # BLD AUTO: 10.25 10*3/MM3 (ref 3.4–10.8)

## 2021-02-05 PROCEDURE — 80048 BASIC METABOLIC PNL TOTAL CA: CPT | Performed by: THORACIC SURGERY (CARDIOTHORACIC VASCULAR SURGERY)

## 2021-02-05 PROCEDURE — 99024 POSTOP FOLLOW-UP VISIT: CPT | Performed by: NURSE PRACTITIONER

## 2021-02-05 PROCEDURE — 93005 ELECTROCARDIOGRAM TRACING: CPT | Performed by: THORACIC SURGERY (CARDIOTHORACIC VASCULAR SURGERY)

## 2021-02-05 PROCEDURE — 63710000001 INSULIN LISPRO (HUMAN) PER 5 UNITS: Performed by: THORACIC SURGERY (CARDIOTHORACIC VASCULAR SURGERY)

## 2021-02-05 PROCEDURE — 85027 COMPLETE CBC AUTOMATED: CPT | Performed by: THORACIC SURGERY (CARDIOTHORACIC VASCULAR SURGERY)

## 2021-02-05 PROCEDURE — 25010000002 ENOXAPARIN PER 10 MG: Performed by: THORACIC SURGERY (CARDIOTHORACIC VASCULAR SURGERY)

## 2021-02-05 PROCEDURE — 97530 THERAPEUTIC ACTIVITIES: CPT

## 2021-02-05 PROCEDURE — 71046 X-RAY EXAM CHEST 2 VIEWS: CPT

## 2021-02-05 PROCEDURE — 93010 ELECTROCARDIOGRAM REPORT: CPT | Performed by: INTERNAL MEDICINE

## 2021-02-05 PROCEDURE — 82962 GLUCOSE BLOOD TEST: CPT

## 2021-02-05 RX ORDER — ACETAMINOPHEN 500 MG
500 TABLET ORAL EVERY 4 HOURS PRN
Status: DISCONTINUED | OUTPATIENT
Start: 2021-02-05 | End: 2021-02-08 | Stop reason: HOSPADM

## 2021-02-05 RX ORDER — SERTRALINE HYDROCHLORIDE 25 MG/1
25 TABLET, FILM COATED ORAL DAILY
Status: DISCONTINUED | OUTPATIENT
Start: 2021-02-06 | End: 2021-02-08 | Stop reason: HOSPADM

## 2021-02-05 RX ORDER — POTASSIUM CHLORIDE 750 MG/1
40 TABLET, FILM COATED, EXTENDED RELEASE ORAL ONCE
Status: COMPLETED | OUTPATIENT
Start: 2021-02-05 | End: 2021-02-05

## 2021-02-05 RX ORDER — AMIODARONE HYDROCHLORIDE 200 MG/1
200 TABLET ORAL
Status: DISCONTINUED | OUTPATIENT
Start: 2021-02-07 | End: 2021-02-08 | Stop reason: HOSPADM

## 2021-02-05 RX ORDER — LEVOFLOXACIN 500 MG/1
500 TABLET, FILM COATED ORAL EVERY 24 HOURS
Status: DISCONTINUED | OUTPATIENT
Start: 2021-02-05 | End: 2021-02-08 | Stop reason: HOSPADM

## 2021-02-05 RX ADMIN — ACETAMINOPHEN 500 MG: 500 TABLET, FILM COATED ORAL at 21:42

## 2021-02-05 RX ADMIN — CETIRIZINE HYDROCHLORIDE ALLERGY 10 MG: 10 TABLET ORAL at 08:51

## 2021-02-05 RX ADMIN — MUPIROCIN 1 APPLICATION: 20 OINTMENT TOPICAL at 21:35

## 2021-02-05 RX ADMIN — ATENOLOL 25 MG: 25 TABLET ORAL at 08:51

## 2021-02-05 RX ADMIN — MUPIROCIN 1 APPLICATION: 20 OINTMENT TOPICAL at 08:51

## 2021-02-05 RX ADMIN — Medication 50 MG: at 08:51

## 2021-02-05 RX ADMIN — SERTRALINE HYDROCHLORIDE 50 MG: 50 TABLET, FILM COATED ORAL at 08:51

## 2021-02-05 RX ADMIN — ENOXAPARIN SODIUM 40 MG: 40 INJECTION SUBCUTANEOUS at 17:34

## 2021-02-05 RX ADMIN — POTASSIUM CHLORIDE 40 MEQ: 750 TABLET, EXTENDED RELEASE ORAL at 12:30

## 2021-02-05 RX ADMIN — DOCUSATE SODIUM 50MG AND SENNOSIDES 8.6MG 2 TABLET: 8.6; 5 TABLET, FILM COATED ORAL at 21:35

## 2021-02-05 RX ADMIN — AMITRIPTYLINE HYDROCHLORIDE 25 MG: 25 TABLET, FILM COATED ORAL at 21:35

## 2021-02-05 RX ADMIN — LEVOTHYROXINE, LIOTHYRONINE 60 MG: 38; 9 TABLET ORAL at 08:51

## 2021-02-05 RX ADMIN — ASPIRIN 325 MG: 325 TABLET, COATED ORAL at 08:51

## 2021-02-05 RX ADMIN — INSULIN LISPRO 2 UNITS: 100 INJECTION, SOLUTION INTRAVENOUS; SUBCUTANEOUS at 12:30

## 2021-02-05 RX ADMIN — AMIODARONE HYDROCHLORIDE 200 MG: 200 TABLET ORAL at 08:50

## 2021-02-05 RX ADMIN — HYDROCODONE BITARTRATE AND ACETAMINOPHEN 1 TABLET: 5; 325 TABLET ORAL at 23:45

## 2021-02-05 NOTE — PLAN OF CARE
Goal Outcome Evaluation:  Plan of Care Reviewed With: (P) patient  Progress: (P) improving  Outcome Summary: (P) Pt was able to ambulate further today with decreased assistance. Pt continues to be limited in distance d/t decreased endurance, however she ambulated without O2 and after activity, her O2sat remained high. Pt will continue to benefit from skilled therapy to address functional mobility, endurance, and gait deficits.

## 2021-02-05 NOTE — PROGRESS NOTES
" LOS: 3 days   Patient Care Team:  Fabi Bill MD as PCP - General (Pediatrics)  Ant Pederson MD as Surgeon (Orthopedic Surgery)  Jason Perrin MD as Consulting Physician (Nephrology)  Dorothy Aldana MD (Urology)    Chief Complaint: post op fu    Subjective:  Symptoms:  No shortness of breath.    Diet:  No nausea.    Activity level: Impaired due to weakness.    Pain:  She reports no pain.          Vital Signs  Temp:  [97.7 °F (36.5 °C)-98.6 °F (37 °C)] 97.7 °F (36.5 °C)  Heart Rate:  [74-81] 76  Resp:  [14-16] 16  BP: ()/(56-65) 109/59  Body mass index is 27.15 kg/m².    Intake/Output Summary (Last 24 hours) at 2/5/2021 0847  Last data filed at 2/4/2021 2011  Gross per 24 hour   Intake 410 ml   Output 190 ml   Net 220 ml     No intake/output data recorded.          02/02/21  0658 02/04/21  0651 02/05/21  0643   Weight: 71.7 kg (158 lb 2 oz) 72.4 kg (159 lb 11.2 oz) 71.8 kg (158 lb 3.2 oz)         Objective:  General Appearance:  Comfortable.    Vital signs: (most recent): Blood pressure 109/59, pulse 76, temperature 97.7 °F (36.5 °C), temperature source Oral, resp. rate 16, height 162.6 cm (64\"), weight 71.8 kg (158 lb 3.2 oz), SpO2 97 %.    Output: Producing urine.    Lungs:  Normal effort and normal respiratory rate.  There are rales (bilateral bases).  No wheezes or rhonchi.  (Room air)  Heart: Normal rate.  Regular rhythm.  S1 normal and S2 normal.    Abdomen: Abdomen is soft and non-distended.    Extremities: There is no dependent edema.    Neurological: Patient is alert and oriented to person, place and time.    Skin:  Warm and dry.  (Sternal incision well approximated without erythema, edema, or drainage)            Results Review:        WBC WBC   Date Value Ref Range Status   02/05/2021 10.25 3.40 - 10.80 10*3/mm3 Final   02/04/2021 12.01 (H) 3.40 - 10.80 10*3/mm3 Final   02/03/2021 9.76 3.40 - 10.80 10*3/mm3 Final   02/02/2021 10.60 3.40 - 10.80 10*3/mm3 Final   02/02/2021 " 11.10 (H) 3.40 - 10.80 10*3/mm3 Final      HGB Hemoglobin   Date Value Ref Range Status   02/05/2021 8.3 (L) 12.0 - 15.9 g/dL Final   02/04/2021 8.1 (L) 12.0 - 15.9 g/dL Final   02/03/2021 8.1 (L) 12.0 - 15.9 g/dL Final   02/02/2021 8.8 (L) 12.0 - 15.9 g/dL Final   02/02/2021 7.5 (L) 12.0 - 15.9 g/dL Final   02/02/2021 9.0 (L) 12.0 - 15.9 g/dL Final   02/02/2021 7.5 (L) 12.0 - 17.0 g/dL Final   02/02/2021 8.8 (L) 12.0 - 17.0 g/dL Final   02/02/2021 8.5 (L) 12.0 - 17.0 g/dL Final   02/02/2021 8.8 (L) 12.0 - 17.0 g/dL Final   02/02/2021 9.9 (L) 12.0 - 17.0 g/dL Final      HCT Hematocrit   Date Value Ref Range Status   02/05/2021 25.2 (L) 34.0 - 46.6 % Final   02/04/2021 24.4 (L) 34.0 - 46.6 % Final   02/03/2021 24.4 (L) 34.0 - 46.6 % Final   02/02/2021 25.5 (L) 34.0 - 46.6 % Final   02/02/2021 22.4 (L) 34.0 - 46.6 % Final   02/02/2021 26.3 (L) 34.0 - 46.6 % Final   02/02/2021 22 (L) 38 - 51 % Final   02/02/2021 26 (L) 38 - 51 % Final   02/02/2021 25 (L) 38 - 51 % Final   02/02/2021 26 (L) 38 - 51 % Final   02/02/2021 29 (L) 38 - 51 % Final      Platelets Platelets   Date Value Ref Range Status   02/05/2021 127 (L) 140 - 450 10*3/mm3 Final   02/04/2021 102 (L) 140 - 450 10*3/mm3 Final   02/03/2021 108 (L) 140 - 450 10*3/mm3 Final   02/02/2021 114 (L) 140 - 450 10*3/mm3 Final   02/02/2021 91 (L) 140 - 450 10*3/mm3 Final        PT/INR:    Protime   Date Value Ref Range Status   02/03/2021 14.9 (H) 11.7 - 14.2 Seconds Final   02/02/2021 18.6 (H) 11.7 - 14.2 Seconds Final   /  INR   Date Value Ref Range Status   02/03/2021 1.19 (H) 0.90 - 1.10 Final   02/02/2021 1.58 (H) 0.90 - 1.10 Final       Sodium Sodium   Date Value Ref Range Status   02/05/2021 137 136 - 145 mmol/L Final   02/04/2021 139 136 - 145 mmol/L Final   02/03/2021 145 136 - 145 mmol/L Final   02/02/2021 141 136 - 145 mmol/L Final   02/02/2021 141 136 - 145 mmol/L Final      Potassium Potassium   Date Value Ref Range Status   02/05/2021 3.8 3.5 - 5.2 mmol/L  Final   02/04/2021 4.1 3.5 - 5.2 mmol/L Final   02/03/2021 3.8 3.5 - 5.2 mmol/L Final   02/02/2021 4.5 3.5 - 5.2 mmol/L Final   02/02/2021 3.6 3.5 - 5.2 mmol/L Final     Comment:     Slight hemolysis detected by analyzer. Results may be affected.      Chloride Chloride   Date Value Ref Range Status   02/05/2021 102 98 - 107 mmol/L Final   02/04/2021 104 98 - 107 mmol/L Final   02/03/2021 112 (H) 98 - 107 mmol/L Final   02/02/2021 109 (H) 98 - 107 mmol/L Final   02/02/2021 110 (H) 98 - 107 mmol/L Final      Bicarbonate CO2   Date Value Ref Range Status   02/05/2021 27.9 22.0 - 29.0 mmol/L Final   02/04/2021 23.4 22.0 - 29.0 mmol/L Final   02/03/2021 21.4 (L) 22.0 - 29.0 mmol/L Final   02/02/2021 22.8 22.0 - 29.0 mmol/L Final   02/02/2021 19.1 (L) 22.0 - 29.0 mmol/L Final      BUN BUN   Date Value Ref Range Status   02/05/2021 22 8 - 23 mg/dL Final   02/04/2021 18 8 - 23 mg/dL Final   02/03/2021 17 8 - 23 mg/dL Final   02/02/2021 18 8 - 23 mg/dL Final   02/02/2021 17 8 - 23 mg/dL Final      Creatinine Creatinine   Date Value Ref Range Status   02/05/2021 0.84 0.57 - 1.00 mg/dL Final   02/04/2021 0.80 0.57 - 1.00 mg/dL Final   02/03/2021 0.89 0.57 - 1.00 mg/dL Final   02/02/2021 1.11 (H) 0.57 - 1.00 mg/dL Final   02/02/2021 1.19 (H) 0.57 - 1.00 mg/dL Final      Calcium Calcium   Date Value Ref Range Status   02/05/2021 9.2 8.6 - 10.5 mg/dL Final   02/04/2021 9.0 8.6 - 10.5 mg/dL Final   02/03/2021 8.6 8.6 - 10.5 mg/dL Final   02/02/2021 9.0 8.6 - 10.5 mg/dL Final   02/02/2021 9.1 8.6 - 10.5 mg/dL Final      Magnesium Magnesium   Date Value Ref Range Status   02/03/2021 2.5 (H) 1.6 - 2.4 mg/dL Final   02/02/2021 2.4 1.6 - 2.4 mg/dL Final   02/02/2021 2.2 1.6 - 2.4 mg/dL Final      Troponin No results found for: TROPONIN   BNP No results found for: BNP         amiodarone, 200 mg, Oral, Q12H  amitriptyline, 25 mg, Oral, Nightly  aspirin, 325 mg, Oral, Daily  atenolol, 25 mg, Oral, Q24H  cetirizine, 10 mg, Oral,  Daily  enoxaparin, 40 mg, Subcutaneous, Daily  insulin lispro, 0-7 Units, Subcutaneous, TID AC  mupirocin, , Each Nare, BID  senna-docusate sodium, 2 tablet, Oral, Nightly  sertraline, 50 mg, Oral, Daily  Thyroid, 60 mg, Oral, Daily  vitamin B-6, 50 mg, Oral, Daily           PRN Meds:.•  acetaminophen **OR** acetaminophen **OR** acetaminophen  •  dextrose  •  dextrose  •  glucagon (human recombinant)  •  HYDROcodone-acetaminophen  •  Morphine **AND** naloxone  •  potassium chloride **OR** potassium chloride  •  traMADol    Patient Active Problem List   Diagnosis Code   • Essential hypertension I10   • Aortic valve stenosis I35.0   • Aortic stenosis I35.0       Assessment & Plan    -Aortic stenosis s/p AVR (tissue)- POD#3 ([place])  -HTN----borderline hypotension post op  -Chronic iron deficiency anemia  -Hypothyroidism  -DM II---HbA1C 7.0  -expected acute blood loss anemia     Still slightly unsteady gait, will continue to monitor, anticipate home over weekend.  Give additional 40 meq KCL today, pull wires, heart rate well controlled without arrhythmia.  Small effusions on cxr, improving, increase pulmonary toilet (only pulling 500 on IS).       Daly Barrera, ERNIE  02/05/21  08:47 EST

## 2021-02-05 NOTE — PROGRESS NOTES
Continued Stay Note  [facility]     Patient Name: Amelie Anderson  MRN: [id number]  Today's Date: 2/5/2021    Admit Date: 2/2/2021    Discharge Plan     Row Name 02/05/21 1520       Plan    Plan  2/5- Home w/ [facility] (Johanna [phone]).    Plan Comments  CCP spoke with Pt and spouse, Bryson Anderson ([phone]) at bedside.  CCP donned a mask and face shield.  CCP introduced self and role.  Pt confirmed information on face sheet.  Pt stated she is [employer], retired & drives.  Pt and spouse live in single story home with two entrance stair steps.  Pt reports PCP is Fabi Bill.  Pt confirms pharmacy as [facility] [employer] [facility]  [facility].  Pt chose to use [facility] at discharge.  Pt denies issues with affording her medications.  Pt denies past home health, sub-acute rehab & DME.  Pt reports she uses a glucometer at home.  Pt voiced she may need a rolling walker at discharge.  Pt chose [facility]  to follow her at discharge.  Referral given to Johanna/[facility] ([phone]) at 12:59 PM.  CCP will continue to follow…BARRY ASCENCIO/CCP        Discharge Codes    No documentation.       Expected Discharge Date and Time     Expected Discharge Date Expected Discharge Time    Feb 8, 2021             Judy Langley RN

## 2021-02-05 NOTE — THERAPY TREATMENT NOTE
Patient Name: Amelie Anderson  : 1950    MRN: [id number]                              Today's Date: 2021       Admit Date: 2021    Visit Dx:     ICD-10-CM ICD-9-CM   1. S/P AVR (aortic valve replacement)  Z95.2 V43.3   2. Aortic stenosis  I35.0 424.1     Patient Active Problem List   Diagnosis   • Essential hypertension   • Aortic valve stenosis   • Aortic stenosis     Past Medical History:   Diagnosis Date   • Anemia    • Aortic stenosis    • Cataract     BILAT   • Elevated cholesterol    • Environmental allergies    • GERD (gastroesophageal reflux disease)    • History of anesthesia complications     STATES WAS SLOW TO WAKE UP AFTER SURGERY   • History of TB (tuberculosis)     YEARS AGO WHEN FIRST CAME TO [facility], WAS TREATED FOR WITH INH   • Hypertension    • Iron deficiency    • Kidney cysts    • Osteoporosis    • PONV (postoperative nausea and vomiting)    • Seasonal allergies    • Urinary frequency      Past Surgical History:   Procedure Laterality Date   • AORTIC VALVE REPAIR/REPLACEMENT N/A 2021    Procedure: VARUN STERNOTOMY AORTIC VALVE REPLACEMENT AND PRP;  Surgeon: Jesse Coronado MD;  Location: [facility];  Service: Cardiothoracic;  Laterality: N/A;   • BREAST BIOPSY     • CARDIAC CATHETERIZATION  2020    no stents   • CARDIAC CATHETERIZATION      no stents   • HYSTERECTOMY     • TEETH EXTRACTION      ALL TEETH ON TOP, ALL BOTTOM TEETH EXCEPT 4   • TONSILLECTOMY       General Information     Row Name 21 140          Physical Therapy Time and Intention    Document Type  therapy note (daily note)  (Pended)   -     Mode of Treatment  individual therapy;physical therapy  (Pended)   -     Row Name 21 140          General Information    Prior Level of Function  --  (Pended)   -     Existing Precautions/Restrictions  cardiac;fall;sternal  (Pended)   -     Row Name 21 140          Cognition    Orientation Status (Cognition)  oriented x 3  (Pended)    -     Row Name 02/05/21 1407          Safety Issues, Functional Mobility    Impairments Affecting Function (Mobility)  balance;endurance/activity tolerance;pain;shortness of breath;strength  (Pended)   -     Comment, Safety Issues/Impairments (Mobility)  Pt noted having a headache and requested pain medication.  (Pended)   -       User Key  (r) = Recorded By, (t) = Taken By, (c) = Cosigned By    Initials Name Provider Type     Kiara Salas, PT Student PT Student        Mobility     Row Name 02/05/21 1409          Bed Mobility    Bed Mobility  supine-sit;sit-supine  (Pended)   -     Supine-Sit Centre (Bed Mobility)  contact guard  (Pended)   -     Sit-Supine Centre (Bed Mobility)  contact guard  (Pended)   -     Row Name 02/05/21 1409          Sit-Stand Transfer    Sit-Stand Centre (Transfers)  contact guard  (Pended)   -[facility] Name 02/05/21 1409          Gait/Stairs (Locomotion)    Centre Level (Gait)  minimum assist (75% patient effort);1 person assist  (Pended)  A  -     Distance in Feet (Gait)  90  (Pended)   -     Deviations/Abnormal Patterns (Gait)  jonah decreased;gait speed decreased;stride length decreased  (Pended)   -     Bilateral Gait Deviations  forward flexed posture  (Pended)   -     Comment (Gait/Stairs)  Pt able to ambulate futher today, limited secondary to decreased endurance. She demonstrated more fluidity with gait and decreased instances of shuffling.  (Pended)   -       User Key  (r) = Recorded By, (t) = Taken By, (c) = Cosigned By    Initials Name Provider Type     Kiara Salas PT Student PT Student        Obj/Interventions     Row Name 02/05/21 1411          Motor Skills    Therapeutic Exercise  --  (Pended)  10 reps cardiac rehab protocol B, cardiac level 3  -     Row Name 02/05/21 1411          Balance    Balance Assessment  standing static balance;standing dynamic balance  (Pended)   -     Static Standing Balance  mild  impairment  (Pended)   -     Dynamic Standing Balance  mild impairment  (Pended)  Pt required HHA during ambulation for balance assistance.  -       User Key  (r) = Recorded By, (t) = Taken By, (c) = Cosigned By    Initials Name Provider Type     Kiara Salas, PT Student PT Student        Goals/Plan    No documentation.       Clinical Impression     [facility] Name 02/05/21 1413          Pain    Additional Documentation  Pain Scale: FACES Pre/Post-Treatment (Group)  (Pended)   -[facility] Name 02/05/21 1413          Pain Scale: FACES Pre/Post-Treatment    Pain: FACES Scale, Pretreatment  4-->hurts little more  (Pended)   -     Posttreatment Pain Rating  4-->hurts little more  (Pended)   -     Pain Location  head  (Pended)   -     Pre/Posttreatment Pain Comment  Pt reported having a migraine today.  (Pended)   -[facility] Name 02/05/21 1413          Plan of Care Review    Plan of Care Reviewed With  patient  (Pended)   -     Progress  improving  (Pended)   -     Outcome Summary  Pt was able to ambulate further today with decreased assistance. Pt continues to be limited in distance d/t decreased endurance, however she ambulated without O2 and after activity, her O2sat remained high. Pt will continue to benefit from skilled therapy to address functional mobility, endurance, and gait deficits.  (Pended)   -[facility] Name 02/05/21 1413          Therapy Assessment/Plan (PT)    Patient/Family Therapy Goals Statement (PT)  --  (Pended)   -     Rehab Potential (PT)  --  (Pended)   -[facility] Name 02/05/21 1413          Positioning and Restraints    Pre-Treatment Position  in bed  (Pended)   -     Post Treatment Position  bed  (Pended)   -     In Bed  supine;call light within reach;encouraged to call for assist;notified nsg  (Pended)   -       User Key  (r) = Recorded By, (t) = Taken By, (c) = Cosigned By    Initials Name Provider Type     Kiara Salas, PT Student PT Student        Outcome Measures      Row Name 02/05/21 1416          How much help from another person do you currently need...    Turning from your back to your side while in flat bed without using bedrails?  4  (Pended)   -BH     Moving from lying on back to sitting on the side of a flat bed without bedrails?  4  (Pended)   -BH     Moving to and from a bed to a chair (including a wheelchair)?  3  (Pended)   -BH     Standing up from a chair using your arms (e.g., wheelchair, bedside chair)?  3  (Pended)   -BH     Climbing 3-5 steps with a railing?  2  (Pended)   -BH     To walk in hospital room?  3  (Pended)   -     AM-PAC 6 Clicks Score (PT)  19  (Pended)   -     Row Name 02/05/21 1416          Functional Assessment    Outcome Measure Options  AM-PAC 6 Clicks Basic Mobility (PT)  (Pended)   -       User Key  (r) = Recorded By, (t) = Taken By, (c) = Cosigned By    Initials Name Provider Type     Kiara Salas, PT Student PT Student        Physical Therapy Education                 Title: PT OT SLP Therapies (Done)     Topic: Physical Therapy (Done)     Point: Mobility training (Done)     Learning Progress Summary           Patient Acceptance, E,TB,D, NR,VU by  at 2/5/2021 1417    Acceptance, E,TB,D, VU,NR by  at 2/4/2021 1000    Acceptance, E,TB,D, VU,NR by  at 2/3/2021 0944                   Point: Home exercise program (Done)     Learning Progress Summary           Patient Acceptance, E,TB,D, NR,VU by  at 2/5/2021 1417    Acceptance, E,TB,D, VU,NR by  at 2/4/2021 1000    Acceptance, E,TB,D, VU,NR by  at 2/3/2021 0944                   Point: Body mechanics (Done)     Learning Progress Summary           Patient Acceptance, E,TB,D, NR,VU by  at 2/5/2021 1417    Acceptance, E,TB,D, VU,NR by  at 2/4/2021 1000    Acceptance, E,TB,D, VU,NR by  at 2/3/2021 0944                   Point: Precautions (Done)     Learning Progress Summary           Patient Acceptance, E,TB,D, NR,VU by  at 2/5/2021 1417    Acceptance, JACQUI CALIXTO D,  VU,NR by  at 2/4/2021 1000    Acceptance, E,TB,D, VU,NR by  at 2/3/2021 0944                               User Key     Initials Effective Dates Name Provider Type [facility] 04/03/18 -  Blanca Ennis, PT Physical Therapist PT     02/01/21 -  Kiara Salas PT Student PT Student PT              PT Recommendation and Plan     Plan of Care Reviewed With: (P) patient  Progress: (P) improving  Outcome Summary: (P) Pt was able to ambulate further today with decreased assistance. Pt continues to be limited in distance d/t decreased endurance, however she ambulated without O2 and after activity, her O2sat remained high. Pt will continue to benefit from skilled therapy to address functional mobility, endurance, and gait deficits.     Time Calculation:   PT Charges     Row Name 02/05/21 1417             Time Calculation    Start Time  1353  (Pended)   -      Stop Time  1404  (Pended)   -      Time Calculation (min)  11 min  (Pended)   -      PT Received On  02/05/21  (Pended)   -      PT - Next Appointment  02/06/21  (Pended)   -         Time Calculation- PT    Total Timed Code Minutes- PT  10 minute(s)  (Pended)   -        User Key  (r) = Recorded By, (t) = Taken By, (c) = Cosigned By    Initials Name Provider Type     Kiara Salas PT Student PT Student        Therapy Charges for Today     Code Description Service Date Service Provider Modifiers Qty    [id number]  PT THERAPEUTIC ACT EA 15 MIN 2/5/2021 Kiara Salas PT Student GP 1          PT G-Codes  Outcome Measure Options: (P) AM-PAC 6 Clicks Basic Mobility (PT)  AM-PAC 6 Clicks Score (PT): (P) 19    Kiara Salas PT Student  2/5/2021

## 2021-02-06 LAB
ALBUMIN SERPL-MCNC: 3.3 G/DL (ref 3.5–5.2)
ALBUMIN/GLOB SERPL: 1.2 G/DL
ALP SERPL-CCNC: 52 U/L (ref 39–117)
ALT SERPL W P-5'-P-CCNC: 10 U/L (ref 1–33)
ANION GAP SERPL CALCULATED.3IONS-SCNC: 9.3 MMOL/L (ref 5–15)
AST SERPL-CCNC: 18 U/L (ref 1–32)
BH BB BLOOD EXPIRATION DATE: NORMAL
BH BB BLOOD EXPIRATION DATE: NORMAL
BH BB BLOOD TYPE BARCODE: 6200
BH BB BLOOD TYPE BARCODE: 6200
BH BB DISPENSE STATUS: NORMAL
BH BB DISPENSE STATUS: NORMAL
BH BB PRODUCT CODE: NORMAL
BH BB PRODUCT CODE: NORMAL
BH BB UNIT NUMBER: NORMAL
BH BB UNIT NUMBER: NORMAL
BILIRUB SERPL-MCNC: 0.5 MG/DL (ref 0–1.2)
BUN SERPL-MCNC: 16 MG/DL (ref 8–23)
BUN/CREAT SERPL: 21.9 (ref 7–25)
CALCIUM SPEC-SCNC: 8.7 MG/DL (ref 8.6–10.5)
CHLORIDE SERPL-SCNC: 104 MMOL/L (ref 98–107)
CO2 SERPL-SCNC: 22.7 MMOL/L (ref 22–29)
CREAT SERPL-MCNC: 0.73 MG/DL (ref 0.57–1)
CROSSMATCH INTERPRETATION: NORMAL
CROSSMATCH INTERPRETATION: NORMAL
DEPRECATED RDW RBC AUTO: 42.7 FL (ref 37–54)
ERYTHROCYTE [DISTWIDTH] IN BLOOD BY AUTOMATED COUNT: 13.1 % (ref 12.3–15.4)
GFR SERPL CREATININE-BSD FRML MDRD: 79 ML/MIN/1.73
GLOBULIN UR ELPH-MCNC: 2.7 GM/DL
GLUCOSE BLDC GLUCOMTR-MCNC: 115 MG/DL (ref 70–130)
GLUCOSE BLDC GLUCOMTR-MCNC: 248 MG/DL (ref 70–130)
GLUCOSE BLDC GLUCOMTR-MCNC: 92 MG/DL (ref 70–130)
GLUCOSE BLDC GLUCOMTR-MCNC: 99 MG/DL (ref 70–130)
GLUCOSE SERPL-MCNC: 110 MG/DL (ref 65–99)
HCT VFR BLD AUTO: 23.4 % (ref 34–46.6)
HGB BLD-MCNC: 7.7 G/DL (ref 12–15.9)
MAGNESIUM SERPL-MCNC: 2.1 MG/DL (ref 1.6–2.4)
MCH RBC QN AUTO: 29.7 PG (ref 26.6–33)
MCHC RBC AUTO-ENTMCNC: 32.9 G/DL (ref 31.5–35.7)
MCV RBC AUTO: 90.3 FL (ref 79–97)
PLATELET # BLD AUTO: 136 10*3/MM3 (ref 140–450)
PMV BLD AUTO: 10.5 FL (ref 6–12)
POTASSIUM SERPL-SCNC: 3.9 MMOL/L (ref 3.5–5.2)
PROT SERPL-MCNC: 6 G/DL (ref 6–8.5)
QT INTERVAL: 402 MS
QT INTERVAL: 447 MS
QT INTERVAL: 462 MS
RBC # BLD AUTO: 2.59 10*6/MM3 (ref 3.77–5.28)
SODIUM SERPL-SCNC: 136 MMOL/L (ref 136–145)
UNIT  ABO: NORMAL
UNIT  ABO: NORMAL
UNIT  RH: NORMAL
UNIT  RH: NORMAL
WBC # BLD AUTO: 6.98 10*3/MM3 (ref 3.4–10.8)

## 2021-02-06 PROCEDURE — 80053 COMPREHEN METABOLIC PANEL: CPT | Performed by: THORACIC SURGERY (CARDIOTHORACIC VASCULAR SURGERY)

## 2021-02-06 PROCEDURE — 93005 ELECTROCARDIOGRAM TRACING: CPT | Performed by: THORACIC SURGERY (CARDIOTHORACIC VASCULAR SURGERY)

## 2021-02-06 PROCEDURE — 82962 GLUCOSE BLOOD TEST: CPT

## 2021-02-06 PROCEDURE — 63710000001 INSULIN LISPRO (HUMAN) PER 5 UNITS: Performed by: THORACIC SURGERY (CARDIOTHORACIC VASCULAR SURGERY)

## 2021-02-06 PROCEDURE — 94799 UNLISTED PULMONARY SVC/PX: CPT

## 2021-02-06 PROCEDURE — 97110 THERAPEUTIC EXERCISES: CPT

## 2021-02-06 PROCEDURE — 25010000002 ENOXAPARIN PER 10 MG: Performed by: THORACIC SURGERY (CARDIOTHORACIC VASCULAR SURGERY)

## 2021-02-06 PROCEDURE — 99024 POSTOP FOLLOW-UP VISIT: CPT | Performed by: NURSE PRACTITIONER

## 2021-02-06 PROCEDURE — 93010 ELECTROCARDIOGRAM REPORT: CPT | Performed by: INTERNAL MEDICINE

## 2021-02-06 PROCEDURE — 93005 ELECTROCARDIOGRAM TRACING: CPT | Performed by: NURSE PRACTITIONER

## 2021-02-06 PROCEDURE — 85027 COMPLETE CBC AUTOMATED: CPT | Performed by: THORACIC SURGERY (CARDIOTHORACIC VASCULAR SURGERY)

## 2021-02-06 PROCEDURE — 83735 ASSAY OF MAGNESIUM: CPT | Performed by: THORACIC SURGERY (CARDIOTHORACIC VASCULAR SURGERY)

## 2021-02-06 RX ORDER — POTASSIUM CHLORIDE 750 MG/1
40 TABLET, FILM COATED, EXTENDED RELEASE ORAL ONCE
Status: COMPLETED | OUTPATIENT
Start: 2021-02-06 | End: 2021-02-06

## 2021-02-06 RX ADMIN — ASPIRIN 325 MG: 325 TABLET, COATED ORAL at 08:30

## 2021-02-06 RX ADMIN — ENOXAPARIN SODIUM 40 MG: 40 INJECTION SUBCUTANEOUS at 19:02

## 2021-02-06 RX ADMIN — INSULIN LISPRO 3 UNITS: 100 INJECTION, SOLUTION INTRAVENOUS; SUBCUTANEOUS at 11:24

## 2021-02-06 RX ADMIN — DOCUSATE SODIUM 50MG AND SENNOSIDES 8.6MG 2 TABLET: 8.6; 5 TABLET, FILM COATED ORAL at 20:22

## 2021-02-06 RX ADMIN — MUPIROCIN 1 APPLICATION: 20 OINTMENT TOPICAL at 08:31

## 2021-02-06 RX ADMIN — LEVOTHYROXINE, LIOTHYRONINE 60 MG: 38; 9 TABLET ORAL at 08:30

## 2021-02-06 RX ADMIN — MUPIROCIN 1 APPLICATION: 20 OINTMENT TOPICAL at 20:21

## 2021-02-06 RX ADMIN — POTASSIUM CHLORIDE 40 MEQ: 750 TABLET, EXTENDED RELEASE ORAL at 11:20

## 2021-02-06 RX ADMIN — SERTRALINE 25 MG: 25 TABLET, FILM COATED ORAL at 08:30

## 2021-02-06 RX ADMIN — CETIRIZINE HYDROCHLORIDE ALLERGY 10 MG: 10 TABLET ORAL at 08:30

## 2021-02-06 RX ADMIN — ATENOLOL 25 MG: 25 TABLET ORAL at 08:30

## 2021-02-06 RX ADMIN — Medication 50 MG: at 08:30

## 2021-02-06 RX ADMIN — LEVOFLOXACIN 500 MG: 500 TABLET, FILM COATED ORAL at 20:22

## 2021-02-06 RX ADMIN — AMITRIPTYLINE HYDROCHLORIDE 25 MG: 25 TABLET, FILM COATED ORAL at 20:22

## 2021-02-06 NOTE — THERAPY TREATMENT NOTE
Patient Name: Amelie Anderson  : 1950    MRN: [id number]                              Today's Date: 2021       Admit Date: 2021    Visit Dx:     ICD-10-CM ICD-9-CM   1. S/P AVR (aortic valve replacement)  Z95.2 V43.3   2. Aortic stenosis  I35.0 424.1     Patient Active Problem List   Diagnosis   • Essential hypertension   • Aortic valve stenosis   • Aortic stenosis     Past Medical History:   Diagnosis Date   • Anemia    • Aortic stenosis    • Cataract     BILAT   • Elevated cholesterol    • Environmental allergies    • GERD (gastroesophageal reflux disease)    • History of anesthesia complications     STATES WAS SLOW TO WAKE UP AFTER SURGERY   • History of TB (tuberculosis)     YEARS AGO WHEN FIRST CAME TO [facility], WAS TREATED FOR WITH INH   • Hypertension    • Iron deficiency    • Kidney cysts    • Osteoporosis    • PONV (postoperative nausea and vomiting)    • Seasonal allergies    • Urinary frequency      Past Surgical History:   Procedure Laterality Date   • AORTIC VALVE REPAIR/REPLACEMENT N/A 2021    Procedure: VARUN STERNOTOMY AORTIC VALVE REPLACEMENT AND PRP;  Surgeon: Jesse Coronado MD;  Location: [facility];  Service: Cardiothoracic;  Laterality: N/A;   • BREAST BIOPSY     • CARDIAC CATHETERIZATION  2020    no stents   • CARDIAC CATHETERIZATION      no stents   • HYSTERECTOMY     • TEETH EXTRACTION      ALL TEETH ON TOP, ALL BOTTOM TEETH EXCEPT 4   • TONSILLECTOMY       General Information     Row Name 21 1020          Physical Therapy Time and Intention    Document Type  therapy note (daily note)  -     Mode of Treatment  individual therapy;physical therapy  -     Row Name 21 1020          General Information    Patient Profile Reviewed  yes  -     Existing Precautions/Restrictions  cardiac;fall;sternal  -     Row Name 21 1020          Safety Issues, Functional Mobility    Safety Issues Affecting Function (Mobility)  awareness of need for  assistance;insight into deficits/self-awareness  -     Impairments Affecting Function (Mobility)  balance;endurance/activity tolerance;strength  -       User Key  (r) = Recorded By, (t) = Taken By, (c) = Cosigned By    Initials Name Provider Type    Liyah Franco PTA Physical Therapy Assistant        Mobility     Row Name 02/06/21 1021          Bed Mobility    Supine-Sit [place] (Bed Mobility)  -- in chair  -     Row Name 02/06/21 1021          Sit-Stand Transfer    Sit-Stand [place] (Transfers)  contact guard;verbal cues 2 attempts req  -     Assistive Device (Sit-Stand Transfers)  walker, front-wheeled  -     Row Name 02/06/21 1021          Gait/Stairs (Locomotion)    [place] Level (Gait)  contact guard;2 person assist c/o fatigue and wkness, took extra person for safety  -     Assistive Device (Gait)  walker, front-wheeled  -     Distance in Feet (Gait)  150ft, cues to look up and not at floor  -     Deviations/Abnormal Patterns (Gait)  jonah decreased  -     Bilateral Gait Deviations  forward flexed posture improved w/cues  -       User Key  (r) = Recorded By, (t) = Taken By, (c) = Cosigned By    Initials Name Provider Type    Liyah Franco PTA Physical Therapy Assistant        Obj/Interventions     Row Name 02/06/21 1021          Motor Skills    Therapeutic Exercise  -- cardiac protocol x 10 reps, cues to slow pace  -       User Key  (r) = Recorded By, (t) = Taken By, (c) = Cosigned By    Initials Name Provider Type    Liyah Franco PTA Physical Therapy Assistant        Goals/Plan    No documentation.       Clinical Impression     Row Name 02/06/21 1505          Pain Scale: Numbers Pre/Post-Treatment    Pretreatment Pain Rating  5/10  -     Posttreatment Pain Rating  5/10  -     Pain Location  back  -     Pain Intervention(s)  Repositioned  -     Row Name 02/06/21 1504          Plan of Care Review    Plan of Care Reviewed With  patient  -      Progress  improving  -     Outcome Summary  Pt agreed to PT session, only c/o was fatigue/wkness; she incr amb dist w/decr shuffling noted; did not attempt stairs this session due to fatigue and urgency for BR; pt req some educ on safety as she is used to being indep at home; plans home w/husb assist at [place]  -     Row Name 02/06/21 1505          Therapy Assessment/Plan (PT)    Rehab Potential (PT)  good, to achieve stated therapy goals  -     Criteria for Skilled Interventions Met (PT)  yes  -     Row Name 02/06/21 1505          Positioning and Restraints    Pre-Treatment Position  sitting in chair/recliner  -     Post Treatment Position  chair  -JM     In Chair  reclined;call light within reach;encouraged to call for assist no alarm upon entry  -       User Key  (r) = Recorded By, (t) = Taken By, (c) = Cosigned By    Initials Name Provider Type    Liyah Franco PTA Physical Therapy Assistant        Outcome Measures     Row Name 02/06/21 1509          How much help from another person do you currently need...    Turning from your back to your side while in flat bed without using bedrails?  4  -JM     Moving from lying on back to sitting on the side of a flat bed without bedrails?  3  -JM     Moving to and from a bed to a chair (including a wheelchair)?  3  -JM     Standing up from a chair using your arms (e.g., wheelchair, bedside chair)?  3  -JM     Climbing 3-5 steps with a railing?  3  -JM     To walk in hospital room?  3  -JM     AM-PAC 6 Clicks Score (PT)  19  -       User Key  (r) = Recorded By, (t) = Taken By, (c) = Cosigned By    Initials Name Provider Type    Liyah Franco PTA Physical Therapy Assistant        Physical Therapy Education                 Title: PT OT SLP Therapies (Done)     Topic: Physical Therapy (Done)     Point: Mobility training (Done)     Learning Progress Summary           Patient Tavo, E,TB,D, VU,NR by BRUNO at 2/6/2021 1503    Comment: slightly impulsive,  educ offered on safety    Acceptance, E,TB,D, NR,VU by  at 2/5/2021 1417    Acceptance, E,TB,D, VU,NR by  at 2/4/2021 1000    Acceptance, E,TB,D, VU,NR by  at 2/3/2021 0944                   Point: Home exercise program (Done)     Learning Progress Summary           Patient Eager, E,TB,D, VU,NR by  at 2/6/2021 1509    Comment: slightly impulsive, educ offered on safety    Acceptance, E,TB,D, NR,VU by  at 2/5/2021 1417    Acceptance, E,TB,D, VU,NR by  at 2/4/2021 1000    Acceptance, E,TB,D, VU,NR by  at 2/3/2021 0944                   Point: Body mechanics (Done)     Learning Progress Summary           Patient Eager, E,TB,D, VU,NR by  at 2/6/2021 1509    Comment: slightly impulsive, educ offered on safety    Acceptance, E,TB,D, NR,VU by  at 2/5/2021 1417    Acceptance, E,TB,D, VU,NR by  at 2/4/2021 1000    Acceptance, E,TB,D, VU,NR by  at 2/3/2021 0944                   Point: Precautions (Done)     Learning Progress Summary           Patient Eager, E,TB,D, VU,NR by  at 2/6/2021 1509    Comment: slightly impulsive, educ offered on safety    Acceptance, E,TB,D, NR,VU by  at 2/5/2021 1417    Acceptance, E,TB,D, VU,NR by  at 2/4/2021 1000    Acceptance, E,TB,D, VU,NR by  at 2/3/2021 0944                               User Key     Initials Effective Dates Name Provider Type Discipline     04/03/18 -  Blanca Ennis, PT Physical Therapist PT     03/07/18 -  Liyah Busby PTA Physical Therapy Assistant PT     02/01/21 -  Kiara Salas PT Student PT Student PT              PT Recommendation and Plan     Plan of Care Reviewed With: patient  Progress: improving  Outcome Summary: Pt agreed to PT session, only c/o was fatigue/wkness; she incr amb dist w/decr shuffling noted; did not attempt stairs this session due to fatigue and urgency for BR; pt req some educ on safety as she is used to being indep at home; plans home w/husb assist at [place]     Time Calculation:   PT Charges     Row  Name 02/06/21 1510             Time Calculation    Start Time  1005  -BRUNO      Stop Time  1037  -BRUNO      Time Calculation (min)  32 min  -BRUNO      PT Received On  02/06/21  -BRUNO      PT - Next Appointment  02/07/21  -BRUNO        User Key  (r) = Recorded By, (t) = Taken By, (c) = Cosigned By    Initials Name Provider Type    Liyah Franco PTA Physical Therapy Assistant        Therapy Charges for Today     Code Description Service Date Service Provider Modifiers Qty    59489037641 HC PT THER PROC EA 15 MIN 2/6/2021 Liyah Busby PTA GP 2    48672487314 HC PT THER SUPP EA 15 MIN 2/6/2021 Liyah Busby PTA GP 2          PT G-Codes  Outcome Measure Options: AM-PAC 6 Clicks Basic Mobility (PT)  AM-PAC 6 Clicks Score (PT): 19    Liyah Busby PTA  2/6/2021

## 2021-02-06 NOTE — PROGRESS NOTES
" LOS: 4 days   Patient Care Team:  Fabi Bill MD as PCP - General (Pediatrics)  Ant Pederson MD as Surgeon (Orthopedic Surgery)  Jason Perrin MD as Consulting Physician (Nephrology)  Dorothy Aldana MD (Urology)    Chief Complaint: post op fu    Subjective:  Symptoms:  No shortness of breath or chest pain.    Diet:  No nausea.    Activity level: Impaired due to weakness.    Pain:  She reports no pain.          Vital Signs  Temp:  [97.6 °F (36.4 °C)-98.2 °F (36.8 °C)] 98 °F (36.7 °C)  Heart Rate:  [69-92] 80  Resp:  [16] 16  BP: ()/(58-69) 120/69  Body mass index is 26.85 kg/m².    Intake/Output Summary (Last 24 hours) at 2/6/2021 0924  Last data filed at 2/6/2021 0835  Gross per 24 hour   Intake 290 ml   Output --   Net 290 ml     I/O this shift:  In: 240 [P.O.:240]  Out: -             02/04/21  0651 02/05/21  0643 02/06/21  0659   Weight: 72.4 kg (159 lb 11.2 oz) 71.8 kg (158 lb 3.2 oz) 70.9 kg (156 lb 6.4 oz)         Objective:  General Appearance:  Comfortable.    Vital signs: (most recent): Blood pressure 120/69, pulse 80, temperature 98 °F (36.7 °C), temperature source Oral, resp. rate 16, height 162.6 cm (64\"), weight 70.9 kg (156 lb 6.4 oz), SpO2 90 %.    Output: Producing urine and no stool output.    Lungs:  Normal effort and normal respiratory rate.  There are decreased breath sounds (bilateral lower lobes).  No rales, wheezes or rhonchi.  (Room air)  Heart: Normal rate.  Regular rhythm.  S1 normal and S2 normal.    Abdomen: Abdomen is soft and non-distended.    Extremities: There is no dependent edema.    Neurological: Patient is alert and oriented to person, place and time.    Skin:  Warm and dry.  (Stable sternum)            Results Review:        WBC WBC   Date Value Ref Range Status   02/06/2021 6.98 3.40 - 10.80 10*3/mm3 Final   02/05/2021 10.25 3.40 - 10.80 10*3/mm3 Final   02/04/2021 12.01 (H) 3.40 - 10.80 10*3/mm3 Final      HGB Hemoglobin   Date Value Ref Range " Status   02/06/2021 7.7 (L) 12.0 - 15.9 g/dL Final   02/05/2021 8.3 (L) 12.0 - 15.9 g/dL Final   02/04/2021 8.1 (L) 12.0 - 15.9 g/dL Final      HCT Hematocrit   Date Value Ref Range Status   02/06/2021 23.4 (L) 34.0 - 46.6 % Final   02/05/2021 25.2 (L) 34.0 - 46.6 % Final   02/04/2021 24.4 (L) 34.0 - 46.6 % Final      Platelets Platelets   Date Value Ref Range Status   02/06/2021 136 (L) 140 - 450 10*3/mm3 Final   02/05/2021 127 (L) 140 - 450 10*3/mm3 Final   02/04/2021 102 (L) 140 - 450 10*3/mm3 Final        PT/INR:  No results found for: PROTIME/No results found for: INR    Sodium Sodium   Date Value Ref Range Status   02/06/2021 136 136 - 145 mmol/L Final   02/05/2021 137 136 - 145 mmol/L Final   02/04/2021 139 136 - 145 mmol/L Final      Potassium Potassium   Date Value Ref Range Status   02/06/2021 3.9 3.5 - 5.2 mmol/L Final     Comment:     Slight hemolysis detected by analyzer. Results may be affected.   02/05/2021 3.8 3.5 - 5.2 mmol/L Final   02/04/2021 4.1 3.5 - 5.2 mmol/L Final      Chloride Chloride   Date Value Ref Range Status   02/06/2021 104 98 - 107 mmol/L Final   02/05/2021 102 98 - 107 mmol/L Final   02/04/2021 104 98 - 107 mmol/L Final      Bicarbonate CO2   Date Value Ref Range Status   02/06/2021 22.7 22.0 - 29.0 mmol/L Final   02/05/2021 27.9 22.0 - 29.0 mmol/L Final   02/04/2021 23.4 22.0 - 29.0 mmol/L Final      BUN BUN   Date Value Ref Range Status   02/06/2021 16 8 - 23 mg/dL Final   02/05/2021 22 8 - 23 mg/dL Final   02/04/2021 18 8 - 23 mg/dL Final      Creatinine Creatinine   Date Value Ref Range Status   02/06/2021 0.73 0.57 - 1.00 mg/dL Final   02/05/2021 0.84 0.57 - 1.00 mg/dL Final   02/04/2021 0.80 0.57 - 1.00 mg/dL Final      Calcium Calcium   Date Value Ref Range Status   02/06/2021 8.7 8.6 - 10.5 mg/dL Final   02/05/2021 9.2 8.6 - 10.5 mg/dL Final   02/04/2021 9.0 8.6 - 10.5 mg/dL Final      Magnesium Magnesium   Date Value Ref Range Status   02/06/2021 2.1 1.6 - 2.4 mg/dL Final       Troponin No results found for: TROPONIN   BNP No results found for: BNP         [START ON 2/7/2021] amiodarone, 200 mg, Oral, Q24H  amitriptyline, 25 mg, Oral, Nightly  aspirin, 325 mg, Oral, Daily  atenolol, 25 mg, Oral, Q24H  cetirizine, 10 mg, Oral, Daily  enoxaparin, 40 mg, Subcutaneous, Daily  insulin lispro, 0-7 Units, Subcutaneous, TID AC  levoFLOXacin, 500 mg, Oral, Q24H  mupirocin, , Each Nare, BID  senna-docusate sodium, 2 tablet, Oral, Nightly  sertraline, 25 mg, Oral, Daily  Thyroid, 60 mg, Oral, Daily  vitamin B-6, 50 mg, Oral, Daily           PRN Meds:.•  acetaminophen **OR** [DISCONTINUED] acetaminophen **OR** [DISCONTINUED] acetaminophen  •  dextrose  •  dextrose  •  glucagon (human recombinant)  •  HYDROcodone-acetaminophen  •  [DISCONTINUED] Morphine **AND** naloxone  •  potassium chloride **OR** potassium chloride    Patient Active Problem List   Diagnosis Code   • Essential hypertension I10   • Aortic valve stenosis I35.0   • Aortic stenosis I35.0       Assessment & Plan    -Aortic stenosis s/p AVR (tissue)- POD#4 ([facility])  -HTN----borderline hypotension post op  -Chronic iron deficiency anemia  -Hypothyroidism  -DM II---HbA1C 7.0  -expected acute blood loss anemia   -Preop UTI----Levaquin resumed yesterday     Give 40 meq KCL again today, amiodarone started for AF prophylaxis, check EKG in am with concomitant antibiotic.  Still slightly unsteady gait, but improving.  Plan for home on Monday with family and home health.    Daly Barrera, APRN  02/06/21  09:24 EST

## 2021-02-06 NOTE — PLAN OF CARE
Goal Outcome Evaluation:  Plan of Care Reviewed With: patient  Progress: improving  Outcome Summary: Pt agreed to PT session, only c/o was fatigue/wkness; she incr amb dist w/decr shuffling noted; did not attempt stairs this session due to fatigue and urgency for BR; pt req some educ on safety as she is used to being indep at home; plans home w/husb assist at [place]    Bessie Cali PT Tech present    Patient was wearing a face mask during this therapy encounter. Therapist used appropriate personal protective equipment including eye protection, mask, and gloves.  Mask used was standard procedure mask. Appropriate PPE was worn during the entire therapy session. Hand hygiene was completed before and after therapy session. Patient is not in enhanced droplet precautions.

## 2021-02-07 LAB
ANION GAP SERPL CALCULATED.3IONS-SCNC: 9.5 MMOL/L (ref 5–15)
BUN SERPL-MCNC: 12 MG/DL (ref 8–23)
BUN/CREAT SERPL: 14.5 (ref 7–25)
CALCIUM SPEC-SCNC: 9.1 MG/DL (ref 8.6–10.5)
CHLORIDE SERPL-SCNC: 101 MMOL/L (ref 98–107)
CO2 SERPL-SCNC: 25.5 MMOL/L (ref 22–29)
CREAT SERPL-MCNC: 0.83 MG/DL (ref 0.57–1)
DEPRECATED RDW RBC AUTO: 42.7 FL (ref 37–54)
ERYTHROCYTE [DISTWIDTH] IN BLOOD BY AUTOMATED COUNT: 13.1 % (ref 12.3–15.4)
GFR SERPL CREATININE-BSD FRML MDRD: 68 ML/MIN/1.73
GLUCOSE BLDC GLUCOMTR-MCNC: 115 MG/DL (ref 70–130)
GLUCOSE BLDC GLUCOMTR-MCNC: 130 MG/DL (ref 70–130)
GLUCOSE BLDC GLUCOMTR-MCNC: 137 MG/DL (ref 70–130)
GLUCOSE BLDC GLUCOMTR-MCNC: 217 MG/DL (ref 70–130)
GLUCOSE SERPL-MCNC: 208 MG/DL (ref 65–99)
HCT VFR BLD AUTO: 27.8 % (ref 34–46.6)
HGB BLD-MCNC: 9.2 G/DL (ref 12–15.9)
MCH RBC QN AUTO: 29.8 PG (ref 26.6–33)
MCHC RBC AUTO-ENTMCNC: 33.1 G/DL (ref 31.5–35.7)
MCV RBC AUTO: 90 FL (ref 79–97)
PLATELET # BLD AUTO: 189 10*3/MM3 (ref 140–450)
PMV BLD AUTO: 8.9 FL (ref 6–12)
POTASSIUM SERPL-SCNC: 3.9 MMOL/L (ref 3.5–5.2)
QT INTERVAL: 384 MS
RBC # BLD AUTO: 3.09 10*6/MM3 (ref 3.77–5.28)
SODIUM SERPL-SCNC: 136 MMOL/L (ref 136–145)
WBC # BLD AUTO: 6.91 10*3/MM3 (ref 3.4–10.8)

## 2021-02-07 PROCEDURE — 93010 ELECTROCARDIOGRAM REPORT: CPT | Performed by: INTERNAL MEDICINE

## 2021-02-07 PROCEDURE — 99024 POSTOP FOLLOW-UP VISIT: CPT | Performed by: NURSE PRACTITIONER

## 2021-02-07 PROCEDURE — 85027 COMPLETE CBC AUTOMATED: CPT | Performed by: NURSE PRACTITIONER

## 2021-02-07 PROCEDURE — 80048 BASIC METABOLIC PNL TOTAL CA: CPT | Performed by: NURSE PRACTITIONER

## 2021-02-07 PROCEDURE — 63710000001 INSULIN LISPRO (HUMAN) PER 5 UNITS: Performed by: THORACIC SURGERY (CARDIOTHORACIC VASCULAR SURGERY)

## 2021-02-07 PROCEDURE — 93005 ELECTROCARDIOGRAM TRACING: CPT | Performed by: NURSE PRACTITIONER

## 2021-02-07 PROCEDURE — 25010000002 ENOXAPARIN PER 10 MG: Performed by: THORACIC SURGERY (CARDIOTHORACIC VASCULAR SURGERY)

## 2021-02-07 PROCEDURE — 82962 GLUCOSE BLOOD TEST: CPT

## 2021-02-07 RX ADMIN — LEVOFLOXACIN 500 MG: 500 TABLET, FILM COATED ORAL at 23:33

## 2021-02-07 RX ADMIN — AMITRIPTYLINE HYDROCHLORIDE 25 MG: 25 TABLET, FILM COATED ORAL at 21:12

## 2021-02-07 RX ADMIN — LEVOTHYROXINE, LIOTHYRONINE 60 MG: 38; 9 TABLET ORAL at 08:46

## 2021-02-07 RX ADMIN — ASPIRIN 325 MG: 325 TABLET, COATED ORAL at 08:46

## 2021-02-07 RX ADMIN — MUPIROCIN: 20 OINTMENT TOPICAL at 22:13

## 2021-02-07 RX ADMIN — ATENOLOL 25 MG: 25 TABLET ORAL at 11:26

## 2021-02-07 RX ADMIN — SERTRALINE 25 MG: 25 TABLET, FILM COATED ORAL at 08:46

## 2021-02-07 RX ADMIN — INSULIN LISPRO 3 UNITS: 100 INJECTION, SOLUTION INTRAVENOUS; SUBCUTANEOUS at 17:47

## 2021-02-07 RX ADMIN — Medication 50 MG: at 08:46

## 2021-02-07 RX ADMIN — AMIODARONE HYDROCHLORIDE 200 MG: 200 TABLET ORAL at 08:46

## 2021-02-07 RX ADMIN — MUPIROCIN 1 APPLICATION: 20 OINTMENT TOPICAL at 08:46

## 2021-02-07 RX ADMIN — CETIRIZINE HYDROCHLORIDE ALLERGY 10 MG: 10 TABLET ORAL at 08:46

## 2021-02-07 RX ADMIN — ENOXAPARIN SODIUM 40 MG: 40 INJECTION SUBCUTANEOUS at 17:48

## 2021-02-07 NOTE — PLAN OF CARE
Goal Outcome Evaluation:  Plan of Care Reviewed With: patient  Progress: improving  Outcome Summary: Pt. remains in NSR with random burst of bundle branch block. HR 70-80s. Ambulating with standby assist. No c/o pain today. Plans to d/c home tomorrow. Will continue to monitor.

## 2021-02-07 NOTE — PROGRESS NOTES
" LOS: 5 days   Patient Care Team:  Fabi Bill MD as PCP - General (Pediatrics)  Ant Pederson MD as Surgeon (Orthopedic Surgery)  Jason Perrin MD as Consulting Physician (Nephrology)  Dorothy Aldana MD (Urology)    Chief Complaint: post op fu    Subjective:  Symptoms:  Improved.  No shortness of breath, malaise, chest pain, weakness, chest pressure, anorexia or anxiety.    Diet:  No nausea or vomiting.    Activity level: Impaired due to weakness.    Pain:  She reports no pain.          Vital Signs  Temp:  [97.5 °F (36.4 °C)-98.6 °F (37 °C)] 98 °F (36.7 °C)  Heart Rate:  [62-81] 81  Resp:  [16] 16  BP: (108-129)/(59-69) 108/63  Body mass index is 26.59 kg/m².    Intake/Output Summary (Last 24 hours) at 2/7/2021 0838  Last data filed at 2/6/2021 1700  Gross per 24 hour   Intake 240 ml   Output --   Net 240 ml     No intake/output data recorded.        02/05/21  0643 02/06/21  0659 02/07/21  0500   Weight: 71.8 kg (158 lb 3.2 oz) 70.9 kg (156 lb 6.4 oz) 70.3 kg (154 lb 14.4 oz)         Objective:  General Appearance:  Comfortable.    Vital signs: (most recent): Blood pressure 113/90, pulse 82, temperature 98 °F (36.7 °C), temperature source Oral, resp. rate 16, height 162.6 cm (64\"), weight 70.3 kg (154 lb 14.4 oz), SpO2 99 %.  No fever.    Output: Producing urine.    Lungs:  Normal effort and normal respiratory rate.  She is not in respiratory distress.  (Room air)  Heart: Normal rate.  Regular rhythm.  S1 normal and S2 normal.    Chest: Symmetric chest wall expansion. Chest wall tenderness present.  (Sternum stable to palpation.  Incision clean, dry, and intact.)  Abdomen: Abdomen is soft.  Bowel sounds are normal.     Extremities: There is no dependent edema.    Neurological: Patient is alert and oriented to person, place and time.  GCS score is 15.    Pupils:  Pupils are equal, round, and reactive to light.    Skin:  Warm and dry.  No rash or cyanosis. (Stable sternum)        Results " October 8, 2019    Beverly Elena  [address]  [facility] [address]    Dear Beverly,    It was a pleasure to see you recently for consideration of pancreas transplantation. Your pre-transplant evaluation assessments and results were reviewed at our Multidisciplinary Selection Committee. The committee has not approved you to be placed on the pancreas transplant waitlist and is requesting the following item is completed before determining your transplant candidacy:    Gastroenterology assessment to evaluate nutritional status/total parenteral nutrition (tpn), diarrhea, and recurrent gastrointestinal bleeding.  Once your nutritional status is optimized, then the committee recommends a trial of immunosuppressive medication to determine whether you will be able to tolerate and absorb the medication.     Once the gastroenterology assessment has been completed, please contact me at [phone], so I may request this information and have the Committee review for further recommendations.      For any questions, please contact me directly at [phone] or the Transplant Office at [phone].      Sincerely,    Jeanette Hamilton RN, BSN, Transplant Coordinator  On Behalf of Solid Organ Transplant  [facility], [facility]    CC:  Care Team   Review:      WBC WBC   Date Value Ref Range Status   02/06/2021 6.98 3.40 - 10.80 10*3/mm3 Final   02/05/2021 10.25 3.40 - 10.80 10*3/mm3 Final      HGB Hemoglobin   Date Value Ref Range Status   02/06/2021 7.7 (L) 12.0 - 15.9 g/dL Final   02/05/2021 8.3 (L) 12.0 - 15.9 g/dL Final      HCT Hematocrit   Date Value Ref Range Status   02/06/2021 23.4 (L) 34.0 - 46.6 % Final   02/05/2021 25.2 (L) 34.0 - 46.6 % Final      Platelets Platelets   Date Value Ref Range Status   02/06/2021 136 (L) 140 - 450 10*3/mm3 Final   02/05/2021 127 (L) 140 - 450 10*3/mm3 Final        PT/INR:  No results found for: PROTIME/No results found for: INR    Sodium Sodium   Date Value Ref Range Status   02/06/2021 136 136 - 145 mmol/L Final   02/05/2021 137 136 - 145 mmol/L Final      Potassium Potassium   Date Value Ref Range Status   02/06/2021 3.9 3.5 - 5.2 mmol/L Final     Comment:     Slight hemolysis detected by analyzer. Results may be affected.   02/05/2021 3.8 3.5 - 5.2 mmol/L Final      Chloride Chloride   Date Value Ref Range Status   02/06/2021 104 98 - 107 mmol/L Final   02/05/2021 102 98 - 107 mmol/L Final      Bicarbonate CO2   Date Value Ref Range Status   02/06/2021 22.7 22.0 - 29.0 mmol/L Final   02/05/2021 27.9 22.0 - 29.0 mmol/L Final      BUN BUN   Date Value Ref Range Status   02/06/2021 16 8 - 23 mg/dL Final   02/05/2021 22 8 - 23 mg/dL Final      Creatinine Creatinine   Date Value Ref Range Status   02/06/2021 0.73 0.57 - 1.00 mg/dL Final   02/05/2021 0.84 0.57 - 1.00 mg/dL Final      Calcium Calcium   Date Value Ref Range Status   02/06/2021 8.7 8.6 - 10.5 mg/dL Final   02/05/2021 9.2 8.6 - 10.5 mg/dL Final      Magnesium Magnesium   Date Value Ref Range Status   02/06/2021 2.1 1.6 - 2.4 mg/dL Final      Troponin No results found for: TROPONIN   BNP No results found for: BNP         amiodarone, 200 mg, Oral, Q24H  amitriptyline, 25 mg, Oral, Nightly  aspirin, 325 mg, Oral, Daily  atenolol, 25 mg, Oral, Q24H  cetirizine,  10 mg, Oral, Daily  enoxaparin, 40 mg, Subcutaneous, Daily  insulin lispro, 0-7 Units, Subcutaneous, TID AC  levoFLOXacin, 500 mg, Oral, Q24H  mupirocin, , Each Nare, BID  senna-docusate sodium, 2 tablet, Oral, Nightly  sertraline, 25 mg, Oral, Daily  Thyroid, 60 mg, Oral, Daily  vitamin B-6, 50 mg, Oral, Daily           PRN Meds:.•  acetaminophen **OR** [DISCONTINUED] acetaminophen **OR** [DISCONTINUED] acetaminophen  •  dextrose  •  dextrose  •  glucagon (human recombinant)  •  HYDROcodone-acetaminophen  •  [DISCONTINUED] Morphine **AND** naloxone  •  potassium chloride **OR** potassium chloride    Patient Active Problem List   Diagnosis Code   • Essential hypertension I10   • Aortic valve stenosis I35.0   • Aortic stenosis I35.0       Assessment & Plan    -Aortic stenosis s/p AVR (tissue)- POD#4 ([facility])  -HTN----borderline hypotension post op, Amiodarone for AF prophylaxis  -Chronic iron deficiency anemia  -Hypothyroidism  -DM II---HbA1C 7.0  -expected acute blood loss anemia   -Preop UTI----Levaquin resumed      Labs pending.  Continued improvement with mobility, still somewhat fatigued and weak.  Plan for stairs today/tomorrow, d/c tomorrow with  and home health.     Daly Barrera, ERNIE  02/07/21  08:38 EST     Discharge home tomorrow if progress continues.  Need to continue Levaquin for at least 7 days since resuming because of prior UTI.

## 2021-02-08 VITALS
SYSTOLIC BLOOD PRESSURE: 119 MMHG | WEIGHT: 153.4 LBS | BODY MASS INDEX: 26.19 KG/M2 | HEART RATE: 64 BPM | OXYGEN SATURATION: 98 % | HEIGHT: 64 IN | DIASTOLIC BLOOD PRESSURE: 75 MMHG | RESPIRATION RATE: 16 BRPM | TEMPERATURE: 97.8 F

## 2021-02-08 LAB
ANION GAP SERPL CALCULATED.3IONS-SCNC: 13.5 MMOL/L (ref 5–15)
BUN SERPL-MCNC: 12 MG/DL (ref 8–23)
BUN/CREAT SERPL: 15.2 (ref 7–25)
CALCIUM SPEC-SCNC: 9.4 MG/DL (ref 8.6–10.5)
CHLORIDE SERPL-SCNC: 102 MMOL/L (ref 98–107)
CO2 SERPL-SCNC: 23.5 MMOL/L (ref 22–29)
CREAT SERPL-MCNC: 0.79 MG/DL (ref 0.57–1)
DEPRECATED RDW RBC AUTO: 43.7 FL (ref 37–54)
ERYTHROCYTE [DISTWIDTH] IN BLOOD BY AUTOMATED COUNT: 13.4 % (ref 12.3–15.4)
GFR SERPL CREATININE-BSD FRML MDRD: 72 ML/MIN/1.73
GLUCOSE BLDC GLUCOMTR-MCNC: 108 MG/DL (ref 70–130)
GLUCOSE BLDC GLUCOMTR-MCNC: 160 MG/DL (ref 70–130)
GLUCOSE SERPL-MCNC: 117 MG/DL (ref 65–99)
HCT VFR BLD AUTO: 28.1 % (ref 34–46.6)
HGB BLD-MCNC: 9.4 G/DL (ref 12–15.9)
MCH RBC QN AUTO: 30.1 PG (ref 26.6–33)
MCHC RBC AUTO-ENTMCNC: 33.5 G/DL (ref 31.5–35.7)
MCV RBC AUTO: 90.1 FL (ref 79–97)
PLATELET # BLD AUTO: 195 10*3/MM3 (ref 140–450)
PMV BLD AUTO: 8.9 FL (ref 6–12)
POTASSIUM SERPL-SCNC: 4 MMOL/L (ref 3.5–5.2)
QT INTERVAL: 406 MS
RBC # BLD AUTO: 3.12 10*6/MM3 (ref 3.77–5.28)
SODIUM SERPL-SCNC: 139 MMOL/L (ref 136–145)
WBC # BLD AUTO: 7.16 10*3/MM3 (ref 3.4–10.8)

## 2021-02-08 PROCEDURE — 93005 ELECTROCARDIOGRAM TRACING: CPT | Performed by: NURSE PRACTITIONER

## 2021-02-08 PROCEDURE — 80048 BASIC METABOLIC PNL TOTAL CA: CPT | Performed by: NURSE PRACTITIONER

## 2021-02-08 PROCEDURE — 82962 GLUCOSE BLOOD TEST: CPT

## 2021-02-08 PROCEDURE — 63710000001 INSULIN LISPRO (HUMAN) PER 5 UNITS: Performed by: THORACIC SURGERY (CARDIOTHORACIC VASCULAR SURGERY)

## 2021-02-08 PROCEDURE — 85027 COMPLETE CBC AUTOMATED: CPT | Performed by: NURSE PRACTITIONER

## 2021-02-08 PROCEDURE — 99024 POSTOP FOLLOW-UP VISIT: CPT | Performed by: NURSE PRACTITIONER

## 2021-02-08 PROCEDURE — 97530 THERAPEUTIC ACTIVITIES: CPT

## 2021-02-08 PROCEDURE — 93010 ELECTROCARDIOGRAM REPORT: CPT | Performed by: INTERNAL MEDICINE

## 2021-02-08 RX ORDER — ACETAMINOPHEN 500 MG
500 TABLET ORAL EVERY 4 HOURS PRN
Start: 2021-02-08 | End: 2022-07-13

## 2021-02-08 RX ORDER — ATENOLOL 25 MG/1
25 TABLET ORAL
Qty: 30 TABLET | Refills: 2 | Status: SHIPPED | OUTPATIENT
Start: 2021-02-09 | End: 2021-03-03 | Stop reason: SDUPTHER

## 2021-02-08 RX ORDER — LEVOFLOXACIN 500 MG/1
500 TABLET, FILM COATED ORAL EVERY 24 HOURS
Qty: 5 TABLET | Refills: 0 | Status: SHIPPED | OUTPATIENT
Start: 2021-02-08 | End: 2021-02-13

## 2021-02-08 RX ORDER — ASPIRIN 325 MG
325 TABLET, DELAYED RELEASE (ENTERIC COATED) ORAL DAILY
Qty: 30 TABLET | Refills: 2 | Status: SHIPPED | OUTPATIENT
Start: 2021-02-09 | End: 2021-03-03 | Stop reason: SDUPTHER

## 2021-02-08 RX ADMIN — AMIODARONE HYDROCHLORIDE 200 MG: 200 TABLET ORAL at 08:28

## 2021-02-08 RX ADMIN — CETIRIZINE HYDROCHLORIDE ALLERGY 10 MG: 10 TABLET ORAL at 08:28

## 2021-02-08 RX ADMIN — LEVOTHYROXINE, LIOTHYRONINE 60 MG: 38; 9 TABLET ORAL at 08:28

## 2021-02-08 RX ADMIN — MUPIROCIN 1 APPLICATION: 20 OINTMENT TOPICAL at 08:28

## 2021-02-08 RX ADMIN — ATENOLOL 25 MG: 25 TABLET ORAL at 08:28

## 2021-02-08 RX ADMIN — Medication 50 MG: at 08:28

## 2021-02-08 RX ADMIN — INSULIN LISPRO 2 UNITS: 100 INJECTION, SOLUTION INTRAVENOUS; SUBCUTANEOUS at 11:40

## 2021-02-08 RX ADMIN — ASPIRIN 325 MG: 325 TABLET, COATED ORAL at 08:28

## 2021-02-08 RX ADMIN — SERTRALINE 25 MG: 25 TABLET, FILM COATED ORAL at 08:28

## 2021-02-08 NOTE — PROGRESS NOTES
Continued Stay Note  [facility]     Patient Name: Amelie Anderson  MRN: [id number]  Today's Date: 2/8/2021    Admit Date: 2/2/2021    Discharge Plan     Row Name 02/08/21 1206       Plan    Plan  2/8 Home w/ [facility] and [place] to supply rolling walker.        Discharge Codes    No documentation.       Expected Discharge Date and Time     Expected Discharge Date Expected Discharge Time    Feb 8, 2021             Judy Langley RN

## 2021-02-08 NOTE — PROGRESS NOTES
" LOS: 6 days   Patient Care Team:  Fabi Bill MD as PCP - General (Pediatrics)  Ant Pederson MD as Surgeon (Orthopedic Surgery)  Jason Perrin MD as Consulting Physician (Nephrology)  Dorothy Aldana MD (Urology)    Chief Complaint: post op    Subjective:  Symptoms:  No shortness of breath, cough or chest pain.    Diet:  Adequate intake.  No nausea or vomiting.    Activity level: Returning to normal.    Pain:  She reports no pain.      Vital Signs  Temp:  [97.2 °F (36.2 °C)-97.8 °F (36.6 °C)] 97.5 °F (36.4 °C)  Heart Rate:  [71-94] 77  Resp:  [16-17] 16  BP: ()/(60-90) 109/60  Body mass index is 26.33 kg/m².    Intake/Output Summary (Last 24 hours) at 2/8/2021 0849  Last data filed at 2/7/2021 1605  Gross per 24 hour   Intake 960 ml   Output --   Net 960 ml     No intake/output data recorded.          02/06/21  0659 02/07/21  0500 02/08/21  0500   Weight: 70.9 kg (156 lb 6.4 oz) 70.3 kg (154 lb 14.4 oz) 69.6 kg (153 lb 6.4 oz)     Objective:  General Appearance:  Comfortable and in no acute distress.    Vital signs: (most recent): Blood pressure 109/60, pulse 77, temperature 97.5 °F (36.4 °C), temperature source Oral, resp. rate 16, height 162.6 cm (64\"), weight 69.6 kg (153 lb 6.4 oz), SpO2 99 %.  Vital signs are normal.  No fever.    Output: Producing urine.    Lungs:  Normal effort and normal respiratory rate.  There are decreased breath sounds.    Heart: Normal rate.  Regular rhythm.  (SR with PVCs)  Abdomen: Abdomen is soft.  Bowel sounds are normal.     Extremities: There is no dependent edema.    Pulses: Distal pulses are intact.    Neurological: Patient is alert and oriented to person, place and time.    Skin:  Warm and dry.  (Sternal wound clean,dry, and intact)        Results Review:        WBC WBC   Date Value Ref Range Status   02/08/2021 7.16 3.40 - 10.80 10*3/mm3 Final   02/07/2021 6.91 3.40 - 10.80 10*3/mm3 Final   02/06/2021 6.98 3.40 - 10.80 10*3/mm3 Final      HGB " Hemoglobin   Date Value Ref Range Status   02/08/2021 9.4 (L) 12.0 - 15.9 g/dL Final   02/07/2021 9.2 (L) 12.0 - 15.9 g/dL Final   02/06/2021 7.7 (L) 12.0 - 15.9 g/dL Final      HCT Hematocrit   Date Value Ref Range Status   02/08/2021 28.1 (L) 34.0 - 46.6 % Final   02/07/2021 27.8 (L) 34.0 - 46.6 % Final   02/06/2021 23.4 (L) 34.0 - 46.6 % Final      Platelets Platelets   Date Value Ref Range Status   02/08/2021 195 140 - 450 10*3/mm3 Final   02/07/2021 189 140 - 450 10*3/mm3 Final   02/06/2021 136 (L) 140 - 450 10*3/mm3 Final        PT/INR:  No results found for: PROTIME/No results found for: INR    Sodium Sodium   Date Value Ref Range Status   02/08/2021 139 136 - 145 mmol/L Final   02/07/2021 136 136 - 145 mmol/L Final   02/06/2021 136 136 - 145 mmol/L Final      Potassium Potassium   Date Value Ref Range Status   02/08/2021 4.0 3.5 - 5.2 mmol/L Final   02/07/2021 3.9 3.5 - 5.2 mmol/L Final   02/06/2021 3.9 3.5 - 5.2 mmol/L Final     Comment:     Slight hemolysis detected by analyzer. Results may be affected.      Chloride Chloride   Date Value Ref Range Status   02/08/2021 102 98 - 107 mmol/L Final   02/07/2021 101 98 - 107 mmol/L Final   02/06/2021 104 98 - 107 mmol/L Final      Bicarbonate CO2   Date Value Ref Range Status   02/08/2021 23.5 22.0 - 29.0 mmol/L Final   02/07/2021 25.5 22.0 - 29.0 mmol/L Final   02/06/2021 22.7 22.0 - 29.0 mmol/L Final      BUN BUN   Date Value Ref Range Status   02/08/2021 12 8 - 23 mg/dL Final   02/07/2021 12 8 - 23 mg/dL Final   02/06/2021 16 8 - 23 mg/dL Final      Creatinine Creatinine   Date Value Ref Range Status   02/08/2021 0.79 0.57 - 1.00 mg/dL Final   02/07/2021 0.83 0.57 - 1.00 mg/dL Final   02/06/2021 0.73 0.57 - 1.00 mg/dL Final      Calcium Calcium   Date Value Ref Range Status   02/08/2021 9.4 8.6 - 10.5 mg/dL Final   02/07/2021 9.1 8.6 - 10.5 mg/dL Final   02/06/2021 8.7 8.6 - 10.5 mg/dL Final      Magnesium Magnesium   Date Value Ref Range Status   02/06/2021  2.1 1.6 - 2.4 mg/dL Final          amiodarone, 200 mg, Oral, Q24H  amitriptyline, 25 mg, Oral, Nightly  aspirin, 325 mg, Oral, Daily  atenolol, 25 mg, Oral, Q24H  cetirizine, 10 mg, Oral, Daily  enoxaparin, 40 mg, Subcutaneous, Daily  insulin lispro, 0-7 Units, Subcutaneous, TID AC  levoFLOXacin, 500 mg, Oral, Q24H  mupirocin, , Each Nare, BID  senna-docusate sodium, 2 tablet, Oral, Nightly  sertraline, 25 mg, Oral, Daily  Thyroid, 60 mg, Oral, Daily  vitamin B-6, 50 mg, Oral, Daily           Patient Active Problem List   Diagnosis Code   • Essential hypertension I10   • Aortic valve stenosis I35.0   • Aortic stenosis I35.0       Assessment & Plan   -Aortic stenosis s/p AVR (tissue)- POD#6 ([place])  -HTN----borderline hypotension post op  -Chronic iron deficiency anemia  -Hypothyroidism  -DM II---HbA1C 7.0  -expected acute blood loss anemia   -Preop UTI----Levaquin resumed yesterday     Looks good this morning  Weaned to RA  Mobilize/encourage pulmonary toilet  To navigate the stairs today with PT  Anticipate discharge home with home health later today    Haydee Acevedo, ERNIE  02/08/21  08:49 EST

## 2021-02-08 NOTE — THERAPY TREATMENT NOTE
Patient Name: Amelie Anderson  : 1950    MRN: [id number]                              Today's Date: 2021       Admit Date: 2021    Visit Dx:     ICD-10-CM ICD-9-CM   1. S/P AVR (aortic valve replacement)  Z95.2 V43.3   2. Aortic stenosis  I35.0 424.1     Patient Active Problem List   Diagnosis   • Essential hypertension   • Aortic valve stenosis   • Aortic stenosis     Past Medical History:   Diagnosis Date   • Anemia    • Aortic stenosis    • Cataract     BILAT   • Elevated cholesterol    • Environmental allergies    • GERD (gastroesophageal reflux disease)    • History of anesthesia complications     STATES WAS SLOW TO WAKE UP AFTER SURGERY   • History of TB (tuberculosis)     YEARS AGO WHEN FIRST CAME TO [facility], WAS TREATED FOR WITH INH   • Hypertension    • Iron deficiency    • Kidney cysts    • Osteoporosis    • PONV (postoperative nausea and vomiting)    • Seasonal allergies    • Urinary frequency      Past Surgical History:   Procedure Laterality Date   • AORTIC VALVE REPAIR/REPLACEMENT N/A 2021    Procedure: VARUN STERNOTOMY AORTIC VALVE REPLACEMENT AND PRP;  Surgeon: Jesse Coronado MD;  Location: [facility];  Service: Cardiothoracic;  Laterality: N/A;   • BREAST BIOPSY     • CARDIAC CATHETERIZATION  2020    no stents   • CARDIAC CATHETERIZATION      no stents   • HYSTERECTOMY     • TEETH EXTRACTION      ALL TEETH ON TOP, ALL BOTTOM TEETH EXCEPT 4   • TONSILLECTOMY       General Information     Row Name 21 1111          Physical Therapy Time and Intention    Document Type  therapy note (daily note)  -CH (r) BH (t) CH (c)     Mode of Treatment  individual therapy;physical therapy  -CH (r) BH (t) CH (c)     Row Name 21 1111          General Information    Existing Precautions/Restrictions  cardiac;fall;sternal  -CH (r) BH (t) CH (c)     Row Name 21 1111          Cognition    Orientation Status (Cognition)  person;place;oriented to Pt was disoriented to time,  thinking it was 10pm instead of 10am.  -CH (r) BH (t) CH (c)     Row Name 02/08/21 1111          Safety Issues, Functional Mobility    Impairments Affecting Function (Mobility)  balance;endurance/activity tolerance;strength  -CH (r) BH (t) CH (c)       User Key  (r) = Recorded By, (t) = Taken By, (c) = Cosigned By    Initials Name Provider Type     Blanca Ennis, PT Physical Therapist     Kiara Salas PT Student PT Student        Mobility     Row Name 02/08/21 1114          Bed Mobility    Bed Mobility  supine-sit  -CH (r) BH (t) CH (c)     Supine-Sit [place] (Bed Mobility)  supervision  -CH (r) BH (t) CH (c)     Sit-Supine [place] (Bed Mobility)  -- Pt sitting EOB at end of treatment.  -CH (r) BH (t) CH (c)     Row Name 02/08/21 1114          Sit-Stand Transfer    Sit-Stand [place] (Transfers)  supervision  -CH (r) BH (t) CH (c)     Row Name 02/08/21 1114          Gait/Stairs (Locomotion)    [place] Level (Gait)  standby assist  -CH (r) BH (t) CH (c)     Distance in Feet (Gait)  200  -CH (r) BH (t) CH (c)     Deviations/Abnormal Patterns (Gait)  jonah decreased;gait speed decreased;stride length decreased  -CH (r) BH (t) CH (c)     Bilateral Gait Deviations  forward flexed posture  -CH (r) BH (t) CH (c)     [place] Level (Stairs)  contact guard  -CH (r) BH (t) CH (c)     Handrail Location (Stairs)  right side (ascending);left side (descending)  -CH (r) BH (t) CH (c)     Number of Steps (Stairs)  8  -CH (r) BH (t) CH (c)     Ascending Technique (Stairs)  step-over-step  -CH (r) BH (t) CH (c)     Descending Technique (Stairs)  step-to-step  -CH (r) BH (t) CH (c)     Comment (Gait/Stairs)  Pt ambulated 200 ft today with SBA with 1 slight LOB, but was able to correct. Pt demonstrated improved safety with gait and stairs with no cueing required.  -CH (r) BH (t) CH (c)       User Key  (r) = Recorded By, (t) = Taken By, (c) = Cosigned By    Initials Name Provider Type    DAYTON Ennis,  Blanca NOWAK, PT Physical Therapist    Kiara Jara, PT Student PT Student        Obj/Interventions     Row Name 02/08/21 1118          Motor Skills    Therapeutic Exercise  -- 10 reps B cardiac rehab protocol, cardiac level 5  -CH (r) BH (t) CH (c)       User Key  (r) = Recorded By, (t) = Taken By, (c) = Cosigned By    Initials Name Provider Type     Blanca Ennis, PT Physical Therapist    Kiara Jara, PT Student PT Student        Goals/Plan    No documentation.       Clinical Impression     Row Name 02/08/21 1119          Pain    Additional Documentation  Pain Scale: FACES Pre/Post-Treatment (Group)  -CH (r) BH (t) CH (c)     Row Name 02/08/21 1119          Pain Scale: FACES Pre/Post-Treatment    Pain: FACES Scale, Pretreatment  0-->no hurt  -CH (r) BH (t) CH (c)     Posttreatment Pain Rating  0-->no hurt  -CH (r) BH (t) CH (c)     Pain Location  chest  -CH (r) BH (t) CH (c)     Row Name 02/08/21 1119          Plan of Care Review    Plan of Care Reviewed With  patient  -CH (r) BH (t) CH (c)     Progress  improving  -CH (r) BH (t) CH (c)     Outcome Summary  Pt ambulated 200 ft with SBA and 8 steps with CGA. Pt demonstrates improved independence and safety with mobility, but still experiences intermittent LOB. Pt is able to correct, but may benefit from continued skilled PT to address balance and functional mobility deficits.  -CH (r) BH (t) CH (c)     Row Name 02/08/21 1119          Positioning and Restraints    Pre-Treatment Position  in bed  -CH (r) BH (t) CH (c)     Post Treatment Position  bed  -CH (r) BH (t) CH (c)     In Bed  sitting EOB;call light within reach;encouraged to call for assist  -CH (r) BH (t) CH (c)       User Key  (r) = Recorded By, (t) = Taken By, (c) = Cosigned By    Initials Name Provider Type    Blanca Murray, PT Physical Therapist    Kiara Jara, PT Student PT Student        Outcome Measures     Row Name 02/08/21 1123          How much help from another person  do you currently need...    Turning from your back to your side while in flat bed without using bedrails?  4  -CH (r) BH (t) CH (c)     Moving from lying on back to sitting on the side of a flat bed without bedrails?  3  -CH (r) BH (t) CH (c)     Moving to and from a bed to a chair (including a wheelchair)?  3  -CH (r) BH (t) CH (c)     Standing up from a chair using your arms (e.g., wheelchair, bedside chair)?  3  -CH (r) BH (t) CH (c)     Climbing 3-5 steps with a railing?  3  -CH (r) BH (t) CH (c)     To walk in hospital room?  4  -CH (r) BH (t) CH (c)     AM-PAC 6 Clicks Score (PT)  20  -CH (r) BH (t)     Row Name 02/08/21 1123          Functional Assessment    Outcome Measure Options  AM-PAC 6 Clicks Basic Mobility (PT)  -CH (r) BH (t) CH (c)       User Key  (r) = Recorded By, (t) = Taken By, (c) = Cosigned By    Initials Name Provider Type     Blanca Ennis, PT Physical Therapist     Kiara Salas PT Student PT Student        Physical Therapy Education                 Title: PT OT SLP Therapies (Done)     Topic: Physical Therapy (Done)     Point: Mobility training (Done)     Learning Progress Summary           Patient Acceptance, E,TB,D, VU,NR by  at 2/8/2021 1124    Eager, E,TB,D, VU,NR by  at 2/6/2021 1509    Comment: slightly impulsive, educ offered on safety    Acceptance, E,TB,D, NR,VU by  at 2/5/2021 1417    Acceptance, E,TB,D, VU,NR by  at 2/4/2021 1000    Acceptance, E,TB,D, VU,NR by  at 2/3/2021 0944                   Point: Home exercise program (Done)     Learning Progress Summary           Patient Acceptance, E,TB,D, VU,NR by  at 2/8/2021 1124    Eager, E,TB,D, VU,NR by  at 2/6/2021 1509    Comment: slightly impulsive, educ offered on safety    Acceptance, E,TB,D, NR,VU by  at 2/5/2021 1417    Acceptance, E,TB,D, VU,NR by  at 2/4/2021 1000    Acceptance, E,TB,D, VU,NR by  at 2/3/2021 0944                   Point: Body mechanics (Done)     Learning Progress Summary            Patient Acceptance, E,TB,D, VU,NR by  at 2/8/2021 1124    Eager, E,TB,D, VU,NR by  at 2/6/2021 1509    Comment: slightly impulsive, educ offered on safety    Acceptance, E,TB,D, NR,VU by  at 2/5/2021 1417    Acceptance, E,TB,D, VU,NR by  at 2/4/2021 1000    Acceptance, E,TB,D, VU,NR by  at 2/3/2021 0944                   Point: Precautions (Done)     Learning Progress Summary           Patient Acceptance, E,TB,D, VU,NR by  at 2/8/2021 1124    Eager, E,TB,D, VU,NR by  at 2/6/2021 1509    Comment: slightly impulsive, educ offered on safety    Acceptance, E,TB,D, NR,VU by  at 2/5/2021 1417    Acceptance, E,TB,D, VU,NR by  at 2/4/2021 1000    Acceptance, E,TB,D, VU,NR by  at 2/3/2021 0944                               User Key     Initials Effective Dates Name Provider Type Discipline     04/03/18 -  Blanca Ennis, PT Physical Therapist PT     03/07/18 -  Liyah Busby PTA Physical Therapy Assistant PT     02/01/21 -  Kiara Salas PT Student PT Student PT              PT Recommendation and Plan     Plan of Care Reviewed With: patient  Progress: improving  Outcome Summary: Pt ambulated 200 ft with SBA and 8 steps with CGA. Pt demonstrates improved independence and safety with mobility, but still experiences intermittent LOB. Pt is able to correct, but may benefit from continued skilled PT to address balance and functional mobility deficits.     Time Calculation:   PT Charges     Row Name 02/08/21 1125             Time Calculation    Start Time  1018  -CH (r) BH (t) CH (c)      Stop Time  1030  -CH (r) BH (t) CH (c)      Time Calculation (min)  12 min  -CH (r) BH (t)      PT Received On  02/08/21  -CH (r) BH (t) CH (c)      PT - Next Appointment  02/09/21  -CH (r) BH (t) CH (c)         Time Calculation- PT    Total Timed Code Minutes- PT  12 minute(s)  -CH (r) BH (t) CH (c)        User Key  (r) = Recorded By, (t) = Taken By, (c) = Cosigned By    Initials Name Provider Type    CH  Blanca Ennis, PT Physical Therapist     Kiara Salas, PT Student PT Student        Therapy Charges for Today     Code Description Service Date Service Provider Modifiers Qty    95294610603 HC PT THERAPEUTIC ACT EA 15 MIN 2/8/2021 Kiara Salas, PT Student GP 1          PT G-Codes  Outcome Measure Options: AM-PAC 6 Clicks Basic Mobility (PT)  AM-PAC 6 Clicks Score (PT): 20    Kiara Salas PT Student  2/8/2021

## 2021-02-08 NOTE — DISCHARGE SUMMARY
Date of Admission: 2/2/2021   Date of Discharge:  2/8/2021    Discharge Diagnosis:   -Aortic stenosis s/p AVR (tissue)  -HTN  -Chronic iron deficiency anemia worsening acutely by acute blood loss from surgery  -Hypothyroidism  -DM II---HbA1C 7.0   -UTI    Presenting Problem/History of Present Illness  Aortic stenosis [I35.0]  Aortic stenosis [I35.0]     Hospital Course  Patient is a 70 y.o. female presented for previously scheduled cardiac surgery. On 2/2/21 she underwent aortic valve replacement using 25 mm magna pericardial valve with Dr. Coronado. Post-operatively she did well. She was successfully extubated and weaned off of inotropes and vasopressors, and transferred to stepdown on POD#1. Her chest tubes, page, and central line were removed on POD#2. Her epicardial wires were removed without difficulty on POD#3. She was noted to have slightly unsteady gait when walking with PT. This improved over the course of her stay. She was able to navigate the stairs, but PT is recommended rolling walker for home. She was started on a course of Levaquin preoperatively for a UTI. This was restarted in the hospital and should be completed as an outpatient. She was successfully weaned from oxygen. She is tolerating the current medication regimen, and has met PT goals. She is eating and drinking sufficiently, and defecating and urinating without difficulty. On POD# 6 she was deemed ready for discharge home with home health. Sternal precautions reviewed as were signs and symptoms of sternal wound infection. She is to follow up with her cardiologist Dr. Pederson in 2 weeks, her PCP in 1 week, and in our office with Dr. Coronado on 3/3/21 at 12:30.     Procedures Performed  Procedure(s):  VARUN STERNOTOMY AORTIC VALVE REPLACEMENT AND PRP       Consults:   Consults     No orders found from 1/4/2021 to 2/3/2021.          Pertinent Test Results:    Lab Results   Component Value Date    WBC 7.16 02/08/2021    HGB 9.4 (L) 02/08/2021    HCT 28.1  (L) 02/08/2021    MCV 90.1 02/08/2021     02/08/2021      Lab Results   Component Value Date    GLUCOSE 117 (H) 02/08/2021    CALCIUM 9.4 02/08/2021     02/08/2021    K 4.0 02/08/2021    CO2 23.5 02/08/2021     02/08/2021    BUN 12 02/08/2021    CREATININE 0.79 02/08/2021    EGFRIFNONA 72 02/08/2021    BCR 15.2 02/08/2021    ANIONGAP 13.5 02/08/2021     Lab Results   Component Value Date    INR 1.19 (H) 02/03/2021    PROTIME 14.9 (H) 02/03/2021         Condition on Discharge:  stable    Vital Signs  Temp:  [97.2 °F (36.2 °C)-97.8 °F (36.6 °C)] 97.8 °F (36.6 °C)  Heart Rate:  [64-94] 64  Resp:  [16-17] 16  BP: ()/(60-75) 119/75      Discharge Disposition  Home-Health Care [facility]    Discharge Medications     Discharge Medications      New Medications      Instructions Start Date   acetaminophen 500 MG tablet  Commonly known as: TYLENOL   500 mg, Oral, Every 4 Hours PRN      atenolol 25 MG tablet  Commonly known as: TENORMIN   25 mg, Oral, Every 24 Hours Scheduled   Start Date: February 9, 2021        Changes to Medications      Instructions Start Date   aspirin  MG tablet  What changed:   · medication strength  · how much to take   325 mg, Oral, Daily   Start Date: February 9, 2021     levoFLOXacin 500 MG tablet  Commonly known as: LEVAQUIN  What changed: when to take this   500 mg, Oral, Every 24 Hours         Continue These Medications      Instructions Start Date   alendronate 70 MG tablet  Commonly known as: FOSAMAX   1 tablet, Oral, Weekly, THURSDAY      amitriptyline 25 MG tablet  Commonly known as: ELAVIL   1 tablet, Oral, Nightly      [place] Thyroid 60 MG tablet  Generic drug: Thyroid   1 tablet, Oral, Daily      atorvastatin 10 MG tablet  Commonly known as: LIPITOR   1 tablet, Oral, Nightly      Biotin 10 MG tablet   10 mg, Oral, Daily      calcium-vitamin D 500-200 MG-UNIT tablet per tablet   1 tablet, Oral, Daily      chlorpheniramine 4 MG tablet  Commonly known as:  CHLOR-TRIMETON   1 tablet, Oral, Daily      FeroSul 325 (65 FE) MG tablet  Generic drug: ferrous sulfate   1 tablet, Oral, Daily      fluticasone 50 MCG/ACT nasal spray  Commonly known as: FLONASE   1 spray, Nasal, As Needed      loratadine 10 MG tablet  Commonly known as: CLARITIN   1 tablet, Oral, Daily      metFORMIN  MG 24 hr tablet  Commonly known as: GLUCOPHAGE-XR   1 tablet, Oral, 2 times daily      multivitamin with minerals tablet tablet   1 tablet, Oral, Daily, HOLD FOR SURGERY      Potassium 99 MG tablet   99 mg, Oral, Daily, OTC, FOR LEG CRAMPS      sertraline 50 MG tablet  Commonly known as: ZOLOFT   1 tablet, Oral, Daily      vitamin B-6 50 MG tablet  Commonly known as: PYRIDOXINE   1 tablet, Oral, Daily         Stop These Medications    chlorhexidine 0.12 % solution  Commonly known as: PERIDEX     Chlorhexidine Gluconate 2 % pads     lisinopril 5 MG tablet  Commonly known as: PRINIVIL,ZESTRIL     metoprolol tartrate 50 MG tablet  Commonly known as: LOPRESSOR     mupirocin 2 % ointment  Commonly known as: BACTROBAN            Discharge Diet:   Diet Instructions     Healthy Heart diet                Activity at Discharge:    1. No driving for 2 weeks and off narcotic pain medications.  2. Shower daily. Clean incisions with warm water and antibacterial soap only. Do not put any lotion or ointments on incisions.  3. Ambulate for 10 minutes at least 3 times a day.  4. No heavy lifting > 10lbs until seen in office.   5. Take all medications as prescribed.     Follow-up Appointments  Future Appointments   Date Time Provider Department Center   3/3/2021 12:30 PM Jesse Coronado MD [facility] None     Additional Instructions for the Follow-ups that You Need to Schedule     Ambulatory Referral to Cardiac Rehab   As directed      Ambulatory Referral to Home Health   As directed      Face to Face Visit Date: 2/8/2021    Follow-up provider for Plan of Care?: I will be treating the patient on an ongoing  basis.  Please send me the Plan of Care for signature.    Follow-up provider: ADRIANNA CORONADO [[id number]]    Reason/Clinical Findings: post op open heart    Describe mobility limitations that make leaving home difficult: post operative weakness    Nursing/Therapeutic Services Requested: Skilled Nursing    Skilled nursing orders: Post CABG care    Frequency: 1 Week 1         Call MD With Problems / Concerns   As directed      Instructions:  Call office at [phone] for any drainage, increased redness, or fever over 100.5    Order Comments: Instructions:  Call office at [phone] for any drainage, increased redness, or fever over 100.5          Discharge Follow-up with PCP   As directed       Currently Documented PCP:    Fabi Bill MD    PCP Phone Number:    [phone]     Follow Up Details: in 1 week         Discharge Follow-up with Specified Provider: Cardiologist--Dr. Pederson; 2 Weeks   As directed      To: Cardiologist--Dr. Pederson    Follow Up: 2 Weeks    Follow Up Details: call for appointment, bring all medication bottles to appointment         Discharge Follow-up with Specified Provider: Dr. Coronado 3/3/21 12:30pm   As directed      To: Dr. Coronado 3/3/21 12:30pm    Follow Up Details: bring all current medications to appointment               Test Results Pending at Discharge       ERNIE Chambers  02/08/21  13:46 EST

## 2021-02-08 NOTE — PLAN OF CARE
Goal Outcome Evaluation:  Plan of Care Reviewed With: (P) patient  Progress: (P) improving  Outcome Summary: (P) Pt ambulated 200 ft with SBA and 8 steps with CGA. Pt demonstrates improved independence and safety with mobility, but still experiences intermittent LOB. Pt is able to correct, but may benefit from continued skilled PT to address balance and functional mobility deficits.

## 2021-02-09 ENCOUNTER — TELEPHONE (OUTPATIENT)
Dept: CARDIAC SURGERY | Facility: CLINIC | Age: 71
End: 2021-02-09

## 2021-02-09 NOTE — PROGRESS NOTES
Case Management Discharge Note      Final Note: Pt d/c home 2/8/21 with [facility]  scheduled to follow and [facility] supplied the rolling walker.  BARRY ASCENCIO/CCP    Provided Post Acute Provider List?: Yes  Post Acute Provider List: Home Health  Delivered To: Patient, Support Person  Support Person: Bryson, spouse  Method of Delivery: In person    Selected Continued Care - Discharged on 2/8/2021 Admission date: 2/2/2021 - Discharge disposition: Home-Health Care c    Destination    No services have been selected for the patient.              Durable Medical Equipment Coordination complete    Service Provider Selected Services Address Phone Fax Patient Preferred    [facility]  Durable Medical Equipment [address], [facility] [id number] [phone] --       Internal Comment last updated by Judy Langley, RN 2/8/2021 1206    ROLLING WALKER. Judy Langley RN                      Dialysis/Infusion    No services have been selected for the patient.              Home Medical Care Coordination complete    Service Provider Selected Services Address Phone Fax Patient Preferred    [facility] [address], [facility] [id number] [phone] --          Therapy    No services have been selected for the patient.              Community Resources    No services have been selected for the patient.                       Final Discharge Disposition Code: 06 - home with home health care

## 2021-02-09 NOTE — TELEPHONE ENCOUNTER
Spoke with pt, advised patient not to take Fosamax for the next 3 weeks per Haydee Acevedo DNP.           Unable to reach pt, voicemail box full, will try later today to reach patient.     Pt dc yesterday 2/8/21. Per Haydee Acevedo DNP, pt needs to hold fosamax for the next 3 weeks.

## 2021-02-15 ENCOUNTER — OFFICE VISIT CONVERTED (OUTPATIENT)
Dept: INTERNAL MEDICINE | Facility: CLINIC | Age: 71
End: 2021-02-15
Attending: INTERNAL MEDICINE

## 2021-02-24 ENCOUNTER — HOSPITAL ENCOUNTER (OUTPATIENT)
Dept: GENERAL RADIOLOGY | Facility: HOSPITAL | Age: 71
Discharge: HOME | End: 2021-02-24
Attending: INTERNAL MEDICINE

## 2021-02-24 ENCOUNTER — HOSPITAL ENCOUNTER (OUTPATIENT)
Dept: OTHER | Facility: HOSPITAL | Age: 71
Discharge: HOME | End: 2021-02-24
Attending: INTERNAL MEDICINE

## 2021-02-24 ENCOUNTER — OFFICE VISIT CONVERTED (OUTPATIENT)
Dept: CARDIOLOGY | Facility: CLINIC | Age: 71
End: 2021-02-24
Attending: INTERNAL MEDICINE

## 2021-02-24 LAB
ALBUMIN SERPL-MCNC: 4.3 G/DL (ref 3.5–5)
ALBUMIN/GLOB SERPL: 1.3 {RATIO} (ref 1.4–2.6)
ALP SERPL-CCNC: 134 U/L (ref 43–160)
ALT SERPL-CCNC: 23 U/L (ref 10–40)
ANION GAP SERPL CALC-SCNC: 14 MMOL/L (ref 8–19)
AST SERPL-CCNC: 22 U/L (ref 15–50)
BASOPHILS # BLD AUTO: 0.04 10*3/UL (ref 0–0.2)
BASOPHILS NFR BLD AUTO: 0.5 % (ref 0–3)
BILIRUB SERPL-MCNC: 0.43 MG/DL (ref 0.2–1.3)
BNP SERPL-MCNC: 705 PG/ML (ref 0–900)
BUN SERPL-MCNC: 18 MG/DL (ref 5–25)
BUN/CREAT SERPL: 16 {RATIO} (ref 6–20)
CALCIUM SERPL-MCNC: 9.8 MG/DL (ref 8.7–10.4)
CHLORIDE SERPL-SCNC: 102 MMOL/L (ref 99–111)
CONV ABS IMM GRAN: 0.02 10*3/UL (ref 0–0.2)
CONV CO2: 25 MMOL/L (ref 22–32)
CONV IMMATURE GRAN: 0.3 % (ref 0–1.8)
CONV TOTAL PROTEIN: 7.6 G/DL (ref 6.3–8.2)
CREAT UR-MCNC: 1.11 MG/DL (ref 0.5–0.9)
DEPRECATED RDW RBC AUTO: 44.7 FL (ref 36.4–46.3)
EOSINOPHIL # BLD AUTO: 0.12 10*3/UL (ref 0–0.7)
EOSINOPHIL # BLD AUTO: 1.6 % (ref 0–7)
ERYTHROCYTE [DISTWIDTH] IN BLOOD BY AUTOMATED COUNT: 13.7 % (ref 11.7–14.4)
GFR SERPLBLD BASED ON 1.73 SQ M-ARVRAT: 51 ML/MIN/{1.73_M2}
GLOBULIN UR ELPH-MCNC: 3.3 G/DL (ref 2–3.5)
GLUCOSE SERPL-MCNC: 102 MG/DL (ref 65–99)
HCT VFR BLD AUTO: 32 % (ref 37–47)
HGB BLD-MCNC: 10.1 G/DL (ref 12–16)
LYMPHOCYTES # BLD AUTO: 1.57 10*3/UL (ref 1–5)
LYMPHOCYTES NFR BLD AUTO: 20.4 % (ref 20–45)
MCH RBC QN AUTO: 28.5 PG (ref 27–31)
MCHC RBC AUTO-ENTMCNC: 31.6 G/DL (ref 33–37)
MCV RBC AUTO: 90.1 FL (ref 81–99)
MONOCYTES # BLD AUTO: 0.72 10*3/UL (ref 0.2–1.2)
MONOCYTES NFR BLD AUTO: 9.3 % (ref 3–10)
NEUTROPHILS # BLD AUTO: 5.24 10*3/UL (ref 2–8)
NEUTROPHILS NFR BLD AUTO: 67.9 % (ref 30–85)
NRBC CBCN: 0 % (ref 0–0.7)
OSMOLALITY SERPL CALC.SUM OF ELEC: 286 MOSM/KG (ref 273–304)
PLATELET # BLD AUTO: 361 10*3/UL (ref 130–400)
PMV BLD AUTO: 8.5 FL (ref 9.4–12.3)
POTASSIUM SERPL-SCNC: 4.3 MMOL/L (ref 3.5–5.3)
RBC # BLD AUTO: 3.55 10*6/UL (ref 4.2–5.4)
SODIUM SERPL-SCNC: 137 MMOL/L (ref 135–147)
WBC # BLD AUTO: 7.71 10*3/UL (ref 4.8–10.8)

## 2021-02-25 LAB — SARS-COV-2 RNA SPEC QL NAA+PROBE: NOT DETECTED

## 2021-02-26 ENCOUNTER — CONVERSION ENCOUNTER (OUTPATIENT)
Dept: GASTROENTEROLOGY | Facility: CLINIC | Age: 71
End: 2021-02-26
Attending: INTERNAL MEDICINE

## 2021-03-03 ENCOUNTER — OFFICE VISIT (OUTPATIENT)
Dept: CARDIAC SURGERY | Facility: CLINIC | Age: 71
End: 2021-03-03

## 2021-03-03 VITALS
WEIGHT: 160 LBS | HEIGHT: 64 IN | OXYGEN SATURATION: 97 % | HEART RATE: 90 BPM | DIASTOLIC BLOOD PRESSURE: 84 MMHG | RESPIRATION RATE: 20 BRPM | TEMPERATURE: 97.3 F | SYSTOLIC BLOOD PRESSURE: 126 MMHG | BODY MASS INDEX: 27.31 KG/M2

## 2021-03-03 DIAGNOSIS — Z95.2 S/P AVR (AORTIC VALVE REPLACEMENT): Primary | ICD-10-CM

## 2021-03-03 PROCEDURE — 99024 POSTOP FOLLOW-UP VISIT: CPT | Performed by: NURSE PRACTITIONER

## 2021-03-03 RX ORDER — ATENOLOL 25 MG/1
25 TABLET ORAL
Qty: 30 TABLET | Refills: 2 | Status: SHIPPED | OUTPATIENT
Start: 2021-03-03 | End: 2021-06-01

## 2021-03-03 RX ORDER — ASPIRIN 325 MG
325 TABLET, DELAYED RELEASE (ENTERIC COATED) ORAL DAILY
Qty: 30 TABLET | Refills: 2 | Status: SHIPPED | OUTPATIENT
Start: 2021-03-03 | End: 2021-06-01

## 2021-03-04 PROBLEM — Z95.2 S/P AVR (AORTIC VALVE REPLACEMENT): Status: ACTIVE | Noted: 2021-03-04

## 2021-03-04 NOTE — PROGRESS NOTES
"CARDIOVASCULAR SURGERY FOLLOW-UP PROGRESS NOTE  Chief Complaint: Postop follow-up        HPI:   Dear Dr. Bill, Fabi Hinds MD and colleagues:    It was nice to see Amelie Anderson in follow up 4 weeks after surgery.  As you know, she is a 70 y.o. female with aortic stenosis who underwent tissue AVR on 2/2/2021 with Dr. Coronado. She did well postoperatively and continues to do well. She comes in today complaining of continued intermittent dry cough that started a few days after discharge, her cardiologist obtained lab work and chest x-ray which were all reportedly normal, she has a follow-up with her pulmonologist on the 26th.  Her activity level has been good.  From a surgical standpoint, the sternal incision is well approximated without erythema, edema, or drainage.  The sternum is stable to palpation, and the patient denies any popping or clicking with deep inspiration or coughing.  I reiterated the importance of sternal precautions with her persistent cough.    Physical Exam:         /84 (BP Location: Left arm, Patient Position: Sitting, Cuff Size: Adult)   Pulse 90   Temp 97.3 °F (36.3 °C) (Oral)   Resp 20   Ht 162.6 cm (64\")   Wt 72.6 kg (160 lb)   SpO2 97%   BMI 27.46 kg/m²   Heart:  regular rate and rhythm, S1, S2 normal, no murmur, click, rub or gallop  Lungs:  clear to auscultation bilaterally  Extremities:  no edema  Incision(s):  mid chest healing well, sternum stable    Assessment/Plan:     S/P AVR. Overall, she is doing well.    No significant post-op complications    No heavy lifting > 10 pounds for 2 more weeks  Keep incisions clean and dry  Follow-up as scheduled with cardiology  Follow-up as scheduled with PCP  Follow-up with CT surgery prn    Continue lifting restriction of 10 lbs until 6 weeks and 50 lbs until 12 weeks from the date of surgery, no excessive jarring motions or twisting motions until 12 weeks from the date of surgery    Return to clinic if any signs or symptoms " of infection or sternal instability develop       Thank you for allowing me to participate in the care of your patient.    Regards,  ERNIE Coello

## 2021-03-10 ENCOUNTER — OUTSIDE FACILITY SERVICE (OUTPATIENT)
Dept: CARDIAC SURGERY | Facility: CLINIC | Age: 71
End: 2021-03-10

## 2021-03-10 PROCEDURE — G0180 MD CERTIFICATION HHA PATIENT: HCPCS | Performed by: THORACIC SURGERY (CARDIOTHORACIC VASCULAR SURGERY)

## 2021-03-24 ENCOUNTER — OFFICE VISIT CONVERTED (OUTPATIENT)
Dept: PULMONOLOGY | Facility: CLINIC | Age: 71
End: 2021-03-24
Attending: NURSE PRACTITIONER

## 2021-03-24 ENCOUNTER — HOSPITAL ENCOUNTER (OUTPATIENT)
Dept: GENERAL RADIOLOGY | Facility: HOSPITAL | Age: 71
Discharge: HOME | End: 2021-03-24
Attending: NURSE PRACTITIONER

## 2021-03-26 ENCOUNTER — TELEPHONE (OUTPATIENT)
Dept: CARDIAC SURGERY | Facility: CLINIC | Age: 71
End: 2021-03-26

## 2021-03-26 NOTE — TELEPHONE ENCOUNTER
I called and spoke with patient to follow up and see how her recovery was progressing. She reports that her recovery is going well and there are no issues at this time. She did have several questions that I was able to answer. Patient will call back with any further concerns

## 2021-04-05 ENCOUNTER — HOSPITAL ENCOUNTER (OUTPATIENT)
Dept: VACCINE CLINIC | Facility: HOSPITAL | Age: 71
Discharge: HOME | End: 2021-04-05
Attending: INTERNAL MEDICINE

## 2021-04-14 ENCOUNTER — HOSPITAL ENCOUNTER (OUTPATIENT)
Dept: OTHER | Facility: HOSPITAL | Age: 71
Discharge: HOME | End: 2021-04-14
Attending: INTERNAL MEDICINE

## 2021-04-14 ENCOUNTER — OFFICE VISIT CONVERTED (OUTPATIENT)
Dept: INTERNAL MEDICINE | Facility: CLINIC | Age: 71
End: 2021-04-14
Attending: INTERNAL MEDICINE

## 2021-04-14 LAB
ALBUMIN SERPL-MCNC: 4.3 G/DL (ref 3.5–5)
ALBUMIN/GLOB SERPL: 1.3 {RATIO} (ref 1.4–2.6)
ALP SERPL-CCNC: 91 U/L (ref 43–160)
ALT SERPL-CCNC: 19 U/L (ref 10–40)
ANION GAP SERPL CALC-SCNC: 19 MMOL/L (ref 8–19)
AST SERPL-CCNC: 23 U/L (ref 15–50)
BILIRUB SERPL-MCNC: 0.32 MG/DL (ref 0.2–1.3)
BUN SERPL-MCNC: 21 MG/DL (ref 5–25)
BUN/CREAT SERPL: 19 {RATIO} (ref 6–20)
CALCIUM SERPL-MCNC: 9.9 MG/DL (ref 8.7–10.4)
CHLORIDE SERPL-SCNC: 103 MMOL/L (ref 99–111)
CHOLEST SERPL-MCNC: 130 MG/DL (ref 107–200)
CHOLEST/HDLC SERPL: 1.9 {RATIO} (ref 3–6)
CONV CO2: 25 MMOL/L (ref 22–32)
CONV TOTAL PROTEIN: 7.5 G/DL (ref 6.3–8.2)
CREAT UR-MCNC: 1.08 MG/DL (ref 0.5–0.9)
EST. AVERAGE GLUCOSE BLD GHB EST-MCNC: 154 MG/DL
GFR SERPLBLD BASED ON 1.73 SQ M-ARVRAT: 52 ML/MIN/{1.73_M2}
GLOBULIN UR ELPH-MCNC: 3.2 G/DL (ref 2–3.5)
GLUCOSE SERPL-MCNC: 129 MG/DL (ref 65–99)
HBA1C MFR BLD: 7 % (ref 3.5–5.7)
HDLC SERPL-MCNC: 67 MG/DL (ref 40–60)
LDLC SERPL CALC-MCNC: 44 MG/DL (ref 70–100)
OSMOLALITY SERPL CALC.SUM OF ELEC: 299 MOSM/KG (ref 273–304)
POTASSIUM SERPL-SCNC: 5.1 MMOL/L (ref 3.5–5.3)
SODIUM SERPL-SCNC: 142 MMOL/L (ref 135–147)
T4 FREE SERPL-MCNC: 0.9 NG/DL (ref 0.9–1.8)
TRIGL SERPL-MCNC: 97 MG/DL (ref 40–150)
TSH SERPL-ACNC: 1.31 M[IU]/L (ref 0.27–4.2)
VLDLC SERPL-MCNC: 19 MG/DL (ref 5–37)

## 2021-04-26 ENCOUNTER — HOSPITAL ENCOUNTER (OUTPATIENT)
Dept: VACCINE CLINIC | Facility: HOSPITAL | Age: 71
Discharge: HOME | End: 2021-04-26
Attending: INTERNAL MEDICINE

## 2021-05-10 NOTE — H&P
History and Physical      Patient Name: Amelie Anderson   Patient ID: [id number]   Sex: Female   YOB: 1950    Primary Care Provider: Fabi Bill MD   Referring Provider: Fabi Bill MD    Visit Date: 2021    Provider: Fran Rm MD   Location: [facility]   Location Address: [address] [place] [address]  [id number]   Location Phone: [phone]          Chief Complaint  · Surgical History and Physical  · Screening Colonoscopy      History Of Present Illness  NON-INPATIENT HISTORY AND PHYSICAL  Allergies: NO KNOWN DRUG ALLERGIES   Chief Complaint/History of Present Illness: Colon Screening, History of colon polyps, No family history of colon cancer   Colon Recall: Yes How Lon years   Failed Outpatient Treatment/Contraindications: N/A   Current Medications: alendronate 70 mg oral tablet, amitriptyline 25 mg oral tablet, [place] Thyroid 60 mg oral tablet, aspirin 325 mg oral tablet,delayed release (DR/EC), atenolol 25 mg oral tablet, atorvastatin 10 mg oral tablet, Calcium 500 + D 500 mg(1,250mg) -200 unit oral tablet, chlorpheniramine maleate 4 mg oral tablet, ferrous sulfate 325 mg (65 mg iron) oral tablet, Fish Oil Concentrate 1,000 mg oral capsule, fluticasone propionate 50 mcg/actuation nasal spray,suspension, FreeStyle Lite Strips miscellaneous strip, Lancets,Ultra Thin 26 gauge miscellaneous misc, lisinopril 5 mg oral tablet, Lopressor 50 mg oral tablet, loratadine 10 mg oral tablet, MoviPrep 100-7.5-2.691 gram oral powder in packet, multivitamin oral, potassium OTC, pyridoxine (vitamin B6) 50 mg oral tablet, Zaditor 0.025 % (0.035 %) ophthalmic (eye) drops, and Zoloft 50 mg oral tablet   Significant Past Medical History: Abdominal Aortic Aneurysm, Allergic rhinitis, Allergic rhinitis, chronic, Aortic stenosis, Arthritis, Bladder Disorder, Depression with anxiety, Diabetes, Heart Disease, Heel spur, right, High cholesterol,  Hypothyroidism, Kidney calculi, Kidney Disease, Lesion of bladder, Microhematuria, Migraine, Mitral valve prolapse, Osteoporosis, Pulmonary nodule, Screening Mammogram, and Thyroid disorder   Significant Family Medical History: No family history of colon cancer   Significant Past Surgical History: Colonoscopy, Cystoscopy, Cystoscopy with fulguration, Hysterectomy, Hysterectomy-Abdominal, Open Heart Surgery, and Tonsillectomy   Previous Colonoscopy: Yes YEAR: 2016 By Whom: Fran Rm MD   Previous EGD: No   PHYSICAL EXAM:  Heart: Regular Rate and Rhythm   Lungs: Breathing Unlabored           Assessment  · Preoperative examination     V72.84/Z01.818  · Screening for colon cancer     V76.51/Z12.11  · History of colon polyps     V12.72/Z86.010      Plan  · Orders  o Consent for Colonoscopy with Possible Biopsy - Possible risks/complications, benefits, and alternatives to surgical or invasive procedure have been explained to patient and/or legal guardian. -Patient has been evaluated and can tolerate anesthesia and/or sedation. Risks, benefits, and alternatives to anesthesia and sedation have been explained to patient and/or legal guardian. (13069) - V72.84/Z01.818, V76.51/Z12.11, V12.72/Z86.010 - 06/01/2021  · Medications  o Medications have been Reconciled  o Transition of Care or Provider Policy  · Instructions  o ****Surgical Orders****  o ***************  o Outpatient  o ***************  o RISK AND BENEFITS:  o Possible risks/complications, benefits and alternatives to surgical or invasive procedure have been explained to the patient and/or legal guardian.  o Patient has been evaluated and can tolerate anesthesia and/or sedation. Risks, benefits, and alternatives to anesthesia and sedation have been explained to the patient and/or legal guardian.  o ***************  o PREP: Per protocol  o IV: Per Anesthesia  o The above History and Physical Examination has been completed within 30 days of admission.  o This note  has been transcribed by LUISITO Beavers. I have read and agree with the findings in this note.  o Electronically Identified Patient Education Materials Provided Electronically  · Disposition  o Call or Return if symptoms worsen or persist.            Electronically Signed by: Lucille Morataya MA -Author on February 26, 2021 09:10:20 AM

## 2021-05-10 NOTE — PROCEDURES
"   Procedure Note      Patient Name: Amelie Anderson   Patient ID: [id number]   Sex: Female   YOB: 1950    Primary Care Provider: Fabi Bill MD   Referring Provider: Fabi Bill MD    Visit Date: November 2, 2020    Provider: Kayla Pederson MD   Location: [facility]   Location Address: [address] [place] [address], [facility]   [place], [place]  [id number]   Location Phone: [phone]          FINAL REPORT   TRANSTHORACIC ECHOCARDIOGRAM REPORT    Diagnosis: Aortic stenosis   Height: 5'4\" Weight: 152 B/P: 116/66 BSA: 1.7   Tech: BNS   MEASUREMENTS:  RVID (Diastole) : RVID. (NORMAL: 0.7 to 2.4 cm max)   LVID (Systole): 3.0 cm (Diastole): 4.2 cm . (NORMAL: 3.7 - 5.4 cm)   Posterior Wall Thickness (Diastole): 1.3 cm. (NORMAL: 0.8 - 1.1 cm)   Septal Thickness (Diastole): 1.3 cm. (NORMAL: 0.7 - 1.2 cm)   LAID (Systole): 3.0 cm. (NORMAL: 1.9 - 3.8 cm)   Aortic Root Diameter (Diastole): 3.5 cm. (NORMAL: 2.0 - 3.7 cm)   COMMENTS:  The patient underwent 2-D, M-Mode, and Doppler examination, including pulse-wave, continuous-wave, and color-flow analysis; the study is technically adequate.   FINDINGS:  MITRAL VALVE: Mild fibrocalcific changes noted with mild prolapse of the posterior leaflets.   AORTIC VALVE: Moderate to heavy fibrocalcific changes noted of a probable trileaflet aortic valve with limited opening of the aortic valve leaflets.   TRICUSPID VALVE: Normal.   PULMONIC VALVE: Normal.   LEFT ATRIUM: Normal; no masses seen. LA volume is 21 mL/m2.   AORTIC ROOT: Normal in size and motion.   LEFT VENTRICLE: The left ventricular chamber size is normal. The left ventricular wall thickness is normal. The left ventricular systolic function is normal with an estimated EF of 55%. No significant regional wall motion abnormalities are identified.   RIGHT ATRIUM: Normal.   RIGHT VENTRICLE: Normal size and function.   PERICARDIUM: No effusion.   INFERIOR VENA CAVA: Diameter is 1.2 cm with greater than 50% " reduction with inspiration.   DOPPLER: Pulse-wave, continuous wave, and color-flow Doppler evaluation was performed. E/A ratio is 0.8. DT= 218 msec. IVRT is 99 msec. E/E' is 14. There is increase in velocity across the fibrotic aortic valve with a peak gradient of 48 mmHg and a mean gradient of 31 mmHg with an estimated aortic valve area of 0.7 cm2 and dimensionless index of 0.23. The peak velocity is 3.5 m/sec. There is mild mitral valve regurgitation present.   Faxed: 11/03/2020      CONCLUSION:  1.  Left ventricular chamber size is normal. The left ventricular wall thickness is normal. The left ventricular        systolic function is normal with an estimated EF of 55%. No significant regional wall motion abnormalities        are identified.  2.  Grade 1 left ventricular diastolic dysfunction.   3.  Mild mitral valve regurgitation.   4.  Fibrocalcific changes noted of a trileaflet aortic valve with severe aortic stenosis.  The aortic valve area is        slightly smaller than what was seen six months ago.      Kayla Pederson MD, FACC  PM/pap    This note was transcribed by Dasha Clark.  pap/pm  The above service was transcribed by Dasha Clark, and I attest to the accuracy of the note.  PM                 Electronically Signed by: Sumaya Clark-, Other -Author on November 3, 2020 09:22:52 AM  Electronically Co-signed by: Kayla Pederson MD -Reviewer on November 8, 2020 09:00:40 PM

## 2021-05-13 NOTE — PROGRESS NOTES
Progress Note      Patient Name: Amelie Anderson   Patient ID: [id number]   Sex: Female   YOB: 1950    Primary Care Provider: Fabi Bill MD   Referring Provider: Fabi Bill MD    Visit Date: June 29, 2020    Provider: Kayla Pederson MD   Location: [facility]   Location Address: [address] [place], [place] [address], [place]  [id number]   Location Phone: [phone]          Chief Complaint  · Follow up of aortic valve stenosis       History Of Present Illness  REFERRING PROVIDER: Fabi Bill MD   Amelie Anderson is a 70-year-old female with significant aortic valve stenosis, diabetes, and hypertension who is doing well. She has mild shortness of breath on moderate to heavy exertion and occasional palpitations that have not changed in the last six months. She feels her exercise capacity is unchanged, and she is able to do just about anything she wants to do at home. She denies dizziness, syncope, or chest pain.   PAST MEDICAL HISTORY: Diabetes mellitus; arthritis; aortic stenosis; circulation problems in her legs.   FAMILY HISTORY: Positive for diabetes, hypertension, and heart disease.   PSYCHOSOCIAL HISTORY: She does not drink alcohol. She previously smoked but quit.   CURRENT MEDICATIONS: include Metoprolol Amptmsjg54 mg b.i.d.; aspirin 81 mg daily; Amitriptyline 25 mg daily; Loratadine 10 mg daily; Metformin 500 mg b.i.d.; Sertraline 50 mg daily; B6 50 mg daily; Lisinopril 5 mg daily; Atorvastatin 10 mg daily; [place] Thyroid 60 mg one tablet Monday through Friday, and 1-1/2 tablet Saturday and Sunday; calcium with vitamin D 500 mg t.i.d.; Chlorphenamine 4 mg p.r.n.; multivitamin. The dosage and frequency of the medications were reviewed with the patient.       Review of Systems  · Cardiovascular  o Admits  o : palpitations (fast, fluttering, or skipping beats), shortness of breath while walking or lying flat  o Denies  o : swelling (feet, ankles, hands),  "chest pain or angina pectoris   · Respiratory  o Denies  o : chronic or frequent cough, asthma or wheezing      Vitals  Date Time BP Position Site L\R Cuff Size HR RR TEMP (F) WT  HT  BMI kg/m2 BSA m2 O2 Sat        06/29/2020 10:57 /56 Sitting    58 - R   152lbs 0oz 5'  4\" 26.09 1.76           Physical Examination  · Constitutional  o Appearance  o : Awake, alert, in no acute distress.  · Eyes  o Conjunctivae  o : Conjunctivae normal.  · Ears, Nose, Mouth and Throat  o Oral Cavity  o :   § Oral Mucosa  § : Normal.  · Neck  o Inspection/Palpation/Auscultation  o : No lymphadenopathy. No JVD. No bruit. Good carotid upstroke.  · Respiratory  o Respiratory  o : Clear to percussion and auscultation. Good respiratory effort.  · Cardiovascular  o Heart  o : PMI is displaced. Soft A1. S2 normal. No S3. No S4. 2/6 systolic murmur at the base heard throughout the precordium and conducted towards the carotids.   o Peripheral Vascular System  o :   § Extremities  § : Good femoral and pedal pulses. No pedal edema.  · Gastrointestinal  o Abdominal Examination  o : Soft. No masses or tenderness felt. No hepatosplenomegaly. Abdominal aorta is not palpable.  · Labs  o Labs  o : Chemistry panel is normal. BUN 25, creatinine 1.1, triglyceride 85. HDL 70, LDL 72.           Assessment     1.  Aortic valve stenosis, severe, still minimally symptomatic.   2.  Hypertension, controlled.   3.  Hyperlipidemia, at goal.    4.  Diabetes mellitus.       Plan     1.  Patient states that she is no different now versus what she was about a year ago.  She is able to do all her        activities at home and outside the home without any trouble.  I have explained to her symptoms that would        indicate rapid progression of her severe aortic valve stenosis.    2.  Follow up in four months with an echocardiogram at that time.       Kayla Pederson MD, FACC  PM/pap    This note was transcribed by Dasha Clark.  pap/pm  The above service was " transcribed by Dasha Clark, and I attest to the accuracy of the note.  PM             Electronically Signed by: Sumaya Clark-, Other -Author on July 2, 2020 08:41:50 AM  Electronically Co-signed by: Kayla Pederson MD -Reviewer on July 3, 2020 06:54:25 PM

## 2021-05-13 NOTE — PROGRESS NOTES
Progress Note      Patient Name: Amelie Anderson   Patient ID: [id number]   Sex: Female   YOB: 1950    Primary Care Provider: Fabi Bill MD   Referring Provider: Fabi Bill MD    Visit Date: June 18, 2020    Provider: Fabi Bill MD   Location: [facility]   Location Address: [address] [place] [address]  [id number]   Location Phone: [phone]          Chief Complaint  · F/U on labs      History Of Present Illness  Video Conferencing Visit  Amelie Anderson is a 70 year old /White female who is presenting for evaluation via video conferencing via Zoom. Verbal consent obtained before beginning visit.   The following staff were present during this visit: Fabi Bill MD   Amelie Anderson is a 70 year old /White female who presents for evaluation and treatment of:      f/u on labs    recent lab results reviewed and discussed with patient during visit       Past Medical History  Disease Name Date Onset Notes   Abdominal Aortic Aneurysm --  --    Allergic rhinitis --  --    Allergic rhinitis, chronic --  --    Aortic stenosis --  --    Arthritis --  --    Bladder Disorder --  --    Depression with anxiety --  --    Diabetes --  --    Heart Disease --  --    Heel spur, right --  --    High cholesterol --  --    Hypothyroidism --  --    Kidney calculi --  --    Kidney Disease --  --    Lesion of bladder 09/25/2015 --    Microhematuria 09/25/2015 --    Migraine --  --    Mitral valve prolapse --  --    Osteoporosis 09/15/2017 --    Pulmonary nodule --  --    Screening Mammogram 4/12/2019 --    Thyroid disorder --  --          Past Surgical History  Procedure Name Date Notes   Colonoscopy 2016 --    Cystoscopy --  2/28/20 Cystoscopy w/ Dr. Aldana   Cystoscopy with fulguration 10-22-15 [facility] per Jovan   Hysterectomy --  --    Hysterectomy-Abdominal --  --    Tonsillectomy --  --          Medication List  Name Date Started  Instructions   alendronate 70 mg oral tablet 03/18/2020 take 1 tablet (70 mg) by oral route once weekly in the morning before breakfast for 90 days   amitriptyline 25 mg oral tablet 04/27/2020 take 1 tablet (25 mg) by oral route once daily at bedtime   [place] Thyroid 60 mg oral tablet 11/18/2019 take 1 tablet by oral route daily Mon - Fri ,then 1 1/2 tbs Saturday and Sunday   aspirin 81 mg oral tablet,delayed release (DR/EC) 01/22/2020 take 1 tablet (81 mg) by oral route once daily for 90 days   atorvastatin 10 mg oral tablet 03/26/2020 take 1 tablet (10 mg) by oral route once daily at bedtime   Calcium 500 + D 500 mg(1,250mg) -200 unit oral tablet 06/18/2019 take 1 tablet by oral route 3 times a day for 90 days   chlorpheniramine maleate 4 mg oral tablet 01/23/2020 take 1 tablet (4 mg) by oral route twice daily as needed for 90 days   Fish Oil Concentrate 1,000 mg oral capsule 06/18/2019 take 1 capsule by oral route 2 times a day for 90 days   Lancets,Ultra Thin 26 gauge miscellaneous misc 06/18/2020 Test PRN QID Dx: E11.9   lisinopril 5 mg oral tablet  take 1 tablet (5 mg) by oral route once daily   Lopressor 50 mg oral tablet 01/23/2020 take 1 tablet (50 mg) by oral route 2 times per day with meals for 90 days   loratadine 10 mg oral tablet 01/22/2020 take 1 tablet (10 mg) by oral route once daily for 90 days   metformin 500 mg oral tablet extended release 24 hr 03/18/2020 take 2 tablets (1,000 mg) by oral route once daily with the evening meal for 90 days   multivitamin oral  --    mupirocin 2 % topical ointment 04/18/2019 apply a small amount to the affected area by topical route 3 times per day   potassium OTC  --    pyridoxine (vitamin B6) 50 mg oral tablet 01/23/2020 take 1 tablet by oral route daily for 90 days   Zoloft 50 mg oral tablet 04/27/2020 take 1 tablet (50 mg) by oral route once daily for 90 days         Allergy List  Allergen Name Date Reaction Notes   NO KNOWN DRUG ALLERGIES --  --  --         Allergies Reconciled  Family Medical History  Disease Name Relative/Age Notes   Heart Disease  --    Diabetes, unspecified type  --    Heart Attack (MI)  --          Social History  Finding Status Start/Stop Quantity Notes   Alcohol Never --/-- --  --    Tobacco Former --/-- 1 ppd Quit in May, 2016         Immunizations  NameDate Admin Mfg Trade Name Lot Number Route Inj VIS Given VIS Publication   Qpaljwexf57/07/2019 PMC FLUZONE-HIGH DOSE [id number] IM LD 10/07/2019    Comments: pt tolerated well   Jwtrzqwhe54/24/2018 PMC FLUZONE-HIGH DOSE [id number] IM LD 09/24/2018 08/19/2014   Comments: pt tolerated well, left office in stable condition   Rptupfrph05/15/2017 PMC FLUZONE-HIGH DOSE [id number] IM LD 09/15/2017 08/19/2014   Comments: pt tolerated well, left office in stable condition   Fzvdcorny1656/27/2018 MSD PNEUMOVAX 23 [id number] IM LD 12/27/2018 04/24/2015   Comments: pt tolerated well, left office in stable condition   Prevnar 1309/15/2017 [facility] PREVNAR 13 [id number] IM RD 09/15/2017 11/05/2015   Comments: pt tolerated well, left office in stable condition         Review of Systems  · Constitutional  o Denies  o : fever, fatigue, weight loss, weight gain  · Cardiovascular  o Denies  o : lower extremity edema, claudication, chest pressure, palpitations  · Respiratory  o Denies  o : shortness of breath, wheezing, cough, hemoptysis, dyspnea on exertion  · Gastrointestinal  o Denies  o : nausea, vomiting, diarrhea, constipation, abdominal pain      Physical Examination     Gen: well-nourished, no acute distress  HENT: atraumatic, normocephalic  Eyes: extraocular movements intact, no scleral icterus  Lung: breathing comfortably, no cough  Skin: no visible rash, no lesions  Neuro: grossly oriented to person, place, and time. no facial droop   Psych: normal mood and affect             Assessment  · High cholesterol     272.0/E78.0  · Diabetes     250.00/E11.9  · Hypothyroidism     244.9/E03.9  · CKD (chronic kidney  disease)     585.9/N18.9    Problems Reconciled  Plan  · Orders  o CBC with Auto Diff [facility] (17336) - 244.9/E03.9, 250.00/E11.9, 272.0/E78.0, 585.9/N18.9 - 09/18/2020  o Thyroid Profile (45547, 30360, THYII) - 244.9/E03.9, 250.00/E11.9, 272.0/E78.0, 585.9/N18.9 - 09/18/2020  o ACO-39: Current medications updated and reviewed () - - 06/18/2020  o Diabetes 2 Panel (Urine Microalbumin, CMP, Lipid, A1c, ) [facility] (35295, 64481, 65096, 41014) - 244.9/E03.9, 250.00/E11.9, 272.0/E78.0, 585.9/N18.9 - 09/18/2020  · Medications  o Lancets,Ultra Thin 26 gauge miscellaneous misc   SIG: Test PRN QID Dx: E11.9   DISP: (1) 100 ct box with 5 refills  Refilled on 06/18/2020     o Medications have been Reconciled  o Transition of Care or Provider Policy  · Instructions  o Take all medications as prescribed/directed.  o Patient was educated/instructed on their diagnosis, treatment and medications prior to discharge from the clinic today.  o Call the office with any concerns or questions.  · Disposition  o Follow up in 3 months            Electronically Signed by: Fabi Bill MD -Author on June 24, 2020 10:42:12 AM

## 2021-05-13 NOTE — PROGRESS NOTES
Progress Note      Patient Name: Amelie Anderson   Patient ID: [id number]   Sex: Female   YOB: 1950    Primary Care Provider: Fabi Bill MD   Referring Provider: Fabi Bill MD    Visit Date: September 28, 2020    Provider: ERNIE Riley   Location: [facility]   Location Address: [address] [place] [address], [address]  [id number]   Location Phone: [phone]          Chief Complaint  · Follow-up  · DM2   · HLD  · Hypothyroidism       History Of Present Illness  Amelie Anderson is a 70 year old /White female who presents for evaluation and treatment of:      Follow up visit    Chronic management     HTN- checks everyday, taking medication as prescribed, Denies cp, soa, edema or headaches. 100-120/60-70    Blood glucose-  checking daily. Denies dizziness, low blood glucose, cp, shortness of breath. Taking metformin as prescribed.     Anxiety- zoloft as prescribed, denies si/hi. Doing well with this medication.     Allergiespatient taking over-the-counter Zaditor allergy eyedrops and would like a prescription for this medication and needs a refill on Flonase. Patient reports with these medications her allergies are well maintained.    Hyperlipidemiataking atorvastatin as prescribed, denies myalgias.   patient has been focusing on changing her eating habits and has incorporated walking back into her daily activity to help with her diabetes and cholesterol.       Past Medical History  Disease Name Date Onset Notes   Abdominal Aortic Aneurysm --  --    Allergic rhinitis --  --    Allergic rhinitis, chronic --  --    Aortic stenosis --  --    Arthritis --  --    Bladder Disorder --  --    Depression with anxiety --  --    Diabetes --  --    Heart Disease --  --    Heel spur, right --  --    High cholesterol --  --    Hypothyroidism --  --    Kidney calculi --  --    Kidney Disease --  --    Lesion of bladder 09/25/2015 --    Microhematuria  09/25/2015 --    Migraine --  --    Mitral valve prolapse --  --    Osteoporosis 09/15/2017 --    Pulmonary nodule --  --    Screening Mammogram 4/12/2019 --    Thyroid disorder --  --          Past Surgical History  Procedure Name Date Notes   Colonoscopy 2016 --    Cystoscopy --  2/28/20 Cystoscopy w/ Dr. Aldana   Cystoscopy with fulguration 10-22-15 [facility] per Jovan   Hysterectomy --  --    Hysterectomy-Abdominal --  --    Tonsillectomy --  --          Medication List  Name Date Started Instructions   alendronate 70 mg oral tablet 03/18/2020 take 1 tablet (70 mg) by oral route once weekly in the morning before breakfast for 90 days   amitriptyline 25 mg oral tablet 09/28/2020 take 1 tablet (25 mg) by oral route once daily at bedtime   Poplar Thyroid 60 mg oral tablet 07/31/2020 take 1 tablet by oral route daily Mon - Fri ,then 1 1/2 tbs Saturday and Sunday   aspirin 81 mg oral tablet,delayed release (/EC) 07/31/2020 take 1 tablet (81 mg) by oral route once daily for 90 days   atorvastatin 10 mg oral tablet 03/26/2020 take 1 tablet (10 mg) by oral route once daily at bedtime   Calcium 500 + D 500 mg(1,250mg) -200 unit oral tablet 09/28/2020 take 1 tablet by oral route 3 times a day for 90 days   chlorpheniramine maleate 4 mg oral tablet 09/28/2020 take 1 tablet (4 mg) by oral route twice daily as needed for 90 days   Fish Oil Concentrate 1,000 mg oral capsule 09/28/2020 take 1 capsule by oral route 2 times a day for 90 days   FreeStyle Lite Strips miscellaneous strip 09/28/2020 use as directed   Lancets,Ultra Thin 26 gauge miscellaneous misc 06/18/2020 Test PRN QID Dx: E11.9   lisinopril 5 mg oral tablet  take 1 tablet (5 mg) by oral route once daily   Lopressor 50 mg oral tablet 08/03/2020 take 1 tablet (50 mg) by oral route 2 times per day with meals for 90 days   loratadine 10 mg oral tablet 09/28/2020 take 1 tablet (10 mg) by oral route once daily for 90 days   metformin 500 mg oral tablet extended release 24  hr 09/28/2020 take 2 tablets (1,000 mg) by oral route once daily with the evening meal for 90 days   multivitamin oral  --    potassium OTC  --    pyridoxine (vitamin B6) 50 mg oral tablet 09/28/2020 take 1 tablet by oral route daily for 90 days   Zoloft 50 mg oral tablet 09/28/2020 take 1 tablet (50 mg) by oral route once daily for 90 days         Allergy List  Allergen Name Date Reaction Notes   NO KNOWN DRUG ALLERGIES --  --  --          Family Medical History  Disease Name Relative/Age Notes   Heart Disease  --    Diabetes, unspecified type  --    Heart Attack (MI)  --          Social History  Finding Status Start/Stop Quantity Notes   Alcohol Never --/-- --  --    Tobacco Former --/-- 1 ppd Quit in May, 2016         Immunizations  NameDate Admin Mfg Trade Name Lot Number Route Inj VIS Given VIS Publication   Txzutzbun75/22/2020 PMC FLUZONE-HIGH DOSE [id number] IM LD 09/22/2020    Comments: Patient tolerated well left office in stable condition. CH RN   Xsivlplpn84/07/2019 PMC FLUZONE-HIGH DOSE [id number] IM LD 10/07/2019    Comments: pt tolerated well   Dbsiqzebe32/24/2018 PMC FLUZONE-HIGH DOSE [id number] IM LD 09/24/2018 08/19/2014   Comments: pt tolerated well, left office in stable condition   Hymveleml43/15/2017 PMC FLUZONE-HIGH DOSE [id number] IM LD 09/15/2017 08/19/2014   Comments: pt tolerated well, left office in stable condition   Koukdqffq9372/27/2018 MSD PNEUMOVAX 23 [id number] IM LD 12/27/2018 04/24/2015   Comments: pt tolerated well, left office in stable condition   Prevnar 1309/15/2017 [facility] PREVNAR 13 [id number] IM RD 09/15/2017 11/05/2015   Comments: pt tolerated well, left office in stable condition         Review of Systems  · Constitutional  o Denies  o : fever, fatigue, weight loss, weight gain  · HENT  o Denies  o : headaches, vertigo, lightheadedness, sinus pain, nasal congestion  · Cardiovascular  o Denies  o : lower extremity edema, claudication, chest pressure, palpitations  · Respiratory  o Denies  o :  "shortness of breath, wheezing, cough, hemoptysis, dyspnea on exertion  · Gastrointestinal  o Denies  o : nausea, vomiting, diarrhea, constipation, abdominal pain  · Musculoskeletal  o Denies  o : joint pain, joint swelling, muscle pain, muscle cramps  · Psychiatric  o Denies  o : anxiety, depression, suicidal ideation, homicidal ideation  · Allergic-Immunologic  o Denies  o : sinus allergy symptoms, frequent illnesses      Vitals  Date Time BP Position Site L\R Cuff Size HR RR TEMP (F) WT  HT  BMI kg/m2 BSA m2 O2 Sat HC       03/18/2020 10:41 /60 Sitting    70 - R  97.9 158lbs 6oz 5'  4\" 27.18 1.8 99 %    06/29/2020 10:57 /56 Sitting    58 - R   152lbs 0oz 5'  4\" 26.09 1.76     09/28/2020 09:00 /66 Sitting    69 - R 18 98.3 152lbs 2oz 5'  4\" 26.11 1.77 99 %          Physical Examination  · Constitutional  o Appearance  o : no acute distress, well-nourished  · Head and Face  o Head  o :   § Inspection  § : atraumatic, normocephalic  · Eyes  o Eyes  o : extraocular movements intact, no scleral icterus, no conjunctival injection  · Ears, Nose, Mouth and Throat  o Ears  o :   § External Ears  § : normal  § Otoscopic Examination  § : tympanic membrane appearance within normal limits bilaterally  o Nose  o :   § Intranasal Exam  § : nares patent  o Oral Cavity  o :   § Oral Mucosa  § : moist mucous membranes  o Throat  o :   § Oropharynx  § : no inflammation or lesions present, tonsils within normal limits  · Respiratory  o Respiratory Effort  o : breathing comfortably, symmetric chest rise  o Auscultation of Lungs  o : clear to asculatation bilaterally, no wheezes, rales, or rhonchii  · Cardiovascular  o Heart  o :   § Auscultation of Heart  § : regular rate and rhythm,murmur,no rubs, or gallops  o Peripheral Vascular System  o :   § Extremities  § : no edema  · Gastrointestinal  o Abdominal Examination  o :   § Abdomen  § : bowel sounds present, non-distended, non-tender  · Lymphatic  o Neck  o : no " lymphadenopathy present  o Supraclavicular Nodes  o : no supraclavicular nodes  · Skin and Subcutaneous Tissue  o General Inspection  o : no lesions present, no areas of discoloration, skin turgor normal  · Neurologic  o Mental Status Examination  o :   § Orientation  § : grossly oriented to person, place and time  o Gait and Station  o :   § Gait Screening  § : normal gait  · Psychiatric  o General  o : normal mood and affect          Assessment  · Thyroid disorder     246.9/E07.9  · Mitral valve prolapse     424.0/I34.1  · Depression with anxiety     300.4/F41.8  · Abdominal Aortic Aneurysm     441.4/I71.4  Sees  in the upcoming weeks. 3 month follow ups   · Hypothyroidism     244.9/E03.9  labs reviewed with patient, will recheck thyroid profile in 6 weeks. 3 month follow up with Dr. Bill.   · Anemia     285.9/D64.9  anemia displayed on previous labs, will redraw a cbc with iron b12 and folate. It was too late to add on labs so new labs were drawn. Patient is not having bleeding from any site. Cancer screenings up to date with the exception of mammogram which we will order. Colonoscopy due in 2021  · Diabetes mellitus, type 2     250.00/E11.9  labs reviewed. Well controlled diabetes. Patient encouraged to continue diet and exercise lifestyle change. We discussed the benefits that she is having.   · Hyperlipidemia     272.4/E78.5  well controlled. Continue current regimen and lifestyle   · Visit for screening mammogram     V76.12/Z12.31  will order mammogram     Problems Reconciled  Plan  · Orders  o Screening Mammography; Bilateral 2D (55920, ) - V76.12/Z12.31 - 09/28/2020  o CBC with Auto Diff H (77153) - 250.00/E11.9, 244.9/E03.9, 300.4/F41.8, 272.4/E78.5 - 09/28/2020  o Thyroid Profile (37919, 48825, THYII) - 244.9/E03.9 - 11/09/2020  o ACO-39: Current medications updated and reviewed (1159F, ) - - 09/28/2020  o Iron Profile (Iron 63205 TIBC 38979 and Transferrin 49805) (IRONP) -  250.00/E11.9, 244.9/E03.9, 300.4/F41.8, 272.4/E78.5 - 09/28/2020  o B12 Folate levels (B12FO) - - 09/28/2020  · Medications  o fluticasone propionate 50 mcg/actuation nasal spray,suspension   SIG: spray 2 sprays (100 mcg) in each nostril by intranasal route once daily as needed for 30 days   DISP: (1) 16 gm aer w/adap with 2 refills  Prescribed on 09/28/2020     o Zaditor 0.025 % (0.035 %) ophthalmic (eye) drops   SIG: instill 1 drop into affected eye(s) by ophthalmic route 2 times per day   DISP: (1) 5 ml drop btl with 1 refills  Prescribed on 09/28/2020     o Medications have been Reconciled  o Transition of Care or Provider Policy  · Instructions  o Continue blood sugar monitoring daily and record. Bring your log to office visits. Call the office for readings below 70 and above 250 or any complications.  o Daily foot care. Avoid walking barefoot. Annual Dilated Eye Exam.  o Advised that cheeses and other sources of dairy fats, animal fats, fast food, and the extras (candy, pastries, pies, doughnuts and cookies) all contain LDL raising nutrients. Advised to increase fruits, vegetables, whole grains, and to monitor portion sizes.   o Patient was educated/instructed on their diagnosis, treatment and medications prior to discharge from the clinic today.  o Call the office with any concerns or questions.  · Disposition  o Call or Return if symptoms worsen or persist.  o Meds sent to pharmacy  o 3 month follow up            Electronically Signed by: ERNIE Riley -Author on September 28, 2020 03:37:42 PM

## 2021-05-13 NOTE — PROGRESS NOTES
"   Progress Note      Patient Name: Amelie Anderson   Patient ID: [id number]   Sex: Female   YOB: 1950    Primary Care Provider: Fabi Bill MD   Referring Provider: Fabi Bill MD    Visit Date: November 2, 2020    Provider: Kayla Pederson MD   Location: [facility]   Location Address: [address] [place] [address], [facility]   [place], [place]  [id number]   Location Phone: [phone]          Chief Complaint  · Shortness of breath with exertion       History Of Present Illness  REFERRING PROVIDER: Fabi Bill MD   Amelie Anderson is a 70-year-old female with hypertension and hyperlipidemia who noticed that she gets short of breath more easily than when she used to. She gets tired and fatigued easily also. She denies chest pain, dizziness, or syncope.   PAST MEDICAL HISTORY: Diabetes mellitus; arthritis; aortic stenosis; circulation problems in her legs.   PSYCHOSOCIAL HISTORY: Denies alcohol use. Previously smoked but quit.   CURRENT MEDICATIONS: Metoprolol ER 50 mg b.i.d.; Sertraline 50 mg daily; vitamin B6 50 mg daily; calcium 500 mg b.i.d.; Loratadine 10 mg daily; ferrous sulfate 65 mg b.i.d.; Alendronate 70 mg daily; Metformin 500 mg b.i.d.; fish oil 340 mg b.i.d.; Chlorphenamine 4 mg p.r.n.; aspirin 81 mg daily; Lisinopril 5 mg daily; Lipitor 10 mg daily.       Review of Systems  · Cardiovascular  o Denies  o : palpitations (fast, fluttering, or skipping beats), swelling (feet, ankles, hands), shortness of breath while walking or lying flat, chest pain or angina pectoris   · Respiratory  o Denies  o : chronic or frequent cough, asthma or wheezing      Vitals  Date Time BP Position Site L\R Cuff Size HR RR TEMP (F) WT  HT  BMI kg/m2 BSA m2 O2 Sat FR L/min FiO2 HC       11/02/2020 01:03 /62 Sitting    54 - R   156lbs 16oz 5'  4\" 26.95 1.79             Physical Examination  · Constitutional  o Appearance  o : Awake, alert, in no acute distress.  · Eyes  o Conjunctivae  o : " Conjunctivae normal.  · Ears, Nose, Mouth and Throat  o Oral Cavity  o :   § Oral Mucosa  § : Normal.  · Neck  o Inspection/Palpation/Auscultation  o : No lymphadenopathy. No JVD. No bruit. Good carotid upstroke.  · Respiratory  o Respiratory  o : Clear to percussion and auscultation. Good respiratory effort.  · Cardiovascular  o Heart  o : PMI is displaced. Soft A1. S2 normal. No S3. No S4. 2/6 systolic murmur at the base heard throughout the precordium and conducted towards the carotids.  o Peripheral Vascular System  o :   § Extremities  § : Good femoral and pedal pulses. No pedal edema.  · Gastrointestinal  o Abdominal Examination  o : Soft. No masses or tenderness felt. No hepatosplenomegaly. Abdominal aorta is not palpable.  · EKG  o Indications  o : Hypertension, aortic valve stenosis.   o Results  o : Sinus bradycardia. No other abnormality noted.   o Comparison  o : The heart rate is similar to the previous EKG.   · Labs  o Labs  o : She forgot to get her bloodwork done.           Assessment     1.  Severe aortic valve stenosis, now symptomatic.   2.  Hypertensive heart disease.   3.  Hyperlipidemia.       Plan     1.  Reduce the Metoprolol to 1/2 tablet in the morning and continue the whole tablet at night. This is in view of        her bradycardia and slightly low blood pressure.   2.  Proceed with diagnostic cardiac catheterization for possible aortic valve replacement.    3.  Indications and risks of the procedure including risk of bleeding, stroke, MI, death, etc. was reviewed with        the patient.       Kayla Pederson MD, [facility]  PM/pap    This note was transcribed by Dasha Clark.  pap/pm  The above service was transcribed by Dasha Clark, and I attest to the accuracy of the note.  PM             Electronically Signed by: Sumaya Clark-, Other -Author on November 3, 2020 09:37:56 AM  Electronically Co-signed by: Kayla Pederson MD -Reviewer on November 8, 2020 05:59:08 PM

## 2021-05-14 VITALS
OXYGEN SATURATION: 96 % | BODY MASS INDEX: 26.8 KG/M2 | HEIGHT: 64 IN | HEART RATE: 85 BPM | SYSTOLIC BLOOD PRESSURE: 94 MMHG | TEMPERATURE: 97.5 F | DIASTOLIC BLOOD PRESSURE: 60 MMHG | WEIGHT: 157 LBS

## 2021-05-14 VITALS
SYSTOLIC BLOOD PRESSURE: 98 MMHG | WEIGHT: 160.37 LBS | HEART RATE: 82 BPM | OXYGEN SATURATION: 98 % | DIASTOLIC BLOOD PRESSURE: 60 MMHG | BODY MASS INDEX: 27.38 KG/M2 | TEMPERATURE: 98 F | HEIGHT: 64 IN | RESPIRATION RATE: 14 BRPM

## 2021-05-14 VITALS
HEART RATE: 74 BPM | HEIGHT: 64 IN | OXYGEN SATURATION: 96 % | TEMPERATURE: 97 F | BODY MASS INDEX: 27.66 KG/M2 | SYSTOLIC BLOOD PRESSURE: 118 MMHG | WEIGHT: 162 LBS | DIASTOLIC BLOOD PRESSURE: 76 MMHG

## 2021-05-14 VITALS
OXYGEN SATURATION: 99 % | HEART RATE: 69 BPM | SYSTOLIC BLOOD PRESSURE: 116 MMHG | WEIGHT: 152.12 LBS | HEIGHT: 64 IN | RESPIRATION RATE: 18 BRPM | BODY MASS INDEX: 25.97 KG/M2 | TEMPERATURE: 98.3 F | DIASTOLIC BLOOD PRESSURE: 66 MMHG

## 2021-05-14 VITALS
HEIGHT: 64 IN | WEIGHT: 162 LBS | DIASTOLIC BLOOD PRESSURE: 76 MMHG | SYSTOLIC BLOOD PRESSURE: 120 MMHG | HEART RATE: 82 BPM | BODY MASS INDEX: 27.66 KG/M2

## 2021-05-14 VITALS
BODY MASS INDEX: 26.8 KG/M2 | WEIGHT: 157 LBS | HEIGHT: 64 IN | HEART RATE: 54 BPM | SYSTOLIC BLOOD PRESSURE: 108 MMHG | DIASTOLIC BLOOD PRESSURE: 62 MMHG

## 2021-05-14 VITALS
HEART RATE: 77 BPM | HEIGHT: 64 IN | BODY MASS INDEX: 27.14 KG/M2 | SYSTOLIC BLOOD PRESSURE: 126 MMHG | DIASTOLIC BLOOD PRESSURE: 74 MMHG | WEIGHT: 159 LBS

## 2021-05-14 NOTE — PROGRESS NOTES
Progress Note      Patient Name: Amelie Anderson   Patient ID: [id number]   Sex: Female   YOB: 1950    Primary Care Provider: Fabi Bill MD   Referring Provider: Fabi Bill MD    Visit Date: February 15, 2021    Provider: Fabi Bill MD   Location: [facility]   Location Address: [address] [place] [address]  [id number]   Location Phone: [phone]          Chief Complaint  · Follow up office visit within 7 calendar days of discharge from inpatient status (high complexity).      History Of Present Illness  FOLLOW UP OFFICE VISIT WITHIN 7 CALENDAR DAYS OF INPATIENT STATUS (SEVERE COMPLEXITY)  Amelie Anderson presents to office for follow up post discharge from inpatient status within 7 calendar days. Patient was contacted within 2 business days via phone conversation. Documentation of that phone contact is present in the patient's electronic chart. Patient was admitted to an inpatient faciliity on 02/02/2021 and discharged on 02/08/2021 due to: Aortic Stenosis   Admitting MD: Eamon Coronado MD   Her discharge summary has been reviewed and placed in the patient's electronic chart.   Patient's problem list is: Abdominal Aortic Aneurysm, Allergic rhinitis, Allergic rhinitis, chronic, Aortic stenosis, Arthritis, Bladder Disorder, Depression with anxiety, Diabetes, Heart Disease, Heel spur, right, High cholesterol, Hypothyroidism, Kidney calculi, Kidney Disease, Lesion of bladder, Microhematuria, Migraine, Mitral valve prolapse, Osteoporosis, Pulmonary nodule, and Thyroid disorder   Patient's outpatient medication list has been reconciled with the medication list from the discharge summary and has been reviewed with the patient. Current medication list is: alendronate 70 mg oral tablet, amitriptyline 25 mg oral tablet, [place] Thyroid 60 mg oral tablet, aspirin 81 mg oral tablet,delayed release (DR/EC), atorvastatin 10 mg oral tablet, Calcium 500  + D 500 mg(1,250mg) -200 unit oral tablet, chlorpheniramine maleate 4 mg oral tablet, ferrous sulfate 325 mg (65 mg iron) oral tablet, Fish Oil Concentrate 1,000 mg oral capsule, fluticasone propionate 50 mcg/actuation nasal spray,suspension, FreeStyle Lite Strips miscellaneous strip, Lancets,Ultra Thin 26 gauge miscellaneous misc, lisinopril 5 mg oral tablet, Lopressor 50 mg oral tablet, loratadine 10 mg oral tablet, metformin 500 mg oral tablet extended release 24 hr, multivitamin oral, potassium OTC, pyridoxine (vitamin B6) 50 mg oral tablet, Zaditor 0.025 % (0.035 %) ophthalmic (eye) drops, and Zoloft 50 mg oral tablet      no concerns today    feeling good since getting out of hospital, does have cough since surgery    healing well        needing refills for amitriptyline, ferrous sulfate, and armour thyroid       Past Medical History  Disease Name Date Onset Notes   Abdominal Aortic Aneurysm --  --    Allergic rhinitis --  --    Allergic rhinitis, chronic --  --    Aortic stenosis --  --    Arthritis --  --    Bladder Disorder --  --    Depression with anxiety --  --    Diabetes --  --    Heart Disease --  --    Heel spur, right --  --    High cholesterol --  --    Hypothyroidism --  --    Kidney calculi --  --    Kidney Disease --  --    Lesion of bladder 09/25/2015 --    Microhematuria 09/25/2015 --    Migraine --  --    Mitral valve prolapse --  --    Osteoporosis 09/15/2017 --    Pulmonary nodule --  --    Screening Mammogram 4/12/2019 --    Thyroid disorder --  --          Past Surgical History  Procedure Name Date Notes   Colonoscopy 2016 --    Cystoscopy --  2/28/20 Cystoscopy w/ Dr. Aldana   Cystoscopy with fulguration 10-22-15 [facility] per Jovan   Hysterectomy --  --    Hysterectomy-Abdominal --  --    Tonsillectomy --  --          Medication List  Name Date Started Instructions   alendronate 70 mg oral tablet 03/18/2020 take 1 tablet (70 mg) by oral route once weekly in the morning before breakfast for  90 days   amitriptyline 25 mg oral tablet 02/15/2021 take 1 tablet (25 mg) by oral route once daily at bedtime   [place] Thyroid 60 mg oral tablet 02/15/2021 take 1 tablet by oral route daily Mon - Fri ,then 1 1/2 tbs Saturday and Sunday   aspirin 325 mg oral tablet,delayed release (DR/EC)  take 1 tablet (325 mg) by oral route once daily   atenolol 25 mg oral tablet  take 1 tablet (25 mg) by oral route once daily   atorvastatin 10 mg oral tablet 03/26/2020 take 1 tablet (10 mg) by oral route once daily at bedtime   Calcium 500 + D 500 mg(1,250mg) -200 unit oral tablet 09/28/2020 take 1 tablet by oral route 3 times a day for 90 days   chlorpheniramine maleate 4 mg oral tablet 09/28/2020 take 1 tablet (4 mg) by oral route twice daily as needed for 90 days   ferrous sulfate 325 mg (65 mg iron) oral tablet 02/15/2021 take 1 tablet (325 mg) by oral route 2 times per day   Fish Oil Concentrate 1,000 mg oral capsule 09/28/2020 take 1 capsule by oral route 2 times a day for 90 days   fluticasone propionate 50 mcg/actuation nasal spray,suspension 09/28/2020 spray 2 sprays (100 mcg) in each nostril by intranasal route once daily as needed for 30 days   FreeStyle Lite Strips miscellaneous strip 09/28/2020 use as directed   Lancets,Ultra Thin 26 gauge miscellaneous misc 06/18/2020 Test PRN QID Dx: E11.9   lisinopril 5 mg oral tablet  take 1 tablet (5 mg) by oral route once daily   Lopressor 50 mg oral tablet 01/19/2021 take 1 tablet (50 mg) by oral route 2 times per day with meals for 90 days   loratadine 10 mg oral tablet 09/28/2020 take 1 tablet (10 mg) by oral route once daily for 90 days   multivitamin oral  --    potassium OTC  --    pyridoxine (vitamin B6) 50 mg oral tablet 09/28/2020 take 1 tablet by oral route daily for 90 days   Zaditor 0.025 % (0.035 %) ophthalmic (eye) drops 09/28/2020 instill 1 drop into affected eye(s) by ophthalmic route 2 times per day   Zoloft 50 mg oral tablet 09/28/2020 take 1 tablet (50 mg) by  "oral route once daily for 90 days         Allergy List  Allergen Name Date Reaction Notes   NO KNOWN DRUG ALLERGIES --  --  --        Allergies Reconciled  Family Medical History  Disease Name Relative/Age Notes   Heart Disease  --    Diabetes, unspecified type  --    Heart Attack (MI)  --          Social History  Finding Status Start/Stop Quantity Notes   Alcohol Never --/-- --  --    Tobacco Former --/-- 1 ppd Quit in May, 2016         Immunizations  NameDate Admin Mfg Trade Name Lot Number Route Inj VIS Given VIS Publication   [id number]/22/2020 PMC FLUZONE-HIGH DOSE [id number] IM LD 09/22/2020    Comments: Patient tolerated well left office in stable condition. CH RN   [id number]/27/2018 MSD PNEUMOVAX 23 [id number] IM LD 12/27/2018 04/24/2015   Comments: pt tolerated well, left office in stable condition   Prevnar 1309/15/2017 [facility]AL PREVNAR 13 [id number] IM RD 09/15/2017 11/05/2015   Comments: pt tolerated well, left office in stable condition         Review of Systems  · Constitutional  o Denies  o : fever, weight gain, weight loss, fatigue  · Cardiovascular  o Denies  o : palpitation, chest pain, claudication, lower extremity edema  · Respiratory  o Admits  o : dry cough  o Denies  o : shortness of breath, hemoptysis, wheezing, productive cough  · Gastrointestinal  o Denies  o : nausea, vomiting, diarrhea, constipation, abdominal pain  · Neurologic  o Denies  o : unsteady gait, weakness, dizziness, H/A      Vitals  Date Time BP Position Site L\R Cuff Size HR RR TEMP (F) WT  HT  BMI kg/m2 BSA m2 O2 Sat FR L/min FiO2 HC       01/13/2021 10:11 /76 Sitting    82 - R   162lbs 0oz 5'  4\" 27.81 1.82       01/14/2021 08:16 AM 98/60 Sitting    82 - R 14 98 160lbs 6oz 5'  4\" 27.53 1.81 98 %      02/15/2021 11:35 AM 94/60 Sitting    85 - R  97.5 156lbs 16oz 5'  4\" 26.95 1.79 96 %            Physical Examination  · Constitutional  o Appearance  o : no acute distress, well-nourished  · Head and Face  o Head  o : "   § Inspection  § : atraumatic, normocephalic  · Eyes  o Eyes  o : extraocular movements intact, no scleral icterus, no conjunctival injection  · Ears, Nose, Mouth and Throat  o Ears  o :   § External Ears  § : normal  o Nose  o :   § Intranasal Exam  § : nares patent  o Oral Cavity  o :   § Oral Mucosa  § : moist mucous membranes  · Respiratory  o Respiratory Effort  o : breathing comfortably, symmetric chest rise  o Auscultation of Lungs  o : clear to asculatation bilaterally, no wheezes, rales, or rhonchii  · Cardiovascular  o Heart  o :   § Auscultation of Heart  § : regular rate and rhythm, no murmurs, rubs, or gallops  o Peripheral Vascular System  o :   § Extremities  § : no edema  · Skin and Subcutaneous Tissue  o General Inspection  o : incisions healing well  · Neurologic  o Mental Status Examination  o :   § Orientation  § : grossly oriented to person, place and time  o Gait and Station  o :   § Gait Screening  § : normal gait  · Psychiatric  o General  o : normal mood and affect              Assessment  · Aortic stenosis     424.1/I35.0  now s/p replacement  doing well  has follow up with surgeon scheduled      Plan  · Orders  o Discharge medications reconciled with the current medication list (1111F) - - 02/15/2021  · Medications  o amitriptyline 25 mg oral tablet   SIG: take 1 tablet (25 mg) by oral route once daily at bedtime   DISP: (90) Tablet with 0 refills  Refilled on 02/15/2021     o [place] Thyroid 60 mg oral tablet   SIG: take 1 tablet by oral route daily Mon - Fri ,then 1 1/2 tbs Saturday and Sunday   DISP: (103) Tablet with 1 refills  Refilled on 02/15/2021     o ferrous sulfate 325 mg (65 mg iron) oral tablet   SIG: take 1 tablet (325 mg) by oral route 2 times per day   DISP: (60) Tablet with 2 refills  Refilled on 02/15/2021     o aspirin 81 mg oral tablet,delayed release (DR/EC)   SIG: take 1 tablet (81 mg) by oral route once daily for 90 days   DISP: (90) tablet with 1  refills  Discontinued on 02/15/2021     o metformin 500 mg oral tablet extended release 24 hr   SIG: take 2 tablets (1,000 mg) by oral route once daily with the evening meal for 90 days   DISP: (180) Tablet with 1 refills  Discontinued on 02/15/2021     o Medications have been Reconciled  o Transition of Care or Provider Policy  · Instructions  o Patient discharge summary has been reviewed and placed in patient's electronic medical record.  o Patient received a phone call from my office within 2 business days of discharge from inpatient status, and documented within the patient's chart.  o Also patient was seen (face to face) for follow up evaluation within 7 calendar days of discharge from inpatient status for a high complexity issue.  o Patient was educated on their diagnosis, treatment and any medication changes while being evaluated today.  · Disposition  o Keep previously scheduled follow up appointment  o Prescriptions sent electronically            Electronically Signed by: Fabi Bill MD -Author on February 15, 2021 12:46:06 PM

## 2021-05-14 NOTE — PROGRESS NOTES
"   Progress Note      Patient Name: Amelie Anderson   Patient ID: [id number]   Sex: Female   YOB: 1950    Primary Care Provider: Fabi Bill MD   Referring Provider: Fabi Bill MD    Visit Date: January 13, 2021    Provider: Kayla Pederson MD   Location: [facility]   Location Address: [address] [place] [address], [facility]   [place], [place]  [id number]   Location Phone: [phone]          Chief Complaint  · Shortness of breath   · Dizzy spells      History Of Present Illness  REFERRING PROVIDER: Fabi Bill MD   Amelie Anderson is a 70-year-old female with history of severe aortic valve stenosis who is scheduled to undergo aortic valve replacement next month. She has been getting more short of breath with walking and dizzy spells. She denies chest pain.   PAST MEDICAL HISTORY: Diabetes mellitus; arthritis; aortic stenosis; circulation problems in her legs.   PSYCHOSOCIAL HISTORY: Denies alcohol use. Previously smoked but quit.   CURRENT MEDICATIONS: Metoprolol Tartrate 50 mg b.i.d.; Sertraline 50 mg daily; vitamin B6; [place] Thyroid 60 mg daily; calcium 500 mg b.i.d.; Loratadine 10 mg daily; ferrous sulfate 65 mg b.i.d.; [id number] mg once a week; Metformin 500 mg b.i.d.; fish oil; Chlorphenamine 340 mg b.i.d.; Atorvastatin 10 mg daily; aspirin 81 mg daily; Lisinopril 5 mg daily; Amitriptyline 25 mg daily; Biotin; potassium.      ALLERGIES:  No known drug allergies.       Review of Systems  · Cardiovascular  o Admits  o : palpitations (fast, fluttering, or skipping beats), shortness of breath while walking or lying flat  o Denies  o : swelling (feet, ankles, hands), chest pain or angina pectoris   · Respiratory  o Denies  o : chronic or frequent cough      Vitals  Date Time BP Position Site L\R Cuff Size HR RR TEMP (F) WT  HT  BMI kg/m2 BSA m2 O2 Sat FR L/min FiO2 HC       01/13/2021 10:11 /76 Sitting    82 - R   162lbs 0oz 5'  4\" 27.81 1.82             Physical " Examination  · Constitutional  o Appearance  o : Awake, alert, in no acute distress.  · Eyes  o Conjunctivae  o : Conjunctivae normal.  · Ears, Nose, Mouth and Throat  o Oral Cavity  o :   § Oral Mucosa  § : Normal.  · Neck  o Inspection/Palpation/Auscultation  o : No lymphadenopathy. No JVD. No bruit. Good carotid upstroke.  · Respiratory  o Respiratory  o : Clear to percussion and auscultation. Good respiratory effort.  · Cardiovascular  o Heart  o : PMI is displaced. Soft A1. S2 normal. No S3. No S4. 2/6 systolic murmur at the base heard throughout the precordium and conducted towards the carotids.  o Peripheral Vascular System  o :   § Extremities  § : Good femoral and pedal pulses. No pedal edema.  · Gastrointestinal  o Abdominal Examination  o : Soft. No masses or tenderness felt. No hepatosplenomegaly. Abdominal aorta is not palpable.  · Labs  o Labs  o : CBC done on the 7th is normal. Iron profile on the 7th was normal with normal iron but elevated TIBC. Saturation of 15%. Normal thyroid panel.           Assessment     1.  Shortness of breath with dizziness spells, mostly likely related to her underlying critical aortic valve        stenosis.   2.  Hypertensive heart disease.   3.  Diabetes mellitus.   4.  Hyperlipidemia.       Plan     1.  I am going to have her stop the Lisinopril.   2.  She will come back tomorrow with her bottles so that we can  address the dosage of her beta-blocker and        the type of the beta-blocker she is on.   3.  We will contact her surgeon in [place] to see if we can get them to do her surgery sooner than next        month.      Kayla Pederson MD, [facility]  PM/pap               Electronically Signed by: Sumaya Clark-, Other -Author on January 13, 2021 11:57:11 AM  Electronically Co-signed by: Kayla Pederson MD -Reviewer on January 16, 2021 07:18:12 PM

## 2021-05-14 NOTE — PROGRESS NOTES
Progress Note      Patient Name: Amelie Anderson   Patient ID: [id number]   Sex: Female   YOB: 1950    Primary Care Provider: Fabi Bill MD   Referring Provider: Fabi Bill MD    Visit Date: January 14, 2021    Provider: Fabi Bill MD   Location: [facility]   Location Address: [address] [place] [address]  [id number]   Location Phone: [phone]          Chief Complaint  · Annual Wellness Exam      History Of Present Illness  The patient is a 70 year old /White female who has come to this office for her Annual Wellness Visit.   Her Primary Care Provider is Fabi Bill MD. Her comprehensive Care Team list, including suppliers, has been updated on the Facesheet. Her medical/family history, height, weight, BMI, and blood pressure have been reviewed and are in the chart. The Health Risk Assessment has been completed and scanned in the chart.   Medications are listed in the medication list.   The active problem list includes: Abdominal Aortic Aneurysm, Allergic rhinitis, Allergic rhinitis, chronic, Aortic stenosis, Arthritis, Bladder Disorder, Depression with anxiety, Diabetes, Heart Disease, Heel spur, right, High cholesterol, Hypothyroidism, Kidney calculi, Kidney Disease, Lesion of bladder, Microhematuria, Migraine, Mitral valve prolapse, Osteoporosis, Pulmonary nodule, and Thyroid disorder   The patient does not have a history of substance use.   Patient reports her diet is adequate.   The Mini-Cog has been administered and is scanned in chart. The results are negative. Her cognitive function is without limitation.   A hearing loss screen was completed today and the result is negative.   Patient has the following risk factors for depression: currently has depression. Patient completed the PHQ-9 today and it has been scanned in the chart. The total score is 5-9.   The Timed Up and Go screen was administered today and the result is  negative.   The Torres Index of [place] in ADLs indicated full function (score of 6).   A Falls Risk Assessment has been completed, including a review of home fall hazards and medication review.   Overall, the patient's functional ability is noted by this provider to be within normal limits. Her level of safety is noted to be within normal limits. Her balance/gait is within normal limits. There have been no falls in the past year. Patient-specific home safety recommendations have been reviewed and a copy has been given to patient.   She denies issues with leaking urine.   There are no additional risk factors identified.   Living Will/Advanced Directive previously executed but not in chart.   Personalized health advice was given to the patient and a written health screening schedule was established; see Plan for details.       Past Medical History  Disease Name Date Onset Notes   Abdominal Aortic Aneurysm --  --    Allergic rhinitis --  --    Allergic rhinitis, chronic --  --    Aortic stenosis --  --    Arthritis --  --    Bladder Disorder --  --    Depression with anxiety --  --    Diabetes --  --    Heart Disease --  --    Heel spur, right --  --    High cholesterol --  --    Hypothyroidism --  --    Kidney calculi --  --    Kidney Disease --  --    Lesion of bladder 09/25/2015 --    Microhematuria 09/25/2015 --    Migraine --  --    Mitral valve prolapse --  --    Osteoporosis 09/15/2017 --    Pulmonary nodule --  --    Screening Mammogram 4/12/2019 --    Thyroid disorder --  --          Past Surgical History  Procedure Name Date Notes   Colonoscopy 2016 --    Cystoscopy --  2/28/20 Cystoscopy w/ Dr. Aldana   Cystoscopy with fulguration 10-22-15 CBF per Jovan   Hysterectomy --  --    Hysterectomy-Abdominal --  --    Tonsillectomy --  --          Medication List  Name Date Started Instructions   alendronate 70 mg oral tablet 03/18/2020 take 1 tablet (70 mg) by oral route once weekly in the morning before  breakfast for 90 days   amitriptyline 25 mg oral tablet 09/28/2020 take 1 tablet (25 mg) by oral route once daily at bedtime   [place] Thyroid 60 mg oral tablet 07/31/2020 take 1 tablet by oral route daily Mon - Fri ,then 1 1/2 tbs Saturday and Sunday   aspirin 81 mg oral tablet,delayed release (DR/EC) 07/31/2020 take 1 tablet (81 mg) by oral route once daily for 90 days   atorvastatin 10 mg oral tablet 03/26/2020 take 1 tablet (10 mg) by oral route once daily at bedtime   Calcium 500 + D 500 mg(1,250mg) -200 unit oral tablet 09/28/2020 take 1 tablet by oral route 3 times a day for 90 days   chlorpheniramine maleate 4 mg oral tablet 09/28/2020 take 1 tablet (4 mg) by oral route twice daily as needed for 90 days   ferrous sulfate 325 mg (65 mg iron) oral tablet 10/06/2020 take 1 tablet (325 mg) by oral route 2 times per day   Fish Oil Concentrate 1,000 mg oral capsule 09/28/2020 take 1 capsule by oral route 2 times a day for 90 days   fluticasone propionate 50 mcg/actuation nasal spray,suspension 09/28/2020 spray 2 sprays (100 mcg) in each nostril by intranasal route once daily as needed for 30 days   FreeStyle Lite Strips miscellaneous strip 09/28/2020 use as directed   Lancets,Ultra Thin 26 gauge miscellaneous misc 06/18/2020 Test PRN QID Dx: E11.9   lisinopril 5 mg oral tablet  take 1 tablet (5 mg) by oral route once daily   Lopressor 50 mg oral tablet 01/19/2021 take 1 tablet (50 mg) by oral route 2 times per day with meals for 90 days   loratadine 10 mg oral tablet 09/28/2020 take 1 tablet (10 mg) by oral route once daily for 90 days   metformin 500 mg oral tablet extended release 24 hr 01/14/2021 take 2 tablets (1,000 mg) by oral route once daily with the evening meal for 90 days   multivitamin oral  --    potassium OTC  --    pyridoxine (vitamin B6) 50 mg oral tablet 09/28/2020 take 1 tablet by oral route daily for 90 days   Zaditor 0.025 % (0.035 %) ophthalmic (eye) drops 09/28/2020 instill 1 drop into  "affected eye(s) by ophthalmic route 2 times per day   Zoloft 50 mg oral tablet 09/28/2020 take 1 tablet (50 mg) by oral route once daily for 90 days         Allergy List  Allergen Name Date Reaction Notes   NO KNOWN DRUG ALLERGIES --  --  --          Family Medical History  Disease Name Relative/Age Notes   Heart Disease  --    Diabetes, unspecified type  --    Heart Attack (MI)  --          Social History  Finding Status Start/Stop Quantity Notes   Alcohol Never --/-- --  --    Tobacco Former --/-- 1 ppd Quit in May, 2016         Immunizations  NameDate Admin Mfg Trade Name Lot Number Route Inj VIS Given VIS Publication   Byqozpijr17/22/2020 PMC FLUZONE-HIGH DOSE [id number] IM LD 09/22/2020    Comments: Patient tolerated well left office in stable condition. CH RN   Rdplmrhav4370/27/2018 MSD PNEUMOVAX 23 [id number] IM LD 12/27/2018 04/24/2015   Comments: pt tolerated well, left office in stable condition   Prevnar 1309/15/2017 WAL PREVNAR 13 [id number] IM RD 09/15/2017 11/05/2015   Comments: pt tolerated well, left office in stable condition         Vitals  Date Time BP Position Site L\R Cuff Size HR RR TEMP (F) WT  HT  BMI kg/m2 BSA m2 O2 Sat FR L/min FiO2 HC       01/14/2021 08:16 AM 98/60 Sitting    82 - R 14 98 160lbs 6oz 5'  4\" 27.53 1.81 98 %                Assessment  · Screening for alcoholism     V79.1/Z13.89  · Screening for depression     V79.0/Z13.89  · Screening for colon cancer     V76.51/Z12.11  · Post menopausal syndrome     V49.81/Z78.0  · Visit for screening mammogram     V76.12/Z12.31  · Encounter for Medicare annual wellness exam     V70.0/Z00.00      Plan  · Orders  o Negative alcohol screening () - V79.1/Z13.89 - 01/14/2021  o ACO-18: Positive screen for clinical depression using a standardized tool and a follow-up plan documented () - V79.0/Z13.89 - 01/14/2021  o COLONOSCOPY REFERRAL (COLON) - V76.51/Z12.11 - 01/14/2021   due July 12 2021  o DEXA Bone Density, 1 or more sites, axial skeleton " [facility] (79390) - V49.81/Z78.0 - 01/14/2021  o Screening Mammography; Bilateral 2D (, 17910) - V76.12/Z12.31 - 01/14/2021   due 10/07/2021  o Falls Risk Assessment Completed (3288F) - V70.0/Z00.00 - 01/14/2021  o Brief hearing screening (written) [facility] () - V70.0/Z00.00 - 01/14/2021  o Annual Wellness Visit-includes a Personalized Prevention Plan of Service (PPS), SUBSEQUENT VISIT (Medicare) () - V70.0/Z00.00 - 01/14/2021  o ACO-13: Fall Risk Screening with no falls in past year or only one fall without injury in the past year (1101F) - V70.0/Z00.00 - 01/14/2021  o Presence or absence of urinary incontinence assessed (ANAMIKA) (1090F) - V70.0/Z00.00 - 01/14/2021  o ACO-19: Colorectal cancer screening results documented and reviewed (3017F) - - 01/14/2021  o ACO-20: Screening Mammography documented and reviewed () - - 01/14/2021  o Influenza immunization was ordered or administered () - - 01/14/2021  o ACO-15: Pneumococcal Vaccine Administered or Previously Received (4040F) - - 01/14/2021  o ACO-39: Current medications updated and reviewed (, 1159F) - - 01/14/2021  · Medications  o Medications have been Reconciled  o Transition of Care or Provider Policy  · Instructions  o Audit-C Questionnaire completed and scanned into the EMR under the designated folder within the patient's documents.  o Depression Screen completed and scanned into the EMR under the designated folder within the patient's documents.  o Today's PHQ-9 score is: _6__  o Health Risk Assessment has been reviewed with the patient.  o Written health screening schedule for next 5-10 years was established with patient; information scanned in chart and given/mailed to patient.  o Fall prevention methods discussed and a copy of recommendations given/mailed to patient.            Electronically Signed by: Fabi Bill MD -Author on February 9, 2021 09:33:22 AM

## 2021-05-14 NOTE — PROGRESS NOTES
Progress Note      Patient Name: Amelie Anderson   Patient ID: [id number]   Sex: Female   YOB: 1950    Primary Care Provider: Fabi Bill MD   Referring Provider: Fabi Bill MD    Visit Date: January 14, 2021    Provider: Fabi Bill MD   Location: [facility]   Location Address: [address] [place] [address], [address] [place] [address]  [id number]   Location Phone: [phone]          Chief Complaint  · thyroid disorder   · AAA  · Anemia  · DM2  · HLD      History Of Present Illness  Amelie Anderson is a 70 year old /White female who presents for evaluation and treatment of:      3mo f/u     DM2- Has been taking 2 tablets of metformin twice a day and doing well. Due for labs today.     HLD: on statin, tolerating well, denies muscle pain/weakness. Due for labs today    Hypothyroidism- on synthroid, doing well, denies constipation, hair loss, fatigue    Anemia- Improved on recent labs, denies fatigue    AS- becoming more symptomatic with shortness of breath on excertion, Surgery set up for Feb 5, going for pre admission on Feb 3. Seeing doctor Dacia today       Past Medical History  Disease Name Date Onset Notes   Abdominal Aortic Aneurysm --  --    Allergic rhinitis --  --    Allergic rhinitis, chronic --  --    Aortic stenosis --  --    Arthritis --  --    Bladder Disorder --  --    Depression with anxiety --  --    Diabetes --  --    Heart Disease --  --    Heel spur, right --  --    High cholesterol --  --    Hypothyroidism --  --    Kidney calculi --  --    Kidney Disease --  --    Lesion of bladder 09/25/2015 --    Microhematuria 09/25/2015 --    Migraine --  --    Mitral valve prolapse --  --    Osteoporosis 09/15/2017 --    Pulmonary nodule --  --    Screening Mammogram 4/12/2019 --    Thyroid disorder --  --          Past Surgical History  Procedure Name Date Notes   Colonoscopy 2016 --    Cystoscopy --  2/28/20 Cystoscopy w/ Dr. Aldana   Cystoscopy with  fulguration 10-22-15 CBF per Jovan   Hysterectomy --  --    Hysterectomy-Abdominal --  --    Tonsillectomy --  --          Medication List  Name Date Started Instructions   alendronate 70 mg oral tablet 03/18/2020 take 1 tablet (70 mg) by oral route once weekly in the morning before breakfast for 90 days   amitriptyline 25 mg oral tablet 09/28/2020 take 1 tablet (25 mg) by oral route once daily at bedtime   [place] Thyroid 60 mg oral tablet 07/31/2020 take 1 tablet by oral route daily Mon - Fri ,then 1 1/2 tbs Saturday and Sunday   aspirin 81 mg oral tablet,delayed release (DR/EC) 07/31/2020 take 1 tablet (81 mg) by oral route once daily for 90 days   atorvastatin 10 mg oral tablet 03/26/2020 take 1 tablet (10 mg) by oral route once daily at bedtime   Calcium 500 + D 500 mg(1,250mg) -200 unit oral tablet 09/28/2020 take 1 tablet by oral route 3 times a day for 90 days   chlorpheniramine maleate 4 mg oral tablet 09/28/2020 take 1 tablet (4 mg) by oral route twice daily as needed for 90 days   ferrous sulfate 325 mg (65 mg iron) oral tablet 10/06/2020 take 1 tablet (325 mg) by oral route 2 times per day   Fish Oil Concentrate 1,000 mg oral capsule 09/28/2020 take 1 capsule by oral route 2 times a day for 90 days   fluticasone propionate 50 mcg/actuation nasal spray,suspension 09/28/2020 spray 2 sprays (100 mcg) in each nostril by intranasal route once daily as needed for 30 days   FreeStyle Lite Strips miscellaneous strip 09/28/2020 use as directed   Lancets,Ultra Thin 26 gauge miscellaneous misc 06/18/2020 Test PRN QID Dx: E11.9   lisinopril 5 mg oral tablet  take 1 tablet (5 mg) by oral route once daily   Lopressor 50 mg oral tablet 01/19/2021 take 1 tablet (50 mg) by oral route 2 times per day with meals for 90 days   loratadine 10 mg oral tablet 09/28/2020 take 1 tablet (10 mg) by oral route once daily for 90 days   metformin 500 mg oral tablet extended release 24 hr 01/14/2021 take 2 tablets (1,000 mg) by oral  "route once daily with the evening meal for 90 days   multivitamin oral  --    potassium OTC  --    pyridoxine (vitamin B6) 50 mg oral tablet 09/28/2020 take 1 tablet by oral route daily for 90 days   Zaditor 0.025 % (0.035 %) ophthalmic (eye) drops 09/28/2020 instill 1 drop into affected eye(s) by ophthalmic route 2 times per day   Zoloft 50 mg oral tablet 09/28/2020 take 1 tablet (50 mg) by oral route once daily for 90 days         Allergy List  Allergen Name Date Reaction Notes   NO KNOWN DRUG ALLERGIES --  --  --        Allergies Reconciled  Family Medical History  Disease Name Relative/Age Notes   Heart Disease  --    Diabetes, unspecified type  --    Heart Attack (MI)  --          Social History  Finding Status Start/Stop Quantity Notes   Alcohol Never --/-- --  --    Tobacco Former --/-- 1 ppd Quit in May, 2016         Immunizations  NameDate Admin Mfg Trade Name Lot Number Route Inj VIS Given VIS Publication   Iohftdfea26/22/2020 PMC FLUZONE-HIGH DOSE [id number] IM LD 09/22/2020    Comments: Patient tolerated well left office in stable condition. CH RN   Rvenyjtyq5174/27/2018 MSD PNEUMOVAX 23 [id number] IM LD 12/27/2018 04/24/2015   Comments: pt tolerated well, left office in stable condition   Prevnar 1309/15/2017 WAL PREVNAR 13 [id number] IM RD 09/15/2017 11/05/2015   Comments: pt tolerated well, left office in stable condition         Vitals  Date Time BP Position Site L\R Cuff Size HR RR TEMP (F) WT  HT  BMI kg/m2 BSA m2 O2 Sat FR L/min FiO2 HC       01/08/2020 04:18 /74 Sitting    75 - R  96.4 159lbs 2oz 5'  4\" 27.31 1.81 96 %      03/18/2020 10:41 /60 Sitting    70 - R  97.9 158lbs 6oz 5'  4\" 27.18 1.8 99 %      09/28/2020 09:00 /66 Sitting    69 - R 18 98.3 152lbs 2oz 5'  4\" 26.11 1.77 99 %  21%    01/14/2021 08:16 AM 98/60 Sitting    82 - R 14 98 160lbs 6oz 5'  4\" 27.53 1.81 98 %            Physical Examination  · Constitutional  o Appearance  o : no acute distress, well-nourished  · Head and " Face  o Head  o :   § Inspection  § : atraumatic, normocephalic  · Eyes  o Eyes  o : extraocular movements intact, no scleral icterus, no conjunctival injection  · Ears, Nose, Mouth and Throat  o Ears  o :   § External Ears  § : normal  o Nose  o :   § Intranasal Exam  § : nares patent  o Oral Cavity  o :   § Oral Mucosa  § : moist mucous membranes  · Respiratory  o Respiratory Effort  o : breathing comfortably, symmetric chest rise  o Auscultation of Lungs  o : clear to asculatation bilaterally, no wheezes, rales, or rhonchii  · Cardiovascular  o Heart  o :   § Auscultation of Heart  § : regular rate and rhythm, no murmurs, rubs, or gallops  o Peripheral Vascular System  o :   § Extremities  § : no edema  · Neurologic  o Mental Status Examination  o :   § Orientation  § : grossly oriented to person, place and time  o Gait and Station  o :   § Gait Screening  § : normal gait  · Psychiatric  o General  o : normal mood and affect          Assessment  · Anemia     285.9/D64.9  · Diabetes mellitus, type 2     250.00/E11.9  obtaining labs today  will adjust medication regimen based on lab results  · Essential hypertension     401.9/I10  well controlled today  continue current medications  · Hyperlipidemia     272.4/E78.5  checking labs today  on statin, tolerating well  will adjust medication dose based on lab results if needed  · Hypothyroidism     244.9/E03.9  checking monitoring labs today  continue synthyroid, will adjust based on lab results  · Abdominal Aortic Aneurysm     441.4/I71.4      Plan  · Orders  o Diabetes 1 Panel (CMP, Lipid, A1c) [facility] (91093, 64246, 85457) - 250.00/E11.9 - 01/14/2021  o CBC with Auto Diff [facility] (12233) - 250.00/E11.9 - 01/14/2021  o ACO-14: Influenza immunization administered or previously received [facility] () - - 01/14/2021  · Medications  o metformin 500 mg oral tablet extended release 24 hr   SIG: take 2 tablets (1,000 mg) by oral route once daily with the evening meal for 90 days    DISP: (180) Tablet with 1 refills  Refilled on 01/14/2021     o Medications have been Reconciled  o Transition of Care or Provider Policy  · Instructions  o Advised that cheeses and other sources of dairy fats, animal fats, fast food, and the extras (candy, pastries, pies, doughnuts and cookies) all contain LDL raising nutrients. Advised to increase fruits, vegetables, whole grains, and to monitor portion sizes.   o Take all medications as prescribed/directed.  o Patient was educated/instructed on their diagnosis, treatment and medications prior to discharge from the clinic today.  o Call the office with any concerns or questions.  · Disposition  o Follow up in 3 months  o Labs drawn in clinic  o Prescriptions sent electronically            Electronically Signed by: Fabi Bill MD -Author on February 9, 2021 09:39:15 AM

## 2021-05-14 NOTE — PROGRESS NOTES
"   Progress Note      Patient Name: Amelie Anderson   Patient ID: [id number]   Sex: Female   YOB: 1950    Primary Care Provider: Fabi Bill MD   Referring Provider: Fabi Bill MD    Visit Date: February 24, 2021    Provider: Kayla Pederson MD   Location: [facility]   Location Address: [address] [place] [address], [facility]   [place], [place]  [id number]   Location Phone: [phone]          Chief Complaint  · Cough  · Shortness of breath       History Of Present Illness  REFERRING PROVIDER: Fabi Bill MD   Amelie Anderson is a 70-year-old female who recently underwent aortic valve replacement who has done well but has had a persistent dry cough since hospitalization. She has not been tested for Covid. She states sometimes she gets short of breath when she coughs a lot. She does not have fever, chills, or expectoration.   PAST MEDICAL HISTORY: Diabetes mellitus; arthritis; aortic stenosis; circulation problems in her legs.   PSYCHOSOCIAL HISTORY: Denies alcohol use. Previously smoked but quit.   CURRENT MEDICATIONS: Medications have been reviewed and are documented. Her Lopressor was changed to Atenolol 25 mg once a day and her aspirin was changed to 325 mg once a day.      ALLERGIES:  No known drug allergies.       Review of Systems  · Cardiovascular  o Denies  o : palpitations (fast, fluttering, or skipping beats), swelling (feet, ankles, hands), shortness of breath while walking or lying flat, chest pain or angina pectoris   · Respiratory  o Admits  o : chronic or frequent cough      Vitals  Date Time BP Position Site L\R Cuff Size HR RR TEMP (F) WT  HT  BMI kg/m2 BSA m2 O2 Sat FR L/min FiO2 HC       02/24/2021 09:26 /74 Sitting    77 - R   159lbs 0oz 5'  4\" 27.29 1.8             Physical Examination  · Constitutional  o Appearance  o : Awake, alert, in no acute distress.  · Eyes  o Conjunctivae  o : Conjunctivae normal.  · Ears, Nose, Mouth and Throat  o Oral Cavity  o : "   § Oral Mucosa  § : Normal.  · Neck  o Inspection/Palpation/Auscultation  o : No lymphadenopathy. No JVD. No bruit. Good carotid upstroke.  · Respiratory  o Respiratory  o : Clear to percussion and auscultation. Good respiratory effort.  · Cardiovascular  o Heart  o : PMI is normal. S1, S2 normal. Faint systolic murmur at the base.   o Peripheral Vascular System  o :   § Extremities  § : Good femoral and pedal pulses. No pedal edema.  · Gastrointestinal  o Abdominal Examination  o : Soft. No masses or tenderness felt. No hepatosplenomegaly. Abdominal aorta is not palpable.  · Imaging  o Imaging  o : Chest x-ray was normal.   · Labs  o Labs  o : I had her go to the lab and do some STAT labs. She came back and her CMP is normal with a GFR of 51. NT-proBNP 705. CBC, hemoglobin 10.1 which is slightly low.           Assessment     1.  Persistent cough and mild shortness of breath, etiology uncertain.  2.  Rule out Covid.   3.  Status post aortic valve replacement with normally functioning aortic valve.   4.  Hypertension, controlled.   5.  Hyperlipidemia       Plan     1.  I am going to give her a 5-day prescription of Tessalon Perles 100 mg t.i.d. p.r.n. to take until she can set        up an appointment with her family physician, Fabi Bill.   2.  She will followup with us in 3-4 months at which time we will do an echocardiogram to followup on her        aortic valve replacement.       Kayla Pederson MD, [facility]  PM/pap               Electronically Signed by: Sumaya Clark-, Other -Author on February 27, 2021 10:08:51 AM  Electronically Co-signed by: Kayla Pederson MD -Reviewer on March 13, 2021 04:43:28 PM

## 2021-05-15 VITALS
TEMPERATURE: 98.5 F | SYSTOLIC BLOOD PRESSURE: 120 MMHG | WEIGHT: 155.37 LBS | RESPIRATION RATE: 16 BRPM | HEIGHT: 64 IN | BODY MASS INDEX: 26.52 KG/M2 | HEART RATE: 59 BPM | DIASTOLIC BLOOD PRESSURE: 72 MMHG | OXYGEN SATURATION: 99 %

## 2021-05-15 VITALS
DIASTOLIC BLOOD PRESSURE: 78 MMHG | SYSTOLIC BLOOD PRESSURE: 120 MMHG | WEIGHT: 169.5 LBS | BODY MASS INDEX: 28.94 KG/M2 | HEIGHT: 64 IN | OXYGEN SATURATION: 97 % | RESPIRATION RATE: 16 BRPM | HEART RATE: 60 BPM | TEMPERATURE: 99.4 F

## 2021-05-15 VITALS
RESPIRATION RATE: 16 BRPM | WEIGHT: 168 LBS | HEART RATE: 77 BPM | OXYGEN SATURATION: 98 % | BODY MASS INDEX: 28.68 KG/M2 | SYSTOLIC BLOOD PRESSURE: 114 MMHG | TEMPERATURE: 97.6 F | HEIGHT: 64 IN | DIASTOLIC BLOOD PRESSURE: 78 MMHG

## 2021-05-15 VITALS
WEIGHT: 152 LBS | DIASTOLIC BLOOD PRESSURE: 56 MMHG | HEIGHT: 64 IN | BODY MASS INDEX: 25.95 KG/M2 | HEART RATE: 58 BPM | SYSTOLIC BLOOD PRESSURE: 120 MMHG

## 2021-05-15 VITALS
HEART RATE: 70 BPM | TEMPERATURE: 97.9 F | WEIGHT: 158.37 LBS | BODY MASS INDEX: 27.04 KG/M2 | OXYGEN SATURATION: 99 % | SYSTOLIC BLOOD PRESSURE: 102 MMHG | DIASTOLIC BLOOD PRESSURE: 60 MMHG | HEIGHT: 64 IN

## 2021-05-15 VITALS — BODY MASS INDEX: 26.82 KG/M2 | HEART RATE: 55 BPM | HEIGHT: 64 IN | WEIGHT: 157.12 LBS

## 2021-05-15 VITALS
HEIGHT: 64 IN | OXYGEN SATURATION: 96 % | TEMPERATURE: 96.4 F | SYSTOLIC BLOOD PRESSURE: 116 MMHG | WEIGHT: 159.12 LBS | BODY MASS INDEX: 27.17 KG/M2 | HEART RATE: 75 BPM | DIASTOLIC BLOOD PRESSURE: 74 MMHG

## 2021-05-15 VITALS
SYSTOLIC BLOOD PRESSURE: 125 MMHG | BODY MASS INDEX: 27.38 KG/M2 | HEIGHT: 64 IN | WEIGHT: 160.37 LBS | DIASTOLIC BLOOD PRESSURE: 67 MMHG

## 2021-05-15 VITALS
HEART RATE: 66 BPM | HEIGHT: 64 IN | OXYGEN SATURATION: 98 % | WEIGHT: 154.5 LBS | BODY MASS INDEX: 26.38 KG/M2 | RESPIRATION RATE: 16 BRPM | TEMPERATURE: 97 F | SYSTOLIC BLOOD PRESSURE: 118 MMHG | DIASTOLIC BLOOD PRESSURE: 66 MMHG

## 2021-05-15 VITALS
WEIGHT: 168 LBS | HEIGHT: 64 IN | TEMPERATURE: 98.2 F | OXYGEN SATURATION: 100 % | BODY MASS INDEX: 28.68 KG/M2 | HEART RATE: 84 BPM | SYSTOLIC BLOOD PRESSURE: 142 MMHG | RESPIRATION RATE: 12 BRPM | DIASTOLIC BLOOD PRESSURE: 80 MMHG

## 2021-05-15 VITALS
DIASTOLIC BLOOD PRESSURE: 82 MMHG | HEIGHT: 64 IN | WEIGHT: 161 LBS | SYSTOLIC BLOOD PRESSURE: 124 MMHG | HEART RATE: 52 BPM | BODY MASS INDEX: 27.49 KG/M2

## 2021-05-16 VITALS
OXYGEN SATURATION: 100 % | DIASTOLIC BLOOD PRESSURE: 70 MMHG | SYSTOLIC BLOOD PRESSURE: 112 MMHG | WEIGHT: 162 LBS | RESPIRATION RATE: 16 BRPM | TEMPERATURE: 97.1 F | HEIGHT: 64 IN | HEART RATE: 71 BPM | BODY MASS INDEX: 27.66 KG/M2

## 2021-05-16 VITALS
TEMPERATURE: 97.9 F | WEIGHT: 158 LBS | SYSTOLIC BLOOD PRESSURE: 108 MMHG | OXYGEN SATURATION: 96 % | BODY MASS INDEX: 26.98 KG/M2 | HEART RATE: 57 BPM | RESPIRATION RATE: 16 BRPM | DIASTOLIC BLOOD PRESSURE: 68 MMHG | HEIGHT: 64 IN

## 2021-05-16 VITALS
BODY MASS INDEX: 27.85 KG/M2 | DIASTOLIC BLOOD PRESSURE: 80 MMHG | HEIGHT: 64 IN | SYSTOLIC BLOOD PRESSURE: 122 MMHG | WEIGHT: 163.12 LBS

## 2021-05-16 VITALS
SYSTOLIC BLOOD PRESSURE: 108 MMHG | HEIGHT: 64 IN | OXYGEN SATURATION: 98 % | TEMPERATURE: 98.3 F | RESPIRATION RATE: 16 BRPM | WEIGHT: 168 LBS | BODY MASS INDEX: 28.68 KG/M2 | HEART RATE: 69 BPM | DIASTOLIC BLOOD PRESSURE: 64 MMHG

## 2021-05-16 VITALS
SYSTOLIC BLOOD PRESSURE: 110 MMHG | RESPIRATION RATE: 12 BRPM | HEART RATE: 56 BPM | TEMPERATURE: 98.7 F | OXYGEN SATURATION: 100 % | WEIGHT: 164.25 LBS | DIASTOLIC BLOOD PRESSURE: 70 MMHG

## 2021-05-16 VITALS
BODY MASS INDEX: 28.38 KG/M2 | WEIGHT: 166.25 LBS | HEIGHT: 64 IN | RESPIRATION RATE: 16 BRPM | HEART RATE: 72 BPM | DIASTOLIC BLOOD PRESSURE: 64 MMHG | SYSTOLIC BLOOD PRESSURE: 105 MMHG | TEMPERATURE: 97.9 F | OXYGEN SATURATION: 100 %

## 2021-05-16 VITALS
TEMPERATURE: 97.6 F | SYSTOLIC BLOOD PRESSURE: 120 MMHG | WEIGHT: 168.12 LBS | OXYGEN SATURATION: 99 % | BODY MASS INDEX: 28.7 KG/M2 | DIASTOLIC BLOOD PRESSURE: 72 MMHG | RESPIRATION RATE: 16 BRPM | HEART RATE: 68 BPM | HEIGHT: 64 IN

## 2021-05-20 ENCOUNTER — CONVERSION ENCOUNTER (OUTPATIENT)
Dept: CARDIOLOGY | Facility: CLINIC | Age: 71
End: 2021-05-20
Attending: INTERNAL MEDICINE

## 2021-05-22 ENCOUNTER — TRANSCRIBE ORDERS (OUTPATIENT)
Dept: CARDIAC REHAB | Facility: HOSPITAL | Age: 71
End: 2021-05-22

## 2021-05-22 DIAGNOSIS — Z95.2 HISTORY OF HEART VALVE REPLACEMENT: Primary | ICD-10-CM

## 2021-05-22 DIAGNOSIS — Z95.2 HX OF AORTIC VALVE REPLACEMENT: Primary | ICD-10-CM

## 2021-05-22 DIAGNOSIS — Z95.2 AORTIC VALVE REPLACED: Primary | ICD-10-CM

## 2021-05-22 DIAGNOSIS — Z95.2 S/P AORTIC VALVE REPLACEMENT: Primary | ICD-10-CM

## 2021-05-23 ENCOUNTER — TRANSCRIBE ORDERS (OUTPATIENT)
Dept: INTERNAL MEDICINE | Facility: CLINIC | Age: 71
End: 2021-05-23

## 2021-05-23 DIAGNOSIS — Z12.31 SCREENING MAMMOGRAM, ENCOUNTER FOR: ICD-10-CM

## 2021-05-23 DIAGNOSIS — Z78.0 POST-MENOPAUSAL: Primary | ICD-10-CM

## 2021-05-28 VITALS
WEIGHT: 153 LBS | HEART RATE: 76 BPM | SYSTOLIC BLOOD PRESSURE: 122 MMHG | RESPIRATION RATE: 14 BRPM | HEIGHT: 64 IN | TEMPERATURE: 98 F | DIASTOLIC BLOOD PRESSURE: 80 MMHG | BODY MASS INDEX: 26.12 KG/M2 | OXYGEN SATURATION: 98 %

## 2021-05-28 VITALS
HEART RATE: 75 BPM | RESPIRATION RATE: 16 BRPM | OXYGEN SATURATION: 99 % | BODY MASS INDEX: 26.8 KG/M2 | TEMPERATURE: 98.1 F | SYSTOLIC BLOOD PRESSURE: 100 MMHG | WEIGHT: 157 LBS | HEIGHT: 64 IN | DIASTOLIC BLOOD PRESSURE: 60 MMHG

## 2021-05-28 VITALS
HEIGHT: 64 IN | TEMPERATURE: 96.6 F | BODY MASS INDEX: 26.34 KG/M2 | RESPIRATION RATE: 18 BRPM | DIASTOLIC BLOOD PRESSURE: 75 MMHG | OXYGEN SATURATION: 100 % | SYSTOLIC BLOOD PRESSURE: 128 MMHG | WEIGHT: 154.25 LBS | HEART RATE: 69 BPM

## 2021-05-28 VITALS
WEIGHT: 169.19 LBS | HEART RATE: 57 BPM | SYSTOLIC BLOOD PRESSURE: 136 MMHG | TEMPERATURE: 98.3 F | HEIGHT: 64 IN | RESPIRATION RATE: 14 BRPM | BODY MASS INDEX: 28.88 KG/M2 | DIASTOLIC BLOOD PRESSURE: 74 MMHG | OXYGEN SATURATION: 100 %

## 2021-05-28 NOTE — PROGRESS NOTES
Patient: MILTON BRICENO     Acct: [id number]     Report: #[id number]  UNIT #: [id number]     : 1950    Encounter Date:10/26/2018  PRIMARY CARE: STEVEN KEARNEY  ***Signed***  --------------------------------------------------------------------------------------------------------------------  Chief Complaint      Encounter Date      Oct 26, 2018            Primary Care Provider      STEVEN KEARNEY            Referring Provider      STEVEN KEARNEY            Patient Complaint      Patient is complaining of      CHEST CT RESULTS, Right upper lobe pulmonary nodule completely resolved            VITALS      Height 5 ft 4 in / 162.56 cm      Weight 169 lbs 3 oz / 76.846734 kg      BSA 1.82 m2      BMI 29.0 kg/m2      Temperature 98.3 F / 36.83 C - Oral      Pulse 57      Respirations 14      Blood Pressure 136/74 Sitting, Left Arm      Pulse Oximetry 100%, Room air            HPI      The patient is a very pleasant 68 year old female here today for follow up.             She had low dose lung cancer screening since her las office visit 1 year ago     that showed stable subcentimeter pulmonary nodules. The patient is doing well at    this time and remains smoke free. She has not been smoking for 3 years.  She     denies any shortness of breath at this time. She is concerned however as she is     undergoing a divorce after being  for 44 years.  She is concerned about     finding a job.            ROS      Constitutional:  Complains of: Fatigue; Denies: Fever, Weight gain, Weight loss,    Chills, Insomnia, Other      Respiratory/Breathing:  Denies: Shortness of air, Wheezing, Cough, Hemoptysis,     Pleuritic pain, Other      Endocrine:  Denies: Polydipsia, Polyuria, Heat/cold intolerance, Abnorml     menstrual pattern, Diabetes, Other      Eyes:  Denies: Blurred vision, Vision Changes, Other      Ears, nose, mouth, throat:  Denies: Mouth lesions, Thrush, Throat pain,     Hoarseness, Allergies/Hay Fever,  Post Nasal Drip, Headaches, Recent Head Injury,    Nose Bleeding, Neck Stiffness, Thyroid Mass, Hearing Loss, Ear Fullness, Dry     Mouth, Nasal or Sinus Pain, Dry Lips, Nasal discharge, Nasal congestion, Other      Cardiovascular:  Denies: Palpitations, Syncope, Claudication, Chest Pain, Wake     up Gasping for air, Leg Swelling, Irregular Heart Rate, Cyanosis, Dyspnea on     Exertion, Other      Gastrointestinal:  Denies: Nausea, Constipation, Diarrhea, Abdominal pain,     Vomiting, Difficulty Swallowing, Reflux/Heartburn, Dysphagia, Jaundice,     Bloating, Melena, Bloody stools, Other      Genitourinary:  Denies: Urinary frequency, Incontinence, Hematuria, Urgency,     Nocturia, Dysuria, Testicular problems, Other      Musculoskeletal:  Denies: Joint Pain, Joint Stiffness, Joint Swelling, Myalgias,    Other      Hematologic/lymphatic:  DENIES: Lymphadenopathy, Bruising, Bleeding tendencies,     Other      Neurological:  Denies: Headache, Numbness, Weakness, Seizures, Other      Psychiatric:  Denies: Anxiety, Appropriate Effect, Depression, Other      Sleep:  No: Excessive daytime sleep, Morning Headache?, Snoring, Insomnia?, Stop    breathing at sleep?, Other      Integumentary:  Denies: Rash, Dry skin, Skin Warm to Touch, Other      Immunologic/Allergic:  Denies: Latex allergy, Seasonal allergies, Asthma,     Urticaria, Eczema, Other      Immunization status:  No: Up to date            FAMILY/SOCIAL/MEDICAL HX      Surgical History:  Yes: Bowel Surgery, Oral Surgery (TONSILLECTOMY); No: AAA     Repair, Abdominal Surgery, Angioplasty, Appendectomy, Back Surgery, Bladder     Surgery, Breast Surgery, CABG, Carotid Stenosis, Cholecystectomy, Ear Surgery,     Eye Surgery, Head Surgery, Hernia Surgery, Kidney Surgery, Nose Surgery,     Orthopedic Surgery, Prostatectomy, Rectal Surgery, Spinal Surgery, Testicular     Surgery, Throat Surgery, Valve Replacement, Vascular Surgery, Other Surgeries      Heart - Family Hx:   Father, Brother      Cancer/Type - Family Hx:  Father, Aunt, Uncle      Is Father Still Living?:  No      Is Mother Still Living?:  No       Family History:  Yes      Social History:  No Tobacco Use, No Alcohol Use, No Recreational Drug use      Smoking status:  Former smoker (1ppd x 50 years quit 05/16)      Hysterectomy:  Yes (PARTIAL AT AGE 26)      Anticoagulation Therapy:  No      Antibiotic Prophylaxis:  No      Medical History:  Yes: Arthritis (OSTEOPOROSIS), Depression, High Blood Pressure    (CONTROLLED WITH MEDS), Miscellaneous Medical/oth; No: Alcoholism, Allergies,     Anemia, Asthma, Blood Disease, Broken Bones, Cataracts, Chemical Dependency,     Chemotherapy/Cancer, Chronic Bronchitis/COPD, Emphysema, Chronic Liver Disease,     Colon Trouble, Colitis, Diverticulitis, Congestive Heart Failu, Deafness or     Ringing Ears, Convulsions, Anxiety, Bipolar Disorder, Diabetes, Epilepsy,     Seizures, Forgetfullness, Glaucoma, Gall Stones, Gout, Head Injury, Heart     Attack, Heart Murmur, Hemorrhoids/Rectal Prob, Hepatitis, Hiatal Hernia, High     Cholesterol, HIV (Do not ask - volu, Jaundice, Kidney or Bladder Disease, Kidney    Stones, Migrane Headaches, Mitral Valve Prolapse, Night sweats, Phlebitis,     Psychiatric Care, Reflux Disease, Rheumatic Fever, Sexually Transmitted Dis,     Shortness Of Breath, Sinus Trouble, Skin Disease/Psoriais/Ecz, Stroke, Thyroid     Problem, Tuberculosis or Pos TB Te      Psychiatric History      Depression            PREVENTION      Hx Influenza Vaccination:  Yes      Date Influenza Vaccine Given:  Oct 1, 2016      Influenza Vaccine Declined:  No      2 or More Falls Past Year?:  No      Fall Past Year with Injury?:  No      Hx Pneumococcal Vaccination:  No      Encouraged to follow-up with:  PCP regarding preventative exams.      Chart initiated by      LIBAN BELL MA            ALLERGIES/MEDICATIONS      Allergies:        Coded Allergies:             NO KNOWN ALLERGIES  (Unverified , 10/26/18)      Medications    Last Reconciled on 10/26/18 13:28 by UNA DRAKE MD      Alendronate Sodium/Vitamin D3 (Fosamax Plus D 70 MG/5600 IU) 1 Tab Tablet      1 TAB PO Fr, TAB         Reported         6/27/17       Multivitamin (Multivitamins) 1 Each Capsule      1 EACH PO QDAY, CAP         Reported         6/27/17       Diclofenac Sodium (Voltaren 1%*) 100 Gm Gel..gram.      1 APL TOPICAL TID, #1 TUBE         Reported         6/27/17       Simvastatin (Simvastatin*) 5 Mg Tablet      5 MG PO HS, #30 TAB 0 Refills         Reported         6/27/17       Loratadine Chew (CHILDREN'S CLARITIN) 5 Mg Tab.chew      5 MG PO QDAY, TAB.CHEW         Reported         5/29/17       Pyridoxine HCl (Vitamin B-6*) 25 Mg Tablet      50 MG PO QDAY, TAB         Reported         7/8/16       Aspirin Chew (Aspirin Baby) 81 Mg Tab.chew      81 MG PO QDAY, #30 TAB.CHEW 0 Refills         Reported         7/8/16       Thyroid ([place] Thyroid) 60 Mg Tab      60 MG PO QDAY, TAB         Reported         10/20/15       Amitriptyline HCl (Elavil) 25 Mg Tablet      25 MG PO HS, TAB         Reported         10/20/15       Omega-3 Fatty Acids/Fish Oil (Fish Oil 1000 Mg) 1,000 Mg Capsule      1 GM PO BID, #60 CAP 0 Refills         Reported         10/20/15       (Calcium 500 Mg)   No Conflict Check      500 MG PO BID         Reported         6/20/12       Chlorpheniramine Maleate (CHLORPHENIRAMINE MALEATE) 4 Mg Tab      4 MG PO BID         Reported         6/20/12       Metoprolol Tartrate (Lopressor*) 50 Mg Tablet      50 MG PO BID, TAB         Reported         6/20/12      Current Medications      Current Medications Reviewed 10/26/18            EXAM      GEN-patient appears stated age resting comfortable in no acute distress      Eyes-PERRL,  conjunctiva are normal in appearance extraocular muscles are     intact, no scleral icterus      Lymphatic-no swollen or enlarged cervical nodes, or axillary node, or femoral      nodes, or supraclavicular nodes      Mouth normal dentition, no erythema no ulcerations oropharynx appears normal no     exudate no evidence of postnasal drip, MP I      Neck-there are no palpable supraclavicular or cervical adenopathy, thyroid is     normal in appearance no apparent nodules, there is no inspiratory or expiratory     stridor      Respiratory-patient exhibits normal work of breathing, speaking in full     sentences without difficulty, the chest is normal in appearance, clear to     auscultation with no wheezes rales or rhonchi, chest is normal to percussion on     both the right and left sides      Cardiovascular-the heart rate is normal and regular S1 and S2 present with no     murmur or extra heart sounds, there is no JVD or pedal edema present      GI-the abdomen is normal in appearance, bowel sounds present and normal in all     quadrants no hepatosplenomegaly or masses felt      Extremities-no clubbing is present, pulses present in all extremities, capillary    refill time is normal      Skin-skin is normal in appearance it is warm and dry, no rashes present, no     evidence of cyanosis, palpation reveals no masses      Neurological-the patient is alert and oriented to time place and person, moves     all 4 extremities, normal gait, normal affect and mood, CN2-12 intact      Psych-normal judgment and insight is good, normal mood and affect, alert and     oriented to person, place, and time, and date      Vtials      Vitals:             Height 5 ft 4 in / 162.56 cm           Weight 169 lbs 3 oz / 76.377680 kg           BSA 1.82 m2           BMI 29.0 kg/m2           Temperature 98.3 F / 36.83 C - Oral           Pulse 57           Respirations 14           Blood Pressure 136/74 Sitting, Left Arm           Pulse Oximetry 100%, Room air            REVIEW      Results Reviewed      PCCS Results Reviewed?:  Yes Prev Lab Results, Yes Prev Radiology Results, Yes     Previous [facility]ecial Records             Assessment      Cancer screening - Z12.9            Notes      New Diagnostics      * Low Dose Chest CT, Year         Dx: Cancer screening - Z12.9      ASSESSMENT/PLAN:      1. Multiple pulmonary nodules. Repeat CT scan of the chest in 1 year.       2. Tobacco abuse of cigarettes in remission. I encouraged the patient to remain     smoke free at this time in lieu of her acute stressor of going through a     divorce.       3. I offered influenza vaccine to the patient however she has already had the fl    u vaccine.       4.  I have personally reviewed laboratory data, imaging as well as previous     medical records.            Patient Education      Education resources provided:  Yes      Patient Education Provided:  Influenza                 Disclaimer: Converted document may not contain table formatting or lab diagrams. Please see [email or web address] for the authenticated document.

## 2021-05-28 NOTE — PROGRESS NOTES
Patient: MILTON BRICENO     Acct: [id number]     Report: #[id number]  UNIT #: [id number]     : 1950    Encounter Date:10/29/2019  PRIMARY CARE: STEVEN KEARNEY  ***Signed***  --------------------------------------------------------------------------------------------------------------------  Chief Complaint      Encounter Date      Oct 29, 2019            Primary Care Provider      STEVEN KEARNEY            Referring Provider      STEVEN KEARNEY            Patient Complaint      Patient is complaining of      Patient here today for 1 year follow up, Pulmonary Nodules            VITALS      Height 64 in / 162.56 cm      Weight 153 lbs  / 69.907341 kg      BSA 1.75 m2      BMI 26.3 kg/m2      Temperature 98 F / 36.67 C - Oral      Pulse 76      Respirations 14      Blood Pressure 122/80 Sitting, Right Arm      Pulse Oximetry 98%, room air            HPI      The patient is a very pleasant 69 year old  female patient of Dr. Severino's here for 1 year follow up.  She was due for low dose CT scan of the     chest in 2019, her last CT scan of the chest done in 2018 showed    some tiny 2 and 3 mm nodules that were not concerning given their small size     from prior.  Apparently office staff were not able to reach the patient to     schedule her low dose CT scan of the chest this year so she has not had it done     yet. She is a former smoker, quit smoking 3-4 years ago and reports she has     remained smoke free. She denies any dyspnea, coughing or wheezing, hemoptysis,     fever or chills.             I reviewed her Review of Systems, medical, surgical and family history and agree    with those as entered.      Copies To:   Pradeep Severino ;            MARK      Constitutional:  Denies: Fatigue, Fever, Weight gain, Weight loss, Chills,     Insomnia, Other      Respiratory/Breathing:  Denies: Shortness of air, Wheezing, Cough, Hemoptysis,     Pleuritic pain, Other      Endocrine:   Denies: Polydipsia, Polyuria, Heat/cold intolerance, Abnorml mens    trual pattern, Diabetes, Other      Eyes:  Denies: Blurred vision, Vision Changes, Other      Ears, nose, mouth, throat:  Denies: Mouth lesions, Thrush, Throat pain,     Hoarseness, Allergies/Hay Fever, Post Nasal Drip, Headaches, Recent Head Injury,    Nose Bleeding, Neck Stiffness, Thyroid Mass, Hearing Loss, Ear Fullness, Dry     Mouth, Nasal or Sinus Pain, Dry Lips, Nasal discharge, Nasal congestion, Other      Cardiovascular:  Denies: Palpitations, Syncope, Claudication, Chest Pain, Wake     up Gasping for air, Leg Swelling, Irregular Heart Rate, Cyanosis, Dyspnea on     Exertion, Other      Gastrointestinal:  Denies: Nausea, Constipation, Diarrhea, Abdominal pain,     Vomiting, Difficulty Swallowing, Reflux/Heartburn, Dysphagia, Jaundice,     Bloating, Melena, Bloody stools, Other      Genitourinary:  Denies: Urinary frequency, Incontinence, Hematuria, Urgency,     Nocturia, Dysuria, Testicular problems, Other      Musculoskeletal:  Denies: Joint Pain, Joint Stiffness, Joint Swelling, Myalgias,    Other      Hematologic/lymphatic:  DENIES: Lymphadenopathy, Bruising, Bleeding tendencies,     Other      Neurological:  Denies: Headache, Numbness, Weakness, Seizures, Other      Psychiatric:  Denies: Anxiety, Appropriate Effect, Depression, Other      Sleep:  No: Excessive daytime sleep, Morning Headache?, Snoring, Insomnia?, Stop    breathing at sleep?, Other      Integumentary:  Denies: Rash, Dry skin, Skin Warm to Touch, Other      Immunologic/Allergic:  Denies: Latex allergy, Seasonal allergies, Asthma,     Urticaria, Eczema, Other      Immunization status:  No: Up to date            FAMILY/SOCIAL/MEDICAL HX      Surgical History:  Yes: Bowel Surgery, Oral Surgery (TONSILLECTOMY); No: AAA     Repair, Abdominal Surgery, Angioplasty, Appendectomy, Back Surgery, Bladder     Surgery, Breast Surgery, CABG, Carotid Stenosis, Cholecystectomy, Ear  Surgery,     Eye Surgery, Head Surgery, Hernia Surgery, Kidney Surgery, Nose Surgery,     Orthopedic Surgery, Prostatectomy, Rectal Surgery, Spinal Surgery, Testicular     Surgery, Throat Surgery, Valve Replacement, Vascular Surgery, Other Surgeries      Heart - Family Hx:  Father, Brother      Cancer/Type - Family Hx:  Father, Aunt, Uncle      Is Father Still Living?:  No      Is Mother Still Living?:  No       Family History:  Yes      Social History:  No Tobacco Use, No Alcohol Use, No Recreational Drug use      Smoking status:  Former smoker (1ppd x 50 years quit 05/16)      Hysterectomy:  Yes (PARTIAL AT AGE 26)      Anticoagulation Therapy:  No      Antibiotic Prophylaxis:  No      Medical History:  Yes: Arthritis (OSTEOPOROSIS), Depression, High Blood Pressure    (CONTROLLED WITH MEDS), Miscellaneous Medical/oth; No: Alcoholism, Allergies, An    emia, Asthma, Blood Disease, Broken Bones, Cataracts, Chemical Dependency,     Chemotherapy/Cancer, Chronic Bronchitis/COPD, Emphysema, Chronic Liver Disease,     Colon Trouble, Colitis, Diverticulitis, Congestive Heart Failu, Deafness or     Ringing Ears, Convulsions, Anxiety, Bipolar Disorder, Diabetes, Epilepsy,     Seizures, Forgetfullness, Glaucoma, Gall Stones, Gout, Head Injury, Heart Attac    k, Heart Murmur, Hemorrhoids/Rectal Prob, Hepatitis, Hiatal Hernia, High     Cholesterol, HIV (Do not ask - volu, Jaundice, Kidney or Bladder Disease, Kidney    Stones, Migrane Headaches, Mitral Valve Prolapse, Night sweats, Phlebitis,     Psychiatric Care, Reflux Disease, Rheumatic Fever, Sexually Transmitted Dis,     Shortness Of Breath, Sinus Trouble, Skin Disease/Psoriais/Ecz, Stroke, Thyroid     Problem, Tuberculosis or Pos TB Te      Psychiatric History      depression            PREVENTION      Hx Influenza Vaccination:  Yes      Date Influenza Vaccine Given:  Oct 1, 2019      Influenza Vaccine Declined:  No      2 or More Falls Past Year?:  No      Fall Past Year  with Injury?:  No      Hx Pneumococcal Vaccination:  Yes      Encouraged to follow-up with:  PCP regarding preventative exams.      Chart initiated by      Ashley Land CMA            ALLERGIES/MEDICATIONS      Allergies:        Coded Allergies:             NO KNOWN ALLERGIES (Unverified , 10/29/19)      Medications    Last Reconciled on 10/29/19 15:13 by BASSEM CARRERA      Alendronate Sodium/Vitamin D3 (Fosamax Plus D 70 MG/5600 IU) 1 Tab Tablet      1 TAB PO Fr, TAB         Reported         6/27/17       Multivitamin (Multivitamins) 1 Each Capsule      1 EACH PO QDAY, CAP         Reported         6/27/17       Diclofenac Sodium (Voltaren 1%*) 100 Gm Gel..gram.      1 APL TOPICAL TID, #1 TUBE         Reported         6/27/17       Simvastatin (Simvastatin*) 5 Mg Tablet      5 MG PO HS, #30 TAB 0 Refills         Reported         6/27/17       Loratadine Chew (CHILDREN'S CLARITIN) 5 Mg Tab.chew      5 MG PO QDAY, TAB.CHEW         Reported         5/29/17       Pyridoxine HCl (Vitamin B-6*) 25 Mg Tablet      50 MG PO QDAY, TAB         Reported         7/8/16       Aspirin Chew (Aspirin Baby) 81 Mg Tab.chew      81 MG PO QDAY, #30 TAB.CHEW 0 Refills         Reported         7/8/16       Thyroid ([place] Thyroid) 60 Mg Tab      60 MG PO QDAY, TAB         Reported         10/20/15       Amitriptyline HCl (Elavil) 25 Mg Tablet      25 MG PO HS, TAB         Reported         10/20/15       Omega-3 Fatty Acids/Fish Oil (Fish Oil 1000 Mg) 1,000 Mg Capsule      1 GM PO BID, #60 CAP 0 Refills         Reported         10/20/15       (Calcium 500 Mg)   No Conflict Check      500 MG PO BID         Reported         6/20/12       Chlorpheniramine Maleate (CHLORPHENIRAMINE MALEATE) 4 Mg Tab      4 MG PO BID         Reported         6/20/12       Metoprolol Tartrate (Lopressor*) 50 Mg Tablet      50 MG PO BID, TAB         Reported         6/20/12      Current Medications      Current Medications Reviewed 10/29/19             EXAM      GEN-patient appears stated age resting comfortable in no acute distress      Eyes-PERRL,  conjunctiva are normal in appearance extraocular muscles are     intact, no scleral icterus      Lymphatic-no swollen or enlarged cervical nodes, or axillary node, or femoral     nodes, or supraclavicular nodes      Mouth normal dentition, no erythema no ulcerations oropharynx appears normal no     exudate no evidence of postnasal drip, MP       Neck-there are no palpable supraclavicular or cervical adenopathy, thyroid is     normal in appearance no apparent nodules, there is no inspiratory or expiratory     stridor      Respiratory-grossly clear to auscultation, no wheezes, rhonchi or crackles     appreciated, normal work of breathing noted.        Cardiovascular-the heart rate is normal and regular S1 and S2 present with loud     4/6 holosystolic murmur at left upper sternal border, no extra heart sounds,     there is no JVD or pedal edema present      GI-the abdomen is normal in appearance, bowel sounds present and normal in all     quadrants no hepatosplenomegaly or masses felt      Extremities-no clubbing is present, pulses present in all extremities, capillary    refill time is normal      Musculoskeletal-Normal strength in upper and lower extremities, inspection shows    no evidence of muscle atrophy      Skin-skin is normal in appearance it is warm and dry, no rashes present, no     evidence of cyanosis, palpation reveals no masses      Neurological-the patient is alert and oriented to time place and person, moves     all 4 extremities, normal gait, normal affect and mood, CN2-12 intact      Psych-normal judgment and insight is good, normal mood and affect, alert and     oriented to person, place, and time, and date      Vtials      Vitals:             Height 64 in / 162.56 cm           Weight 153 lbs  / 69.482054 kg           BSA 1.75 m2           BMI 26.3 kg/m2           Temperature 98 F / 36.67 C - Oral            Pulse 76           Respirations 14           Blood Pressure 122/80 Sitting, Right Arm           Pulse Oximetry 98%, room air            REVIEW      Results Reviewed      PCCS Results Reviewed?:  Yes Prev Lab Results, Yes Prev Radiology Results, Yes     Previous [facility] Records            Assessment      Tobacco dependence in remission - F17.201            Notes      New Diagnostics      * Low Dose Chest CT, SCHEDULED PROCEDURE         Dx: Tobacco dependence in remission - F17.201      ASSESSMENT:      1. Multiple pulmonary nodules stable on CT scan of the chest 1 year ago due for     repeat low dose CT scan of the chest now.         2. Tobacco abuse of cigarettes in remission for 3-4 years.       3. Loud systolic murmur, follows with Dr. Green for severe aortic stenosis.             PLAN:      1. I have discussed with the patient that we will need to reschedule her low     dose CT scan of the chest now to ensure the pulmonary nodules have remained     stable in size and there are no new nodules given her smoking history.       2. I congratulated the patient on remaining smoke free for several years.       3. I discussed her systolic murmur noted on exam today and she already follows     with Dr. Green for aortic stenosis. Continue to follow up there as     scheduled. She typically has an annual echocardiogram.      4. She is up to date on flu and pneumonia vaccines through her primary care     provider.       5. Follow up in 1 year with us, sooner if needed.            Patient Education      Time Spent:  > 50% /Coord Care            Electronically signed by RONNIE JACKSON PA-C  10/31/2019 15:15       Disclaimer: Converted document may not contain table formatting or lab diagrams. Please see [employer] for the authenticated document.

## 2021-05-28 NOTE — PROGRESS NOTES
Patient: MILTON BRICENO     Acct: [id number]     Report: #[id number]  UNIT #: [id number]     : 1950    Encounter Date:2021  PRIMARY CARE: STEVEN KEARNEY  ***Signed***  --------------------------------------------------------------------------------------------------------------------  Chief Complaint      Encounter Date      Mar 24, 2021            Primary Care Provider      STEVEN KEARNEY            Referring Provider      STEVEN KEARNEY            Patient Complaint      Patient is complaining of      PT here today for F/U, Lung Nodule, Dyspnea            VITALS      Height 64 in / 162.56 cm      Weight 157 lbs  / 71.027241 kg      BSA 1.76 m2      BMI 26.9 kg/m2      Temperature 98.1 F / 36.72 C - Temporal      Pulse 75      Respirations 16      Blood Pressure 100/60 Sitting, Left Arm      Pulse Oximetry 99%, room air            HPI      The patient is a 71 year old female patient of Dr. Severino's with history of     pulmonary nodules and dyspnea. The patient presents for follow up today.             The patient states since her last office visit she had open heart surgery on     21. 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 The     patient states the surgery was performed by Dr. Coronado in [place] and she is     scheduled to follow up with him. The patient is also under the care of Dr. Pederson    cardiologist and scheduled to follow up with him on 21. The patient states    her breathing has improved since having open heart surgery. The patient states     that several days after she developed a cough however the cough is now improving    but was told by the home health nurse that she had some crackles in her left     lung base and the patient would like to have a chest x-ray. The patient had a     pulmonary function test on 21 and spirometry showed no evidence of     obstruction, no response to bronchodilator therapy. Lung volumes showed no     restriction  and diffusion capacity was mildly reduced. The patient denies any     wheezing,  fever or chills, night sweats, hemoptysis,  purulent sputum     production, swollen glands in head and neck, unintentional weight loss,     palpitations,  chest pain or chest tightness, abdominal pain, nausea or vomiting    or diarrhea. The patient denies  any headaches, myalgias, sore throat, changes     in sense of taste and smell any coronavirus or flu like symptoms. The patient     denies any leg swelling, orthopnea or paroxysmal nocturnal dyspnea or nocturnal     awakenings. The patient states she is scheduled to get her first COVID-19     vaccine on 04/05/21. The patient states she has seasonal allergies and sometimes    she gets sinus headaches and nasal congestion however she is taking claritin     which is helping. The patient states she is able to perform her activities of     daily living without difficulty. The patient had a repeat low dose CT scan of     the chest on 11/09/2020 and it showed no evidence of for acute intra-thoracic     abnormality. There was no suspicious pulmonary nodules.             I reviewed the Review of Systems, medical, surgical and family history and agree    with those as entered.      Copies To:   Pradeep Severino      Constitutional:  Denies: Fatigue, Fever, Weight gain, Weight loss, Chills,     Insomnia, Other      Respiratory/Breathing:  Complains of: Cough; Denies: Shortness of air, Wheezing,    Hemoptysis, Pleuritic pain, Other      Endocrine:  Denies: Polydipsia, Polyuria, Heat/cold intolerance, Abnorml     menstrual pattern, Diabetes, Other      Eyes:  Denies: Blurred vision, Vision Changes, Other      Ears, nose, mouth, throat:  Denies: Mouth lesions, Thrush, Throat pain,     Hoarseness, Allergies/Hay Fever, Post Nasal Drip, Headaches, Recent Head Injury,    Nose Bleeding, Neck Stiffness, Thyroid Mass, Hearing Loss, Ear Fullness, Dry Aminah    th, Nasal or Sinus Pain, Dry Lips,  Nasal discharge, Nasal congestion, Other      Cardiovascular:  Denies: Palpitations, Syncope, Claudication, Chest Pain, Wake     up Gasping for air, Leg Swelling, Irregular Heart Rate, Cyanosis, Dyspnea on Exe    rtion, Other      Gastrointestinal:  Denies: Nausea, Constipation, Diarrhea, Abdominal pain,     Vomiting, Difficulty Swallowing, Reflux/Heartburn, Dysphagia, Jaundice,     Bloating, Melena, Bloody stools, Other      Genitourinary:  Denies: Urinary frequency, Incontinence, Hematuria, Urgency,     Nocturia, Dysuria, Testicular problems, Other      Musculoskeletal:  Denies: Joint Pain, Joint Stiffness, Joint Swelling, Myalgias,    Other      Hematologic/lymphatic:  DENIES: Lymphadenopathy, Bruising, Bleeding tendencies,     Other      Neurological:  Denies: Headache, Numbness, Weakness, Seizures, Other      Psychiatric:  Denies: Anxiety, Appropriate Effect, Depression, Other      Sleep:  No: Excessive daytime sleep, Morning Headache?, Snoring, Insomnia?, Stop    breathing at sleep?, Other      Integumentary:  Denies: Rash, Dry skin, Skin Warm to Touch, Other      Immunologic/Allergic:  Denies: Latex allergy, Seasonal allergies, Asthma,     Urticaria, Eczema, Other      Immunization status:  No: Up to date            FAMILY/SOCIAL/MEDICAL HX      Surgical History:  Yes: Bowel Surgery, Oral Surgery (TONSILLECTOMY), Other     Surgeries (heart surgery 2-1-2021); No: AAA Repair, Abdominal Surgery,     Angioplasty, Appendectomy, Back Surgery, Bladder Surgery, Breast Surgery, CABG,     Carotid Stenosis, Cholecystectomy, Ear Surgery, Eye Surgery, Head Surgery,     Hernia Surgery, Kidney Surgery, Nose Surgery, Orthopedic Surgery, Prostatectomy,    Rectal Surgery, Spinal Surgery, Testicular Surgery, Throat Surgery, Valve     Replacement, Vascular Surgery      Heart - Family Hx:  Father, Brother      Cancer/Type - Family Hx:  Father, Aunt, Uncle      Is Father Still Living?:  No      Is Mother Still Living?:  No       Social History:  No Tobacco Use, No Alcohol Use, No Recreational Drug use      Smoking status:  Former smoker ( (1ppd x 50 years quit 05/16))      Hysterectomy:  Yes (PARTIAL AT AGE 26)      Anticoagulation Therapy:  No      Antibiotic Prophylaxis:  No      Medical History:  Yes: Arthritis (OSTEOPOROSIS), Depression, High Blood Pressure    (CONTROLLED WITH MEDS), Miscellaneous Medical/oth; No: Anemia, Asthma, Blood     Disease, Broken Bones, Cataracts, Chemical Dependency, Chemotherapy/Cancer,     Chronic Bronchitis/COPD, Emphysema, Chronic Liver Disease, Colon Trouble,     Colitis, Diverticulitis, Congestive Heart Failu, Deafness or Ringing Ears,     Anxiety, Bipolar Disorder, Diabetes, Epilepsy, Seizures, Glaucoma, Gall Stones,     Gout, Head Injury, Heart Attack, Heart Murmur, Hemorrhoids/Rectal Prob,     Hepatitis, Hiatal Hernia, HIV (Do not ask - volu, Kidney or Bladder Disease,     Kidney Stones, Migrane Headaches, Mitral Valve Prolapse, Psychiatric Care,     Reflux Disease, Rheumatic Fever, Sexually Transmitted Dis, Shortness Of Breath,     Sinus Trouble, Skin Disease/Psoriais/Ecz, Stroke, Thyroid Problem, Tuberculosis     or Pos TB Te      Psychiatric History      depression            PREVENTION      Hx Influenza Vaccination:  Yes      Date Influenza Vaccine Given:  Oct 1, 2020      Influenza Vaccine Declined:  No      2 or More Falls in Past Year?:  No      Fall Past Year with Injury?:  No      Hx Pneumococcal Vaccination:  Yes      Encouraged to follow-up with:  PCP regarding preventative exams.      Chart initiated by      Ashley Land CMA            ALLERGIES/MEDICATIONS      Allergies:        Coded Allergies:             NO KNOWN ALLERGIES (Unverified , 3/24/21)      Medications    Last Reconciled on 3/24/21 14:21 by KAILYN BARTLETT,       Alendronate Sodium (Fosamax) 70 Mg Tablet      70 MG PO Mo for 30 Days, #4 TAB         Reported         3/24/21       Cholecalciferol (Vitamin D3) (Vitamin D3)  Unknown Strength Capsule      PO QDAY for 30 Days, CAP         Reported         3/24/21       Atenolol (ATENOLOL) 25 Mg Tablet      25 MG PO QDAY, #30 TAB 0 Refills         Reported         3/24/21       Atorvastatin (Atorvastatin) 10 Mg Tablet      10 MG PO HS, #30 TAB         Reported         3/24/21       Loratadine (Loratadine) 10 Mg Tablet      10 MG PO QDAY, #30 TAB 0 Refills         Reported         3/24/21       metFORMIN ER (metFORMIN ER) 500 Mg Tab.er.24      1000 MG PO QDAY, #30 TAB.ER 0 Refills         Reported         11/17/20       Sertraline HCl (Sertraline*) 50 Mg Tablet      50 MG PO QDAY, #30 TAB 0 Refills         Reported         11/17/20       Ferrous Sulfate (Iron Sulfate*) 325 Mg Tablet      325 MG PO BID, #60 TAB 0 Refills         Reported         11/9/20       Multivitamin (Multivitamins) 1 Each Capsule      1 EACH PO QDAY, CAP         Reported         6/27/17       Pyridoxine HCl (Vitamin B-6*) 25 Mg Tablet      50 MG PO QDAY, TAB         Reported         7/8/16       Aspirin Chew (Aspirin Baby) 81 Mg Tab.chew      81 MG PO QDAY, #30 TAB.CHEW 0 Refills         Reported         7/8/16       Thyroid ([place] Thyroid) 60 Mg Tab      60 MG PO QDAY, TAB         Reported         10/20/15       Amitriptyline HCl (Elavil) 25 Mg Tablet      25 MG PO HS, TAB         Reported         10/20/15       Omega-3 Fatty Acids/Fish Oil (Fish Oil 1000 Mg) 1,000 Mg Capsule      1 GM PO BID, #60 CAP 0 Refills         Reported         10/20/15       Chlorpheniramine Maleate (CHLORPHENIRAMINE MALEATE) 4 Mg Tab      4 MG PO BID         Reported         6/20/12      Current Medications      Current Medications Reviewed 3/24/21            EXAM      Vital Signs Reviewed      Gen: WDWN, Alert, NAD.        HEENT:  PERRL, EOMI.  OP, nares clear, no sinus tenderness.      Neck:  Supple, no JVD, no thyromegaly.      Lymph: No axillary, cervical, supraclavicular lymphadenopathy noted bilaterally.      Chest:  Good aeration,  clear to auscultation bilaterally, tympanic to percussion    bilaterally, no work of breathing noted.      CV:  RRR, no MGR, pulses 2+, equal.      Abd:  Soft, NT, ND, + BS, no HSM.      EXT:  No clubbing, no cyanosis, no edema, no joint tenderness.       Neuro:  A  Skin: No rashes or lesions.      Vtials      Vitals:             Height 64 in / 162.56 cm           Weight 157 lbs  / 71.683651 kg           BSA 1.76 m2           BMI 26.9 kg/m2           Temperature 98.1 F / 36.72 C - Temporal           Pulse 75           Respirations 16           Blood Pressure 100/60 Sitting, Left Arm           Pulse Oximetry 99%, room air            REVIEW      Results Reviewed      PCCS Results Reviewed?:  Yes Prev Lab Results, Yes Prev Radiology Results, Yes     Previous M[facility] Records      Lab Results      I personally reviewed Dr. Severino's office visit notes.      Radiographic Results               [facility] Diagnostic Img                PACS RADIOLOGY REPORT            Patient: MILTON BRICENO   Acct: #[id number]   Report: #[id number]            UNIT #: [id number]    DOS: 20 1415      INSURANCE:MEDICARE PART A   LOCATION:[facility]     : 1950            PROVIDERS      ADMITTING:     ATTENDING: Pradeep Severino      FAMILY:  STEVEN KEARNEY   ORDERING:  Pradeep Severino         OTHER: REY JONES   DICTATING:  Yani Landon MD            REQ #:[id number]   EXAM:[facility] - LOW DOSE CHEST CT SCREENING      REASON FOR EXAM:        REASON FOR VISIT:  FORMER SMOKER            *******Signed******         PROCEDURE:   CT LOW DOSE CHEST SCREENING             COMPARISON:   [facility], CT, CT LOW DOSE CHEST SCREENING,    2019, 12:45.             REASON FOR SCREENING:   Patient is 70 years of age, asymptomatic, and has a     smoking history of more       than 30 pack years.      SMOKING STATUS:   Former smoker.  Years since quittin YEARS                 SCREENING VISIT:   Year 2.                   TECHNIQUE:   Axial unenhanced LDCT images from the apices through mid-kidney     were obtained.       Evaluation of solid organs and vascular structures is suboptimal due to the lack    of IV contrast.       Imaging was performed on a [employer] 128 CT scanner.             RADIATION:   CT Dose Index Vol (CTDIvol):    3.085  mGy         Dose Length Product (DLP):    125  mGy-cm             DIAGNOSTIC QUALITY:   Satisfactory             FINDINGS:      No well-defined consolidations or significant pleural effusions are observed. No    dominant pulmonary       nodules or abnormal pulmonary masses are seen.              No significant hilar, mediastinal, or axillary lymphadenopathy is seen.      Atherosclerotic       calcifications are present.  Aortic ectasia is present.  A normal aortic arch     branching pattern is       noted. The heart appears normal in size. No pericardial effusion identified. The    thyroid gland is       unremarkable. The esophagus is unremarkable.             The limited evaluation of the upper abdomen demonstrates no definitive acute     abnormalities.             No acute osseous abnormalities are seen.                LUNG-RADS CLASSIFICATION:  Lung-RADS 1 - Negative.  No nodules or definitely     benign nodules.        Recommendation: Continue annual screening with LDCT in 12 months.  <1%     probability of malignancy.        Estimated population prevalence is 90%.  Reference: ACR Lung-RADS (Lung CT     Screening reporting and       data system) Version 1.1 assessment categories release date 2019.               CONCLUSION:         1. No evidence for acute intrathoracic abnormality.  No suspicious pulmonary     nodules.      2. Atherosclerosis with aortic ectasia.      3. Ancillary findings as described above.             LUNG-RADS CLASSIFICATION:   1-negative              ROSITA DUMONT MD             Electronically Signed and Approved By:  ROSITA DUMONT MD on 2020 at 14:34                               Until signed, this is an unconfirmed preliminary report that may contain      errors and is subject to change.                                              KOGIL:      D:20 1434      PFT Results      Patient: MILTON BRICENO   Acct: #[id number]   Report: #[id number]            UNIT #: [id number]    ADMIT/REGISTRATION DATE: 21      LOCATION:[facility]     : 1950            PROVIDERS      ADMITTING:     FAMILY:  MD NONE         OTHER:       DICTATING:  Pradeep Severino DO            REASON FOR VISIT:  SOA            *******Signed******                                     [facility]                          [employer]                            [facility]  [id number]               __________________________________________________________________________             Patient Name:                   Attending Physician:      Milton Briceno D.O.             Patient Visit # MR #            Admit Date  Disch Date     Location      [id number]    [id number]      2021                 [facility]- -             Date of Birth      1950      __________________________________________________________________________      0821 - DIAGNOSTIC REPORT             PULMONARY FUNCTION TEST             SPIROMETRY:      FEV1/FVC ratio is 75%.      FEV1 is 83% of predicted.      Forced vital capacity is 84% of predicted.      No response to bronchodilator therapy seen.             LUNG VOLUMES:      Total lung capacity is 90% of predicted.      Residual volume is 90% of predicted.             DIFFUSION:      DLCO is 66% of predicted.             IMPRESSION:      1.  Spirometry shows no evidence of obstruction.      2.  No response to bronchodilator therapy seen.      3.  Lung volumes show no restriction.      4.  Diffusion capacity is mildly decreased.              To be electronically signed in [employer]      [id number] UNA SEVERINO D.O.             WC:rt      D:  02/05/2021 12:34      T:  02/05/2021 13:16      #[id number]             Until signed, this is an unconfirmed preliminary report that may contain      errors and is subject to change.                   Until signed, this is an unconfirmed preliminary report that may contain      errors and is subject to change.                     <Electronically signed by Una Severino DO>                02/08/21 1745               Una Severino:CYNDEE      D:02/05/21 1234            Assessment      Cough - R05            Former smoker - Z87.891            Notes      New Medications      * Loratadine 10 MG TABLET: 10 MG PO QDAY #30      * Atorvastatin 10 MG TABLET: 10 MG PO HS #30      * ATENOLOL 25 MG TABLET: 25 MG PO QDAY #30      * Cholecalciferol (Vitamin D3) (Vitamin D3) Unknown Strength CAPSULE: PO QDAY 30      Days      * Alendronate Sodium (Fosamax) 70 MG TABLET: 70 MG PO Mo 30 Days #4      New Diagnostics      * Chest 2 View, 1 DAY         Dx: Cough - R05      * LDCT for lung ca screening, 9 Months         Dx: Former smoker - Z87.891      ASSESSMENT:      1. Repeat low dose CT scan of the chest on 11/09/2020 showed no suspicious     pulmonary nodules.       2. Open heart surgery with aortic valve replacement under the care of Dr. Coronado     and Dr. Pederson.       3. Dyspnea improved since open heart surgery per patient report.       4. Tobacco abuse of cigarettes in remission.             PLAN:      1. The patient will need a repeat low dose CT scan of the chest in November 2021, order placed today.       2. Follow up with cardiology for her underlying cardiac issues as scheduled.       3. The patient's pulmonary function test showed no evidence of obstruction, no     response to bronchodilator therapies seen, lung volumes showed no restriction      and diffusion capacity was mildly reduced. The patient feels that her breathing     is improved. No need for bronchodilator therapies at this time.       4. The patient states she is going to check with her primary care provider and     see if she is up to date with flu and pneumonia vaccines and will notify our     office. The patient states she is scheduled to receive her COVID-19 vaccine on     04/05/21. The patient is advised to follow CDC recommendations such as social     distancing, wearing a mask and washing hand for at least 20 seconds.      5. I will order a chest x-ray as the patient states she was having intermittent     cough and her home health nurse is concerned about hearing some crackles in the     base of her left lung. Order placed today.       6. The patient is advised to call the office, call 911 or go to the ER for any     new or worsening symptoms.       7. Follow up with Dr. Coronado and Dr. Pederson as scheduled.       8. Follow up with Dr. Severino in 2-3 months, sooner if needed.            Patient Education      Patient Education Provided:  Acute Bronchitis      Time Spent:  > 50% /Coord Care            Electronically signed by KAILYN BARTLETT [facility]  03/26/2021 14:31       Disclaimer: Converted document may not contain table formatting or lab diagrams. Please see [employer] for the authenticated document.

## 2021-05-28 NOTE — PROGRESS NOTES
Patient: MILTON BRICENO     Acct: [id number]     Report: #[id number]  UNIT #: [id number]     : 1950    Encounter Date:2020  PRIMARY CARE: STEVEN KEARNEY  ***Signed***  --------------------------------------------------------------------------------------------------------------------  Chief Complaint      Encounter Date      2020            Primary Care Provider      STEVEN KEARNEY            Referring Provider      STEVEN KEARNEY            Patient Complaint      Patient is complaining of      Pt here for 1y f/u, pulmonary nodule            VITALS      Height 5 ft 4 in / 162.56 cm      Weight 154 lbs 4 oz / 69.844827 kg      BSA 1.75 m2      BMI 26.5 kg/m2      Temperature 96.6 F / 35.89 C - Temporal      Pulse 69      Respirations 18      Blood Pressure 128/75 Sitting, Right Arm      Pulse Oximetry 100%, Room air            HPI      The patient is a 70 year old female here for follow up today.             The patient is doing well in regards to her smoking cessation. She reports     smoking cessation for the past 4 years. She has dyspnea that is worsening due to    her heart problems. She is scheduled to have cardiac surgery. She has no     increased cough, no increased sputum production. No weight loss.            ROS      Constitutional:  Denies: Fatigue, Fever, Weight gain, Weight loss, Chills,     Insomnia, Other      Respiratory/Breathing:  Complains of: Shortness of air; Denies: Wheezing, Cough,    Hemoptysis, Pleuritic pain, Other      Endocrine:  Denies: Polydipsia, Polyuria, Heat/cold intolerance, Abnorml     menstrual pattern, Diabetes, Other      Eyes:  Denies: Blurred vision, Vision Changes, Other      Ears, nose, mouth, throat:  Denies: Mouth lesions, Thrush, Throat pain,     Hoarseness, Allergies/Hay Fever, Post Nasal Drip, Headaches, Recent Head Injury,    Nose Bleeding, Neck Stiffness, Thyroid Mass, Hearing Loss, Ear Fullness, Dry     Mouth, Nasal or Sinus Pain, Dry  Lips, Nasal discharge, Nasal congestion, Other      Cardiovascular:  Denies: Palpitations, Syncope, Claudication, Chest Pain, Wake     up Gasping for air, Leg Swelling, Irregular Heart Rate, Cyanosis, Dyspnea on     Exertion, Other      Gastrointestinal:  Denies: Nausea, Constipation, Diarrhea, Abdominal pain,     Vomiting, Difficulty Swallowing, Reflux/Heartburn, Dysphagia, Jaundice,     Bloating, Melena, Bloody stools, Other      Genitourinary:  Denies: Urinary frequency, Incontinence, Hematuria, Urgency,     Nocturia, Dysuria, Testicular problems, Other      Musculoskeletal:  Denies: Joint Pain, Joint Stiffness, Joint Swelling, Myalgias,    Other      Hematologic/lymphatic:  DENIES: Lymphadenopathy, Bruising, Bleeding tendencies,     Other      Neurological:  Denies: Headache, Numbness, Weakness, Seizures, Other      Psychiatric:  Denies: Anxiety, Appropriate Effect, Depression, Other      Sleep:  No: Excessive daytime sleep, Morning Headache?, Snoring, Insomnia?, Stop    breathing at sleep?, Other      Integumentary:  Denies: Rash, Dry skin, Skin Warm to Touch, Other      Immunologic/Allergic:  Denies: Latex allergy, Seasonal allergies, Asthma,     Urticaria, Eczema, Other      Immunization status:  No: Up to date            FAMILY/SOCIAL/MEDICAL HX      Surgical History:  Yes: Bowel Surgery, Oral Surgery (TONSILLECTOMY); No: AAA     Repair, Abdominal Surgery, Angioplasty, Appendectomy, Back Surgery, Bladder     Surgery, Breast Surgery, CABG, Carotid Stenosis, Cholecystectomy, Ear Surgery,     Eye Surgery, Head Surgery, Hernia Surgery, Kidney Surgery, Nose Surgery,     Orthopedic Surgery, Prostatectomy, Rectal Surgery, Spinal Surgery, Testicular     Surgery, Throat Surgery, Valve Replacement, Vascular Surgery, Other Surgeries      Heart - Family Hx:  Father, Brother      Cancer/Type - Family Hx:  Father, Aunt, Uncle      Is Father Still Living?:  No      Is Mother Still Living?:  No      Social History:  No  Tobacco Use, No Alcohol Use, No Recreational Drug use      Smoking status:  Former smoker (1ppd x 50 years quit 05/16)      Hysterectomy:  Yes (PARTIAL AT AGE 26)      Anticoagulation Therapy:  No      Antibiotic Prophylaxis:  No      Medical History:  Yes: Arthritis (OSTEOPOROSIS), Depression, High Blood Pressure    (CONTROLLED WITH MEDS), Miscellaneous Medical/oth; No: Anemia, Asthma, Blood     Disease, Broken Bones, Cataracts, Chemical Dependency, Chemotherapy/Cancer,     Chronic Bronchitis/COPD, Emphysema, Chronic Liver Disease, Colon Trouble,     Colitis, Diverticulitis, Congestive Heart Failu, Deafness or Ringing Ears,     Anxiety, Bipolar Disorder, Diabetes, Epilepsy, Seizures, Glaucoma, Gall Stones,     Gout, Head Injury, Heart Attack, Heart Murmur, Hemorrhoids/Rectal Prob,     Hepatitis, Hiatal Hernia, HIV (Do not ask - volu, Kidney or Bladder Disease,     Kidney Stones, Migrane Headaches, Mitral Valve Prolapse, Psychiatric Care,     Reflux Disease, Rheumatic Fever, Sexually Transmitted Dis, Shortness Of Breath,     Sinus Trouble, Skin Disease/Psoriais/Ecz, Stroke, Thyroid Problem, Tuberculosis     or Pos TB Te      Psychiatric History      Depression            PREVENTION      Hx Influenza Vaccination:  Yes      Date Influenza Vaccine Given:  Oct 1, 2020      Influenza Vaccine Declined:  No      2 or More Falls in Past Year?:  No      Fall Past Year with Injury?:  No      Hx Pneumococcal Vaccination:  Yes      Encouraged to follow-up with:  PCP regarding preventative exams.      Chart initiated by      Sophia Ferrell MA            ALLERGIES/MEDICATIONS      Allergies:        Coded Allergies:             NO KNOWN ALLERGIES (Unverified , 11/9/20)      Medications    Last Reconciled on 11/9/20 11:21 by UNA DRAKE MD      Ferrous Sulfate (Iron Sulfate*) 325 Mg Tablet      325 MG PO BID, #60 TAB 0 Refills         Reported         11/9/20       Alendronate Sodium/Vitamin D3 (Fosamax Plus D 70 MG/5600 IU) 1  Tab Tablet      1 TAB PO Fr, TAB         Reported         6/27/17       Multivitamin (Multivitamins) 1 Each Capsule      1 EACH PO QDAY, CAP         Reported         6/27/17       Diclofenac Sodium (Voltaren 1%*) 100 Gm Gel..gram.      1 APL TOPICAL TID, #1 TUBE         Reported         6/27/17       Simvastatin (Simvastatin*) 5 Mg Tablet      5 MG PO HS, #30 TAB 0 Refills         Reported         6/27/17       Loratadine Chew (CHILDREN'S CLARITIN) 5 Mg Tab.chew      5 MG PO QDAY, TAB.CHEW         Reported         5/29/17       Pyridoxine HCl (Vitamin B-6*) 25 Mg Tablet      50 MG PO QDAY, TAB         Reported         7/8/16       Aspirin Chew (Aspirin Baby) 81 Mg Tab.chew      81 MG PO QDAY, #30 TAB.CHEW 0 Refills         Reported         7/8/16       Thyroid ([place] Thyroid) 60 Mg Tab      60 MG PO QDAY, TAB         Reported         10/20/15       Amitriptyline HCl (Elavil) 25 Mg Tablet      25 MG PO HS, TAB         Reported         10/20/15       Omega-3 Fatty Acids/Fish Oil (Fish Oil 1000 Mg) 1,000 Mg Capsule      1 GM PO BID, #60 CAP 0 Refills         Reported         10/20/15       (Calcium 500 Mg)   No Conflict Check      500 MG PO BID         Reported         6/20/12       Chlorpheniramine Maleate (CHLORPHENIRAMINE MALEATE) 4 Mg Tab      4 MG PO BID         Reported         6/20/12       Metoprolol Tartrate (Lopressor*) 50 Mg Tablet      50 MG PO BID, TAB         Reported         6/20/12      Current Medications      Current Medications Reviewed 11/9/20            EXAM      Vital Signs Reviewed      Gen: WDWN, Alert, NAD.        HEENT:  PERRL, EOMI.  OP, nares clear, no sinus tenderness.      Neck:  Supple, no JVD, no thyromegaly.      Lymph: No axillary, cervical, supraclavicular lymphadenopathy noted bilaterally.      Chest:  Good aeration, clear to auscultation bilaterally, tympanic to percussion    bilaterally, no work of breathing noted.      CV:  RRR, no MGR, pulses 2+, equal.      Abd:  Soft, NT, ND,  + BS, no HSM.      EXT:  No clubbing, no cyanosis, no edema, no joint tenderness.       Neuro:  A  Skin: No rashes or lesions.      Vtials      Vitals:             Height 5 ft 4 in / 162.56 cm           Weight 154 lbs 4 oz / 69.526024 kg           BSA 1.75 m2           BMI 26.5 kg/m2           Temperature 96.6 F / 35.89 C - Temporal           Pulse 69           Respirations 18           Blood Pressure 128/75 Sitting, Right Arm           Pulse Oximetry 100%, Room air            REVIEW      Results Reviewed      PCCS Results Reviewed?:  Yes Prev Lab Results, Yes Prev Radiology Results, Yes     Previous Mecial Records            Assessment      Shortness of breath - R06.02            Notes      New Medications      * FERROUS SULFATE (Iron Sulfate*) 325 MG TABLET: 325 MG PO BID #60      New Diagnostics      * PFT-Comp, PrePost,DLCO,BodyBox, Week         Dx: Shortness of breath - R06.02      ASSESSMENT/PLAN:      1. Pulmonary nodules. Repeat CT scan of the chest, the patient is due for her     yearly low dose lung cancer screening. She will have this done today.       2. Worsening dyspnea. Likely related to her underlying cardiac issues. We will     repeat pulmonary function test at this time.       3. Tobacco abuse of cigarettes in remission. I congratulated the patient on     remaining smoke free.       4. We will see the patient back in 2-3 months to discuss the results of her     pulmonary function test. No need for bronchodilator therapies at this time as     most recent pulmonary function test 3 years ago showed no significant underlying    obstructive lung disease.       5. I have personally reviewed laboratory data, imaging and previous medical     records.            Patient Education      Education resources provided:  Yes (pulmonary nodules)            Electronically signed by Pradeep Severino  11/11/2020 16:02       Disclaimer: Converted document may not contain table formatting or lab diagrams. Please see  [employer] for the authenticated document.

## 2021-06-01 ENCOUNTER — HOSPITAL ENCOUNTER (OUTPATIENT)
Dept: GASTROENTEROLOGY | Facility: HOSPITAL | Age: 71
Setting detail: HOSPITAL OUTPATIENT SURGERY
Discharge: HOME | End: 2021-06-01
Attending: INTERNAL MEDICINE

## 2021-06-01 LAB — GLUCOSE BLD-MCNC: 98 MG/DL (ref 65–99)

## 2021-06-05 NOTE — PROCEDURES
"   Procedure Note      Patient Name: Amelie Anderson   Patient ID: [id number]   Sex: Female   YOB: 1950    Primary Care Provider: Fabi Bill MD   Referring Provider: Fabi Bill MD    Visit Date: May 20, 2021    Provider: Kayla Pederson MD   Location: [facility]   Location Address: [address] [place] [address], [address]   [place]  [id number]   Location Phone: [phone]          FINAL REPORT   TRANSTHORACIC ECHOCARDIOGRAM REPORT    Diagnosis: Status post TAVR   Height: 5'4\" Weight: 159 B/P: 142/74 BSA: 1.8   Tech: BNS   MEASUREMENTS:  RVID (Diastole) : RVID. (NORMAL: 0.7 to 2.4 cm max)   LVID (Systole): 3.6 cm (Diastole): 4.5 cm . (NORMAL: 3.7 - 5.4 cm)   Posterior Wall Thickness (Diastole): 1.1 cm. (NORMAL: 0.8 - 1.1 cm)   Septal Thickness (Diastole): 1.O cm. (NORMAL: 0.7 - 1.2 cm)   LAID (Systole): 3.3 cm. (NORMAL: 1.9 - 3.8 cm)   Aortic Root Diameter (Diastole): 3.3 cm. (NORMAL: 2.0 - 3.7 cm)   COMMENTS:  The patient underwent 2-D, M-Mode, and Doppler examination, including pulse-wave, continuous-wave, and color-flow analysis; the study is technically adequate.   FINDINGS:  MITRAL VALVE: Normal. E to F slope was normal. No evidence of any prolapse.   AORTIC VALVE: Bioprosthetic aortic valve noted with limited visualization of the leaflet opening.   TRICUSPID VALVE: Normal.   PULMONIC VALVE: Normal.   LEFT ATRIUM: Normal; no masses seen. LA volume is 24 mL/m2.   AORTIC ROOT: Normal in size and motion.   LEFT VENTRICLE: The left ventricular chamber size is normal. The left ventricular wall thickness is normal. The left ventricular systolic function is normal with an estimated EF of 55%. No significant regional wall motion abnormalities are identified.   RIGHT ATRIUM: Normal.   RIGHT VENTRICLE: Normal size and function.   PERICARDIUM: No effusion.   INFERIOR VENA CAVA: Diameter is 1.1 cm.   DOPPLER: Pulse-wave, continuous wave, and color-flow Doppler evaluation was performed. E/A ratio " is 0.7. DT= 282 msec. IVRT is 106 msec. E/E' is 9. The mean gradient across the bioprosthetic aortic valve is 9 mmHg with a max gradient of 19 mmHg with an estimated aortic valve area of 1.3 cm2.   Faxed: 05/22/2021      CONCLUSION:  1.  Left ventricular chamber size is normal. The left ventricular wall thickness is normal. The left ventricular        systolic function is normal with an estimated EF of 55%. No significant regional wall motion abnormalities        are identified.   2.  Grade 2 left ventricular diastolic dysfunction.  3.  Normally functioning prosthetic aortic valve.      Kayla Pederson MD, [facility]  PM/pap                   Electronically Signed by: Sumaya Clark-, Other -Author on May 22, 2021 11:54:18 AM  Electronically Co-signed by: Kayla Pederson MD -Reviewer on May 22, 2021 05:01:40 PM

## 2021-06-05 NOTE — PROGRESS NOTES
"   Progress Note      Patient Name: Amelie Anderson   Patient ID: [id number]   Sex: Female   YOB: 1950    Primary Care Provider: Fabi Bill MD   Referring Provider: Fabi Bill MD    Visit Date: May 20, 2021    Provider: Kayla Pederson MD   Location: [facility]   Location Address: [address] [place] [address], [facility]   [place], [place]  [id number]   Location Phone: [phone]          Chief Complaint     Followup visit for hypertension, aortic valve replacement, hyperlipidemia.       History Of Present Illness  REFERRING PROVIDER: Fabi Bill MD   Amelie Anderson is a 71-year-old female with aortic valve stenosis and is status post aortic valve replacement for severe aortic valve stenosis who has done well. She denies chest pain, palpitations, shortness of breath, or syncope. She has had some degree of elevated blood pressures at home.   PAST MEDICAL HISTORY: Diabetes mellitus; arthritis; aortic stenosis; circulation problems in her legs.   PSYCHOSOCIAL HISTORY: Denies alcohol consumption. Previously smoked, but quit.   CURRENT MEDICATIONS: Medication list was reviewed and is as documented.      ALLERGIES:  No known drug allergies.       Review of Systems  · Cardiovascular  o Denies  o : palpitations (fast, fluttering, or skipping beats), swelling (feet, ankles, hands), shortness of breath while walking or lying flat, chest pain or angina pectoris   · Respiratory  o Denies  o : chronic or frequent cough      Vitals  Date Time BP Position Site L\R Cuff Size HR RR TEMP (F) WT  HT  BMI kg/m2 BSA m2 O2 Sat FR L/min FiO2 HC       05/20/2021 11:39 /74 Sitting    66 - R   159lbs 0oz 5'  4\" 27.29 1.8       05/20/2021 11:39 /70 Sitting    68 - R                    Blood pressure log was reviewed and she has episodes of hypertension.       Physical Examination  · Constitutional  o Appearance  o : Awake, alert, in no acute distress.  · Eyes  o Conjunctivae  o : Conjunctivae " normal.  · Ears, Nose, Mouth and Throat  o Oral Cavity  o :   § Oral Mucosa  § : Normal.  · Neck  o Inspection/Palpation/Auscultation  o : No lymphadenopathy. No JVD. No bruit. Good carotid upstroke.  · Respiratory  o Respiratory  o : Clear to percussion and auscultation. Good respiratory effort.  · Cardiovascular  o Heart  o : PMI is not well felt. S1, S2 are normal. No S3. No S4.  o Peripheral Vascular System  o :   § Extremities  § : Good femoral and pedal pulses. No pedal edema.  · Gastrointestinal  o Abdominal Examination  o : Soft. No masses or tenderness felt. No hepatosplenomegaly. Abdominal aorta is not palpable.  · Labs  o Labs  o : Chemistry panel is normal. Triglyceride 133, HDL 72, LDL 84, CBC normal.          Assessment     1.  Status post aortic valve replacement with good LV function and normally functioning prosthetic aortic valve.   2.  Hypertension, needs tighter control.  3.  Hyperlipidemia. She needs tighter control of her LDL in view of nonobstructive coronary artery disease.  4.  Hypothyroidism.       Plan     1.  Increase Atorvastatin to 40 mg at bedtime.  2.  Start Losartan 25 mg 1/2 tablet at bedtime, and she will do a blood pressure log for two weeks and give it        to us starting this Sunday.  3.  Followup blood work in 3 months and office visit and blood work in 6 months.      Kayla Pederson MD, [facility]  PM/pap               Electronically Signed by: Sumaya Clark-, Other -Author on May 24, 2021 02:12:51 PM  Electronically Co-signed by: Kayla Pederson MD -Reviewer on May 30, 2021 04:12:49 PM

## 2021-06-15 ENCOUNTER — TELEPHONE (OUTPATIENT)
Dept: INTERNAL MEDICINE | Facility: CLINIC | Age: 71
End: 2021-06-15

## 2021-06-15 DIAGNOSIS — J30.9 ALLERGIC RHINITIS, UNSPECIFIED SEASONALITY, UNSPECIFIED TRIGGER: Primary | ICD-10-CM

## 2021-06-15 DIAGNOSIS — E11.9 TYPE 2 DIABETES MELLITUS WITHOUT COMPLICATION, UNSPECIFIED WHETHER LONG TERM INSULIN USE (HCC): ICD-10-CM

## 2021-06-15 RX ORDER — ASPIRIN 325 MG
325 TABLET ORAL DAILY
COMMUNITY
End: 2021-06-15 | Stop reason: SDUPTHER

## 2021-06-15 RX ORDER — ATENOLOL 25 MG/1
25 TABLET ORAL DAILY
COMMUNITY
End: 2021-06-15 | Stop reason: SDUPTHER

## 2021-06-15 NOTE — TELEPHONE ENCOUNTER
Patient left a message requesting the following refills:   Atenolol 25mg  Aspirin 325mg   Loratadine 10mg   Vitamin B-6 50mg   meds pended for you to send.   LOV: 04/14/2021

## 2021-06-16 RX ORDER — ASPIRIN 325 MG
325 TABLET ORAL DAILY
Qty: 90 TABLET | Refills: 1 | Status: SHIPPED | OUTPATIENT
Start: 2021-06-16 | End: 2022-01-13 | Stop reason: DRUGHIGH

## 2021-06-16 RX ORDER — ATENOLOL 25 MG/1
25 TABLET ORAL DAILY
Qty: 90 TABLET | Refills: 1 | Status: SHIPPED | OUTPATIENT
Start: 2021-06-16 | End: 2022-01-26 | Stop reason: SDUPTHER

## 2021-06-16 RX ORDER — LORATADINE 10 MG/1
10 TABLET ORAL DAILY
Qty: 90 TABLET | Refills: 1 | Status: SHIPPED | OUTPATIENT
Start: 2021-06-16 | End: 2021-11-11 | Stop reason: SDUPTHER

## 2021-06-16 RX ORDER — LANOLIN ALCOHOL/MO/W.PET/CERES
50 CREAM (GRAM) TOPICAL DAILY
Qty: 90 TABLET | Refills: 1 | Status: SHIPPED | OUTPATIENT
Start: 2021-06-16 | End: 2022-03-02 | Stop reason: SDUPTHER

## 2021-06-17 DIAGNOSIS — E11.9 TYPE 2 DIABETES MELLITUS WITHOUT COMPLICATION, UNSPECIFIED WHETHER LONG TERM INSULIN USE (HCC): ICD-10-CM

## 2021-06-17 DIAGNOSIS — J30.9 ALLERGIC RHINITIS, UNSPECIFIED SEASONALITY, UNSPECIFIED TRIGGER: ICD-10-CM

## 2021-06-17 RX ORDER — LORATADINE 10 MG/1
10 TABLET ORAL DAILY
Qty: 90 TABLET | Refills: 1 | Status: CANCELLED | OUTPATIENT
Start: 2021-06-17

## 2021-06-17 RX ORDER — LANOLIN ALCOHOL/MO/W.PET/CERES
50 CREAM (GRAM) TOPICAL DAILY
Qty: 90 TABLET | Refills: 1 | Status: CANCELLED | OUTPATIENT
Start: 2021-06-17

## 2021-06-17 RX ORDER — ATENOLOL 25 MG/1
25 TABLET ORAL DAILY
Qty: 90 TABLET | Refills: 1 | Status: CANCELLED | OUTPATIENT
Start: 2021-06-17

## 2021-06-17 RX ORDER — ASPIRIN 325 MG
325 TABLET ORAL DAILY
Qty: 90 TABLET | Refills: 1 | Status: CANCELLED | OUTPATIENT
Start: 2021-06-17

## 2021-06-17 NOTE — TELEPHONE ENCOUNTER
Caller: Justin Amelie L    Relationship: Self    Best call back number: [phone]    Medication needed:   Requested Prescriptions     Pending Prescriptions Disp Refills   • atenolol (TENORMIN) 25 MG tablet 90 tablet 1     Sig: Take 1 tablet by mouth Daily.   • vitamin B-6 (PYRIDOXINE) 50 MG tablet 90 tablet 1     Sig: Take 1 tablet by mouth Daily.   • loratadine (CLARITIN) 10 MG tablet 90 tablet 1     Sig: Take 1 tablet by mouth Daily.   • aspirin 325 MG tablet 90 tablet 1     Sig: Take 1 tablet by mouth Daily.       When do you need the refill by: 06/17/2021    What additional details did the patient provide when requesting the medication: THE PATIENT STATED SHE IS OUT OF REFILLS AND ONLY HAS A COUPLE OF DAYS LEFT OF HER MEDICATIONS. SHE WOULD LIKE A REFILL SENT TO THE PHARMACY [facility].    Does the patient have less than a 3 day supply:  [x] Yes  [] No    What is the patient's preferred pharmacy:   [facility]   [address] [place] [id number] [phone]

## 2021-06-18 ENCOUNTER — APPOINTMENT (OUTPATIENT)
Dept: CARDIAC REHAB | Facility: HOSPITAL | Age: 71
End: 2021-06-18

## 2021-06-21 ENCOUNTER — APPOINTMENT (OUTPATIENT)
Dept: CARDIAC REHAB | Facility: HOSPITAL | Age: 71
End: 2021-06-21

## 2021-06-23 ENCOUNTER — APPOINTMENT (OUTPATIENT)
Dept: CARDIAC REHAB | Facility: HOSPITAL | Age: 71
End: 2021-06-23

## 2021-06-25 ENCOUNTER — APPOINTMENT (OUTPATIENT)
Dept: CARDIAC REHAB | Facility: HOSPITAL | Age: 71
End: 2021-06-25

## 2021-06-28 ENCOUNTER — APPOINTMENT (OUTPATIENT)
Dept: CARDIAC REHAB | Facility: HOSPITAL | Age: 71
End: 2021-06-28

## 2021-06-30 ENCOUNTER — APPOINTMENT (OUTPATIENT)
Dept: CARDIAC REHAB | Facility: HOSPITAL | Age: 71
End: 2021-06-30

## 2021-07-02 ENCOUNTER — APPOINTMENT (OUTPATIENT)
Dept: CARDIAC REHAB | Facility: HOSPITAL | Age: 71
End: 2021-07-02

## 2021-07-05 ENCOUNTER — APPOINTMENT (OUTPATIENT)
Dept: CARDIAC REHAB | Facility: HOSPITAL | Age: 71
End: 2021-07-05

## 2021-07-07 ENCOUNTER — APPOINTMENT (OUTPATIENT)
Dept: CARDIAC REHAB | Facility: HOSPITAL | Age: 71
End: 2021-07-07

## 2021-07-07 RX ORDER — METFORMIN HYDROCHLORIDE 500 MG/1
500 TABLET, EXTENDED RELEASE ORAL 2 TIMES DAILY
Qty: 180 TABLET | Refills: 2 | Status: SHIPPED | OUTPATIENT
Start: 2021-07-07 | End: 2022-03-02 | Stop reason: SDUPTHER

## 2021-07-07 NOTE — TELEPHONE ENCOUNTER
Dr. Bill patient to be seen today but had to be rescheduled. Needs a refill on this medication please.

## 2021-07-09 ENCOUNTER — APPOINTMENT (OUTPATIENT)
Dept: CARDIAC REHAB | Facility: HOSPITAL | Age: 71
End: 2021-07-09

## 2021-07-12 ENCOUNTER — OFFICE VISIT (OUTPATIENT)
Dept: PULMONOLOGY | Facility: CLINIC | Age: 71
End: 2021-07-12

## 2021-07-12 ENCOUNTER — APPOINTMENT (OUTPATIENT)
Dept: CARDIAC REHAB | Facility: HOSPITAL | Age: 71
End: 2021-07-12

## 2021-07-12 VITALS
BODY MASS INDEX: 27.14 KG/M2 | HEIGHT: 64 IN | RESPIRATION RATE: 17 BRPM | WEIGHT: 159 LBS | TEMPERATURE: 98.1 F | SYSTOLIC BLOOD PRESSURE: 121 MMHG | HEART RATE: 62 BPM | DIASTOLIC BLOOD PRESSURE: 61 MMHG | OXYGEN SATURATION: 98 %

## 2021-07-12 DIAGNOSIS — Z87.891 EX-SMOKER FOR MORE THAN 1 YEAR: Primary | ICD-10-CM

## 2021-07-12 PROCEDURE — 99213 OFFICE O/P EST LOW 20 MIN: CPT | Performed by: SPECIALIST

## 2021-07-12 RX ORDER — ALENDRONATE SODIUM 70 MG/1
TABLET ORAL
COMMUNITY
Start: 2021-06-08 | End: 2021-07-12 | Stop reason: SDUPTHER

## 2021-07-12 RX ORDER — AMITRIPTYLINE HYDROCHLORIDE 25 MG/1
TABLET, FILM COATED ORAL
COMMUNITY
End: 2021-07-12 | Stop reason: SDUPTHER

## 2021-07-12 RX ORDER — ASPIRIN 325 MG
TABLET ORAL
COMMUNITY
Start: 2021-06-22 | End: 2021-07-12 | Stop reason: SDUPTHER

## 2021-07-12 NOTE — PROGRESS NOTES
progress NOTE     CHIEF COMPLAINT  Chief Complaint   Patient presents with   • Follow-up        Primary Care Provider  Fabi Bill MD     Referring Provider  No ref. provider found    Patient Complaint    ICD-10-CM ICD-9-CM   1. Ex-smoker for more than 1 year  Z87.891 V15.82            Subjective          History of Presenting Illness  Amelie Anderson is a 71 y.o. female was seen by Dr. Severino and Tarah Kothari/nurse practitioner in the past.  Patient denies of having any symptoms at this time and other review the systems are noncontributory.    I have personally reviewed the review of systems, past family, social, medical and surgical histories; and agree with their findings.      HISTORY    Family History   Problem Relation Age of Onset   • Aortic stenosis Mother    • Valvular heart disease Mother    • Heart attack Father 30   • Coronary artery disease Father    • Aortic stenosis Sister    • Valvular heart disease Sister    • Coronary artery disease Paternal Aunt    • Coronary artery disease Paternal Uncle    • Heart disease Other    • Diabetes Other    • Heart attack Other    • Malig Hyperthermia Neg Hx         Social History     Socioeconomic History   • Marital status:      Spouse name: Not on file   • Number of children: Not on file   • Years of education: Not on file   • Highest education level: Not on file   Tobacco Use   • Smoking status: Former Smoker     Packs/day: 1.00     Years: 48.00     Pack years: 48.00     Start date:      Quit date:      Years since quittin.5   • Smokeless tobacco: Never Used   Substance and Sexual Activity   • Alcohol use: Not Currently   • Drug use: Never   • Sexual activity: Defer        Past Medical History:   Diagnosis Date   • Abdominal aortic aneurysm (AAA) (CMS/[facility])    • Anemia    • Aortic stenosis    • AR (allergic rhinitis)    • Arthritis    • Bladder disorder    • Cataract     BILAT   • Depression with anxiety    • Diabetes  (CMS/HCC)    • Elevated cholesterol    • Environmental allergies    • GERD (gastroesophageal reflux disease)    • Heel spur, right    • High cholesterol    • History of anesthesia complications     STATES WAS SLOW TO WAKE UP AFTER SURGERY   • History of TB (tuberculosis)     YEARS AGO WHEN FIRST CAME TO [facility], WAS TREATED FOR WITH INH   • Hypertension    • Hyperthyroidism    • Iron deficiency    • Kidney calculi    • Kidney cysts    • Kidney disease    • Lesion of bladder [id number]   • Microhematuria 09/25/2015   • Migraine    • Mitral valve prolapse    • Osteoporosis    • PONV (postoperative nausea and vomiting)    • Pulmonary nodule    • Seasonal allergies    • Thyroid disorder    • Urinary frequency         Immunization History   Administered Date(s) Administered   • COVID-19 (PFIZER) 04/05/2021, 04/26/2021   • Influenza, Unspecified 09/22/2020   • Pneumococcal Conjugate 13-Valent (PCV13) 09/15/2017   • Pneumococcal Polysaccharide (PPSV23) 12/27/2018       No Known Allergies       Current Outpatient Medications:   •  alendronate (FOSAMAX) 70 MG tablet, Take 1 tablet by mouth 1 (One) Time Per Week. THURSDAY, Disp: , Rfl:   •  amitriptyline (ELAVIL) 25 MG tablet, Take 1 tablet by mouth Every Night., Disp: , Rfl:   •  S[place]ton Thyroid 60 MG tablet, Take 1 tablet by mouth Daily., Disp: , Rfl:   •  aspirin 325 MG tablet, Take 1 tablet by mouth Daily., Disp: 90 tablet, Rfl: 1  •  atenolol (TENORMIN) 25 MG tablet, Take 1 tablet by mouth Daily., Disp: 90 tablet, Rfl: 1  •  atorvastatin (LIPITOR) 10 MG tablet, Take 1 tablet by mouth Every Night., Disp: , Rfl:   •  Biotin 10 MG tablet, Take 10 mg by mouth Daily., Disp: , Rfl:   •  Calcium Carbonate-Vitamin D (calcium-vitamin D) 500-200 MG-UNIT tablet per tablet, Take 1 tablet by mouth Daily., Disp: , Rfl:   •  chlorpheniramine (CHLOR-TRIMETON) 4 MG tablet, Take 1 tablet by mouth Daily., Disp: , Rfl:   •  FeroSul 325 (65 Fe) MG tablet, Take 1 tablet by mouth Daily., Disp: ,  Rfl:   •  fluticasone (FLONASE) 50 MCG/ACT nasal spray, 1 spray into the nostril(s) as directed by provider As Needed., Disp: , Rfl:   •  loratadine (CLARITIN) 10 MG tablet, Take 1 tablet by mouth Daily., Disp: 90 tablet, Rfl: 1  •  metFORMIN ER (GLUCOPHAGE-XR) 500 MG 24 hr tablet, Take 1 tablet by mouth 2 (two) times a day., Disp: 180 tablet, Rfl: 2  •  multivitamin with minerals tablet tablet, Take 1 tablet by mouth Daily. HOLD FOR SURGERY, Disp: , Rfl:   •  Potassium 99 MG tablet, Take 99 mg by mouth Daily. OTC, FOR LEG CRAMPS, Disp: , Rfl:   •  sertraline (ZOLOFT) 50 MG tablet, Take 1 tablet by mouth Daily., Disp: , Rfl:   •  vitamin B-6 (PYRIDOXINE) 50 MG tablet, Take 1 tablet by mouth Daily., Disp: 90 tablet, Rfl: 1  •  acetaminophen (TYLENOL) 500 MG tablet, Take 1 tablet by mouth Every 4 (Four) Hours As Needed for Mild Pain ., Disp:  , Rfl:   •  alendronate (FOSAMAX) 70 MG tablet, , Disp: , Rfl:   •  amitriptyline (Elavil) 25 MG tablet, Elavil 25 mg po 1 po qd HS   Suspended, Disp: , Rfl:   •  aspirin 325 MG tablet, , Disp: , Rfl:   No current facility-administered medications for this visit.    Facility-Administered Medications Ordered in Other Visits:   •  Chlorhexidine Gluconate Cloth 2 % pads 1 application, 1 application, Topical, Q12H LISAN, Ne Acevedo APRN     Smoking status: Former smoker with 30-pack-year history of smoking and quit 5 years ago    Objective     Vital Signs:   Vitals:    07/12/21 1405   BP: 121/61   Pulse: 62   Resp: 17   Temp: 98.1 °F (36.7 °C)   SpO2: 98%        Physical Exam:  HEENT is unremarkable.  Neck supple.  Next lungs: Clear to auscultation.  Cardiovascular system: Regular rate and rhythm.  Next abdomen: Soft and benign.  Next extremities: No clubbing, no cyanosis, no edema.  Central nervous system: Grossly intact.     Result Review :   I have personally reviewed the low-dose CT scan of the chest results which are nondiagnostic and also had the pulmonary function testing  which is also negative for any acute and obvious COPD.         Impression:  Solis smoker  Resolved cough.  Patient had the current active bypass graft surgery in February 2021 as per her.  Other medical problems are being followed by cardiology and primary care physician.    Plan:  No bronchodilators were given as the patient is asymptomatic and the pulmonary function testing are normal.  After the low-dose CT scan of the chest for the lung screening with a history of the risks as noted above.  Follow-up with Dr. Severino with the results.    Follow Up   Return in about 6 months (around 1/12/2022).  Patient was given instructions and counseling regarding her condition or for health maintenance advice. Please see specific information pulled into the AVS if appropriate.     Troy Blum MD

## 2021-07-14 ENCOUNTER — APPOINTMENT (OUTPATIENT)
Dept: CARDIAC REHAB | Facility: HOSPITAL | Age: 71
End: 2021-07-14

## 2021-07-15 VITALS
DIASTOLIC BLOOD PRESSURE: 74 MMHG | HEIGHT: 64 IN | HEART RATE: 64 BPM | SYSTOLIC BLOOD PRESSURE: 142 MMHG | WEIGHT: 159 LBS | BODY MASS INDEX: 27.14 KG/M2

## 2021-07-16 ENCOUNTER — APPOINTMENT (OUTPATIENT)
Dept: CARDIAC REHAB | Facility: HOSPITAL | Age: 71
End: 2021-07-16

## 2021-07-19 ENCOUNTER — APPOINTMENT (OUTPATIENT)
Dept: CARDIAC REHAB | Facility: HOSPITAL | Age: 71
End: 2021-07-19

## 2021-07-21 ENCOUNTER — APPOINTMENT (OUTPATIENT)
Dept: CARDIAC REHAB | Facility: HOSPITAL | Age: 71
End: 2021-07-21

## 2021-07-21 ENCOUNTER — HOSPITAL ENCOUNTER (OUTPATIENT)
Dept: CT IMAGING | Facility: HOSPITAL | Age: 71
Discharge: HOME | End: 2021-07-21
Admitting: SPECIALIST

## 2021-07-21 DIAGNOSIS — Z87.891 EX-SMOKER FOR MORE THAN 1 YEAR: ICD-10-CM

## 2021-07-21 PROCEDURE — 71271 CT THORAX LUNG CANCER SCR C-: CPT

## 2021-07-21 PROCEDURE — 71271 CT THORAX LUNG CANCER SCR C-: CPT | Performed by: RADIOLOGY

## 2021-07-23 ENCOUNTER — APPOINTMENT (OUTPATIENT)
Dept: CARDIAC REHAB | Facility: HOSPITAL | Age: 71
End: 2021-07-23

## 2021-07-26 ENCOUNTER — APPOINTMENT (OUTPATIENT)
Dept: CARDIAC REHAB | Facility: HOSPITAL | Age: 71
End: 2021-07-26

## 2021-07-28 ENCOUNTER — APPOINTMENT (OUTPATIENT)
Dept: CARDIAC REHAB | Facility: HOSPITAL | Age: 71
End: 2021-07-28

## 2021-07-30 ENCOUNTER — APPOINTMENT (OUTPATIENT)
Dept: CARDIAC REHAB | Facility: HOSPITAL | Age: 71
End: 2021-07-30

## 2021-08-02 ENCOUNTER — OFFICE VISIT (OUTPATIENT)
Dept: INTERNAL MEDICINE | Facility: CLINIC | Age: 71
End: 2021-08-02

## 2021-08-02 ENCOUNTER — APPOINTMENT (OUTPATIENT)
Dept: CARDIAC REHAB | Facility: HOSPITAL | Age: 71
End: 2021-08-02

## 2021-08-02 VITALS
WEIGHT: 163.2 LBS | BODY MASS INDEX: 27.86 KG/M2 | TEMPERATURE: 98.2 F | DIASTOLIC BLOOD PRESSURE: 72 MMHG | OXYGEN SATURATION: 97 % | SYSTOLIC BLOOD PRESSURE: 102 MMHG | HEIGHT: 64 IN | HEART RATE: 62 BPM

## 2021-08-02 DIAGNOSIS — E03.9 HYPOTHYROIDISM, UNSPECIFIED TYPE: ICD-10-CM

## 2021-08-02 DIAGNOSIS — E11.9 TYPE 2 DIABETES MELLITUS WITHOUT COMPLICATION, WITHOUT LONG-TERM CURRENT USE OF INSULIN (HCC): Primary | ICD-10-CM

## 2021-08-02 DIAGNOSIS — I10 ESSENTIAL HYPERTENSION: ICD-10-CM

## 2021-08-02 LAB
ALBUMIN SERPL-MCNC: 4.7 G/DL (ref 3.5–5.2)
ALBUMIN/GLOB SERPL: 1.7 G/DL
ALP SERPL-CCNC: 92 U/L (ref 39–117)
ALT SERPL W P-5'-P-CCNC: 18 U/L (ref 1–33)
ANION GAP SERPL CALCULATED.3IONS-SCNC: 7.8 MMOL/L (ref 5–15)
AST SERPL-CCNC: 18 U/L (ref 1–32)
BASOPHILS # BLD AUTO: 0.02 10*3/MM3 (ref 0–0.2)
BASOPHILS NFR BLD AUTO: 0.4 % (ref 0–1.5)
BILIRUB SERPL-MCNC: 0.5 MG/DL (ref 0–1.2)
BUN SERPL-MCNC: 21 MG/DL (ref 8–23)
BUN/CREAT SERPL: 21 (ref 7–25)
CALCIUM SPEC-SCNC: 10.3 MG/DL (ref 8.6–10.5)
CHLORIDE SERPL-SCNC: 101 MMOL/L (ref 98–107)
CHOLEST SERPL-MCNC: 129 MG/DL (ref 0–200)
CO2 SERPL-SCNC: 28.2 MMOL/L (ref 22–29)
CREAT SERPL-MCNC: 1 MG/DL (ref 0.57–1)
DEPRECATED RDW RBC AUTO: 47.3 FL (ref 37–54)
EOSINOPHIL # BLD AUTO: 0.12 10*3/MM3 (ref 0–0.4)
EOSINOPHIL NFR BLD AUTO: 2.2 % (ref 0.3–6.2)
ERYTHROCYTE [DISTWIDTH] IN BLOOD BY AUTOMATED COUNT: 15.3 % (ref 12.3–15.4)
GFR SERPL CREATININE-BSD FRML MDRD: 55 ML/MIN/1.73
GLOBULIN UR ELPH-MCNC: 2.7 GM/DL
GLUCOSE SERPL-MCNC: 119 MG/DL (ref 65–99)
HBA1C MFR BLD: 7.51 % (ref 4.8–5.6)
HCT VFR BLD AUTO: 38.3 % (ref 34–46.6)
HDLC SERPL-MCNC: 56 MG/DL (ref 40–60)
HGB BLD-MCNC: 12.8 G/DL (ref 12–15.9)
IMM GRANULOCYTES # BLD AUTO: 0.01 10*3/MM3 (ref 0–0.05)
IMM GRANULOCYTES NFR BLD AUTO: 0.2 % (ref 0–0.5)
LDLC SERPL CALC-MCNC: 52 MG/DL (ref 0–100)
LDLC/HDLC SERPL: 0.88 {RATIO}
LYMPHOCYTES # BLD AUTO: 1.43 10*3/MM3 (ref 0.7–3.1)
LYMPHOCYTES NFR BLD AUTO: 26.6 % (ref 19.6–45.3)
MCH RBC QN AUTO: 28.6 PG (ref 26.6–33)
MCHC RBC AUTO-ENTMCNC: 33.4 G/DL (ref 31.5–35.7)
MCV RBC AUTO: 85.5 FL (ref 79–97)
MONOCYTES # BLD AUTO: 0.44 10*3/MM3 (ref 0.1–0.9)
MONOCYTES NFR BLD AUTO: 8.2 % (ref 5–12)
NEUTROPHILS NFR BLD AUTO: 3.36 10*3/MM3 (ref 1.7–7)
NEUTROPHILS NFR BLD AUTO: 62.4 % (ref 42.7–76)
NRBC BLD AUTO-RTO: 0 /100 WBC (ref 0–0.2)
PLATELET # BLD AUTO: 188 10*3/MM3 (ref 140–450)
PMV BLD AUTO: 10.6 FL (ref 6–12)
POTASSIUM SERPL-SCNC: 4.9 MMOL/L (ref 3.5–5.2)
PROT SERPL-MCNC: 7.4 G/DL (ref 6–8.5)
RBC # BLD AUTO: 4.48 10*6/MM3 (ref 3.77–5.28)
SODIUM SERPL-SCNC: 137 MMOL/L (ref 136–145)
TRIGL SERPL-MCNC: 118 MG/DL (ref 0–150)
VLDLC SERPL-MCNC: 21 MG/DL (ref 5–40)
WBC # BLD AUTO: 5.38 10*3/MM3 (ref 3.4–10.8)

## 2021-08-02 PROCEDURE — 80053 COMPREHEN METABOLIC PANEL: CPT | Performed by: INTERNAL MEDICINE

## 2021-08-02 PROCEDURE — 82043 UR ALBUMIN QUANTITATIVE: CPT | Performed by: INTERNAL MEDICINE

## 2021-08-02 PROCEDURE — 36415 COLL VENOUS BLD VENIPUNCTURE: CPT | Performed by: INTERNAL MEDICINE

## 2021-08-02 PROCEDURE — 99214 OFFICE O/P EST MOD 30 MIN: CPT | Performed by: INTERNAL MEDICINE

## 2021-08-02 PROCEDURE — 83036 HEMOGLOBIN GLYCOSYLATED A1C: CPT | Performed by: INTERNAL MEDICINE

## 2021-08-02 PROCEDURE — 80061 LIPID PANEL: CPT | Performed by: INTERNAL MEDICINE

## 2021-08-02 PROCEDURE — 85025 COMPLETE CBC W/AUTO DIFF WBC: CPT | Performed by: INTERNAL MEDICINE

## 2021-08-02 PROCEDURE — 82570 ASSAY OF URINE CREATININE: CPT | Performed by: INTERNAL MEDICINE

## 2021-08-02 RX ORDER — LOSARTAN POTASSIUM 25 MG/1
25 TABLET ORAL 2 TIMES DAILY
COMMUNITY
Start: 2021-05-27 | End: 2023-01-20 | Stop reason: SDUPTHER

## 2021-08-02 RX ORDER — ALENDRONATE SODIUM 70 MG/1
TABLET ORAL
COMMUNITY
End: 2021-10-21 | Stop reason: SDUPTHER

## 2021-08-02 RX ORDER — FLUTICASONE PROPIONATE 50 MCG
SPRAY, SUSPENSION (ML) NASAL
COMMUNITY
Start: 2021-04-16

## 2021-08-02 RX ORDER — KETOTIFEN FUMARATE 0.35 MG/ML
1 SOLUTION/ DROPS OPHTHALMIC
COMMUNITY
Start: 2021-04-16 | End: 2022-01-26 | Stop reason: SDUPTHER

## 2021-08-02 RX ORDER — BLOOD-GLUCOSE METER
KIT MISCELLANEOUS
COMMUNITY
Start: 2021-06-19 | End: 2021-09-13 | Stop reason: SDUPTHER

## 2021-08-02 RX ORDER — CHLORPHENIRAMINE MALEATE 4 MG/1
TABLET ORAL
COMMUNITY
Start: 2021-04-16 | End: 2021-11-03 | Stop reason: SDUPTHER

## 2021-08-02 RX ORDER — FERROUS SULFATE 325(65) MG
1 TABLET ORAL DAILY
Qty: 90 TABLET | Refills: 1 | Status: SHIPPED | OUTPATIENT
Start: 2021-08-02 | End: 2022-03-02 | Stop reason: SDUPTHER

## 2021-08-02 RX ORDER — ATORVASTATIN CALCIUM 40 MG/1
40 TABLET, FILM COATED ORAL DAILY
COMMUNITY
End: 2022-07-05 | Stop reason: SDUPTHER

## 2021-08-02 RX ORDER — ATORVASTATIN CALCIUM 40 MG/1
TABLET, FILM COATED ORAL
COMMUNITY
Start: 2021-05-27 | End: 2021-08-02 | Stop reason: SDUPTHER

## 2021-08-03 LAB
ALBUMIN UR-MCNC: <1.2 MG/DL
CREAT UR-MCNC: 55.2 MG/DL
MICROALBUMIN/CREAT UR: NORMAL MG/G{CREAT}

## 2021-08-04 ENCOUNTER — APPOINTMENT (OUTPATIENT)
Dept: CARDIAC REHAB | Facility: HOSPITAL | Age: 71
End: 2021-08-04

## 2021-08-06 ENCOUNTER — APPOINTMENT (OUTPATIENT)
Dept: CARDIAC REHAB | Facility: HOSPITAL | Age: 71
End: 2021-08-06

## 2021-08-09 ENCOUNTER — APPOINTMENT (OUTPATIENT)
Dept: CARDIAC REHAB | Facility: HOSPITAL | Age: 71
End: 2021-08-09

## 2021-08-11 ENCOUNTER — APPOINTMENT (OUTPATIENT)
Dept: CARDIAC REHAB | Facility: HOSPITAL | Age: 71
End: 2021-08-11

## 2021-08-13 ENCOUNTER — APPOINTMENT (OUTPATIENT)
Dept: CARDIAC REHAB | Facility: HOSPITAL | Age: 71
End: 2021-08-13

## 2021-08-16 ENCOUNTER — APPOINTMENT (OUTPATIENT)
Dept: CARDIAC REHAB | Facility: HOSPITAL | Age: 71
End: 2021-08-16

## 2021-08-18 ENCOUNTER — APPOINTMENT (OUTPATIENT)
Dept: CARDIAC REHAB | Facility: HOSPITAL | Age: 71
End: 2021-08-18

## 2021-09-10 ENCOUNTER — TELEPHONE (OUTPATIENT)
Dept: INTERNAL MEDICINE | Facility: CLINIC | Age: 71
End: 2021-09-10

## 2021-09-10 ENCOUNTER — TELEPHONE (OUTPATIENT)
Dept: CARDIOLOGY | Facility: CLINIC | Age: 71
End: 2021-09-10

## 2021-09-10 NOTE — TELEPHONE ENCOUNTER
----- Message from ERNIE Sotelo sent at 9/7/2021  8:00 AM EDT -----  Cholesterols are labs are reviewed.  Cholesterols are  great.  Continue current medications

## 2021-09-10 NOTE — TELEPHONE ENCOUNTER
Caller: Amelie Anderson    Relationship: Self    Best call back number: [phone]    Medication needed:   FREESTYLE LITE test strip  PIN NEEDLE     When do you need the refill by: ASAP    What additional details did the patient provide when requesting the medication: PATIENT STATES THAT THE PIN NEDDLE NEED A NEW PERCEPTION      Does the patient have less than a 3 day supply:  [x] Yes  [] No    What is the patient's preferred pharmacy:    [facility]  [address] [place] [address]  [phone]            
1.69

## 2021-09-13 RX ORDER — BLOOD-GLUCOSE METER
KIT MISCELLANEOUS
Qty: 100 EACH | Refills: 1 | Status: SHIPPED | OUTPATIENT
Start: 2021-09-13 | End: 2021-11-03 | Stop reason: SDUPTHER

## 2021-10-08 ENCOUNTER — HOSPITAL ENCOUNTER (OUTPATIENT)
Dept: BONE DENSITY | Facility: HOSPITAL | Age: 71
Discharge: HOME | End: 2021-10-08

## 2021-10-08 ENCOUNTER — HOSPITAL ENCOUNTER (OUTPATIENT)
Dept: MAMMOGRAPHY | Facility: HOSPITAL | Age: 71
Discharge: HOME | End: 2021-10-08

## 2021-10-08 DIAGNOSIS — Z12.31 SCREENING MAMMOGRAM, ENCOUNTER FOR: ICD-10-CM

## 2021-10-08 DIAGNOSIS — Z78.0 POST-MENOPAUSAL: ICD-10-CM

## 2021-10-08 PROCEDURE — 77067 SCR MAMMO BI INCL CAD: CPT

## 2021-10-08 PROCEDURE — 77063 BREAST TOMOSYNTHESIS BI: CPT

## 2021-10-08 PROCEDURE — 77080 DXA BONE DENSITY AXIAL: CPT

## 2021-10-13 NOTE — DISCHARGE PLACEMENT REQUEST
"Milton Anderson (70 y.o. Female)     Date of Birth Social Security Number Address Home Phone MRN    [id number]  [address] [place] [address]  [place] [address] [phone] [id number]    Adventism Marital Status          None        Admission Date Admission Type Admitting Provider Attending Provider Department, Room/Bed    2/2/21 Elective Jesse Coronado MD Khan, Ahmad Aftab, MD [facility] CARDIOVASC UNIT, 2220/1    Discharge Date Discharge Disposition Discharge Destination                       Attending Provider: Jesse Coronado MD    Allergies: No Known Allergies    Isolation: None   Infection: None   Code Status: CPR    Ht: 162.6 cm (64\")   Wt: 71.8 kg (158 lb 3.2 oz)    Admission Cmt: None   Principal Problem: Aortic stenosis [I35.0] More...                 Active Insurance as of 2/2/2021     Primary Coverage     Payor Plan Insurance Group Employer/Plan Group    MEDICARE MEDICARE A & B      Payor Plan Address Payor Plan Phone Number Payor Plan Fax Number Effective Dates    [address] [phone]  3/1/2017 - None Entered    [facility] [id number]       Subscriber Name Subscriber Birth Date Member ID       MILTON ANDERSON 1950 [id number]           Secondary Coverage     Payor Plan Insurance Group Employer/Plan Group    [facility]      Payor Plan Address Payor Plan Phone Number Payor Plan Fax Number Effective Dates    [address] [phone]  11/20/2020 - None Entered    [facility] [id number]       Subscriber Name Subscriber Birth Date Member ID       MILTON ANDERSON 1950 [id number]                 Emergency Contacts      (Rel.) Home Phone Work Phone Mobile Phone    ANDERSONEVIN (Spouse) [phone] -- --              " "I don't want ECT anymore"

## 2021-11-03 ENCOUNTER — CLINICAL SUPPORT (OUTPATIENT)
Dept: INTERNAL MEDICINE | Facility: CLINIC | Age: 71
End: 2021-11-03

## 2021-11-03 DIAGNOSIS — J30.9 ALLERGIC RHINITIS, UNSPECIFIED SEASONALITY, UNSPECIFIED TRIGGER: ICD-10-CM

## 2021-11-03 DIAGNOSIS — Z23 NEED FOR INFLUENZA VACCINATION: Primary | ICD-10-CM

## 2021-11-03 DIAGNOSIS — E11.9 TYPE 2 DIABETES MELLITUS WITHOUT COMPLICATION, WITHOUT LONG-TERM CURRENT USE OF INSULIN (HCC): Primary | ICD-10-CM

## 2021-11-03 PROCEDURE — 90662 IIV NO PRSV INCREASED AG IM: CPT | Performed by: INTERNAL MEDICINE

## 2021-11-03 PROCEDURE — G0008 ADMIN INFLUENZA VIRUS VAC: HCPCS | Performed by: INTERNAL MEDICINE

## 2021-11-03 RX ORDER — CHLORPHENIRAMINE MALEATE 4 MG/1
4 TABLET ORAL EVERY 6 HOURS PRN
Qty: 90 TABLET | Refills: 0 | Status: SHIPPED | OUTPATIENT
Start: 2021-11-03 | End: 2021-11-29 | Stop reason: SDUPTHER

## 2021-11-03 RX ORDER — BLOOD-GLUCOSE METER
KIT MISCELLANEOUS
Qty: 100 EACH | Refills: 1 | Status: SHIPPED | OUTPATIENT
Start: 2021-11-03 | End: 2021-11-29 | Stop reason: SDUPTHER

## 2021-11-11 DIAGNOSIS — E11.9 TYPE 2 DIABETES MELLITUS WITHOUT COMPLICATION, WITHOUT LONG-TERM CURRENT USE OF INSULIN (HCC): ICD-10-CM

## 2021-11-11 DIAGNOSIS — J30.9 ALLERGIC RHINITIS, UNSPECIFIED SEASONALITY, UNSPECIFIED TRIGGER: ICD-10-CM

## 2021-11-11 DIAGNOSIS — E11.9 TYPE 2 DIABETES MELLITUS WITHOUT COMPLICATION, UNSPECIFIED WHETHER LONG TERM INSULIN USE (HCC): ICD-10-CM

## 2021-11-11 RX ORDER — LORATADINE 10 MG/1
10 TABLET ORAL DAILY
Qty: 90 TABLET | Refills: 1 | Status: SHIPPED | OUTPATIENT
Start: 2021-11-11 | End: 2022-03-02 | Stop reason: SDUPTHER

## 2021-11-29 ENCOUNTER — OFFICE VISIT (OUTPATIENT)
Dept: INTERNAL MEDICINE | Facility: CLINIC | Age: 71
End: 2021-11-29

## 2021-11-29 VITALS
HEIGHT: 64 IN | WEIGHT: 167.6 LBS | DIASTOLIC BLOOD PRESSURE: 78 MMHG | BODY MASS INDEX: 28.61 KG/M2 | HEART RATE: 69 BPM | SYSTOLIC BLOOD PRESSURE: 106 MMHG | TEMPERATURE: 97.8 F | OXYGEN SATURATION: 100 % | RESPIRATION RATE: 18 BRPM

## 2021-11-29 DIAGNOSIS — E11.9 TYPE 2 DIABETES MELLITUS WITHOUT COMPLICATION, WITHOUT LONG-TERM CURRENT USE OF INSULIN (HCC): ICD-10-CM

## 2021-11-29 DIAGNOSIS — E03.9 HYPOTHYROIDISM, UNSPECIFIED TYPE: ICD-10-CM

## 2021-11-29 DIAGNOSIS — J30.9 ALLERGIC RHINITIS, UNSPECIFIED SEASONALITY, UNSPECIFIED TRIGGER: ICD-10-CM

## 2021-11-29 DIAGNOSIS — I10 ESSENTIAL HYPERTENSION: Primary | ICD-10-CM

## 2021-11-29 LAB
ALBUMIN SERPL-MCNC: 4.9 G/DL (ref 3.5–5.2)
ALBUMIN/GLOB SERPL: 1.8 G/DL
ALP SERPL-CCNC: 84 U/L (ref 39–117)
ALT SERPL W P-5'-P-CCNC: 18 U/L (ref 1–33)
ANION GAP SERPL CALCULATED.3IONS-SCNC: 7.4 MMOL/L (ref 5–15)
AST SERPL-CCNC: 22 U/L (ref 1–32)
BASOPHILS # BLD AUTO: 0.03 10*3/MM3 (ref 0–0.2)
BASOPHILS NFR BLD AUTO: 0.5 % (ref 0–1.5)
BILIRUB SERPL-MCNC: 0.5 MG/DL (ref 0–1.2)
BUN SERPL-MCNC: 21 MG/DL (ref 8–23)
BUN/CREAT SERPL: 19.1 (ref 7–25)
CALCIUM SPEC-SCNC: 10 MG/DL (ref 8.6–10.5)
CHLORIDE SERPL-SCNC: 104 MMOL/L (ref 98–107)
CHOLEST SERPL-MCNC: 119 MG/DL (ref 0–200)
CO2 SERPL-SCNC: 27.6 MMOL/L (ref 22–29)
CREAT SERPL-MCNC: 1.1 MG/DL (ref 0.57–1)
DEPRECATED RDW RBC AUTO: 39.6 FL (ref 37–54)
EOSINOPHIL # BLD AUTO: 0.1 10*3/MM3 (ref 0–0.4)
EOSINOPHIL NFR BLD AUTO: 1.7 % (ref 0.3–6.2)
ERYTHROCYTE [DISTWIDTH] IN BLOOD BY AUTOMATED COUNT: 11.9 % (ref 12.3–15.4)
GFR SERPL CREATININE-BSD FRML MDRD: 49 ML/MIN/1.73
GLOBULIN UR ELPH-MCNC: 2.7 GM/DL
GLUCOSE SERPL-MCNC: 120 MG/DL (ref 65–99)
HBA1C MFR BLD: 7.02 % (ref 4.8–5.6)
HCT VFR BLD AUTO: 39.3 % (ref 34–46.6)
HDLC SERPL-MCNC: 59 MG/DL (ref 40–60)
HGB BLD-MCNC: 13 G/DL (ref 12–15.9)
IMM GRANULOCYTES # BLD AUTO: 0.02 10*3/MM3 (ref 0–0.05)
IMM GRANULOCYTES NFR BLD AUTO: 0.3 % (ref 0–0.5)
LDLC SERPL CALC-MCNC: 46 MG/DL (ref 0–100)
LDLC/HDLC SERPL: 0.8 {RATIO}
LYMPHOCYTES # BLD AUTO: 1.52 10*3/MM3 (ref 0.7–3.1)
LYMPHOCYTES NFR BLD AUTO: 25.4 % (ref 19.6–45.3)
MCH RBC QN AUTO: 29.9 PG (ref 26.6–33)
MCHC RBC AUTO-ENTMCNC: 33.1 G/DL (ref 31.5–35.7)
MCV RBC AUTO: 90.3 FL (ref 79–97)
MONOCYTES # BLD AUTO: 0.58 10*3/MM3 (ref 0.1–0.9)
MONOCYTES NFR BLD AUTO: 9.7 % (ref 5–12)
NEUTROPHILS NFR BLD AUTO: 3.73 10*3/MM3 (ref 1.7–7)
NEUTROPHILS NFR BLD AUTO: 62.4 % (ref 42.7–76)
NRBC BLD AUTO-RTO: 0 /100 WBC (ref 0–0.2)
PLATELET # BLD AUTO: 211 10*3/MM3 (ref 140–450)
PMV BLD AUTO: 9.9 FL (ref 6–12)
POTASSIUM SERPL-SCNC: 4.6 MMOL/L (ref 3.5–5.2)
PROT SERPL-MCNC: 7.6 G/DL (ref 6–8.5)
RBC # BLD AUTO: 4.35 10*6/MM3 (ref 3.77–5.28)
SODIUM SERPL-SCNC: 139 MMOL/L (ref 136–145)
TRIGL SERPL-MCNC: 63 MG/DL (ref 0–150)
TSH SERPL DL<=0.05 MIU/L-ACNC: 1.61 UIU/ML (ref 0.27–4.2)
VLDLC SERPL-MCNC: 14 MG/DL (ref 5–40)
WBC NRBC COR # BLD: 5.98 10*3/MM3 (ref 3.4–10.8)

## 2021-11-29 PROCEDURE — 84443 ASSAY THYROID STIM HORMONE: CPT | Performed by: INTERNAL MEDICINE

## 2021-11-29 PROCEDURE — 85025 COMPLETE CBC W/AUTO DIFF WBC: CPT | Performed by: INTERNAL MEDICINE

## 2021-11-29 PROCEDURE — 36415 COLL VENOUS BLD VENIPUNCTURE: CPT | Performed by: INTERNAL MEDICINE

## 2021-11-29 PROCEDURE — 80053 COMPREHEN METABOLIC PANEL: CPT | Performed by: INTERNAL MEDICINE

## 2021-11-29 PROCEDURE — 99214 OFFICE O/P EST MOD 30 MIN: CPT | Performed by: INTERNAL MEDICINE

## 2021-11-29 PROCEDURE — 80061 LIPID PANEL: CPT | Performed by: INTERNAL MEDICINE

## 2021-11-29 PROCEDURE — 83036 HEMOGLOBIN GLYCOSYLATED A1C: CPT | Performed by: INTERNAL MEDICINE

## 2021-11-29 RX ORDER — AMOXICILLIN 500 MG/1
CAPSULE ORAL
COMMUNITY
Start: 2021-11-23 | End: 2022-01-13

## 2021-11-29 RX ORDER — CHLORPHENIRAMINE MALEATE 4 MG/1
4 TABLET ORAL EVERY 6 HOURS PRN
Qty: 90 TABLET | Refills: 0 | Status: SHIPPED | OUTPATIENT
Start: 2021-11-29 | End: 2022-03-02 | Stop reason: SDUPTHER

## 2021-11-29 RX ORDER — BLOOD-GLUCOSE METER
KIT MISCELLANEOUS
Qty: 100 EACH | Refills: 1 | Status: SHIPPED | OUTPATIENT
Start: 2021-11-29 | End: 2022-06-02 | Stop reason: SDUPTHER

## 2021-11-29 NOTE — PROGRESS NOTES
"Chief Complaint  Diabetes, Follow-up, and right sided pain (arthritis)    Subjective          Amelie Anderson presents to [facility]  History of Present Illness     The patient verbally consents to being recorded using DINA.    Allergic rhinitis  She is in need of a refill of her allergy medication.     Type 2 diabetes mellitus  The patient is in need of refills of Freestyle test strips and lancets today. Previously well controlled. Needs labs today.    Essential hypertension  Well controlled in clinic today. She continues with atenolol and losartan.    Arthritis pain  She reports that her left hip is painful. The patient takes Fosamax. She notes that her nephrologist has advised that she take no pain medication due to kidney function. The patient adds that she uses Voltaren gel intermittently; however, this does not fully relieve her pain.     Preventative care  The patient has received her COVID-19 vaccine and is inquiring about the booster dose today.     Objective   Vital Signs:   /78 (BP Location: Left arm, Patient Position: Sitting, Cuff Size: Adult)   Pulse 69   Temp 97.8 °F (36.6 °C) (Temporal)   Resp 18   Ht 162.6 cm (64\")   Wt 76 kg (167 lb 9.6 oz)   SpO2 100%   BMI 28.77 kg/m²     Physical Exam  Vitals reviewed.   Constitutional:       Appearance: Normal appearance. She is well-developed.   HENT:      Head: Normocephalic and atraumatic.      Mouth/Throat:      Pharynx: No oropharyngeal exudate.   Eyes:      Conjunctiva/sclera: Conjunctivae normal.      Pupils: Pupils are equal, round, and reactive to light.   Cardiovascular:      Rate and Rhythm: Normal rate and regular rhythm.      Heart sounds: No murmur heard.  No friction rub. No gallop.    Pulmonary:      Effort: Pulmonary effort is normal.      Breath sounds: Normal breath sounds. No wheezing or rhonchi.   Skin:     General: Skin is warm and dry.   Neurological:      Mental Status: She is " alert and oriented to person, place, and time.   Psychiatric:         Mood and Affect: Affect normal.          Result Review :{Labs  Result Review  Imaging  Med Tab  Media  Procedures :23}   The following data was reviewed by: Fabi Bill MD on 11/29/2021:  CMP    CMP 2/24/21 4/14/21 8/2/21   Glucose 102 (A) 129 (A) 119 (A)   BUN 18 21 21   Creatinine 1.11 (A) 1.08 (A) 1.00   eGFR Non African Am   55 (A)   Sodium 137 142 137   Potassium 4.3 5.1 4.9   Chloride 102 103 101   Calcium 9.8 9.9 10.3   Albumin 4.3 4.3 4.70   Total Bilirubin 0.43 0.32 0.5   Alkaline Phosphatase 134 91 92   AST (SGOT) 22 23 18   ALT (SGPT) 23 19 18   (A) Abnormal value            CBC    CBC 2/8/21 2/24/21 8/2/21   WBC 7.16 7.71 5.38   RBC 3.12 (A) 3.55 (A) 4.48   Hemoglobin 9.4 (A) 10.1 (A) 12.8   Hematocrit 28.1 (A) 32.0 (A) 38.3   MCV 90.1 90.1 85.5   MCH 30.1 28.5 28.6   MCHC 33.5 31.6 (A) 33.4   RDW 13.4 13.7 15.3   Platelets 195 361 188   (A) Abnormal value            Lipid Panel    Lipid Panel 1/29/21 4/14/21 8/2/21   Total Cholesterol 144  129   Total Cholesterol  130    Triglycerides 100 97 118   HDL Cholesterol 65 (A) 67 (A) 56   VLDL Cholesterol 18 19 21   LDL Cholesterol  61 44 (A) 52   LDL/HDL Ratio 0.91  0.88   (A) Abnormal value       Comments are available for some flowsheets but are not being displayed.           TSH    TSH 1/7/21 4/14/21   TSH 2.600 1.310           A1C Last 3 Results    HGBA1C Last 3 Results 1/29/21 4/14/21 8/2/21   Hemoglobin A1C 7.05 (A) 7.0 (A) 7.51 (A)   (A) Abnormal value       Comments are available for some flowsheets but are not being displayed.           Microalbumin    Microalbumin 8/2/21   Microalbumin, Urine <1.2                Procedures        Assessment and Plan    Diagnoses and all orders for this visit:    1. Essential hypertension (Primary)        - Her blood pressure is within normal limits. She will continue with atenolol and losartan as prescribed.     2. Allergic  rhinitis, unspecified seasonality, unspecified trigger  -     chlorpheniramine (CHLOR-TRIMETON) 4 MG tablet; Take 1 tablet by mouth Every 6 (Six) Hours As Needed for Allergies.  Dispense: 90 tablet; Refill: 0    3. Type 2 diabetes mellitus without complication, without long-term current use of insulin (HCC)  Previously well controlled. Follow up blood work will be obtained today. Refills of Freestyle test strips and lancets are sent to her pharmacy today.   -     FREESTYLE LITE test strip; Use as directed  Dispense: 100 each; Refill: 1  -     CBC & Differential  -     Comprehensive Metabolic Panel  -     Hemoglobin A1c  -     Lipid Panel  -     TSH      4. Hypothyroidism, unspecified type  -     TSH  - Blood work will be obtained.     Other orders  -     Lancets 30G misc; 1 each 4 (Four) Times a Day As Needed (for glucose testing).  Dispense: 100 each; Refill: 1          Follow Up   Return in about 3 months (around 2/28/2022) for Next scheduled follow up.  Patient was given instructions and counseling regarding her condition or for health maintenance advice. Please see specific information pulled into the AVS if appropriate.       Transcribed from ambient dictation for Fabi Bill MD by Lisa Adams.  11/29/21   10:55 EST    Patient verbalized consent to the visit recording.  I have personally performed the services described in this document as transcribed by the above individual, and it is both accurate and complete.  Fabi Bill MD  11/29/2021  15:47 EST

## 2021-12-02 ENCOUNTER — IMMUNIZATION (OUTPATIENT)
Dept: INTERNAL MEDICINE | Facility: CLINIC | Age: 71
End: 2021-12-02

## 2021-12-02 PROCEDURE — 0004A COVID-19 (PFIZER): CPT | Performed by: INTERNAL MEDICINE

## 2021-12-02 PROCEDURE — 91300 COVID-19 (PFIZER): CPT | Performed by: INTERNAL MEDICINE

## 2021-12-09 ENCOUNTER — TELEPHONE (OUTPATIENT)
Dept: INTERNAL MEDICINE | Facility: CLINIC | Age: 71
End: 2021-12-09

## 2021-12-09 NOTE — TELEPHONE ENCOUNTER
Called patient. No answer; could not leave a VM due to VM box being full. HUB please advise patient of message below from her provider.      Normal labs. A1C stable at 7%

## 2022-01-11 PROBLEM — Z95.2 S/P AVR (AORTIC VALVE REPLACEMENT): Chronic | Status: ACTIVE | Noted: 2021-03-04

## 2022-01-12 ENCOUNTER — OFFICE VISIT (OUTPATIENT)
Dept: PULMONOLOGY | Facility: CLINIC | Age: 72
End: 2022-01-12

## 2022-01-12 VITALS
WEIGHT: 167 LBS | TEMPERATURE: 97.8 F | RESPIRATION RATE: 16 BRPM | OXYGEN SATURATION: 98 % | DIASTOLIC BLOOD PRESSURE: 67 MMHG | SYSTOLIC BLOOD PRESSURE: 115 MMHG | BODY MASS INDEX: 28.51 KG/M2 | HEART RATE: 62 BPM | HEIGHT: 64 IN

## 2022-01-12 DIAGNOSIS — R91.1 PULMONARY NODULE: Primary | ICD-10-CM

## 2022-01-12 PROCEDURE — 99213 OFFICE O/P EST LOW 20 MIN: CPT | Performed by: INTERNAL MEDICINE

## 2022-01-12 NOTE — PROGRESS NOTES
"Chief Complaint  Follow-up (6 mo f/u)    Subjective          Amelie Anderson presents to [facility]  History of Present Illness  71-year-old female here for follow-up  Breathing good  Had PFTs that were lately unremarkable  Not smoking  Breathing improved after having open heart surgery  Objective   Vital Signs:   /67 (BP Location: Left arm, Patient Position: Sitting, Cuff Size: Adult)   Pulse 62   Temp 97.8 °F (36.6 °C)   Resp 16   Ht 162.6 cm (64\")   Wt 75.8 kg (167 lb)   SpO2 98%   BMI 28.67 kg/m²     Physical Exam   Vital Signs Reviewed  General WDWN, Alert, NAD.    HEENT:  PERRL, EOMI.  OP, nares clear, no sinus tenderness  Neck:  Supple, no JVD, no thyromegaly  Lymph: no axillary, cervical, supraclavicular lymphadenopathy noted bilaterally  Chest:  good aeration, clear to auscultation bilaterally, tympanic to percussion bilaterally, no work of breathing noted  CV: RRR, no MGR,  EXT:  no clubbing, no cyanosis, no edema, no joint tenderness  Neuro:  A&Ox3, CN grossly intact, no focal deficits.  Skin: No rashes or lesions noted  Result Review :       Data reviewed: PFT showing no obstruction or restriction          Assessment and Plan    Diagnoses and all orders for this visit:    1. Pulmonary nodule (Primary)  Assessment & Plan:  Patient doing well from a pulmonary standpoint not complaining of any shortness of breath and is in remission from her tobacco abuse    Reviewed CT scan from July 2021 showing a 0.4 cm pulmonary nodule    At this time based on patient's prior smoking history we will plan on repeating a CT scan in July 2022    We will plan on seeing patient in August to follow-up on the results of this pulmonary nodule.    Orders:  -     CT Chest Without Contrast Diagnostic; Future      Follow Up   Return in about 6 months (around 7/12/2022).  Patient was given instructions and counseling regarding her condition or for health maintenance " advice. Please see specific information pulled into the AVS if appropriate.

## 2022-01-12 NOTE — ASSESSMENT & PLAN NOTE
Patient doing well from a pulmonary standpoint not complaining of any shortness of breath and is in remission from her tobacco abuse    Reviewed CT scan from July 2021 showing a 0.4 cm pulmonary nodule    At this time based on patient's prior smoking history we will plan on repeating a CT scan in July 2022    We will plan on seeing patient in August to follow-up on the results of this pulmonary nodule.

## 2022-01-13 ENCOUNTER — OFFICE VISIT (OUTPATIENT)
Dept: CARDIOLOGY | Facility: CLINIC | Age: 72
End: 2022-01-13

## 2022-01-13 ENCOUNTER — LAB (OUTPATIENT)
Dept: LAB | Facility: HOSPITAL | Age: 72
End: 2022-01-13

## 2022-01-13 VITALS
HEART RATE: 59 BPM | BODY MASS INDEX: 26.98 KG/M2 | SYSTOLIC BLOOD PRESSURE: 134 MMHG | WEIGHT: 158 LBS | HEIGHT: 64 IN | DIASTOLIC BLOOD PRESSURE: 52 MMHG

## 2022-01-13 DIAGNOSIS — I10 ESSENTIAL HYPERTENSION: Primary | ICD-10-CM

## 2022-01-13 DIAGNOSIS — Z95.2 S/P AVR (AORTIC VALVE REPLACEMENT): Chronic | ICD-10-CM

## 2022-01-13 DIAGNOSIS — I10 ESSENTIAL HYPERTENSION: ICD-10-CM

## 2022-01-13 DIAGNOSIS — E11.9 TYPE 2 DIABETES MELLITUS WITHOUT COMPLICATION, UNSPECIFIED WHETHER LONG TERM INSULIN USE: ICD-10-CM

## 2022-01-13 DIAGNOSIS — E78.00 HIGH CHOLESTEROL: ICD-10-CM

## 2022-01-13 LAB
ANION GAP SERPL CALCULATED.3IONS-SCNC: 9.8 MMOL/L (ref 5–15)
BUN SERPL-MCNC: 21 MG/DL (ref 8–23)
BUN/CREAT SERPL: 23.6 (ref 7–25)
CALCIUM SPEC-SCNC: 9.8 MG/DL (ref 8.6–10.5)
CHLORIDE SERPL-SCNC: 105 MMOL/L (ref 98–107)
CO2 SERPL-SCNC: 27.2 MMOL/L (ref 22–29)
CREAT SERPL-MCNC: 0.89 MG/DL (ref 0.57–1)
GFR SERPL CREATININE-BSD FRML MDRD: 63 ML/MIN/1.73
GLUCOSE SERPL-MCNC: 113 MG/DL (ref 65–99)
MAGNESIUM SERPL-MCNC: 2 MG/DL (ref 1.6–2.4)
POTASSIUM SERPL-SCNC: 4.5 MMOL/L (ref 3.5–5.2)
SODIUM SERPL-SCNC: 142 MMOL/L (ref 136–145)

## 2022-01-13 PROCEDURE — 83735 ASSAY OF MAGNESIUM: CPT

## 2022-01-13 PROCEDURE — 36415 COLL VENOUS BLD VENIPUNCTURE: CPT

## 2022-01-13 PROCEDURE — 80048 BASIC METABOLIC PNL TOTAL CA: CPT

## 2022-01-13 PROCEDURE — 99214 OFFICE O/P EST MOD 30 MIN: CPT | Performed by: NURSE PRACTITIONER

## 2022-01-13 RX ORDER — ASPIRIN 81 MG/1
81 TABLET ORAL DAILY
Qty: 90 TABLET | Refills: 3 | Status: SHIPPED | OUTPATIENT
Start: 2022-01-13 | End: 2022-03-02 | Stop reason: SDUPTHER

## 2022-01-13 NOTE — PROGRESS NOTES
Chief Complaint  Hypertension; Hyperlipidemia; and S/P aortic valve replacement, prosthetic    Subjective            Amelie Anderson presents to [facility] CARDIOLOGY  She is a 71-year-old white female with a history of prosthetic aortic valve.  Who comes in to evaluate her aortic valve, hypertension and hyperlipidemia.  Complains of intermittent episodes of shortness of breath when she is walking up a hill or walking fast.  This is something new for her.  She also complains of occasional dizziness with position changes but denies any syncope.  She complains of a lot of leg cramps particularly at night.Denies chest pain,  palpitations, swelling,  syncope, PND, and orthopnea.               Past History:    Past Medical History:   Diagnosis Date   • Abdominal aortic aneurysm (AAA) (HCC)    • Anemia    • Aortic stenosis    • AR (allergic rhinitis)    • Arthritis    • Bladder disorder    • Cataract     BILAT   • Depression with anxiety    • Diabetes (HCC)    • Elevated cholesterol    • Environmental allergies    • GERD (gastroesophageal reflux disease)    • Heel spur, right    • High cholesterol    • History of anesthesia complications     STATES WAS SLOW TO WAKE UP AFTER SURGERY   • History of TB (tuberculosis)     YEARS AGO WHEN FIRST CAME TO [facility], WAS TREATED FOR WITH INH   • Hypertension    • Hyperthyroidism    • Iron deficiency    • Kidney calculi    • Kidney cysts    • Kidney disease    • Lesion of bladder [id number]   • Microhematuria 09/25/2015   • Migraine    • Mitral valve prolapse    • Osteoporosis    • PONV (postoperative nausea and vomiting)    • Pulmonary nodule    • S/P AVR (aortic valve replacement) 3/4/2021     Normally functioning prosthetic aortic valve. on echo 5/20/2021   • Seasonal allergies    • Thyroid disorder    • Urinary frequency         Family History: family history includes Aortic stenosis in her mother and sister; Coronary artery disease in her father, paternal aunt, and  paternal uncle; Diabetes in an other family member; Heart attack in an other family member; Heart attack (age of onset: 30) in her father; Heart disease in an other family member; Valvular heart disease in her mother and sister.     Social History: reports that she quit smoking about 6 years ago. She started smoking about 54 years ago. She has a 48.00 pack-year smoking history. She has never used smokeless tobacco. She reports previous alcohol use. She reports that she does not use drugs.    Allergies: Patient has no known allergies.      Past Surgical History:   Procedure Laterality Date   • ABDOMINAL HYSTERECTOMY     • AORTIC VALVE REPAIR/REPLACEMENT N/A 2/2/2021    Procedure: VARUN STERNOTOMY AORTIC VALVE REPLACEMENT AND PRP;  Surgeon: Jesse Coronado MD;  Location: [facility];  Service: Cardiothoracic;  Laterality: N/A;   • BREAST BIOPSY     • CARDIAC CATHETERIZATION  11/17/2020    no stents   • CARDIAC CATHETERIZATION  1975    no stents   • CARDIAC SURGERY  2021    OPEN HEART SURGERY    • COLONOSCOPY  2016, 2021   • CYSTOSCOPY  02/25/2020 5/25/2020  WITH DR OSMAN  10/22/2015 WITH FULGURATION; CBF PER DAWSON   • HYSTERECTOMY     • TEETH EXTRACTION      ALL TEETH ON TOP, ALL BOTTOM TEETH EXCEPT 4   • TONSILLECTOMY            Current Outpatient Medications:   •  acetaminophen (TYLENOL) 500 MG tablet, Take 1 tablet by mouth Every 4 (Four) Hours As Needed for Mild Pain ., Disp:  , Rfl:   •  alendronate (Fosamax) 70 MG tablet, Take 1 tablet by mouth Every 7 (Seven) Days., Disp: 12 tablet, Rfl: 0  •  amitriptyline (ELAVIL) 25 MG tablet, Take 1 tablet by mouth Every Night., Disp: , Rfl:   •  [place] Thyroid 60 MG tablet, Take 1 tablet by mouth Daily., Disp: 90 tablet, Rfl: 1  •  atenolol (TENORMIN) 25 MG tablet, Take 1 tablet by mouth Daily., Disp: 90 tablet, Rfl: 1  •  atorvastatin (LIPITOR) 40 MG tablet, Take 40 mg by mouth Daily., Disp: , Rfl:   •  Biotin 10 MG tablet, Take 10 mg by mouth Daily., Disp: ,  Rfl:   •  Calcium Carbonate-Vitamin D (calcium-vitamin D) 500-200 MG-UNIT tablet per tablet, Take 1 tablet by mouth Daily., Disp: , Rfl:   •  chlorpheniramine (CHLOR-TRIMETON) 4 MG tablet, Take 1 tablet by mouth Every 6 (Six) Hours As Needed for Allergies., Disp: 90 tablet, Rfl: 0  •  FeroSul 325 (65 Fe) MG tablet, Take 1 tablet by mouth Daily., Disp: 90 tablet, Rfl: 1  •  fluticasone (FLONASE) 50 MCG/ACT nasal spray, fluticasone propionate 50 mcg/actuation nasal spray,suspension spray 2 sprays (100 mcg) in each nostril by intranasal route once daily as needed for 30 days 4/16/2021  Active, Disp: , Rfl:   •  FREESTYLE LITE test strip, Use as directed, Disp: 100 each, Rfl: 1  •  Insulin Pen Needle (Pen Needles) 31G X 6 MM misc, 1 each 4 (Four) Times a Day As Needed (with insulin)., Disp: 100 each, Rfl: 3  •  ketotifen (Zaditor) 0.025 % ophthalmic solution, 1 drop., Disp: , Rfl:   •  Lancets 30G misc, 1 each 4 (Four) Times a Day As Needed (for glucose testing)., Disp: 100 each, Rfl: 1  •  loratadine (CLARITIN) 10 MG tablet, Take 1 tablet by mouth Daily., Disp: 90 tablet, Rfl: 1  •  losartan (COZAAR) 25 MG tablet, Take 25 mg by mouth Daily., Disp: , Rfl:   •  metFORMIN ER (GLUCOPHAGE-XR) 500 MG 24 hr tablet, Take 1 tablet by mouth 2 (two) times a day., Disp: 180 tablet, Rfl: 2  •  multivitamin with minerals tablet tablet, Take 1 tablet by mouth Daily. HOLD FOR SURGERY, Disp: , Rfl:   •  Potassium 99 MG tablet, Take 99 mg by mouth Daily. OTC, FOR LEG CRAMPS, Disp: , Rfl:   •  sertraline (Zoloft) 50 MG tablet, Zoloft 50 mg oral tablet take 1 tablet (50 mg) by oral route once daily for 90 days 4/16/2021  Active, Disp: , Rfl:   •  vitamin B-6 (PYRIDOXINE) 50 MG tablet, Take 1 tablet by mouth Daily., Disp: 90 tablet, Rfl: 1  •  aspirin (aspirin) 81 MG EC tablet, Take 1 tablet by mouth Daily., Disp: 90 tablet, Rfl: 3  No current facility-administered medications for this visit.    Facility-Administered Medications Ordered in  "Other Visits:   •  Chlorhexidine Gluconate Cloth 2 % pads 1 application, 1 application, Topical, Q12H LISAN, Ne Acevedo APRN     Medications Discontinued During This Encounter   Medication Reason   • amoxicillin (AMOXIL) 500 MG capsule Historical Med - Therapy completed   • aspirin 325 MG tablet Dose adjustment        Review of Systems   Constitutional: Negative for fatigue.   Respiratory: Positive for shortness of breath.    Cardiovascular: Positive for palpitations. Negative for chest pain and leg swelling.   Neurological: Positive for dizziness (occasional).   All other systems reviewed and are negative.       Objective     Physical Exam  Constitutional:       General: She is not in acute distress.     Appearance: Normal appearance. She is normal weight.   Neck:      Vascular: No carotid bruit.   Cardiovascular:      Rate and Rhythm: Normal rate and regular rhythm.      Chest Wall: PMI is displaced.      Heart sounds: Murmur (Faint murmur at the base) heard.       Pulmonary:      Effort: Pulmonary effort is normal.      Breath sounds: Normal breath sounds.   Musculoskeletal:      Right lower leg: No edema.      Left lower leg: No edema.   Neurological:      Mental Status: She is alert.       /52   Pulse 59   Ht 162.6 cm (64\")   Wt 71.7 kg (158 lb)   BMI 27.12 kg/m²       Vitals:    01/13/22 0845   BP: 134/52   Pulse: 59       Result Review :         The following data was reviewed by: ERNIE Sotelo on 01/13/2022:      Lab Results   Component Value Date    PROBNP 92.1 01/29/2021     02/24/2021     11/17/2020     CMP    CMP 4/14/21 8/2/21 11/29/21   Glucose 129 (A) 119 (A) 120 (A)   BUN 21 21 21   Creatinine 1.08 (A) 1.00 1.10 (A)   eGFR Non  Am  55 (A) 49 (A)   Sodium 142 137 139   Potassium 5.1 4.9 4.6   Chloride 103 101 104   Calcium 9.9 10.3 10.0   Albumin 4.3 4.70 4.90   Total Bilirubin 0.32 0.5 0.5   Alkaline Phosphatase 91 92 84   AST (SGOT) 23 18 22   ALT (SGPT) " 19 18 18   (A) Abnormal value            CBC w/diff    CBC w/Diff 2/24/21 8/2/21 11/29/21   WBC 7.71 5.38 5.98   RBC 3.55 (A) 4.48 4.35   Hemoglobin 10.1 (A) 12.8 13.0   Hematocrit 32.0 (A) 38.3 39.3   MCV 90.1 85.5 90.3   MCH 28.5 28.6 29.9   MCHC 31.6 (A) 33.4 33.1   RDW 13.7 15.3 11.9 (A)   Platelets 361 188 211   Neutrophil Rel % 67.9 62.4 62.4   Immature Granulocyte Rel %  0.2 0.3   Lymphocyte Rel % 20.4 26.6 25.4   Monocyte Rel % 9.3 8.2 9.7   Eosinophil Rel % 1.6 2.2 1.7   Basophil Rel % 0.5 0.4 0.5   (A) Abnormal value             Lipid Panel    Lipid Panel 4/14/21 8/2/21 11/29/21   Total Cholesterol  129 119   Total Cholesterol 130     Triglycerides 97 118 63   HDL Cholesterol 67 (A) 56 59   VLDL Cholesterol 19 21 14   LDL Cholesterol  44 (A) 52 46   LDL/HDL Ratio  0.88 0.80   (A) Abnormal value       Comments are available for some flowsheets but are not being displayed.            Lab Results   Component Value Date    TSH 1.610 11/29/2021    TSH 1.310 04/14/2021    TSH 2.600 01/07/2021      Lab Results   Component Value Date    FREET4 0.9 04/14/2021    FREET4 0.8 (L) 01/07/2021    FREET4 0.8 (L) 11/17/2020      No results found for: DDIMERQUANT  Magnesium   Date Value Ref Range Status   02/06/2021 2.1 1.6 - 2.4 mg/dL Final      No results found for: DIGOXIN   A1C Last 3 Results    HGBA1C Last 3 Results 4/14/21 8/2/21 11/29/21   Hemoglobin A1C 7.0 (A) 7.51 (A) 7.02 (A)   (A) Abnormal value       Comments are available for some flowsheets but are not being displayed.                                Assessment and Plan        Diagnoses and all orders for this visit:    1. Essential hypertension (Primary)  Assessment & Plan:  Controlled.  Continue losartan 25 mg and atenolol 25 mg.  She is on aspirin for secondary prevention.  But instructed to decrease it to 81 mg a day    Orders:  -     Basic Metabolic Panel; Future  -     Magnesium; Future    2. S/P AVR (aortic valve replacement)  Assessment &  Plan:  Complaining of increasing shortness of breath.  We will do another echocardiogram to evaluate her shortness of breath and her aortic valve.    Orders:  -     Adult Transthoracic Echo Complete W/ Cont if Necessary Per Protocol; Future    3. Type 2 diabetes mellitus without complication, unspecified whether long term insulin use (HCC)  Assessment & Plan:  Controlled.  Monitor by PCP    Orders:  -     aspirin (aspirin) 81 MG EC tablet; Take 1 tablet by mouth Daily.  Dispense: 90 tablet; Refill: 3    4. High cholesterol  Assessment & Plan:  At goal for risk status.  However she is complaining of leg cramps so instructed to this stop atorvastatin for 2 weeks and then restarted again.  She is going let us know if cramps resolve when she is off the statin.  We will also check a BMP and a magnesium level              Follow Up     Return in about 6 months (around 7/13/2022).    Patient was given instructions and counseling regarding her condition or for health maintenance advice. Please see specific information pulled into the AVS if appropriate.       ERNIE Luevano  01/13/22 08:48 EST

## 2022-01-13 NOTE — ASSESSMENT & PLAN NOTE
Controlled.  Continue losartan 25 mg and atenolol 25 mg.  She is on aspirin for secondary prevention.  But instructed to decrease it to 81 mg a day

## 2022-01-13 NOTE — ASSESSMENT & PLAN NOTE
Complaining of increasing shortness of breath.  We will do another echocardiogram to evaluate her shortness of breath and her aortic valve.

## 2022-01-13 NOTE — PATIENT INSTRUCTIONS
In 1 week stop atorvastatin for 2 weeks.  Then restart it.  Let me know if cramps go away while you are off the medicine and then come back again when you restarted.

## 2022-01-13 NOTE — ASSESSMENT & PLAN NOTE
At goal for risk status.  However she is complaining of leg cramps so instructed to this stop atorvastatin for 2 weeks and then restarted again.  She is going let us know if cramps resolve when she is off the statin.  We will also check a BMP and a magnesium level

## 2022-01-17 ENCOUNTER — TELEPHONE (OUTPATIENT)
Dept: CARDIOLOGY | Facility: CLINIC | Age: 72
End: 2022-01-17

## 2022-01-17 NOTE — TELEPHONE ENCOUNTER
----- Message from ERNIE Sotelo sent at 1/16/2022  2:04 PM EST -----  Kidney function and magnesium is normal.  Continue current medications

## 2022-01-26 DIAGNOSIS — E11.9 TYPE 2 DIABETES MELLITUS WITHOUT COMPLICATION, UNSPECIFIED WHETHER LONG TERM INSULIN USE: ICD-10-CM

## 2022-01-26 DIAGNOSIS — J30.9 ALLERGIC RHINITIS, UNSPECIFIED SEASONALITY, UNSPECIFIED TRIGGER: ICD-10-CM

## 2022-01-26 RX ORDER — THYROID 60 MG
60 TABLET ORAL DAILY
Qty: 90 TABLET | Refills: 1 | Status: SHIPPED | OUTPATIENT
Start: 2022-01-26 | End: 2022-09-29 | Stop reason: SDUPTHER

## 2022-01-26 RX ORDER — KETOTIFEN FUMARATE 0.35 MG/ML
1 SOLUTION/ DROPS OPHTHALMIC 2 TIMES DAILY
Qty: 5 ML | Refills: 2 | Status: SHIPPED | OUTPATIENT
Start: 2022-01-26 | End: 2023-01-20

## 2022-01-26 RX ORDER — ASPIRIN 81 MG/1
81 TABLET ORAL DAILY
Qty: 90 TABLET | Refills: 3 | OUTPATIENT
Start: 2022-01-26

## 2022-01-26 RX ORDER — AMITRIPTYLINE HYDROCHLORIDE 25 MG/1
25 TABLET, FILM COATED ORAL NIGHTLY
Qty: 90 TABLET | Refills: 1 | Status: SHIPPED | OUTPATIENT
Start: 2022-01-26 | End: 2022-09-08 | Stop reason: SDUPTHER

## 2022-01-26 RX ORDER — ATENOLOL 25 MG/1
25 TABLET ORAL DAILY
Qty: 90 TABLET | Refills: 1 | Status: SHIPPED | OUTPATIENT
Start: 2022-01-26 | End: 2022-09-08 | Stop reason: SDUPTHER

## 2022-01-26 NOTE — TELEPHONE ENCOUNTER
Caller: Anderson, Amelie L    Relationship: Self    Best call back number:[phone]      Requested Prescriptions:   Requested Prescriptions     Pending Prescriptions Disp Refills   • Calcium Carbonate-Vitamin D (calcium-vitamin D) 500-200 MG-UNIT tablet per tablet       Sig: Take 1 tablet by mouth Daily.   • aspirin (aspirin) 81 MG EC tablet 90 tablet 3     Sig: Take 1 tablet by mouth Daily.   • amitriptyline (ELAVIL) 25 MG tablet       Sig: Take 1 tablet by mouth Every Night.   • [place] Thyroid 60 MG tablet 90 tablet 1     Sig: Take 1 tablet by mouth Daily.   • atenolol (TENORMIN) 25 MG tablet 90 tablet 1     Sig: Take 1 tablet by mouth Daily.   • ketotifen (Zaditor) 0.025 % ophthalmic solution       Si drop.        Pharmacy where request should be sent: [facility] [place], [place]  [address] [facility] - [phone]  - [phone] FX     Additional details provided by patient:    Does the patient have less than a 3 day supply:  [] Yes  [x] No

## 2022-02-10 ENCOUNTER — TELEPHONE (OUTPATIENT)
Dept: CARDIOLOGY | Facility: CLINIC | Age: 72
End: 2022-02-10

## 2022-02-10 NOTE — TELEPHONE ENCOUNTER
----- Message from ERNIE Sotelo sent at 2/10/2022  1:02 PM EST -----  Echo was read and valve is normal.  If shortness of breath persists please let us know

## 2022-03-02 ENCOUNTER — OFFICE VISIT (OUTPATIENT)
Dept: INTERNAL MEDICINE | Facility: CLINIC | Age: 72
End: 2022-03-02

## 2022-03-02 VITALS
HEART RATE: 70 BPM | WEIGHT: 159 LBS | SYSTOLIC BLOOD PRESSURE: 120 MMHG | RESPIRATION RATE: 15 BRPM | BODY MASS INDEX: 27.14 KG/M2 | TEMPERATURE: 97 F | HEIGHT: 64 IN | DIASTOLIC BLOOD PRESSURE: 70 MMHG | OXYGEN SATURATION: 97 %

## 2022-03-02 DIAGNOSIS — E11.9 TYPE 2 DIABETES MELLITUS WITHOUT COMPLICATION, UNSPECIFIED WHETHER LONG TERM INSULIN USE: ICD-10-CM

## 2022-03-02 DIAGNOSIS — Z00.00 MEDICARE ANNUAL WELLNESS VISIT, SUBSEQUENT: Primary | ICD-10-CM

## 2022-03-02 DIAGNOSIS — E03.9 HYPOTHYROIDISM, UNSPECIFIED TYPE: ICD-10-CM

## 2022-03-02 DIAGNOSIS — E78.00 HIGH CHOLESTEROL: ICD-10-CM

## 2022-03-02 DIAGNOSIS — J30.9 ALLERGIC RHINITIS, UNSPECIFIED SEASONALITY, UNSPECIFIED TRIGGER: ICD-10-CM

## 2022-03-02 DIAGNOSIS — R41.3 MEMORY CHANGES: ICD-10-CM

## 2022-03-02 DIAGNOSIS — E61.1 IRON DEFICIENCY: ICD-10-CM

## 2022-03-02 DIAGNOSIS — I10 ESSENTIAL HYPERTENSION: ICD-10-CM

## 2022-03-02 LAB
ALBUMIN SERPL-MCNC: 4.7 G/DL (ref 3.5–5.2)
ALBUMIN/GLOB SERPL: 1.6 G/DL
ALP SERPL-CCNC: 74 U/L (ref 39–117)
ALT SERPL W P-5'-P-CCNC: 18 U/L (ref 1–33)
ANION GAP SERPL CALCULATED.3IONS-SCNC: 12.6 MMOL/L (ref 5–15)
AST SERPL-CCNC: 21 U/L (ref 1–32)
BASOPHILS # BLD AUTO: 0.03 10*3/MM3 (ref 0–0.2)
BASOPHILS NFR BLD AUTO: 0.5 % (ref 0–1.5)
BILIRUB SERPL-MCNC: 0.5 MG/DL (ref 0–1.2)
BUN SERPL-MCNC: 17 MG/DL (ref 8–23)
BUN/CREAT SERPL: 14.8 (ref 7–25)
CALCIUM SPEC-SCNC: 10.5 MG/DL (ref 8.6–10.5)
CHLORIDE SERPL-SCNC: 103 MMOL/L (ref 98–107)
CHOLEST SERPL-MCNC: 129 MG/DL (ref 0–200)
CO2 SERPL-SCNC: 26.4 MMOL/L (ref 22–29)
CREAT SERPL-MCNC: 1.15 MG/DL (ref 0.57–1)
DEPRECATED RDW RBC AUTO: 42.5 FL (ref 37–54)
EGFRCR SERPLBLD CKD-EPI 2021: 51.7 ML/MIN/1.73
EOSINOPHIL # BLD AUTO: 0.11 10*3/MM3 (ref 0–0.4)
EOSINOPHIL NFR BLD AUTO: 2 % (ref 0.3–6.2)
ERYTHROCYTE [DISTWIDTH] IN BLOOD BY AUTOMATED COUNT: 13.1 % (ref 12.3–15.4)
FERRITIN SERPL-MCNC: 39.8 NG/ML (ref 13–150)
GLOBULIN UR ELPH-MCNC: 2.9 GM/DL
GLUCOSE SERPL-MCNC: 115 MG/DL (ref 65–99)
HBA1C MFR BLD: 7.1 % (ref 4.8–5.6)
HCT VFR BLD AUTO: 38.7 % (ref 34–46.6)
HDLC SERPL-MCNC: 63 MG/DL (ref 40–60)
HGB BLD-MCNC: 12.7 G/DL (ref 12–15.9)
IMM GRANULOCYTES # BLD AUTO: 0.02 10*3/MM3 (ref 0–0.05)
IMM GRANULOCYTES NFR BLD AUTO: 0.4 % (ref 0–0.5)
IRON 24H UR-MRATE: 113 MCG/DL (ref 37–145)
IRON SATN MFR SERPL: 27 % (ref 20–50)
LDLC SERPL CALC-MCNC: 51 MG/DL (ref 0–100)
LDLC/HDLC SERPL: 0.81 {RATIO}
LYMPHOCYTES # BLD AUTO: 1.45 10*3/MM3 (ref 0.7–3.1)
LYMPHOCYTES NFR BLD AUTO: 26.3 % (ref 19.6–45.3)
MCH RBC QN AUTO: 29.2 PG (ref 26.6–33)
MCHC RBC AUTO-ENTMCNC: 32.8 G/DL (ref 31.5–35.7)
MCV RBC AUTO: 89 FL (ref 79–97)
MONOCYTES # BLD AUTO: 0.46 10*3/MM3 (ref 0.1–0.9)
MONOCYTES NFR BLD AUTO: 8.3 % (ref 5–12)
NEUTROPHILS NFR BLD AUTO: 3.44 10*3/MM3 (ref 1.7–7)
NEUTROPHILS NFR BLD AUTO: 62.5 % (ref 42.7–76)
NRBC BLD AUTO-RTO: 0 /100 WBC (ref 0–0.2)
PLATELET # BLD AUTO: 190 10*3/MM3 (ref 140–450)
PMV BLD AUTO: 10.3 FL (ref 6–12)
POTASSIUM SERPL-SCNC: 4.6 MMOL/L (ref 3.5–5.2)
PROT SERPL-MCNC: 7.6 G/DL (ref 6–8.5)
RBC # BLD AUTO: 4.35 10*6/MM3 (ref 3.77–5.28)
SODIUM SERPL-SCNC: 142 MMOL/L (ref 136–145)
TIBC SERPL-MCNC: 420 MCG/DL (ref 298–536)
TRANSFERRIN SERPL-MCNC: 282 MG/DL (ref 200–360)
TRIGL SERPL-MCNC: 74 MG/DL (ref 0–150)
TSH SERPL DL<=0.05 MIU/L-ACNC: 3.37 UIU/ML (ref 0.27–4.2)
VLDLC SERPL-MCNC: 15 MG/DL (ref 5–40)
WBC NRBC COR # BLD: 5.51 10*3/MM3 (ref 3.4–10.8)

## 2022-03-02 PROCEDURE — 80061 LIPID PANEL: CPT | Performed by: PHYSICIAN ASSISTANT

## 2022-03-02 PROCEDURE — 83540 ASSAY OF IRON: CPT | Performed by: PHYSICIAN ASSISTANT

## 2022-03-02 PROCEDURE — 1170F FXNL STATUS ASSESSED: CPT | Performed by: PHYSICIAN ASSISTANT

## 2022-03-02 PROCEDURE — 80053 COMPREHEN METABOLIC PANEL: CPT | Performed by: PHYSICIAN ASSISTANT

## 2022-03-02 PROCEDURE — 99214 OFFICE O/P EST MOD 30 MIN: CPT | Performed by: PHYSICIAN ASSISTANT

## 2022-03-02 PROCEDURE — 82728 ASSAY OF FERRITIN: CPT | Performed by: PHYSICIAN ASSISTANT

## 2022-03-02 PROCEDURE — 84466 ASSAY OF TRANSFERRIN: CPT | Performed by: PHYSICIAN ASSISTANT

## 2022-03-02 PROCEDURE — 36415 COLL VENOUS BLD VENIPUNCTURE: CPT | Performed by: PHYSICIAN ASSISTANT

## 2022-03-02 PROCEDURE — 84443 ASSAY THYROID STIM HORMONE: CPT | Performed by: PHYSICIAN ASSISTANT

## 2022-03-02 PROCEDURE — 85025 COMPLETE CBC W/AUTO DIFF WBC: CPT | Performed by: PHYSICIAN ASSISTANT

## 2022-03-02 PROCEDURE — 83036 HEMOGLOBIN GLYCOSYLATED A1C: CPT | Performed by: PHYSICIAN ASSISTANT

## 2022-03-02 PROCEDURE — G0439 PPPS, SUBSEQ VISIT: HCPCS | Performed by: PHYSICIAN ASSISTANT

## 2022-03-02 PROCEDURE — 1126F AMNT PAIN NOTED NONE PRSNT: CPT | Performed by: PHYSICIAN ASSISTANT

## 2022-03-02 PROCEDURE — 1159F MED LIST DOCD IN RCRD: CPT | Performed by: PHYSICIAN ASSISTANT

## 2022-03-02 RX ORDER — LORATADINE 10 MG/1
10 TABLET ORAL DAILY
Qty: 90 TABLET | Refills: 1 | Status: SHIPPED | OUTPATIENT
Start: 2022-03-02 | End: 2022-10-05 | Stop reason: SDUPTHER

## 2022-03-02 RX ORDER — LANOLIN ALCOHOL/MO/W.PET/CERES
50 CREAM (GRAM) TOPICAL DAILY
Qty: 90 TABLET | Refills: 1 | Status: SHIPPED | OUTPATIENT
Start: 2022-03-02 | End: 2022-10-05 | Stop reason: SDUPTHER

## 2022-03-02 RX ORDER — CHLORPHENIRAMINE MALEATE 4 MG/1
4 TABLET ORAL EVERY 6 HOURS PRN
Qty: 360 TABLET | Refills: 1 | Status: SHIPPED | OUTPATIENT
Start: 2022-03-02 | End: 2023-03-23 | Stop reason: SDUPTHER

## 2022-03-02 RX ORDER — FERROUS SULFATE 325(65) MG
1 TABLET ORAL DAILY
Qty: 90 TABLET | Refills: 1 | Status: SHIPPED | OUTPATIENT
Start: 2022-03-02 | End: 2022-10-05 | Stop reason: SDUPTHER

## 2022-03-02 RX ORDER — METFORMIN HYDROCHLORIDE 500 MG/1
500 TABLET, EXTENDED RELEASE ORAL 2 TIMES DAILY
Qty: 180 TABLET | Refills: 1 | Status: SHIPPED | OUTPATIENT
Start: 2022-03-02 | End: 2022-09-08 | Stop reason: SDUPTHER

## 2022-03-02 RX ORDER — ASPIRIN 81 MG/1
81 TABLET ORAL DAILY
Qty: 90 TABLET | Refills: 3 | Status: SHIPPED | OUTPATIENT
Start: 2022-03-02 | End: 2022-06-02 | Stop reason: SDUPTHER

## 2022-03-02 NOTE — ASSESSMENT & PLAN NOTE
Reviewed preventative medication recommendations that are age appropriate for the patient. Education provided for health and wellness. Given handout on Advanced Directive.

## 2022-03-02 NOTE — PROGRESS NOTES
The ABCs of the Annual Wellness Visit  Subsequent Medicare Wellness Visit    Chief Complaint   Patient presents with   • Diabetes   • Hyperlipidemia   • Hypertension   • Hypothyroidism      Subjective    History of Present Illness:  Amelie Anderson is a 71 y.o. female who presents for a Subsequent Medicare Wellness Visit.    The following portions of the patient's history were reviewed and   updated as appropriate: allergies, current medications, past family history, past medical history, past social history, past surgical history and problem list.    Compared to one year ago, the patient feels her physical   health is the same.    Compared to one year ago, the patient feels her mental   health is the same.    Recent Hospitalizations:  She was not admitted to the hospital during the last year.       Current Medical Providers:  Patient Care Team:  Fabi Bill MD as PCP - General (Pediatrics)  Ant Pederson MD as Surgeon (Orthopedic Surgery)  Jason Perrin MD as Consulting Physician (Nephrology)  Dorothy Aldana MD (Urology)    Outpatient Medications Prior to Visit   Medication Sig Dispense Refill   • acetaminophen (TYLENOL) 500 MG tablet Take 1 tablet by mouth Every 4 (Four) Hours As Needed for Mild Pain .     • alendronate (Fosamax) 70 MG tablet Take 1 tablet by mouth Every 7 (Seven) Days. 12 tablet 0   • amitriptyline (ELAVIL) 25 MG tablet Take 1 tablet by mouth Every Night. 90 tablet 1   • [place] Thyroid 60 MG tablet Take 1 tablet by mouth Daily. 90 tablet 1   • atenolol (TENORMIN) 25 MG tablet Take 1 tablet by mouth Daily. 90 tablet 1   • atorvastatin (LIPITOR) 40 MG tablet Take 40 mg by mouth Daily.     • Biotin 10 MG tablet Take 10 mg by mouth Daily.     • Calcium Carbonate-Vitamin D (calcium-vitamin D) 500-200 MG-UNIT tablet per tablet Take 1 tablet by mouth Daily. 90 tablet 3   • fluticasone (FLONASE) 50 MCG/ACT nasal spray fluticasone propionate 50 mcg/actuation nasal spray,suspension  spray 2 sprays (100 mcg) in each nostril by intranasal route once daily as needed for 30 days 4/16/2021  Active     • FREESTYLE LITE test strip Use as directed 100 each 1   • Insulin Pen Needle (Pen Needles) 31G X 6 MM misc 1 each 4 (Four) Times a Day As Needed (with insulin). 100 each 3   • ketotifen (Zaditor) 0.025 % ophthalmic solution Administer 1 drop to both eyes 2 (Two) Times a Day. 5 mL 2   • Lancets 30G misc 1 each 4 (Four) Times a Day As Needed (for glucose testing). 100 each 1   • losartan (COZAAR) 25 MG tablet Take 25 mg by mouth Daily.     • multivitamin with minerals tablet tablet Take 1 tablet by mouth Daily. HOLD FOR SURGERY     • Potassium 99 MG tablet Take 99 mg by mouth Daily. OTC, FOR LEG CRAMPS     • sertraline (Zoloft) 50 MG tablet Zoloft 50 mg oral tablet take 1 tablet (50 mg) by oral route once daily for 90 days 4/16/2021  Active     • aspirin (aspirin) 81 MG EC tablet Take 1 tablet by mouth Daily. 90 tablet 3   • chlorpheniramine (CHLOR-TRIMETON) 4 MG tablet Take 1 tablet by mouth Every 6 (Six) Hours As Needed for Allergies. 90 tablet 0   • FeroSul 325 (65 Fe) MG tablet Take 1 tablet by mouth Daily. 90 tablet 1   • loratadine (CLARITIN) 10 MG tablet Take 1 tablet by mouth Daily. 90 tablet 1   • metFORMIN ER (GLUCOPHAGE-XR) 500 MG 24 hr tablet Take 1 tablet by mouth 2 (two) times a day. 180 tablet 2   • vitamin B-6 (PYRIDOXINE) 50 MG tablet Take 1 tablet by mouth Daily. 90 tablet 1   • Calcium Carb-Cholecalciferol (calcium-vitamin D) 500-200 MG-UNIT tablet per tablet        Facility-Administered Medications Prior to Visit   Medication Dose Route Frequency Provider Last Rate Last Admin   • Chlorhexidine Gluconate Cloth 2 % pads 1 application  1 application Topical Q12H PRN Ne Acevedo APRN           No opioid medication identified on active medication list. I have reviewed chart for other potential  high risk medication/s and harmful drug interactions in the elderly.          Aspirin is  "on active medication list. Aspirin use is not indicated based on review of current medical condition/s. Risk of harm outweighs potential benefits. Patient instructed to discontinue this medication.  .      Patient Active Problem List   Diagnosis   • Essential hypertension   • Aortic valve stenosis   • S/P AVR (aortic valve replacement)   • Hypothyroidism   • Type 2 diabetes mellitus without complication (HCC)   • Pulmonary nodule   • High cholesterol   • Medicare annual wellness visit, subsequent     Advance Care Planning  Advance Directive is not on file.  ACP discussion was held with the patient during this visit. Patient does not have an advance directive, information provided.    DM: bg running around .   Denies low bg, dizziness, shakiness    HTN: Denies cp, palpitations, ha, dizziness    Allergies: doing well on current meds    Iron def: taking ferritin daily    Memory issues: pt has noticed she is having difficulty with short term memory   Her mom had dementia  She states it is not affecting daily quality of life               Objective    Vitals:    03/02/22 0911   BP: 120/70   Pulse: 70   Resp: 15   Temp: 97 °F (36.1 °C)   SpO2: 97%   Weight: 72.1 kg (159 lb)   Height: 162.6 cm (64\")   PainSc: 0-No pain     BMI Readings from Last 1 Encounters:   03/02/22 27.29 kg/m²   BMI is above normal parameters. Recommendations include: exercise counseling    Does the patient have evidence of cognitive impairment? Yes    Physical Exam  Vitals reviewed.   Constitutional:       Appearance: Normal appearance.   HENT:      Head: Normocephalic and atraumatic.      Nose: Nose normal.      Mouth/Throat:      Mouth: Mucous membranes are moist.   Eyes:      Extraocular Movements: Extraocular movements intact.      Conjunctiva/sclera: Conjunctivae normal.      Pupils: Pupils are equal, round, and reactive to light.   Cardiovascular:      Rate and Rhythm: Normal rate and regular rhythm.   Pulmonary:      Effort: Pulmonary " effort is normal.      Breath sounds: Normal breath sounds.   Abdominal:      General: Abdomen is flat. Bowel sounds are normal.      Palpations: Abdomen is soft.   Musculoskeletal:         General: Normal range of motion.   Neurological:      General: No focal deficit present.      Mental Status: She is alert and oriented to person, place, and time.   Psychiatric:         Mood and Affect: Mood normal.                 HEALTH RISK ASSESSMENT    Smoking Status:  Social History     Tobacco Use   Smoking Status Former Smoker   • Packs/day: 1.00   • Years: 48.00   • Pack years: 48.00   • Start date:    • Quit date:    • Years since quittin.1   Smokeless Tobacco Never Used     Alcohol Consumption:  Social History     Substance and Sexual Activity   Alcohol Use Not Currently     Fall Risk Screen:    [facility] Fall Risk Assessment was completed, and patient is at LOW risk for falls.Assessment completed on:3/2/2022    Depression Screening:  PHQ-2/PHQ-9 Depression Screening 3/2/2022   Little interest or pleasure in doing things 0   Feeling down, depressed, or hopeless 0   Total Score 0       Health Habits and Functional and Cognitive Screening:  Functional & Cognitive Status 3/2/2022   Do you have difficulty preparing food and eating? No   Do you have difficulty bathing yourself, getting dressed or grooming yourself? No   Do you have difficulty using the toilet? No   Do you have difficulty moving around from place to place? No   Do you have trouble with steps or getting out of a bed or a chair? No   Current Diet Well Balanced Diet   Dental Exam Up to date   Eye Exam Up to date   Exercise (times per week) 7 times per week   Current Exercises Include Walking   Do you need help using the phone?  No   Are you deaf or do you have serious difficulty hearing?  No   Do you need help with transportation? No   Do you need help shopping? No   Do you need help preparing meals?  No   Do you need help with housework?  No   Do you  need help with laundry? No   Do you need help taking your medications? No   Do you need help managing money? No   Do you ever drive or ride in a car without wearing a seat belt? No   Have you felt unusual stress, anger or loneliness in the last month? No   Who do you live with? Spouse   If you need help, do you have trouble finding someone available to you? No   Have you been bothered in the last four weeks by sexual problems? No   Do you have difficulty concentrating, remembering or making decisions? No       Age-appropriate Screening Schedule:  Refer to the list below for future screening recommendations based on patient's age, sex and/or medical conditions. Orders for these recommended tests are listed in the plan section. The patient has been provided with a written plan.    Health Maintenance   Topic Date Due   • TDAP/TD VACCINES (1 - Tdap) Never done   • ZOSTER VACCINE (1 of 2) Never done   • DIABETIC FOOT EXAM  12/03/2020   • DIABETIC EYE EXAM  Never done   • HEMOGLOBIN A1C  05/29/2022   • URINE MICROALBUMIN  08/02/2022   • LIPID PANEL  11/29/2022   • MAMMOGRAM  10/08/2023   • DXA SCAN  10/08/2023   • INFLUENZA VACCINE  Completed              Assessment/Plan   CMS Preventative Services Quick Reference  Risk Factors Identified During Encounter  Cardiovascular Disease  Dementia/Memory   The above risks/problems have been discussed with the patient.  Follow up actions/plans if indicated are seen below in the Assessment/Plan Section.  Pertinent information has been shared with the patient in the After Visit Summary.    Diagnoses and all orders for this visit:    1. Medicare annual wellness visit, subsequent (Primary)  Assessment & Plan:  Reviewed preventative medication recommendations that are age appropriate for the patient. Education provided for health and wellness. Given handout on Advanced Directive.      2. Type 2 diabetes mellitus without complication, unspecified whether long term insulin use  (HCC)  Assessment & Plan:  Labs today, will adjust medication based on results.      Orders:  -     Hemoglobin A1c    3. Allergic rhinitis, unspecified seasonality, unspecified trigger  Comments:  Stable, cont current meds    4. Essential hypertension  Assessment & Plan:  Hypertension is improving with treatment.  Continue current treatment regimen.  Blood pressure will be reassessed at the next regular appointment.    Orders:  -     Comprehensive Metabolic Panel  -     CBC & Differential  -     TSH    5. High cholesterol  Assessment & Plan:  Labs today, will adjust medication based on results.      Orders:  -     Lipid Panel    6. Hypothyroidism, unspecified type  Assessment & Plan:  Labs today, will adjust medication based on results.      Orders:  -     TSH    7. Iron deficiency  Comments:  Labs today to eval  Orders:  -     CBC & Differential  -     Ferritin  -     Iron Profile    8. Memory changes  Comments:  Discussed memory issues with pt. She declines medicine or further workup at this time. She will let us know if sx worsen or affecing qol    Other orders  -     metFORMIN ER (GLUCOPHAGE-XR) 500 MG 24 hr tablet; Take 1 tablet by mouth 2 (Two) Times a Day.  Dispense: 180 tablet; Refill: 1  -     aspirin (aspirin) 81 MG EC tablet; Take 1 tablet by mouth Daily.  Dispense: 90 tablet; Refill: 3  -     loratadine (CLARITIN) 10 MG tablet; Take 1 tablet by mouth Daily.  Dispense: 90 tablet; Refill: 1  -     FeroSul 325 (65 Fe) MG tablet; Take 1 tablet by mouth Daily.  Dispense: 90 tablet; Refill: 1  -     vitamin B-6 (PYRIDOXINE) 50 MG tablet; Take 1 tablet by mouth Daily.  Dispense: 90 tablet; Refill: 1  -     chlorpheniramine (CHLOR-TRIMETON) 4 MG tablet; Take 1 tablet by mouth Every 6 (Six) Hours As Needed for Allergies.  Dispense: 360 tablet; Refill: 1      Follow Up:   Return in about 3 months (around 6/2/2022) for Follow Up with Dr. Bill.     An After Visit Summary and PPPS were made available to the  patient.

## 2022-03-07 ENCOUNTER — TELEPHONE (OUTPATIENT)
Dept: INTERNAL MEDICINE | Facility: CLINIC | Age: 72
End: 2022-03-07

## 2022-06-02 ENCOUNTER — OFFICE VISIT (OUTPATIENT)
Dept: INTERNAL MEDICINE | Facility: CLINIC | Age: 72
End: 2022-06-02

## 2022-06-02 VITALS
HEART RATE: 66 BPM | HEIGHT: 64 IN | RESPIRATION RATE: 18 BRPM | TEMPERATURE: 97.7 F | SYSTOLIC BLOOD PRESSURE: 112 MMHG | OXYGEN SATURATION: 98 % | BODY MASS INDEX: 27.04 KG/M2 | WEIGHT: 158.4 LBS | DIASTOLIC BLOOD PRESSURE: 70 MMHG

## 2022-06-02 DIAGNOSIS — E78.00 HIGH CHOLESTEROL: ICD-10-CM

## 2022-06-02 DIAGNOSIS — E11.9 TYPE 2 DIABETES MELLITUS WITHOUT COMPLICATION, WITHOUT LONG-TERM CURRENT USE OF INSULIN: Primary | ICD-10-CM

## 2022-06-02 DIAGNOSIS — Z11.59 NEED FOR HEPATITIS C SCREENING TEST: ICD-10-CM

## 2022-06-02 DIAGNOSIS — I10 ESSENTIAL HYPERTENSION: ICD-10-CM

## 2022-06-02 PROBLEM — J30.9 ALLERGIC RHINITIS: Status: ACTIVE | Noted: 2022-06-02

## 2022-06-02 PROBLEM — M19.90 ARTHRITIS: Status: ACTIVE | Noted: 2022-06-02

## 2022-06-02 PROBLEM — F41.8 DEPRESSION WITH ANXIETY: Status: ACTIVE | Noted: 2022-06-02

## 2022-06-02 PROBLEM — I34.1 MITRAL VALVE PROLAPSE: Status: ACTIVE | Noted: 2022-06-02

## 2022-06-02 PROBLEM — M81.0 OSTEOPOROSIS: Status: ACTIVE | Noted: 2017-09-15

## 2022-06-02 PROBLEM — R91.8 LUNG MASS: Status: ACTIVE | Noted: 2022-06-02

## 2022-06-02 PROBLEM — I51.9 HEART DISEASE: Status: ACTIVE | Noted: 2022-06-02

## 2022-06-02 PROBLEM — N20.0 KIDNEY CALCULI: Status: ACTIVE | Noted: 2022-06-02

## 2022-06-02 PROBLEM — M77.31 HEEL SPUR, RIGHT: Status: ACTIVE | Noted: 2022-06-02

## 2022-06-02 PROBLEM — I71.40 ABDOMINAL AORTIC ANEURYSM (HCC): Status: ACTIVE | Noted: 2022-06-02

## 2022-06-02 PROBLEM — G43.909 MIGRAINE: Status: ACTIVE | Noted: 2022-06-02

## 2022-06-02 PROBLEM — N32.9 BLADDER DISORDER: Status: ACTIVE | Noted: 2022-06-02

## 2022-06-02 PROBLEM — N28.9 KIDNEY DISEASE: Status: ACTIVE | Noted: 2022-06-02

## 2022-06-02 LAB
ALBUMIN SERPL-MCNC: 5 G/DL (ref 3.5–5.2)
ALBUMIN/GLOB SERPL: 2 G/DL
ALP SERPL-CCNC: 87 U/L (ref 39–117)
ALT SERPL W P-5'-P-CCNC: 19 U/L (ref 1–33)
ANION GAP SERPL CALCULATED.3IONS-SCNC: 10.6 MMOL/L (ref 5–15)
AST SERPL-CCNC: 21 U/L (ref 1–32)
BASOPHILS # BLD AUTO: 0.03 10*3/MM3 (ref 0–0.2)
BASOPHILS NFR BLD AUTO: 0.6 % (ref 0–1.5)
BILIRUB SERPL-MCNC: 0.8 MG/DL (ref 0–1.2)
BUN SERPL-MCNC: 33 MG/DL (ref 8–23)
BUN/CREAT SERPL: 27.5 (ref 7–25)
CALCIUM SPEC-SCNC: 9.8 MG/DL (ref 8.6–10.5)
CHLORIDE SERPL-SCNC: 102 MMOL/L (ref 98–107)
CHOLEST SERPL-MCNC: 126 MG/DL (ref 0–200)
CO2 SERPL-SCNC: 24.4 MMOL/L (ref 22–29)
CREAT SERPL-MCNC: 1.2 MG/DL (ref 0.57–1)
DEPRECATED RDW RBC AUTO: 40.8 FL (ref 37–54)
EGFRCR SERPLBLD CKD-EPI 2021: 48.8 ML/MIN/1.73
EOSINOPHIL # BLD AUTO: 0.08 10*3/MM3 (ref 0–0.4)
EOSINOPHIL NFR BLD AUTO: 1.6 % (ref 0.3–6.2)
ERYTHROCYTE [DISTWIDTH] IN BLOOD BY AUTOMATED COUNT: 12.6 % (ref 12.3–15.4)
GLOBULIN UR ELPH-MCNC: 2.5 GM/DL
GLUCOSE SERPL-MCNC: 119 MG/DL (ref 65–99)
HBA1C MFR BLD: 7.3 % (ref 4.8–5.6)
HCT VFR BLD AUTO: 36.9 % (ref 34–46.6)
HCV AB SER DONR QL: NORMAL
HDLC SERPL-MCNC: 58 MG/DL (ref 40–60)
HGB BLD-MCNC: 12.4 G/DL (ref 12–15.9)
IMM GRANULOCYTES # BLD AUTO: 0.01 10*3/MM3 (ref 0–0.05)
IMM GRANULOCYTES NFR BLD AUTO: 0.2 % (ref 0–0.5)
LDLC SERPL CALC-MCNC: 49 MG/DL (ref 0–100)
LDLC/HDLC SERPL: 0.82 {RATIO}
LYMPHOCYTES # BLD AUTO: 1.32 10*3/MM3 (ref 0.7–3.1)
LYMPHOCYTES NFR BLD AUTO: 26.9 % (ref 19.6–45.3)
MCH RBC QN AUTO: 30 PG (ref 26.6–33)
MCHC RBC AUTO-ENTMCNC: 33.6 G/DL (ref 31.5–35.7)
MCV RBC AUTO: 89.1 FL (ref 79–97)
MONOCYTES # BLD AUTO: 0.47 10*3/MM3 (ref 0.1–0.9)
MONOCYTES NFR BLD AUTO: 9.6 % (ref 5–12)
NEUTROPHILS NFR BLD AUTO: 2.99 10*3/MM3 (ref 1.7–7)
NEUTROPHILS NFR BLD AUTO: 61.1 % (ref 42.7–76)
NRBC BLD AUTO-RTO: 0 /100 WBC (ref 0–0.2)
PLATELET # BLD AUTO: 191 10*3/MM3 (ref 140–450)
PMV BLD AUTO: 10 FL (ref 6–12)
POTASSIUM SERPL-SCNC: 4.9 MMOL/L (ref 3.5–5.2)
PROT SERPL-MCNC: 7.5 G/DL (ref 6–8.5)
RBC # BLD AUTO: 4.14 10*6/MM3 (ref 3.77–5.28)
SODIUM SERPL-SCNC: 137 MMOL/L (ref 136–145)
TRIGL SERPL-MCNC: 103 MG/DL (ref 0–150)
TSH SERPL DL<=0.05 MIU/L-ACNC: 1.51 UIU/ML (ref 0.27–4.2)
VLDLC SERPL-MCNC: 19 MG/DL (ref 5–40)
WBC NRBC COR # BLD: 4.9 10*3/MM3 (ref 3.4–10.8)

## 2022-06-02 PROCEDURE — 85025 COMPLETE CBC W/AUTO DIFF WBC: CPT | Performed by: INTERNAL MEDICINE

## 2022-06-02 PROCEDURE — 86803 HEPATITIS C AB TEST: CPT | Performed by: INTERNAL MEDICINE

## 2022-06-02 PROCEDURE — 83036 HEMOGLOBIN GLYCOSYLATED A1C: CPT | Performed by: INTERNAL MEDICINE

## 2022-06-02 PROCEDURE — 80061 LIPID PANEL: CPT | Performed by: INTERNAL MEDICINE

## 2022-06-02 PROCEDURE — 84443 ASSAY THYROID STIM HORMONE: CPT | Performed by: INTERNAL MEDICINE

## 2022-06-02 PROCEDURE — 36415 COLL VENOUS BLD VENIPUNCTURE: CPT | Performed by: INTERNAL MEDICINE

## 2022-06-02 PROCEDURE — 80053 COMPREHEN METABOLIC PANEL: CPT | Performed by: INTERNAL MEDICINE

## 2022-06-02 PROCEDURE — 99214 OFFICE O/P EST MOD 30 MIN: CPT | Performed by: INTERNAL MEDICINE

## 2022-06-02 RX ORDER — ASPIRIN 81 MG/1
81 TABLET ORAL DAILY
Qty: 90 TABLET | Refills: 3 | Status: SHIPPED | OUTPATIENT
Start: 2022-06-02

## 2022-06-02 RX ORDER — BLOOD-GLUCOSE METER
KIT MISCELLANEOUS
Qty: 100 EACH | Refills: 1 | Status: SHIPPED | OUTPATIENT
Start: 2022-06-02 | End: 2023-01-05 | Stop reason: SDUPTHER

## 2022-06-02 NOTE — PROGRESS NOTES
"Chief Complaint  Hypertension (3 month follow up) and Diabetes (3 month follow up)    Subjective          Amelie Anderson presents to [facility]  History of Present Illness    Her blood pressure has been doing good.  Diabetes- She says that she feels like it is not under control and is wondering about her A1C.  Her last A1C was 7.1  She wants to check on COVID booster.  She has had 1 booster, but would like the second one.  She also believes she needs another pneumonia vaccine.        Objective   Vital Signs:   /70 (BP Location: Left arm, Patient Position: Sitting, Cuff Size: Adult)   Pulse 66   Temp 97.7 °F (36.5 °C) (Oral)   Resp 18   Ht 162.6 cm (64\")   Wt 71.8 kg (158 lb 6.4 oz)   SpO2 98%   BMI 27.19 kg/m²     Physical Exam  Vitals reviewed.   Constitutional:       Appearance: Normal appearance. She is well-developed.   HENT:      Head: Normocephalic and atraumatic.      Mouth/Throat:      Pharynx: No oropharyngeal exudate.   Eyes:      Conjunctiva/sclera: Conjunctivae normal.      Pupils: Pupils are equal, round, and reactive to light.   Cardiovascular:      Rate and Rhythm: Normal rate and regular rhythm.      Heart sounds: No murmur heard.    No friction rub. No gallop.   Pulmonary:      Effort: Pulmonary effort is normal.      Breath sounds: Normal breath sounds. No wheezing or rhonchi.   Skin:     General: Skin is warm and dry.   Neurological:      Mental Status: She is alert and oriented to person, place, and time.   Psychiatric:         Mood and Affect: Affect normal.        Result Review :   The following data was reviewed by: Fabi Bill MD on 06/02/2022:  CMP    CMP 1/13/22 3/2/22 6/2/22   Glucose 113 (A) 115 (A) 119 (A)   BUN 21 17 33 (A)   Creatinine 0.89 1.15 (A) 1.20 (A)   eGFR Non African Am 63     Sodium 142 142 137   Potassium 4.5 4.6 4.9   Chloride 105 103 102   Calcium 9.8 10.5 9.8   Albumin  4.70 5.00   Total " Bilirubin  0.5 0.8   Alkaline Phosphatase  74 87   AST (SGOT)  21 21   ALT (SGPT)  18 19   (A) Abnormal value            CBC    CBC 11/29/21 3/2/22 6/2/22   WBC 5.98 5.51 4.90   RBC 4.35 4.35 4.14   Hemoglobin 13.0 12.7 12.4   Hematocrit 39.3 38.7 36.9   MCV 90.3 89.0 89.1   MCH 29.9 29.2 30.0   MCHC 33.1 32.8 33.6   RDW 11.9 (A) 13.1 12.6   Platelets 211 190 191   (A) Abnormal value            Lipid Panel    Lipid Panel 11/29/21 3/2/22 6/2/22   Total Cholesterol 119 129 126   Triglycerides 63 74 103   HDL Cholesterol 59 63 (A) 58   VLDL Cholesterol 14 15 19   LDL Cholesterol  46 51 49   LDL/HDL Ratio 0.80 0.81 0.82   (A) Abnormal value            TSH    TSH 11/29/21 3/2/22 6/2/22   TSH 1.610 3.370 1.510           A1C Last 3 Results    HGBA1C Last 3 Results 11/29/21 3/2/22 6/2/22   Hemoglobin A1C 7.02 (A) 7.10 (A) 7.30 (A)   (A) Abnormal value            Microalbumin    Microalbumin 8/2/21   Microalbumin, Urine <1.2              Procedures      Assessment and Plan    Diagnoses and all orders for this visit:    1. Type 2 diabetes mellitus without complication, without long-term current use of insulin (HCC) (Primary)  Assessment & Plan:  Previously well controlled  Checking labs today  Continue current management, will adjust based on lab results    Orders:  -     FREESTYLE LITE test strip; Use as directed  Dispense: 100 each; Refill: 1  -     Comprehensive Metabolic Panel  -     CBC & Differential  -     TSH  -     Lipid Panel  -     Hemoglobin A1c    2. Essential hypertension  Assessment & Plan:  Well controlled in clinic today  Continue current management        3. High cholesterol    4. Need for hepatitis C screening test  -     Hepatitis C Antibody    Other orders  -     aspirin (aspirin) 81 MG EC tablet; Take 1 tablet by mouth Daily.  Dispense: 90 tablet; Refill: 3  -     Lancets 30G misc; 1 each 4 (Four) Times a Day As Needed (for glucose testing).  Dispense: 100 each; Refill: 1            Follow Up   Return in  about 3 months (around 9/2/2022) for Next scheduled follow up.  Patient was given instructions and counseling regarding her condition or for health maintenance advice. Please see specific information pulled into the AVS if appropriate.

## 2022-06-03 NOTE — ASSESSMENT & PLAN NOTE
Previously well controlled  Checking labs today  Continue current management, will adjust based on lab results

## 2022-07-05 NOTE — TELEPHONE ENCOUNTER
Caller: Amelie Anderson    Relationship: Self    Best call back number: [phone]     Requested Prescriptions:   Requested Prescriptions     Pending Prescriptions Disp Refills   • atorvastatin (LIPITOR) 40 MG tablet 90 tablet      Sig: Take 1 tablet by mouth Daily.        Pharmacy where request should be sent: [facility] [address] - [phone]  - [phone]     Additional details provided by patient: PATIENT IS NEEDING A REFILL. PLEASE CALL AND ADVISE.     Does the patient have less than a 3 day supply:  [x] Yes  [] No    Ann Marie Suarez Rep   07/05/22 11:29 EDT

## 2022-07-06 RX ORDER — ATORVASTATIN CALCIUM 40 MG/1
40 TABLET, FILM COATED ORAL DAILY
Qty: 90 TABLET | Refills: 1 | Status: SHIPPED | OUTPATIENT
Start: 2022-07-06 | End: 2023-01-05 | Stop reason: SDUPTHER

## 2022-07-08 NOTE — PROGRESS NOTES
Chief Complaint  Hypertension, Aortic valve stenosis, AAA, and Mitral Valve Prolapse    Subjective        Amelie Anderson presents to [facility]  She is a 72-year-old female comes in to evaluate her prosthetic aortic valve, hypertension and hyperlipidemia.  States he has occasional dizziness but denies any syncope.  Admits that she has some issues with balance now but denies any falls.  Denies any chest pains, shortness of breath, palpitations, syncope, swelling, PND, or orthopnea.  Cardiac wise she has no further complaints.  Admits does not get any regular exercise due to the weather.       Past History:    Past Medical History:   Diagnosis Date   • Abdominal aortic aneurysm (AAA) (HCC)    • Anemia    • Aortic stenosis    • AR (allergic rhinitis)    • Arthritis    • Bladder disorder    • Cataract     BILAT   • Depression with anxiety    • Diabetes (HCC)    • Elevated cholesterol    • Environmental allergies    • GERD (gastroesophageal reflux disease)    • Heel spur, right    • High cholesterol    • History of anesthesia complications     STATES WAS SLOW TO WAKE UP AFTER SURGERY   • History of TB (tuberculosis)     YEARS AGO WHEN FIRST CAME TO [facility], WAS TREATED FOR WITH INH   • Hypertension    • Hyperthyroidism    • Iron deficiency    • Kidney calculi    • Kidney cysts    • Kidney disease    • Lesion of bladder [id number]   • Microhematuria 09/25/2015   • Migraine    • Mitral valve prolapse    • Osteoporosis    • PONV (postoperative nausea and vomiting)    • Pulmonary nodule    • S/P AVR (aortic valve replacement) 03/04/2021     Normally functioning prosthetic aortic valve. on echo 5/20/2021 and 2/8/2022   • Seasonal allergies    • Thyroid disorder    • Urinary frequency         Family History: family history includes Aortic stenosis in her mother and sister; Coronary artery disease in her father, paternal aunt, and paternal uncle; Diabetes in an other family member; Heart attack in an  other family member; Heart attack (age of onset: 30) in her father; Heart disease in an other family member; Valvular heart disease in her mother and sister.     Social History: reports that she quit smoking about 6 years ago. She started smoking about 54 years ago. She has a 48.00 pack-year smoking history. She has never used smokeless tobacco. She reports previous alcohol use. She reports that she does not use drugs.    Allergies: Patient has no known allergies.    Past Surgical History:   Procedure Laterality Date   • ABDOMINAL HYSTERECTOMY     • AORTIC VALVE REPAIR/REPLACEMENT N/A 2/2/2021    Procedure: VARUN STERNOTOMY AORTIC VALVE REPLACEMENT AND PRP;  Surgeon: Jesse Coronado MD;  Location: [facility] OR;  Service: Cardiothoracic;  Laterality: N/A;   • BREAST BIOPSY     • CARDIAC CATHETERIZATION  11/17/2020    no stents   • CARDIAC CATHETERIZATION  1975    no stents   • CARDIAC SURGERY  2021    OPEN HEART SURGERY    • COLONOSCOPY  2016, 2021   • CYSTOSCOPY  02/25/2020 5/25/2020  WITH DR OSMAN  10/22/2015 WITH FULGURATION; CBF PER DAWSON   • HYSTERECTOMY     • TEETH EXTRACTION      ALL TEETH ON TOP, ALL BOTTOM TEETH EXCEPT 4   • TONSILLECTOMY            Current Outpatient Medications:   •  alendronate (Fosamax) 70 MG tablet, Take 1 tablet by mouth Every 7 (Seven) Days., Disp: 12 tablet, Rfl: 0  •  amitriptyline (ELAVIL) 25 MG tablet, Take 1 tablet by mouth Every Night., Disp: 90 tablet, Rfl: 1  •  [place] Thyroid 60 MG tablet, Take 1 tablet by mouth Daily., Disp: 90 tablet, Rfl: 1  •  aspirin (aspirin) 81 MG EC tablet, Take 1 tablet by mouth Daily., Disp: 90 tablet, Rfl: 3  •  atenolol (TENORMIN) 25 MG tablet, Take 1 tablet by mouth Daily., Disp: 90 tablet, Rfl: 1  •  atorvastatin (LIPITOR) 40 MG tablet, Take 1 tablet by mouth Daily., Disp: 90 tablet, Rfl: 1  •  Biotin 10 MG tablet, Take 10 mg by mouth Daily., Disp: , Rfl:   •  Calcium Carbonate-Vitamin D (calcium-vitamin D) 500-200 MG-UNIT tablet per  tablet, Take 1 tablet by mouth Daily., Disp: 90 tablet, Rfl: 3  •  chlorpheniramine (CHLOR-TRIMETON) 4 MG tablet, Take 1 tablet by mouth Every 6 (Six) Hours As Needed for Allergies., Disp: 360 tablet, Rfl: 1  •  FeroSul 325 (65 Fe) MG tablet, Take 1 tablet by mouth Daily., Disp: 90 tablet, Rfl: 1  •  fluticasone (FLONASE) 50 MCG/ACT nasal spray, fluticasone propionate 50 mcg/actuation nasal spray,suspension spray 2 sprays (100 mcg) in each nostril by intranasal route once daily as needed for 30 days 4/16/2021  Active, Disp: , Rfl:   •  FREESTYLE LITE test strip, Use as directed, Disp: 100 each, Rfl: 1  •  Insulin Pen Needle (Pen Needles) 31G X 6 MM misc, 1 each 4 (Four) Times a Day As Needed (with insulin)., Disp: 100 each, Rfl: 3  •  ketotifen (Zaditor) 0.025 % ophthalmic solution, Administer 1 drop to both eyes 2 (Two) Times a Day., Disp: 5 mL, Rfl: 2  •  Lancets 30G misc, 1 each 4 (Four) Times a Day As Needed (for glucose testing)., Disp: 100 each, Rfl: 1  •  loratadine (CLARITIN) 10 MG tablet, Take 1 tablet by mouth Daily., Disp: 90 tablet, Rfl: 1  •  losartan (COZAAR) 25 MG tablet, Take 25 mg by mouth Daily., Disp: , Rfl:   •  metFORMIN ER (GLUCOPHAGE-XR) 500 MG 24 hr tablet, Take 1 tablet by mouth 2 (Two) Times a Day., Disp: 180 tablet, Rfl: 1  •  multivitamin with minerals tablet tablet, Take 1 tablet by mouth Daily. HOLD FOR SURGERY, Disp: , Rfl:   •  Potassium 99 MG tablet, Take 99 mg by mouth Daily. OTC, FOR LEG CRAMPS, Disp: , Rfl:   •  sertraline (ZOLOFT) 50 MG tablet, Zoloft 50 mg oral tablet take 1 tablet (50 mg) by oral route once daily for 90 days 4/16/2021  Active, Disp: , Rfl:   •  vitamin B-6 (PYRIDOXINE) 50 MG tablet, Take 1 tablet by mouth Daily., Disp: 90 tablet, Rfl: 1  No current facility-administered medications for this visit.    Facility-Administered Medications Ordered in Other Visits:   •  Chlorhexidine Gluconate Cloth 2 % pads 1 application, 1 application, Topical, Q12H PRN, Kristina  "ERNIE Olivia     Medications Discontinued During This Encounter   Medication Reason   • acetaminophen (TYLENOL) 500 MG tablet *Therapy completed        Review of Systems   Constitutional: Negative for fatigue.   Respiratory: Negative for cough and shortness of breath.    Cardiovascular: Negative for chest pain, palpitations and leg swelling.   Neurological: Positive for light-headedness (And balance is off at times). Negative for dizziness.   All other systems reviewed and are negative.       Objective     Physical Exam  Constitutional:       General: She is not in acute distress.     Appearance: Normal appearance.   Neck:      Vascular: No carotid bruit.   Cardiovascular:      Rate and Rhythm: Normal rate and regular rhythm.      Heart sounds: Murmur (1/6 systolic murmur at the base) heard.   Pulmonary:      Effort: Pulmonary effort is normal.      Breath sounds: Normal breath sounds.   Musculoskeletal:      Right lower leg: No edema.      Left lower leg: No edema.   Neurological:      Mental Status: She is alert.       /65   Pulse 67   Ht 162.6 cm (64\")   Wt 72.6 kg (160 lb)   BMI 27.46 kg/m²       Vitals:    07/13/22 1049   BP: 120/65   Pulse: 67       Result Review :         The following data was reviewed by: ERNIE Sotelo on 07/13/2022:      Lab Results   Component Value Date    PROBNP 92.1 01/29/2021     02/24/2021     11/17/2020     CMP    CMP 1/13/22 3/2/22 6/2/22   Glucose 113 (A) 115 (A) 119 (A)   BUN 21 17 33 (A)   Creatinine 0.89 1.15 (A) 1.20 (A)   eGFR Non African Am 63     Sodium 142 142 137   Potassium 4.5 4.6 4.9   Chloride 105 103 102   Calcium 9.8 10.5 9.8   Albumin  4.70 5.00   Total Bilirubin  0.5 0.8   Alkaline Phosphatase  74 87   AST (SGOT)  21 21   ALT (SGPT)  18 19   (A) Abnormal value            CBC w/diff    CBC w/Diff 11/29/21 3/2/22 6/2/22   WBC 5.98 5.51 4.90   RBC 4.35 4.35 4.14   Hemoglobin 13.0 12.7 12.4   Hematocrit 39.3 38.7 36.9   MCV 90.3 89.0 " 89.1   MCH 29.9 29.2 30.0   MCHC 33.1 32.8 33.6   RDW 11.9 (A) 13.1 12.6   Platelets 211 190 191   Neutrophil Rel % 62.4 62.5 61.1   Immature Granulocyte Rel % 0.3 0.4 0.2   Lymphocyte Rel % 25.4 26.3 26.9   Monocyte Rel % 9.7 8.3 9.6   Eosinophil Rel % 1.7 2.0 1.6   Basophil Rel % 0.5 0.5 0.6   (A) Abnormal value             Lipid Panel    Lipid Panel 11/29/21 3/2/22 6/2/22   Total Cholesterol 119 129 126   Triglycerides 63 74 103   HDL Cholesterol 59 63 (A) 58   VLDL Cholesterol 14 15 19   LDL Cholesterol  46 51 49   LDL/HDL Ratio 0.80 0.81 0.82   (A) Abnormal value             Lab Results   Component Value Date    TSH 1.510 06/02/2022    TSH 3.370 03/02/2022    TSH 1.610 11/29/2021      Lab Results   Component Value Date    FREET4 0.9 04/14/2021    FREET4 0.8 (L) 01/07/2021    FREET4 0.8 (L) 11/17/2020      Magnesium   Date Value Ref Range Status   01/13/2022 2.0 1.6 - 2.4 mg/dL Final      A1C Last 3 Results    HGBA1C Last 3 Results 11/29/21 3/2/22 6/2/22   Hemoglobin A1C 7.02 (A) 7.10 (A) 7.30 (A)   (A) Abnormal value               Last Echo  Results for orders placed in visit on 02/08/22    Adult Transthoracic Echo Complete W/ Cont if Necessary Per Protocol    Interpretation Summary  · Estimated right ventricular systolic pressure from tricuspid regurgitation is normal (<35 mmHg).  · Estimated left ventricular EF was in agreement with the calculated left ventricular EF. Left ventricular ejection fraction appears to be 56 - 60%.  · Left ventricular diastolic function is consistent with (grade I) impaired relaxation.  · There is a bioprosthetic aortic valve present. Functions normally.      Results for orders placed in visit on 02/02/21    Emergent/Open-Heart Anesthesia VARUN    Narrative  Procedure Performed: Emergent/Open-Heart Anesthesia VARUN  Start Time:  2/2/2021 9:41 AM  End Time:   2/2/2021 11:11 AM      General Procedure Information  Diagnostic Indications for Echo:  assessment of ascending aorta and  assessment of surgical repair  Physician Requesting Echo: Jesse Coronado MD  Location performed:  OR  Intubated  Bite block not placed  Heart visualized  Probe Insertion:  Easy  Probe Type:  Multiplane  Modalities:  2D only, color flow mapping and continuous wave Doppler    Echocardiographic and Doppler Measurements    Ventricles    Right Ventricle:  Cavity size normal.  Global function normal.  Left Ventricle:  Cavity size normal.  Hypertrophy present.  Global Function normal.  Ejection Fraction 60%.        Valves    Aortic Valve:  Stenosis severe.  Area: 0.9 cm².  Mean Gradient: mean 20 mmHg.  Regurgitation trace.  Leaflets calcified and thickened.  Leaflet motions restricted.    Mitral Valve:  Annulus normal.  Stenosis not present.  Regurgitation absent.  Leaflets normal.  Leaflet motions normal.    Tricuspid Valve:  Annulus normal.  Regurgitation mild.  Leaflets normal.        Aorta    Ascending Aorta:  Size normal.        Atria      Left Atrium:  Left atrial appendage normal.      Septa    Atrial Septum:  Intra-atrial septal morphology normal.            Other Findings  Pericardium:  normal  Pleural Effusion:  none      Anesthesia Information  Performed Personally      Echocardiogram Comments:  Diagnostic VARUN.  Diagnosis: non-rheumatic tricuspid regurgitation.  Pre-CPB, ROSIE by continuity equation 0.9 sq cm, trileaflet aortic valve.  Mean gradient after AVR was 6 mm Hg.  No PVL seen.                          Assessment and Plan    Diagnoses and all orders for this visit:    1. S/P AVR (aortic valve replacement) (Primary)  Assessment & Plan:  Reviewed recent echo.  Denies any shortness of breath.  We will continue to monitor.      2. High cholesterol  Assessment & Plan:  Lipids at goal.  Continue atorvastatin 40 mg.    Orders:  -     Lipid Panel; Future  -     Comprehensive Metabolic Panel; Future    3. Essential hypertension  Assessment & Plan:  Controlled.  Continue losartan 25 mg, and atenolol 25  mg.            Follow Up     Return in about 6 months (around 1/13/2023) for with Dr Chandler.    Patient was given instructions and counseling regarding her condition or for health maintenance advice. Please see specific information pulled into the AVS if appropriate.       ERNIE Luevano  07/13/22 07:51 EDT

## 2022-07-13 ENCOUNTER — OFFICE VISIT (OUTPATIENT)
Dept: CARDIOLOGY | Facility: CLINIC | Age: 72
End: 2022-07-13

## 2022-07-13 VITALS
HEIGHT: 64 IN | HEART RATE: 67 BPM | BODY MASS INDEX: 27.31 KG/M2 | DIASTOLIC BLOOD PRESSURE: 65 MMHG | SYSTOLIC BLOOD PRESSURE: 120 MMHG | WEIGHT: 160 LBS

## 2022-07-13 DIAGNOSIS — Z95.2 S/P AVR (AORTIC VALVE REPLACEMENT): Primary | Chronic | ICD-10-CM

## 2022-07-13 DIAGNOSIS — I10 ESSENTIAL HYPERTENSION: Chronic | ICD-10-CM

## 2022-07-13 DIAGNOSIS — E78.00 HIGH CHOLESTEROL: Chronic | ICD-10-CM

## 2022-07-13 PROCEDURE — 99214 OFFICE O/P EST MOD 30 MIN: CPT | Performed by: NURSE PRACTITIONER

## 2022-07-14 ENCOUNTER — HOSPITAL ENCOUNTER (OUTPATIENT)
Dept: CT IMAGING | Facility: HOSPITAL | Age: 72
Discharge: HOME | End: 2022-07-14
Admitting: INTERNAL MEDICINE

## 2022-07-14 DIAGNOSIS — R91.1 PULMONARY NODULE: ICD-10-CM

## 2022-07-14 PROCEDURE — 71250 CT THORAX DX C-: CPT

## 2022-07-22 ENCOUNTER — OFFICE VISIT (OUTPATIENT)
Dept: PULMONOLOGY | Facility: CLINIC | Age: 72
End: 2022-07-22

## 2022-07-22 VITALS
RESPIRATION RATE: 18 BRPM | BODY MASS INDEX: 27.14 KG/M2 | HEIGHT: 64 IN | DIASTOLIC BLOOD PRESSURE: 68 MMHG | HEART RATE: 61 BPM | SYSTOLIC BLOOD PRESSURE: 113 MMHG | OXYGEN SATURATION: 99 % | TEMPERATURE: 98.4 F | WEIGHT: 159 LBS

## 2022-07-22 DIAGNOSIS — F17.211 NICOTINE DEPENDENCE, CIGARETTES, IN REMISSION: ICD-10-CM

## 2022-07-22 DIAGNOSIS — I10 ESSENTIAL HYPERTENSION: ICD-10-CM

## 2022-07-22 DIAGNOSIS — J30.2 SEASONAL ALLERGIES: ICD-10-CM

## 2022-07-22 DIAGNOSIS — J30.9 ALLERGIC RHINITIS, UNSPECIFIED SEASONALITY, UNSPECIFIED TRIGGER: ICD-10-CM

## 2022-07-22 DIAGNOSIS — R91.1 PULMONARY NODULE: Primary | ICD-10-CM

## 2022-07-22 PROCEDURE — 99213 OFFICE O/P EST LOW 20 MIN: CPT | Performed by: NURSE PRACTITIONER

## 2022-07-22 NOTE — PROGRESS NOTES
Primary Care Provider  Fabi Bill MD     Referring Provider  No ref. provider found     Chief Complaint  Lung Nodule (6 month f/u )    Subjective          Amelie Anderson presents to [facility]  History of Present Illness  Amelie Anderson is a 72 y.o. female patient of Dr. Severino here for management of a pulmonary nodule, seasonal allergies/allergic rhinitis, hypertension and tobacco use of cigarettes in remission.    Patient states she is doing very well since her last visit.  She denies any getting antibiotics or steroids for her lungs.  She denies any fevers or chills.  She has very minimal shortness of breath with exertion and states that the increase in weather does not help with her breathing.  She is not currently on any inhalers.  She recently had a chest CT on 7/14/2022.  This shows a stable 4 mm left lower lobe nodule.  This is compatible with benign etiology.  We will continue with patient's yearly low-dose CT scans at this point.  Otherwise, she has no additional concerns today.  She is able to perform her ADLs without difficulty.  She is up-to-date with her flu, pneumonia and COVID vaccines.     Her history of smoking is   Tobacco Use: Medium Risk   • Smoking Tobacco Use: Former Smoker   • Smokeless Tobacco Use: Never Used   .    Review of Systems   Constitutional: Negative for chills, fatigue, fever, unexpected weight gain and unexpected weight loss.   HENT: Negative for congestion (Nasal), hearing loss, mouth sores, nosebleeds, postnasal drip, sore throat and trouble swallowing.    Eyes: Negative for blurred vision and visual disturbance.   Respiratory: Negative for apnea, cough, shortness of breath and wheezing.         Negative for Hemoptysis     Cardiovascular: Negative for chest pain, palpitations and leg swelling.   Gastrointestinal: Negative for abdominal pain, constipation, diarrhea, nausea, vomiting and GERD.         Negative for Jaundice  Negative for Bloating  Negative for Melena   Musculoskeletal: Negative for joint swelling and myalgias.        Negative for Joint pain  Negative for Joint stiffness   Skin: Negative for color change.        Negative for cyanosis   Neurological: Negative for syncope, weakness, numbness and headache.      Sleep: Negative for Excessive daytime sleepiness  Negative for morning headaches  Negative for Snoring    Family History   Problem Relation Age of Onset   • Aortic stenosis Mother    • Valvular heart disease Mother    • Heart attack Father 30   • Coronary artery disease Father    • Aortic stenosis Sister    • Valvular heart disease Sister    • Coronary artery disease Paternal Aunt    • Coronary artery disease Paternal Uncle    • Heart disease Other    • Diabetes Other    • Heart attack Other    • Malig Hyperthermia Neg Hx         Social History     Socioeconomic History   • Marital status:    Tobacco Use   • Smoking status: Former Smoker     Packs/day: 1.00     Years: 48.00     Pack years: 48.00     Start date:      Quit date:      Years since quittin.5   • Smokeless tobacco: Never Used   Vaping Use   • Vaping Use: Never used   Substance and Sexual Activity   • Alcohol use: Not Currently   • Drug use: Never   • Sexual activity: Defer        Past Medical History:   Diagnosis Date   • Abdominal aortic aneurysm (AAA) (HCC)    • Anemia    • Aortic stenosis    • AR (allergic rhinitis)    • Arthritis    • Bladder disorder    • Cataract     BILAT   • Depression with anxiety    • Diabetes (HCC)    • Elevated cholesterol    • Environmental allergies    • GERD (gastroesophageal reflux disease)    • Heel spur, right    • High cholesterol    • History of anesthesia complications     STATES WAS SLOW TO WAKE UP AFTER SURGERY   • History of TB (tuberculosis)     YEARS AGO WHEN FIRST CAME TO [facility], WAS TREATED FOR WITH INH   • Hypertension    • Hyperthyroidism    • Iron deficiency    • Kidney  calculi    • Kidney cysts    • Kidney disease    • Lesion of bladder [id number]   • Microhematuria 09/25/2015   • Migraine    • Mitral valve prolapse    • Osteoporosis    • PONV (postoperative nausea and vomiting)    • Pulmonary nodule    • S/P AVR (aortic valve replacement) 03/04/2021     Normally functioning prosthetic aortic valve. on echo 5/20/2021 and 2/8/2022   • Seasonal allergies    • Thyroid disorder    • Urinary frequency         Immunization History   Administered Date(s) Administered   • COVID-19 (PFIZER) PURPLE CAP 04/05/2021, 04/26/2021, 12/02/2021   • Covid-19 (Pfizer) Gray Cap 06/06/2022   • Fluzone High-Dose 65+yrs 11/03/2021   • Influenza, Unspecified 09/22/2020   • Pneumococcal Conjugate 13-Valent (PCV13) 09/15/2017   • Pneumococcal Conjugate 20-Valent (PCV20) 06/06/2022   • Pneumococcal Polysaccharide (PPSV23) 12/27/2018         No Known Allergies       Current Outpatient Medications:   •  alendronate (Fosamax) 70 MG tablet, Take 1 tablet by mouth Every 7 (Seven) Days., Disp: 12 tablet, Rfl: 0  •  amitriptyline (ELAVIL) 25 MG tablet, Take 1 tablet by mouth Every Night., Disp: 90 tablet, Rfl: 1  •  [place] Thyroid 60 MG tablet, Take 1 tablet by mouth Daily., Disp: 90 tablet, Rfl: 1  •  aspirin (aspirin) 81 MG EC tablet, Take 1 tablet by mouth Daily., Disp: 90 tablet, Rfl: 3  •  atenolol (TENORMIN) 25 MG tablet, Take 1 tablet by mouth Daily., Disp: 90 tablet, Rfl: 1  •  atorvastatin (LIPITOR) 40 MG tablet, Take 1 tablet by mouth Daily., Disp: 90 tablet, Rfl: 1  •  Biotin 10 MG tablet, Take 10 mg by mouth Daily., Disp: , Rfl:   •  Calcium Carbonate-Vitamin D (calcium-vitamin D) 500-200 MG-UNIT tablet per tablet, Take 1 tablet by mouth Daily., Disp: 90 tablet, Rfl: 3  •  chlorpheniramine (CHLOR-TRIMETON) 4 MG tablet, Take 1 tablet by mouth Every 6 (Six) Hours As Needed for Allergies., Disp: 360 tablet, Rfl: 1  •  FeroSul 325 (65 Fe) MG tablet, Take 1 tablet by mouth Daily., Disp: 90 tablet, Rfl: 1  •   fluticasone (FLONASE) 50 MCG/ACT nasal spray, fluticasone propionate 50 mcg/actuation nasal spray,suspension spray 2 sprays (100 mcg) in each nostril by intranasal route once daily as needed for 30 days 4/16/2021  Active, Disp: , Rfl:   •  FREESTYLE LITE test strip, Use as directed, Disp: 100 each, Rfl: 1  •  Insulin Pen Needle (Pen Needles) 31G X 6 MM misc, 1 each 4 (Four) Times a Day As Needed (with insulin)., Disp: 100 each, Rfl: 3  •  ketotifen (Zaditor) 0.025 % ophthalmic solution, Administer 1 drop to both eyes 2 (Two) Times a Day., Disp: 5 mL, Rfl: 2  •  Lancets 30G misc, 1 each 4 (Four) Times a Day As Needed (for glucose testing)., Disp: 100 each, Rfl: 1  •  loratadine (CLARITIN) 10 MG tablet, Take 1 tablet by mouth Daily., Disp: 90 tablet, Rfl: 1  •  losartan (COZAAR) 25 MG tablet, Take 25 mg by mouth Daily., Disp: , Rfl:   •  metFORMIN ER (GLUCOPHAGE-XR) 500 MG 24 hr tablet, Take 1 tablet by mouth 2 (Two) Times a Day., Disp: 180 tablet, Rfl: 1  •  multivitamin with minerals tablet tablet, Take 1 tablet by mouth Daily. HOLD FOR SURGERY, Disp: , Rfl:   •  Potassium 99 MG tablet, Take 99 mg by mouth Daily. OTC, FOR LEG CRAMPS, Disp: , Rfl:   •  sertraline (ZOLOFT) 50 MG tablet, Zoloft 50 mg oral tablet take 1 tablet (50 mg) by oral route once daily for 90 days 4/16/2021  Active, Disp: , Rfl:   •  vitamin B-6 (PYRIDOXINE) 50 MG tablet, Take 1 tablet by mouth Daily., Disp: 90 tablet, Rfl: 1  •  Calcium Carb-Cholecalciferol (calcium-vitamin D) 500-200 MG-UNIT tablet per tablet, , Disp: , Rfl:   No current facility-administered medications for this visit.    Facility-Administered Medications Ordered in Other Visits:   •  Chlorhexidine Gluconate Cloth 2 % pads 1 application, 1 application, Topical, Q12H PRN, Acevedo, Ne, APRN     Objective   Physical Exam  Constitutional:       General: She is not in acute distress.     Appearance: Normal appearance. She is normal weight.   HENT:      Right Ear: Hearing  "normal.      Left Ear: Hearing normal.      Nose: No nasal tenderness or congestion.      Mouth/Throat:      Mouth: Mucous membranes are moist. No oral lesions.   Eyes:      Extraocular Movements: Extraocular movements intact.      Pupils: Pupils are equal, round, and reactive to light.   Neck:      Thyroid: No thyroid mass or thyromegaly.   Cardiovascular:      Rate and Rhythm: Normal rate and regular rhythm.      Pulses: Normal pulses.      Heart sounds: Normal heart sounds. No murmur heard.  Pulmonary:      Effort: Pulmonary effort is normal.      Breath sounds: Normal breath sounds. No wheezing, rhonchi or rales.   Chest:   Breasts:      Right: No axillary adenopathy.       Abdominal:      General: Bowel sounds are normal. There is no distension.      Palpations: Abdomen is soft.      Tenderness: There is no abdominal tenderness.   Musculoskeletal:      Cervical back: Neck supple.      Right lower leg: No edema.      Left lower leg: No edema.   Lymphadenopathy:      Cervical: No cervical adenopathy.      Upper Body:      Right upper body: No axillary adenopathy.   Skin:     General: Skin is warm and dry.      Findings: No lesion or rash.   Neurological:      General: No focal deficit present.      Mental Status: She is alert and oriented to person, place, and time.      Cranial Nerves: Cranial nerves are intact.   Psychiatric:         Mood and Affect: Affect normal. Mood is not anxious or depressed.         Vital Signs:   /68 (BP Location: Right arm, Patient Position: Sitting, Cuff Size: Adult)   Pulse 61   Temp 98.4 °F (36.9 °C) (Temporal)   Resp 18   Ht 162.6 cm (64\")   Wt 72.1 kg (159 lb)   SpO2 99% Comment: room air  BMI 27.29 kg/m²        Result Review :     CMP    CMP 1/13/22 3/2/22 6/2/22   Glucose 113 (A) 115 (A) 119 (A)   BUN 21 17 33 (A)   Creatinine 0.89 1.15 (A) 1.20 (A)   eGFR Non African Am 63     Sodium 142 142 137   Potassium 4.5 4.6 4.9   Chloride 105 103 102   Calcium 9.8 10.5 9.8 "   Albumin  4.70 5.00   Total Bilirubin  0.5 0.8   Alkaline Phosphatase  74 87   AST (SGOT)  21 21   ALT (SGPT)  18 19   (A) Abnormal value            CBC w/diff    CBC w/Diff 11/29/21 3/2/22 6/2/22   WBC 5.98 5.51 4.90   RBC 4.35 4.35 4.14   Hemoglobin 13.0 12.7 12.4   Hematocrit 39.3 38.7 36.9   MCV 90.3 89.0 89.1   MCH 29.9 29.2 30.0   MCHC 33.1 32.8 33.6   RDW 11.9 (A) 13.1 12.6   Platelets 211 190 191   Neutrophil Rel % 62.4 62.5 61.1   Immature Granulocyte Rel % 0.3 0.4 0.2   Lymphocyte Rel % 25.4 26.3 26.9   Monocyte Rel % 9.7 8.3 9.6   Eosinophil Rel % 1.7 2.0 1.6   Basophil Rel % 0.5 0.5 0.6   (A) Abnormal value            Data reviewed: Radiologic studies Chest CT 7/14/2022 and Dr. Severino's last office note   Procedures        Assessment and Plan    Diagnoses and all orders for this visit:    1. Pulmonary nodule (Primary)    2. Nicotine dependence, cigarettes, in remission  -      CT Chest Low Dose Cancer Screening WO; Future    3. Seasonal allergies    4. Essential hypertension    5. Allergic rhinitis, unspecified seasonality, unspecified trigger    6.  Continue Claritin and Flonase for seasonal allergies and allergic rhinitis.  7.  Follow-up with cardiology as scheduled.  8.  Repeat CT scan ordered for July 2023.  9.  Follow-up with our office in 1 year after CT scan, sooner if needed.        Follow Up   Return in about 1 year (around 7/22/2023) for Recheck with Mere/Kiara.  Patient was given instructions and counseling regarding her condition or for health maintenance advice. Please see specific information pulled into the AVS if appropriate.

## 2022-09-06 DIAGNOSIS — E11.9 TYPE 2 DIABETES MELLITUS WITHOUT COMPLICATION, UNSPECIFIED WHETHER LONG TERM INSULIN USE: ICD-10-CM

## 2022-09-06 DIAGNOSIS — J30.9 ALLERGIC RHINITIS, UNSPECIFIED SEASONALITY, UNSPECIFIED TRIGGER: ICD-10-CM

## 2022-09-06 RX ORDER — ATENOLOL 25 MG/1
25 TABLET ORAL DAILY
Qty: 90 TABLET | Refills: 1 | Status: CANCELLED | OUTPATIENT
Start: 2022-09-06

## 2022-09-06 RX ORDER — AMITRIPTYLINE HYDROCHLORIDE 25 MG/1
25 TABLET, FILM COATED ORAL NIGHTLY
Qty: 90 TABLET | Refills: 1 | Status: CANCELLED | OUTPATIENT
Start: 2022-09-06

## 2022-09-06 RX ORDER — METFORMIN HYDROCHLORIDE 500 MG/1
500 TABLET, EXTENDED RELEASE ORAL 2 TIMES DAILY
Qty: 180 TABLET | Refills: 1 | Status: CANCELLED | OUTPATIENT
Start: 2022-09-06

## 2022-09-06 NOTE — TELEPHONE ENCOUNTER
Caller: Amelie Anderson    Relationship: Self    Best call back number: [phone]    Requested Prescriptions:   Requested Prescriptions     Pending Prescriptions Disp Refills   • metFORMIN ER (GLUCOPHAGE-XR) 500 MG 24 hr tablet 180 tablet 1     Sig: Take 1 tablet by mouth 2 (Two) Times a Day.   • atenolol (TENORMIN) 25 MG tablet 90 tablet 1     Sig: Take 1 tablet by mouth Daily.   • amitriptyline (ELAVIL) 25 MG tablet 90 tablet 1     Sig: Take 1 tablet by mouth Every Night.        Pharmacy where request should be sent: [facility] [place] [facility] [address] [facility] - [phone] [facility] [phone] FX     Additional details provided by patient: PLEASE CALL WHEN PRESCRIPTIONS ARE SENT OR SEND TEXT MESSAGE     Does the patient have less than a 3 day supply:  [x] Yes  [] No    Ann Marie Barcenas Rep   09/06/22 10:23 EDT

## 2022-09-08 DIAGNOSIS — J30.9 ALLERGIC RHINITIS, UNSPECIFIED SEASONALITY, UNSPECIFIED TRIGGER: ICD-10-CM

## 2022-09-08 DIAGNOSIS — E11.9 TYPE 2 DIABETES MELLITUS WITHOUT COMPLICATION, UNSPECIFIED WHETHER LONG TERM INSULIN USE: ICD-10-CM

## 2022-09-08 RX ORDER — AMITRIPTYLINE HYDROCHLORIDE 25 MG/1
25 TABLET, FILM COATED ORAL NIGHTLY
Qty: 90 TABLET | Refills: 1 | Status: SHIPPED | OUTPATIENT
Start: 2022-09-08 | End: 2023-03-17 | Stop reason: SDUPTHER

## 2022-09-08 RX ORDER — METFORMIN HYDROCHLORIDE 500 MG/1
500 TABLET, EXTENDED RELEASE ORAL 2 TIMES DAILY
Qty: 180 TABLET | Refills: 1 | Status: SHIPPED | OUTPATIENT
Start: 2022-09-08 | End: 2022-10-05 | Stop reason: SDUPTHER

## 2022-09-08 RX ORDER — ATENOLOL 25 MG/1
25 TABLET ORAL DAILY
Qty: 90 TABLET | Refills: 1 | Status: SHIPPED | OUTPATIENT
Start: 2022-09-08 | End: 2023-03-23 | Stop reason: SDUPTHER

## 2022-09-08 NOTE — TELEPHONE ENCOUNTER
Caller: Amelie Anderson    Relationship: Self    Best call back number: [phone]     Who are you requesting to speak with (clinical staff, provider,  specific staff member): MEDICAL STAFF    What was the call regarding: PATIENT WOULD LIKE TO KNOW THE STATUS OF HER REFILLS. SHE HAS NOT HEARD ANYTHING FROM THE MEDICAL STAFF. ATTEMPTED TO WARM TRANSFER. PLEASE CALL PATIENT TO ADVISE.

## 2022-09-16 ENCOUNTER — LAB (OUTPATIENT)
Dept: LAB | Facility: HOSPITAL | Age: 72
End: 2022-09-16

## 2022-09-16 ENCOUNTER — TRANSCRIBE ORDERS (OUTPATIENT)
Dept: LAB | Facility: HOSPITAL | Age: 72
End: 2022-09-16

## 2022-09-16 DIAGNOSIS — H10.45 CONJUNCTIVITIS, CHRONIC ALLERGIC: ICD-10-CM

## 2022-09-16 DIAGNOSIS — H46.01 OPTIC PAPILLITIS, RIGHT EYE: ICD-10-CM

## 2022-09-16 DIAGNOSIS — H43.813 VITREOUS DEGENERATION OF BOTH EYES: ICD-10-CM

## 2022-09-16 DIAGNOSIS — H46.01 OPTIC PAPILLITIS, RIGHT EYE: Primary | ICD-10-CM

## 2022-09-16 DIAGNOSIS — H04.121 DRY EYE SYNDROME, RIGHT: ICD-10-CM

## 2022-09-16 DIAGNOSIS — H10.45 CONJUNCTIVITIS, ALLERGIC, CHRONIC: ICD-10-CM

## 2022-09-16 DIAGNOSIS — Z79.84 LONG TERM CURRENT USE OF ORAL HYPOGLYCEMIC DRUG: ICD-10-CM

## 2022-09-16 DIAGNOSIS — H26.493: ICD-10-CM

## 2022-09-16 DIAGNOSIS — E11.9 DIABETES MELLITUS WITHOUT COMPLICATION: ICD-10-CM

## 2022-09-16 LAB
ANION GAP SERPL CALCULATED.3IONS-SCNC: 10.1 MMOL/L (ref 5–15)
BUN SERPL-MCNC: 19 MG/DL (ref 8–23)
BUN/CREAT SERPL: 17.1 (ref 7–25)
CALCIUM SPEC-SCNC: 10.2 MG/DL (ref 8.6–10.5)
CHLORIDE SERPL-SCNC: 104 MMOL/L (ref 98–107)
CO2 SERPL-SCNC: 26.9 MMOL/L (ref 22–29)
CREAT SERPL-MCNC: 1.11 MG/DL (ref 0.57–1)
CRP SERPL-MCNC: <0.3 MG/DL (ref 0–0.5)
EGFRCR SERPLBLD CKD-EPI 2021: 53.6 ML/MIN/1.73
ERYTHROCYTE [SEDIMENTATION RATE] IN BLOOD: 6 MM/HR (ref 0–30)
GLUCOSE SERPL-MCNC: 115 MG/DL (ref 65–99)
POTASSIUM SERPL-SCNC: 4.2 MMOL/L (ref 3.5–5.2)
SODIUM SERPL-SCNC: 141 MMOL/L (ref 136–145)
T PALLIDUM IGG SER QL: NORMAL

## 2022-09-16 PROCEDURE — 86780 TREPONEMA PALLIDUM: CPT

## 2022-09-16 PROCEDURE — 83516 IMMUNOASSAY NONANTIBODY: CPT

## 2022-09-16 PROCEDURE — 82164 ANGIOTENSIN I ENZYME TEST: CPT

## 2022-09-16 PROCEDURE — 86618 LYME DISEASE ANTIBODY: CPT

## 2022-09-16 PROCEDURE — 86038 ANTINUCLEAR ANTIBODIES: CPT

## 2022-09-16 PROCEDURE — 86480 TB TEST CELL IMMUN MEASURE: CPT

## 2022-09-16 PROCEDURE — 80048 BASIC METABOLIC PNL TOTAL CA: CPT

## 2022-09-16 PROCEDURE — 86140 C-REACTIVE PROTEIN: CPT

## 2022-09-16 PROCEDURE — 86611 BARTONELLA ANTIBODY: CPT

## 2022-09-16 PROCEDURE — 86037 ANCA TITER EACH ANTIBODY: CPT

## 2022-09-16 PROCEDURE — 85652 RBC SED RATE AUTOMATED: CPT

## 2022-09-16 PROCEDURE — 36415 COLL VENOUS BLD VENIPUNCTURE: CPT

## 2022-09-17 LAB — B BURGDOR IGG+IGM SER QL IA: NEGATIVE

## 2022-09-19 LAB — ACE SERPL-CCNC: 57 U/L (ref 14–82)

## 2022-09-20 LAB
ANA SER QL IF: NEGATIVE
B HENSELAE IGG TITR SER IF: NEGATIVE TITER
B HENSELAE IGM TITR SER IF: NEGATIVE TITER
B QUINTANA IGG TITR SER IF: NEGATIVE TITER
B QUINTANA IGM TITR SER IF: NEGATIVE TITER
C-ANCA TITR SER IF: NORMAL TITER
GAMMA INTERFERON BACKGROUND BLD IA-ACNC: 0 IU/ML
M TB IFN-G BLD-IMP: NEGATIVE
M TB IFN-G CD4+ BCKGRND COR BLD-ACNC: 0 IU/ML
M TB IFN-G CD4+CD8+ BCKGRND COR BLD-ACNC: 0 IU/ML
MITOGEN IGNF BLD-ACNC: >10 IU/ML
MYELOPEROXIDASE AB SER IA-ACNC: <0.2 UNITS (ref 0–0.9)
P-ANCA ATYPICAL TITR SER IF: NORMAL TITER
P-ANCA TITR SER IF: NORMAL TITER
PROTEINASE3 AB SER IA-ACNC: <0.2 UNITS (ref 0–0.9)
QUANTIFERON INCUBATION: NORMAL
SERVICE CMNT-IMP: NORMAL

## 2022-09-26 ENCOUNTER — TELEPHONE (OUTPATIENT)
Dept: INTERNAL MEDICINE | Facility: CLINIC | Age: 72
End: 2022-09-26

## 2022-09-26 ENCOUNTER — NURSE TRIAGE (OUTPATIENT)
Dept: CALL CENTER | Facility: HOSPITAL | Age: 72
End: 2022-09-26

## 2022-09-26 NOTE — TELEPHONE ENCOUNTER
Red rule verified and correct.    Pt stated she was placed on high dose steroids on 9/23/22 for her eyes.  She will be on for 6 weeks.  Concerned her BG will get high.    Currently on metformin 500 mg BID    Today .

## 2022-09-26 NOTE — TELEPHONE ENCOUNTER
"Caller wishes to speak with her PCP about  How to manage her BG for the next few weeks while she is taking \"heavy duty steroids\" Warm Transfer to [place] at the office.     Reason for Disposition  • [1] Caller requesting NON-URGENT health information AND [2] PCP's office is the best resource    Additional Information  • Negative: [1] Caller is not with the adult (patient) AND [2] reporting urgent symptoms  • Negative: Lab result questions  • Negative: Medication questions  • Negative: Caller can't be reached by phone  • Negative: Caller has already spoken to PCP or another triager  • Negative: RN needs further essential information from caller in order to complete triage  • Negative: Requesting regular office appointment    Answer Assessment - Initial Assessment Questions  1. REASON FOR CALL or QUESTION: \"What is your reason for calling today?\" or \"How can I best help you?\" or \"What question do you have that I can help answer?\"      Caller states she has been placed on \"heavy duty steroids\" and is wanting to speak to her provider about how to manage her BG while she is taking the steroids.    Protocols used: INFORMATION ONLY CALL - NO TRIAGE-ADULT-      "

## 2022-09-28 RX ORDER — THYROID 60 MG
60 TABLET ORAL DAILY
Qty: 90 TABLET | Refills: 1 | Status: CANCELLED | OUTPATIENT
Start: 2022-09-28

## 2022-09-28 NOTE — TELEPHONE ENCOUNTER
The steroids may certainly raise her blood sugar. Continue to monitor sugars. If becoming consistently high, can increase metformin to 1000mg BID. Please call clinic if blood sugars remain high after increasing medication.

## 2022-09-28 NOTE — TELEPHONE ENCOUNTER
"[facility]    HUB TO READ: PER DR KEARNEY:    \"The steroids may certainly raise her blood sugar. Continue to monitor sugars. If becoming consistently high, you can increase your metformin to 1000 mg TWICE A DAY. Please call clinic if blood sugars remain high after increasing medication.\"     "

## 2022-09-28 NOTE — TELEPHONE ENCOUNTER
PATIENT NOTIFIED OF HUB TO READ NOTE     Caller: Amelie Anderson    Relationship: Self    Best call back number: [phone]    Requested Prescriptions:   Requested Prescriptions     Pending Prescriptions Disp Refills   • [place] Thyroid 60 MG tablet 90 tablet 1     Sig: Take 1 tablet by mouth Daily.   • sertraline (ZOLOFT) 50 MG tablet          Pharmacy where request should be sent: [facility] -  [place], [place] - [address] [facility] [phone] [facility] [phone] FX       Does the patient have less than a 3 day supply:  [x] Yes  [] No    Ann Marie Vincent Rep   09/28/22 11:42 EDT

## 2022-09-29 DIAGNOSIS — E03.9 HYPOTHYROIDISM, UNSPECIFIED TYPE: ICD-10-CM

## 2022-09-29 DIAGNOSIS — F41.8 DEPRESSION WITH ANXIETY: Primary | ICD-10-CM

## 2022-09-29 RX ORDER — THYROID 60 MG
60 TABLET ORAL DAILY
Qty: 90 TABLET | Refills: 1 | Status: SHIPPED | OUTPATIENT
Start: 2022-09-29 | End: 2023-03-23 | Stop reason: SDUPTHER

## 2022-09-29 NOTE — TELEPHONE ENCOUNTER
Caller: Amelie Anderson     Relationship: Self     Best call back number: [phone]     Requested Prescriptions:   Requested Prescriptions             Pending Prescriptions Disp Refills   • [place] Thyroid 60 MG tablet 90 tablet 1       Sig: Take 1 tablet by mouth Daily.   • sertraline (ZOLOFT) 50 MG tablet             Pharmacy where request should be sent: [facility] [place] [place] - [address] [facility] [phone] [facility] [phone] FX         Does the patient have less than a 3 day supply:  [x]? Yes  []? No     Ann Marie Vincent Rep   09/28/22 11:42 EDT

## 2022-09-29 NOTE — TELEPHONE ENCOUNTER
Requested Prescriptions     Pending Prescriptions Disp Refills   • sertraline (ZOLOFT) 50 MG tablet 90 tablet 1     Sig: Take 1 tablet by mouth Daily.     Signed Prescriptions Disp Refills   • [place] Thyroid 60 MG tablet 90 tablet 1     Sig: Take 1 tablet by mouth Daily.     Authorizing Provider: STEVEN KEARNEY     Ordering User: AMISH LEW - 6/2/2022    LRF - historical - sertraline    UPCOMING APPT - none -  was due in September.    Pt now scheduled for 10/5/22.    Sertraline pended per protocol.

## 2022-10-05 ENCOUNTER — OFFICE VISIT (OUTPATIENT)
Dept: INTERNAL MEDICINE | Facility: CLINIC | Age: 72
End: 2022-10-05

## 2022-10-05 VITALS
HEART RATE: 65 BPM | BODY MASS INDEX: 26.91 KG/M2 | WEIGHT: 156.8 LBS | OXYGEN SATURATION: 96 % | SYSTOLIC BLOOD PRESSURE: 102 MMHG | TEMPERATURE: 97.9 F | RESPIRATION RATE: 18 BRPM | DIASTOLIC BLOOD PRESSURE: 64 MMHG

## 2022-10-05 DIAGNOSIS — I10 ESSENTIAL HYPERTENSION: Primary | ICD-10-CM

## 2022-10-05 DIAGNOSIS — F41.8 DEPRESSION WITH ANXIETY: ICD-10-CM

## 2022-10-05 DIAGNOSIS — E11.9 TYPE 2 DIABETES MELLITUS WITHOUT COMPLICATION, WITHOUT LONG-TERM CURRENT USE OF INSULIN: ICD-10-CM

## 2022-10-05 PROCEDURE — 99214 OFFICE O/P EST MOD 30 MIN: CPT | Performed by: INTERNAL MEDICINE

## 2022-10-05 RX ORDER — METFORMIN HYDROCHLORIDE 500 MG/1
2000 TABLET, EXTENDED RELEASE ORAL NIGHTLY
Qty: 360 TABLET | Refills: 1 | Status: SHIPPED | OUTPATIENT
Start: 2022-10-05 | End: 2023-03-17 | Stop reason: SDUPTHER

## 2022-10-05 RX ORDER — OLOPATADINE HYDROCHLORIDE 1 MG/ML
2 SOLUTION/ DROPS OPHTHALMIC 2 TIMES DAILY
COMMUNITY
Start: 2022-09-27

## 2022-10-05 RX ORDER — PREDNISONE 20 MG/1
20 TABLET ORAL 3 TIMES DAILY
COMMUNITY
Start: 2022-09-23 | End: 2023-01-20

## 2022-10-05 RX ORDER — FLUCONAZOLE 150 MG/1
150 TABLET ORAL ONCE
Qty: 1 TABLET | Refills: 0 | Status: SHIPPED | OUTPATIENT
Start: 2022-10-05 | End: 2022-10-05

## 2022-10-05 RX ORDER — METFORMIN HYDROCHLORIDE 500 MG/1
2000 TABLET, EXTENDED RELEASE ORAL NIGHTLY
Qty: 360 TABLET | Refills: 1 | Status: SHIPPED | OUTPATIENT
Start: 2022-10-05 | End: 2022-10-05

## 2022-10-05 RX ORDER — LANOLIN ALCOHOL/MO/W.PET/CERES
50 CREAM (GRAM) TOPICAL DAILY
Qty: 90 TABLET | Refills: 1 | Status: SHIPPED | OUTPATIENT
Start: 2022-10-05 | End: 2023-01-05 | Stop reason: SDUPTHER

## 2022-10-05 RX ORDER — FERROUS SULFATE 325(65) MG
1 TABLET ORAL DAILY
Qty: 90 TABLET | Refills: 1 | Status: SHIPPED | OUTPATIENT
Start: 2022-10-05

## 2022-10-05 RX ORDER — LORATADINE 10 MG/1
10 TABLET ORAL DAILY
Qty: 90 TABLET | Refills: 1 | Status: SHIPPED | OUTPATIENT
Start: 2022-10-05 | End: 2023-04-05 | Stop reason: SDUPTHER

## 2022-10-05 NOTE — ASSESSMENT & PLAN NOTE
Well-controlled on previous labs  Will delay checking A1c as she has been on high-dose prednisone.  Continue current management of elevated blood sugars  Call clinic if blood sugars becoming even more elevated.  Can consider addition of medication at that time.

## 2022-10-05 NOTE — PROGRESS NOTES
Chief Complaint  Hypertension (Follow-up), Diabetes (Follow-up), Depression (Follow-up), and eye sight issue (Was put on prednisone and has been messing with her eye sight since she started it)    Subjective       Amelie Anderson presents to [facility]    HPI   Presenting for follow-up    HTN:  well controlled today, doing well on medication, denies headache, chest pain, dizziness, vision changes    DM: well controlled on previous labs, A1C 7.3%, denies numbness/tingling, vision changes, urinary changes, dizziness, nausea    States that she has been put on a 6-week course of high-dose prednisone by her ophthalmologist for loss of vision in her right eye.  Has been having elevated blood sugars due to the prednisone.  She has found that taking her metformin at night has helped with this.    Objective     Vitals:    10/05/22 1350   BP: 102/64   BP Location: Left arm   Patient Position: Sitting   Cuff Size: Adult   Pulse: 65   Resp: 18   Temp: 97.9 °F (36.6 °C)   TempSrc: Oral   SpO2: 96%   Weight: 71.1 kg (156 lb 12.8 oz)      Wt Readings from Last 3 Encounters:   10/05/22 71.1 kg (156 lb 12.8 oz)   07/22/22 72.1 kg (159 lb)   07/13/22 72.6 kg (160 lb)      BP Readings from Last 3 Encounters:   10/05/22 102/64   07/22/22 113/68   07/13/22 120/65        Body mass index is 26.91 kg/m².    BMI is >= 25 and <30. (Overweight) The following options were offered after discussion;: exercise counseling/recommendations and nutrition counseling/recommendations       Physical Exam  Vitals reviewed.   Constitutional:       Appearance: Normal appearance. She is well-developed.   HENT:      Head: Normocephalic and atraumatic.      Mouth/Throat:      Pharynx: No oropharyngeal exudate.   Eyes:      Conjunctiva/sclera: Conjunctivae normal.      Pupils: Pupils are equal, round, and reactive to light.   Cardiovascular:      Rate and Rhythm: Normal rate and regular rhythm.      Heart  sounds: No murmur heard.    No friction rub. No gallop.   Pulmonary:      Effort: Pulmonary effort is normal.      Breath sounds: Normal breath sounds. No wheezing or rhonchi.   Skin:     General: Skin is warm and dry.   Neurological:      Mental Status: She is alert and oriented to person, place, and time.   Psychiatric:         Mood and Affect: Affect normal.          Result Review :   The following data was reviewed by: Fabi Bill MD on 10/05/2022:  CMP    CMP 3/2/22 6/2/22 9/16/22   Glucose 115 (A) 119 (A) 115 (A)   BUN 17 33 (A) 19   Creatinine 1.15 (A) 1.20 (A) 1.11 (A)   Sodium 142 137 141   Potassium 4.6 4.9 4.2   Chloride 103 102 104   Calcium 10.5 9.8 10.2   Albumin 4.70 5.00    Total Bilirubin 0.5 0.8    Alkaline Phosphatase 74 87    AST (SGOT) 21 21    ALT (SGPT) 18 19    (A) Abnormal value            CBC    CBC 11/29/21 3/2/22 6/2/22   WBC 5.98 5.51 4.90   RBC 4.35 4.35 4.14   Hemoglobin 13.0 12.7 12.4   Hematocrit 39.3 38.7 36.9   MCV 90.3 89.0 89.1   MCH 29.9 29.2 30.0   MCHC 33.1 32.8 33.6   RDW 11.9 (A) 13.1 12.6   Platelets 211 190 191   (A) Abnormal value            Lipid Panel    Lipid Panel 11/29/21 3/2/22 6/2/22   Total Cholesterol 119 129 126   Triglycerides 63 74 103   HDL Cholesterol 59 63 (A) 58   VLDL Cholesterol 14 15 19   LDL Cholesterol  46 51 49   LDL/HDL Ratio 0.80 0.81 0.82   (A) Abnormal value            TSH    TSH 11/29/21 3/2/22 6/2/22   TSH 1.610 3.370 1.510           A1C Last 3 Results    HGBA1C Last 3 Results 11/29/21 3/2/22 6/2/22   Hemoglobin A1C 7.02 (A) 7.10 (A) 7.30 (A)   (A) Abnormal value                    Procedures    Assessment and Plan   Diagnoses and all orders for this visit:    1. Essential hypertension (Primary)  Assessment & Plan:  Well-controlled in clinic today  Continue current management      2. Type 2 diabetes mellitus without complication, without long-term current use of insulin (HCC)  Assessment & Plan:  Well-controlled on previous  labs  Will delay checking A1c as she has been on high-dose prednisone.  Continue current management of elevated blood sugars  Call clinic if blood sugars becoming even more elevated.  Can consider addition of medication at that time.      3. Depression with anxiety    Other orders  -     vitamin B-6 (PYRIDOXINE) 50 MG tablet; Take 1 tablet by mouth Daily.  Dispense: 90 tablet; Refill: 1  -     loratadine (CLARITIN) 10 MG tablet; Take 1 tablet by mouth Daily.  Dispense: 90 tablet; Refill: 1  -     FeroSul 325 (65 Fe) MG tablet; Take 1 tablet by mouth Daily.  Dispense: 90 tablet; Refill: 1  -     Discontinue: metFORMIN ER (GLUCOPHAGE-XR) 500 MG 24 hr tablet; Take 4 tablets by mouth Every Night.  Dispense: 360 tablet; Refill: 1  -     fluconazole (DIFLUCAN) 150 MG tablet; Take 1 tablet by mouth 1 (One) Time for 1 dose.  Dispense: 1 tablet; Refill: 0  -     metFORMIN ER (GLUCOPHAGE-XR) 500 MG 24 hr tablet; Take 4 tablets by mouth Every Night.  Dispense: 360 tablet; Refill: 1        Follow Up   Return in about 3 months (around 1/5/2023) for Next scheduled follow up.  Patient was given instructions and counseling regarding her condition or for health maintenance advice. Please see specific information pulled into the AVS if appropriate.

## 2022-10-11 ENCOUNTER — CLINICAL SUPPORT (OUTPATIENT)
Dept: INTERNAL MEDICINE | Facility: CLINIC | Age: 72
End: 2022-10-11

## 2022-10-11 DIAGNOSIS — Z23 NEED FOR INFLUENZA VACCINATION: Primary | ICD-10-CM

## 2022-10-11 PROCEDURE — 90662 IIV NO PRSV INCREASED AG IM: CPT | Performed by: INTERNAL MEDICINE

## 2022-10-11 PROCEDURE — G0008 ADMIN INFLUENZA VIRUS VAC: HCPCS | Performed by: INTERNAL MEDICINE

## 2022-10-13 ENCOUNTER — IMMUNIZATION (OUTPATIENT)
Dept: INTERNAL MEDICINE | Facility: CLINIC | Age: 72
End: 2022-10-13

## 2022-10-13 PROCEDURE — 91312 COVID-19 (PFIZER) BIVALENT BOOSTER 12+YRS: CPT | Performed by: INTERNAL MEDICINE

## 2022-10-13 PROCEDURE — 0124A COVID-19 (PFIZER) BIVALENT BOOSTER 12+YRS: CPT | Performed by: INTERNAL MEDICINE

## 2022-11-11 ENCOUNTER — TRANSCRIBE ORDERS (OUTPATIENT)
Dept: ADMINISTRATIVE | Facility: HOSPITAL | Age: 72
End: 2022-11-11

## 2022-11-11 DIAGNOSIS — Z12.31 SCREENING MAMMOGRAM FOR BREAST CANCER: Primary | ICD-10-CM

## 2022-12-27 ENCOUNTER — HOSPITAL ENCOUNTER (OUTPATIENT)
Dept: MAMMOGRAPHY | Facility: HOSPITAL | Age: 72
Discharge: HOME | End: 2022-12-27
Admitting: INTERNAL MEDICINE

## 2022-12-27 DIAGNOSIS — Z12.31 SCREENING MAMMOGRAM FOR BREAST CANCER: ICD-10-CM

## 2022-12-27 PROCEDURE — 77063 BREAST TOMOSYNTHESIS BI: CPT

## 2022-12-27 PROCEDURE — 77067 SCR MAMMO BI INCL CAD: CPT

## 2023-01-05 ENCOUNTER — OFFICE VISIT (OUTPATIENT)
Dept: INTERNAL MEDICINE | Facility: CLINIC | Age: 73
End: 2023-01-05
Payer: MEDICARE

## 2023-01-05 VITALS
BODY MASS INDEX: 27.06 KG/M2 | HEIGHT: 64 IN | WEIGHT: 158.5 LBS | SYSTOLIC BLOOD PRESSURE: 106 MMHG | OXYGEN SATURATION: 98 % | DIASTOLIC BLOOD PRESSURE: 66 MMHG | TEMPERATURE: 97.7 F | HEART RATE: 76 BPM

## 2023-01-05 DIAGNOSIS — E78.00 HIGH CHOLESTEROL: Chronic | ICD-10-CM

## 2023-01-05 DIAGNOSIS — E11.9 TYPE 2 DIABETES MELLITUS WITHOUT COMPLICATION, WITHOUT LONG-TERM CURRENT USE OF INSULIN: Primary | ICD-10-CM

## 2023-01-05 DIAGNOSIS — I10 ESSENTIAL HYPERTENSION: ICD-10-CM

## 2023-01-05 DIAGNOSIS — E03.9 HYPOTHYROIDISM, UNSPECIFIED TYPE: ICD-10-CM

## 2023-01-05 LAB
BASOPHILS # BLD AUTO: 0.02 10*3/MM3 (ref 0–0.2)
BASOPHILS NFR BLD AUTO: 0.3 % (ref 0–1.5)
DEPRECATED RDW RBC AUTO: 42.1 FL (ref 37–54)
EOSINOPHIL # BLD AUTO: 0.12 10*3/MM3 (ref 0–0.4)
EOSINOPHIL NFR BLD AUTO: 2 % (ref 0.3–6.2)
ERYTHROCYTE [DISTWIDTH] IN BLOOD BY AUTOMATED COUNT: 12.6 % (ref 12.3–15.4)
HBA1C MFR BLD: 7.6 % (ref 4.8–5.6)
HCT VFR BLD AUTO: 34.5 % (ref 34–46.6)
HGB BLD-MCNC: 11.5 G/DL (ref 12–15.9)
IMM GRANULOCYTES # BLD AUTO: 0.02 10*3/MM3 (ref 0–0.05)
IMM GRANULOCYTES NFR BLD AUTO: 0.3 % (ref 0–0.5)
LYMPHOCYTES # BLD AUTO: 1.55 10*3/MM3 (ref 0.7–3.1)
LYMPHOCYTES NFR BLD AUTO: 25.8 % (ref 19.6–45.3)
MCH RBC QN AUTO: 30.7 PG (ref 26.6–33)
MCHC RBC AUTO-ENTMCNC: 33.3 G/DL (ref 31.5–35.7)
MCV RBC AUTO: 92.2 FL (ref 79–97)
MONOCYTES # BLD AUTO: 0.46 10*3/MM3 (ref 0.1–0.9)
MONOCYTES NFR BLD AUTO: 7.7 % (ref 5–12)
NEUTROPHILS NFR BLD AUTO: 3.84 10*3/MM3 (ref 1.7–7)
NEUTROPHILS NFR BLD AUTO: 63.9 % (ref 42.7–76)
NRBC BLD AUTO-RTO: 0 /100 WBC (ref 0–0.2)
PLATELET # BLD AUTO: 202 10*3/MM3 (ref 140–450)
PMV BLD AUTO: 10.4 FL (ref 6–12)
RBC # BLD AUTO: 3.74 10*6/MM3 (ref 3.77–5.28)
WBC NRBC COR # BLD: 6.01 10*3/MM3 (ref 3.4–10.8)

## 2023-01-05 PROCEDURE — 3051F HG A1C>EQUAL 7.0%<8.0%: CPT | Performed by: INTERNAL MEDICINE

## 2023-01-05 PROCEDURE — 99214 OFFICE O/P EST MOD 30 MIN: CPT | Performed by: INTERNAL MEDICINE

## 2023-01-05 PROCEDURE — 82043 UR ALBUMIN QUANTITATIVE: CPT | Performed by: INTERNAL MEDICINE

## 2023-01-05 PROCEDURE — 80053 COMPREHEN METABOLIC PANEL: CPT | Performed by: INTERNAL MEDICINE

## 2023-01-05 PROCEDURE — 36415 COLL VENOUS BLD VENIPUNCTURE: CPT | Performed by: INTERNAL MEDICINE

## 2023-01-05 PROCEDURE — 84443 ASSAY THYROID STIM HORMONE: CPT | Performed by: INTERNAL MEDICINE

## 2023-01-05 PROCEDURE — 83036 HEMOGLOBIN GLYCOSYLATED A1C: CPT | Performed by: INTERNAL MEDICINE

## 2023-01-05 PROCEDURE — 85025 COMPLETE CBC W/AUTO DIFF WBC: CPT | Performed by: INTERNAL MEDICINE

## 2023-01-05 PROCEDURE — 80061 LIPID PANEL: CPT | Performed by: INTERNAL MEDICINE

## 2023-01-05 RX ORDER — LANOLIN ALCOHOL/MO/W.PET/CERES
50 CREAM (GRAM) TOPICAL DAILY
Qty: 90 TABLET | Refills: 1 | Status: SHIPPED | OUTPATIENT
Start: 2023-01-05

## 2023-01-05 RX ORDER — ATORVASTATIN CALCIUM 40 MG/1
40 TABLET, FILM COATED ORAL DAILY
Qty: 90 TABLET | Refills: 1 | Status: SHIPPED | OUTPATIENT
Start: 2023-01-05

## 2023-01-05 RX ORDER — BLOOD-GLUCOSE METER
KIT MISCELLANEOUS
Qty: 100 EACH | Refills: 1 | Status: SHIPPED | OUTPATIENT
Start: 2023-01-05

## 2023-01-06 LAB
ALBUMIN SERPL-MCNC: 4.8 G/DL (ref 3.5–5.2)
ALBUMIN UR-MCNC: <1.2 MG/DL
ALBUMIN/GLOB SERPL: 2.5 G/DL
ALP SERPL-CCNC: 72 U/L (ref 39–117)
ALT SERPL W P-5'-P-CCNC: 14 U/L (ref 1–33)
ANION GAP SERPL CALCULATED.3IONS-SCNC: 8 MMOL/L (ref 5–15)
AST SERPL-CCNC: 17 U/L (ref 1–32)
BILIRUB SERPL-MCNC: 0.4 MG/DL (ref 0–1.2)
BUN SERPL-MCNC: 23 MG/DL (ref 8–23)
BUN/CREAT SERPL: 22.5 (ref 7–25)
CALCIUM SPEC-SCNC: 9.6 MG/DL (ref 8.6–10.5)
CHLORIDE SERPL-SCNC: 102 MMOL/L (ref 98–107)
CHOLEST SERPL-MCNC: 121 MG/DL (ref 0–200)
CO2 SERPL-SCNC: 28 MMOL/L (ref 22–29)
CREAT SERPL-MCNC: 1.02 MG/DL (ref 0.57–1)
EGFRCR SERPLBLD CKD-EPI 2021: 59.3 ML/MIN/1.73
GLOBULIN UR ELPH-MCNC: 1.9 GM/DL
GLUCOSE SERPL-MCNC: 150 MG/DL (ref 65–99)
HDLC SERPL-MCNC: 50 MG/DL (ref 40–60)
LDLC SERPL CALC-MCNC: 38 MG/DL (ref 0–100)
LDLC/HDLC SERPL: 0.58 {RATIO}
POTASSIUM SERPL-SCNC: 4.5 MMOL/L (ref 3.5–5.2)
PROT SERPL-MCNC: 6.7 G/DL (ref 6–8.5)
SODIUM SERPL-SCNC: 138 MMOL/L (ref 136–145)
TRIGL SERPL-MCNC: 211 MG/DL (ref 0–150)
TSH SERPL DL<=0.05 MIU/L-ACNC: 1.67 UIU/ML (ref 0.27–4.2)
VLDLC SERPL-MCNC: 33 MG/DL (ref 5–40)

## 2023-01-06 NOTE — PROGRESS NOTES
Chief Complaint  Follow-up (Follow up pt states sugar is slowly doing better. And says its time for labs also. /Every once in a while gets busy spells )    Subjective       Amelie Anderson presents to [facility]    HPI   Presenting for follow up     DM: well controlled on previous labs, A1C 7.3%, denies numbness/tingling, vision changes, urinary changes, dizziness, nausea    HTN:  well controlled today, doing well on medication, denies headache, chest pain, dizziness, vision changes    HLD: on statin, tolerating well, denies muscle pain/weakness      Objective     Vitals:    01/05/23 1503   BP: 106/66   BP Location: Right arm   Patient Position: Sitting   Cuff Size: Adult   Pulse: 76   Temp: 97.7 °F (36.5 °C)   TempSrc: Temporal   SpO2: 98%   Weight: 71.9 kg (158 lb 8 oz)   Height: 162.6 cm (64\")      Wt Readings from Last 3 Encounters:   01/05/23 71.9 kg (158 lb 8 oz)   10/05/22 71.1 kg (156 lb 12.8 oz)   07/22/22 72.1 kg (159 lb)      BP Readings from Last 3 Encounters:   01/05/23 106/66   10/05/22 102/64   07/22/22 113/68        Body mass index is 27.21 kg/m².           Physical Exam  Vitals reviewed.   Constitutional:       Appearance: Normal appearance. She is well-developed.   HENT:      Head: Normocephalic and atraumatic.      Mouth/Throat:      Pharynx: No oropharyngeal exudate.   Eyes:      Conjunctiva/sclera: Conjunctivae normal.      Pupils: Pupils are equal, round, and reactive to light.   Cardiovascular:      Rate and Rhythm: Normal rate and regular rhythm.      Heart sounds: No murmur heard.    No friction rub. No gallop.   Pulmonary:      Effort: Pulmonary effort is normal.      Breath sounds: Normal breath sounds. No wheezing or rhonchi.   Skin:     General: Skin is warm and dry.   Neurological:      Mental Status: She is alert and oriented to person, place, and time.   Psychiatric:         Mood and Affect: Affect normal.          Result Review  :   The following data was reviewed by: Fabi Bill MD on 01/05/2023:  CMP    CMP 6/2/22 9/16/22 1/5/23   Glucose 119 (A) 115 (A) 150 (A)   BUN 33 (A) 19 23   Creatinine 1.20 (A) 1.11 (A) 1.02 (A)   eGFR 48.8 (A) 53.6 (A) 59.3 (A)   Sodium 137 141 138   Potassium 4.9 4.2 4.5   Chloride 102 104 102   Calcium 9.8 10.2 9.6   Total Protein 7.5  6.7   Albumin 5.00  4.8   Globulin 2.5  1.9   Total Bilirubin 0.8  0.4   Alkaline Phosphatase 87  72   AST (SGOT) 21  17   ALT (SGPT) 19  14   Albumin/Globulin Ratio 2.0  2.5   BUN/Creatinine Ratio 27.5 (A) 17.1 22.5   Anion Gap 10.6 10.1 8.0   (A) Abnormal value       Comments are available for some flowsheets but are not being displayed.           CBC    CBC 3/2/22 6/2/22 1/5/23   WBC 5.51 4.90 6.01   RBC 4.35 4.14 3.74 (A)   Hemoglobin 12.7 12.4 11.5 (A)   Hematocrit 38.7 36.9 34.5   MCV 89.0 89.1 92.2   MCH 29.2 30.0 30.7   MCHC 32.8 33.6 33.3   RDW 13.1 12.6 12.6   Platelets 190 191 202   (A) Abnormal value            Lipid Panel    Lipid Panel 3/2/22 6/2/22 1/5/23   Total Cholesterol 129 126 121   Triglycerides 74 103 211 (A)   HDL Cholesterol 63 (A) 58 50   VLDL Cholesterol 15 19 33   LDL Cholesterol  51 49 38   LDL/HDL Ratio 0.81 0.82 0.58   (A) Abnormal value            TSH    TSH 3/2/22 6/2/22 1/5/23   TSH 3.370 1.510 1.670           A1C Last 3 Results    HGBA1C Last 3 Results 3/2/22 6/2/22 1/5/23   Hemoglobin A1C 7.10 (A) 7.30 (A) 7.60 (A)   (A) Abnormal value            Microalbumin    Microalbumin 1/5/23   Microalbumin, Urine <1.2               Procedures    Assessment and Plan   Diagnoses and all orders for this visit:    1. Type 2 diabetes mellitus without complication, without long-term current use of insulin (HCC) (Primary)  Assessment & Plan:  Well controlled  Checking follow up labs today  Continue current management    Orders:  -     FREESTYLE LITE test strip; Use as directed  Dispense: 100 each; Refill: 1  -     CBC & Differential  -      Hemoglobin A1c  -     MicroAlbumin, Urine, Random - Urine, Clean Catch    2. Hypothyroidism, unspecified type  -     TSH    3. High cholesterol  Assessment & Plan:  On statin, doing well    Orders:  -     Comprehensive Metabolic Panel  -     Lipid Panel    4. Essential hypertension  Assessment & Plan:  Well controlled in clinic today  Continue current management      Other orders  -     atorvastatin (LIPITOR) 40 MG tablet; Take 1 tablet by mouth Daily.  Dispense: 90 tablet; Refill: 1  -     vitamin B-6 (PYRIDOXINE) 50 MG tablet; Take 1 tablet by mouth Daily.  Dispense: 90 tablet; Refill: 1        Follow Up   Return in about 3 months (around 4/5/2023) for Next scheduled follow up.  Patient was given instructions and counseling regarding her condition or for health maintenance advice. Please see specific information pulled into the AVS if appropriate.

## 2023-01-10 ENCOUNTER — OFFICE VISIT (OUTPATIENT)
Dept: PULMONOLOGY | Facility: CLINIC | Age: 73
End: 2023-01-10
Payer: MEDICARE

## 2023-01-10 VITALS
SYSTOLIC BLOOD PRESSURE: 109 MMHG | HEIGHT: 64 IN | DIASTOLIC BLOOD PRESSURE: 67 MMHG | OXYGEN SATURATION: 100 % | TEMPERATURE: 97.5 F | WEIGHT: 157.8 LBS | RESPIRATION RATE: 16 BRPM | BODY MASS INDEX: 26.94 KG/M2 | HEART RATE: 69 BPM

## 2023-01-10 DIAGNOSIS — R06.09 DYSPNEA ON EXERTION: ICD-10-CM

## 2023-01-10 DIAGNOSIS — R91.1 PULMONARY NODULE: Primary | ICD-10-CM

## 2023-01-10 DIAGNOSIS — J30.9 ALLERGIC RHINITIS, UNSPECIFIED SEASONALITY, UNSPECIFIED TRIGGER: ICD-10-CM

## 2023-01-10 DIAGNOSIS — J30.2 SEASONAL ALLERGIES: ICD-10-CM

## 2023-01-10 DIAGNOSIS — F17.211 NICOTINE DEPENDENCE, CIGARETTES, IN REMISSION: ICD-10-CM

## 2023-01-10 PROCEDURE — 99213 OFFICE O/P EST LOW 20 MIN: CPT | Performed by: NURSE PRACTITIONER

## 2023-01-10 NOTE — PROGRESS NOTES
Primary Care Provider  Fabi Bill MD     Referring Provider  No ref. provider found     Chief Complaint  Follow-up (6 month f/up )    Subjective          Amelie Anderson presents to [facility]  History of Present Illness  Amelie Anderson is a 72 y.o. female patient of  here for management of a pulmonary nodule, seasonal allergies/allergic rhinitis, hypertension and tobacco use of cigarettes in remission.     Patient states she is doing well since her last visit.  She denies any getting antibiotics or steroids for her lungs.  She denies any fevers or chills.  Patient's shortness of breath is very bearable in severity and worse with seasonal changes.  She continues to take Claritin and Flonase for seasonal allergies and allergic rhinitis.  She is scheduled to have repeat chest CT in July 2023.  Patient does state that over the past year she has had intermittent vision loss in her right eye.  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  They believe this is related to her diabetes.  Patient's recent A1c is 7.6 patient states that she is working on getting this number down.  Patient's diabetes is being managed by her primary care provider.  Overall, patient has no concerns at this time.  She is able to perform her ADLs without difficulty.  She is up-to-date with her flu, pneumonia and COVID vaccines.     Her history of smoking is   Tobacco Use: Medium Risk   • Smoking Tobacco Use: Former   • Smokeless Tobacco Use: Never   • Passive Exposure: Not on file   .    Review of Systems   Constitutional: Negative for chills, fatigue, fever, unexpected weight gain and unexpected weight loss.   HENT: Negative for congestion (Nasal), hearing loss, mouth sores, nosebleeds, postnasal drip, sore throat and trouble swallowing.    Eyes: Positive for visual disturbance. Negative for blurred vision.    Respiratory: Negative for apnea, cough, shortness of breath and wheezing.         Negative for Hemoptysis     Cardiovascular: Negative for chest pain, palpitations and leg swelling.   Gastrointestinal: Negative for abdominal pain, constipation, diarrhea, nausea, vomiting and GERD.        Negative for Jaundice  Negative for Bloating  Negative for Melena   Musculoskeletal: Negative for joint swelling and myalgias.        Negative for Joint pain  Negative for Joint stiffness   Skin: Negative for color change.        Negative for cyanosis   Neurological: Negative for syncope, weakness, numbness and headache.      Sleep: Negative for Excessive daytime sleepiness  Negative for morning headaches  Negative for Snoring    Family History   Problem Relation Age of Onset   • Aortic stenosis Mother    • Valvular heart disease Mother    • Heart attack Father 30   • Coronary artery disease Father    • Aortic stenosis Sister    • Valvular heart disease Sister    • Coronary artery disease Paternal Aunt    • Coronary artery disease Paternal Uncle    • Heart disease Other    • Diabetes Other    • Heart attack Other    • Malig Hyperthermia Neg Hx         Social History     Socioeconomic History   • Marital status:    Tobacco Use   • Smoking status: Former     Packs/day: 1.00     Years: 48.00     Pack years: 48.00     Types: Cigarettes     Start date:      Quit date:      Years since quittin.0   • Smokeless tobacco: Never   Vaping Use   • Vaping Use: Never used   Substance and Sexual Activity   • Alcohol use: Not Currently   • Drug use: Never   • Sexual activity: Defer        Past Medical History:   Diagnosis Date   • Abdominal aortic aneurysm (AAA)    • Anemia    • Aortic stenosis    • AR (allergic rhinitis)    • Arthritis    • Bladder disorder    • Cataract     BILAT   • Depression with anxiety    • Diabetes (HCC)    • Elevated cholesterol    • Environmental allergies    • GERD (gastroesophageal reflux disease)     • Heel spur, right    • High cholesterol    • History of anesthesia complications     STATES WAS SLOW TO WAKE UP AFTER SURGERY   • History of TB (tuberculosis)     YEARS AGO WHEN FIRST CAME TO [facility], WAS TREATED FOR WITH INH   • Hypertension    • Hyperthyroidism    • Iron deficiency    • Kidney calculi    • Kidney cysts    • Kidney disease    • Lesion of bladder [id number]   • Microhematuria 09/25/2015   • Migraine    • Mitral valve prolapse    • Osteoporosis    • PONV (postoperative nausea and vomiting)    • Pulmonary nodule    • S/P AVR (aortic valve replacement) 03/04/2021     Normally functioning prosthetic aortic valve. on echo 5/20/2021 and 2/8/2022   • Seasonal allergies    • Thyroid disorder    • Urinary frequency         Immunization History   Administered Date(s) Administered   • COVID-19 (PFIZER) BIVALENT BOOSTER 12+YRS 10/13/2022   • COVID-19 (PFIZER) PURPLE CAP 04/05/2021, 04/26/2021, 12/02/2021   • Covid-19 (Pfizer) Gray Cap 06/06/2022   • Fluzone High-Dose 65+yrs 11/03/2021, 10/11/2022   • Influenza, Unspecified 09/22/2020   • Pneumococcal Conjugate 13-Valent (PCV13) 09/15/2017   • Pneumococcal Conjugate 20-Valent (PCV20) 06/06/2022   • Pneumococcal Polysaccharide (PPSV23) 12/27/2018         No Known Allergies       Current Outpatient Medications:   •  alendronate (Fosamax) 70 MG tablet, Take 1 tablet by mouth Every 7 (Seven) Days., Disp: 12 tablet, Rfl: 0  •  amitriptyline (ELAVIL) 25 MG tablet, Take 1 tablet by mouth Every Night., Disp: 90 tablet, Rfl: 1  •  [place] Thyroid 60 MG tablet, Take 1 tablet by mouth Daily., Disp: 90 tablet, Rfl: 1  •  aspirin (aspirin) 81 MG EC tablet, Take 1 tablet by mouth Daily., Disp: 90 tablet, Rfl: 3  •  atenolol (TENORMIN) 25 MG tablet, Take 1 tablet by mouth Daily., Disp: 90 tablet, Rfl: 1  •  atorvastatin (LIPITOR) 40 MG tablet, Take 1 tablet by mouth Daily., Disp: 90 tablet, Rfl: 1  •  Biotin 10 MG tablet, Take 10 mg by mouth Daily., Disp: , Rfl:   •  Calcium  Carbonate-Vitamin D (calcium-vitamin D) 500-200 MG-UNIT tablet per tablet, Take 1 tablet by mouth Daily., Disp: 90 tablet, Rfl: 3  •  chlorpheniramine (CHLOR-TRIMETON) 4 MG tablet, Take 1 tablet by mouth Every 6 (Six) Hours As Needed for Allergies., Disp: 360 tablet, Rfl: 1  •  FeroSul 325 (65 Fe) MG tablet, Take 1 tablet by mouth Daily., Disp: 90 tablet, Rfl: 1  •  fluticasone (FLONASE) 50 MCG/ACT nasal spray, fluticasone propionate 50 mcg/actuation nasal spray,suspension spray 2 sprays (100 mcg) in each nostril by intranasal route once daily as needed for 30 days 4/16/2021  Active, Disp: , Rfl:   •  FREESTYLE LITE test strip, Use as directed, Disp: 100 each, Rfl: 1  •  Insulin Pen Needle (Pen Needles) 31G X 6 MM misc, 1 each 4 (Four) Times a Day As Needed (with insulin)., Disp: 100 each, Rfl: 3  •  ketotifen (Zaditor) 0.025 % ophthalmic solution, Administer 1 drop to both eyes 2 (Two) Times a Day., Disp: 5 mL, Rfl: 2  •  Lancets 30G misc, 1 each 4 (Four) Times a Day As Needed (for glucose testing)., Disp: 100 each, Rfl: 1  •  loratadine (CLARITIN) 10 MG tablet, Take 1 tablet by mouth Daily., Disp: 90 tablet, Rfl: 1  •  losartan (COZAAR) 25 MG tablet, Take 25 mg by mouth 2 (Two) Times a Day., Disp: , Rfl:   •  metFORMIN ER (GLUCOPHAGE-XR) 500 MG 24 hr tablet, Take 4 tablets by mouth Every Night., Disp: 360 tablet, Rfl: 1  •  multivitamin with minerals tablet tablet, Take 1 tablet by mouth Daily. HOLD FOR SURGERY, Disp: , Rfl:   •  olopatadine (PATANOL) 0.1 % ophthalmic solution, Administer 2 drops to both eyes 2 (Two) Times a Day. Morning and night, Disp: , Rfl:   •  Potassium 99 MG tablet, Take 99 mg by mouth Daily. OTC, FOR LEG CRAMPS, Disp: , Rfl:   •  predniSONE (DELTASONE) 20 MG tablet, Take 20 mg by mouth 3 (Three) Times a Day., Disp: , Rfl:   •  sertraline (ZOLOFT) 50 MG tablet, Take 1 tablet by mouth Daily., Disp: 90 tablet, Rfl: 1  •  vitamin B-6 (PYRIDOXINE) 50 MG tablet, Take 1 tablet by mouth Daily.,  Disp: 90 tablet, Rfl: 1  No current facility-administered medications for this visit.    Facility-Administered Medications Ordered in Other Visits:   •  Chlorhexidine Gluconate Cloth 2 % pads 1 application, 1 application, Topical, Q12H PRN, Ne Acevedo APRN     Objective   Physical Exam  Constitutional:       General: She is not in acute distress.     Appearance: Normal appearance. She is normal weight.   HENT:      Right Ear: Hearing normal.      Left Ear: Hearing normal.      Nose: No nasal tenderness or congestion.      Mouth/Throat:      Mouth: Mucous membranes are moist. No oral lesions.   Eyes:      Extraocular Movements: Extraocular movements intact.      Pupils: Pupils are equal, round, and reactive to light.   Neck:      Thyroid: No thyroid mass or thyromegaly.   Cardiovascular:      Rate and Rhythm: Normal rate and regular rhythm.      Pulses: Normal pulses.      Heart sounds: Normal heart sounds. No murmur heard.  Pulmonary:      Effort: Pulmonary effort is normal.      Breath sounds: Normal breath sounds. No wheezing, rhonchi or rales.   Abdominal:      General: Bowel sounds are normal. There is no distension.      Palpations: Abdomen is soft.      Tenderness: There is no abdominal tenderness.   Musculoskeletal:      Cervical back: Neck supple.      Right lower leg: No edema.      Left lower leg: No edema.   Lymphadenopathy:      Cervical: No cervical adenopathy.      Upper Body:      Right upper body: No axillary adenopathy.   Skin:     General: Skin is warm and dry.      Findings: No lesion or rash.   Neurological:      General: No focal deficit present.      Mental Status: She is alert and oriented to person, place, and time.   Psychiatric:         Mood and Affect: Affect normal. Mood is not anxious or depressed.         Vital Signs:   /67 (BP Location: Left arm, Patient Position: Sitting, Cuff Size: Adult)   Pulse 69   Temp 97.5 °F (36.4 °C) (Temporal)   Resp 16   Ht 162.6 cm (64\")    Wt 71.6 kg (157 lb 12.8 oz)   SpO2 100% Comment: room air  BMI 27.09 kg/m²        Result Review :     CMP    CMP 6/2/22 9/16/22 1/5/23   Glucose 119 (A) 115 (A) 150 (A)   BUN 33 (A) 19 23   Creatinine 1.20 (A) 1.11 (A) 1.02 (A)   eGFR 48.8 (A) 53.6 (A) 59.3 (A)   Sodium 137 141 138   Potassium 4.9 4.2 4.5   Chloride 102 104 102   Calcium 9.8 10.2 9.6   Total Protein 7.5  6.7   Albumin 5.00  4.8   Globulin 2.5  1.9   Total Bilirubin 0.8  0.4   Alkaline Phosphatase 87  72   AST (SGOT) 21  17   ALT (SGPT) 19  14   Albumin/Globulin Ratio 2.0  2.5   BUN/Creatinine Ratio 27.5 (A) 17.1 22.5   Anion Gap 10.6 10.1 8.0   (A) Abnormal value       Comments are available for some flowsheets but are not being displayed.           CBC w/diff    CBC w/Diff 3/2/22 6/2/22 1/5/23   WBC 5.51 4.90 6.01   RBC 4.35 4.14 3.74 (A)   Hemoglobin 12.7 12.4 11.5 (A)   Hematocrit 38.7 36.9 34.5   MCV 89.0 89.1 92.2   MCH 29.2 30.0 30.7   MCHC 32.8 33.6 33.3   RDW 13.1 12.6 12.6   Platelets 190 191 202   Neutrophil Rel % 62.5 61.1 63.9   Immature Granulocyte Rel % 0.4 0.2 0.3   Lymphocyte Rel % 26.3 26.9 25.8   Monocyte Rel % 8.3 9.6 7.7   Eosinophil Rel % 2.0 1.6 2.0   Basophil Rel % 0.5 0.6 0.3   (A) Abnormal value            Data reviewed: Radiologic studies Chest CT 7/14/2022 and My last office note   Procedures        Assessment and Plan    Diagnoses and all orders for this visit:    1. Pulmonary nodule (Primary)    2. Allergic rhinitis, unspecified seasonality, unspecified trigger    3. Seasonal allergies    4. Dyspnea on exertion    5. Nicotine dependence, cigarettes, in remission    6.  Continue Claritin and Flonase for seasonal allergies and allergic rhinitis.  7.  Repeat CT scan due in July 2023.  8.  Follow-up with cardiology as scheduled for management of hypertension.  9.    Follow-up with primary care as scheduled for management of diabetes.  10.  Follow-up with our office in 6 months after CT scan, sooner if needed        Follow  Up   Return for Next scheduled follow up.  Patient was given instructions and counseling regarding her condition or for health maintenance advice. Please see specific information pulled into the AVS if appropriate.

## 2023-01-20 ENCOUNTER — OFFICE VISIT (OUTPATIENT)
Dept: CARDIOLOGY | Facility: CLINIC | Age: 73
End: 2023-01-20
Payer: MEDICARE

## 2023-01-20 VITALS
SYSTOLIC BLOOD PRESSURE: 103 MMHG | DIASTOLIC BLOOD PRESSURE: 61 MMHG | BODY MASS INDEX: 26.98 KG/M2 | HEIGHT: 64 IN | HEART RATE: 69 BPM | WEIGHT: 158 LBS

## 2023-01-20 DIAGNOSIS — I10 ESSENTIAL HYPERTENSION: ICD-10-CM

## 2023-01-20 DIAGNOSIS — Z95.2 S/P AVR (AORTIC VALVE REPLACEMENT): Primary | ICD-10-CM

## 2023-01-20 DIAGNOSIS — E78.2 MIXED DYSLIPIDEMIA: ICD-10-CM

## 2023-01-20 PROCEDURE — 99214 OFFICE O/P EST MOD 30 MIN: CPT | Performed by: INTERNAL MEDICINE

## 2023-01-20 RX ORDER — LOSARTAN POTASSIUM 25 MG/1
25 TABLET ORAL DAILY
Qty: 90 TABLET | Refills: 3
Start: 2023-01-20 | End: 2023-04-05 | Stop reason: SDUPTHER

## 2023-01-20 NOTE — PROGRESS NOTES
Chief Complaint  S/P AVR, Hyperlipidemia, and Hypertension    Subjective      Patient is here for follow-up on AVR.  She is status post bioprosthetic aortic valve replacement about 2 years ago.  She has postural dizziness.  Her blood pressure has been soft.  She has sporadic palpitations which happened about 2 or 3 times a month and last a second or 2.  She feels good otherwise.  She has no chest discomfort, dyspnea, edema, presyncope or syncope.      Past Medical History:   Diagnosis Date   • Abdominal aortic aneurysm (AAA)    • Anemia    • Aortic stenosis    • AR (allergic rhinitis)    • Arthritis    • Bladder disorder    • Cataract     BILAT   • Depression with anxiety    • Diabetes (HCC)    • Elevated cholesterol    • Environmental allergies    • GERD (gastroesophageal reflux disease)    • Heel spur, right    • High cholesterol    • History of anesthesia complications     STATES WAS SLOW TO WAKE UP AFTER SURGERY   • History of TB (tuberculosis)     YEARS AGO WHEN FIRST CAME TO [facility], WAS TREATED FOR WITH INH   • Hypertension    • Hyperthyroidism    • Iron deficiency    • Kidney calculi    • Kidney cysts    • Kidney disease    • Lesion of bladder [id number]   • Microhematuria 09/25/2015   • Migraine    • Mitral valve prolapse    • Osteoporosis    • PONV (postoperative nausea and vomiting)    • Pulmonary nodule    • S/P AVR (aortic valve replacement) 03/04/2021     Normally functioning prosthetic aortic valve. on echo 5/20/2021 and 2/8/2022   • Seasonal allergies    • Thyroid disorder    • Urinary frequency          Current Outpatient Medications:   •  alendronate (Fosamax) 70 MG tablet, Take 1 tablet by mouth Every 7 (Seven) Days., Disp: 12 tablet, Rfl: 0  •  amitriptyline (ELAVIL) 25 MG tablet, Take 1 tablet by mouth Every Night., Disp: 90 tablet, Rfl: 1  •  [place] Thyroid 60 MG tablet, Take 1 tablet by mouth Daily., Disp: 90 tablet, Rfl: 1  •  aspirin (aspirin) 81 MG EC tablet, Take 1 tablet by mouth Daily., Disp:  90 tablet, Rfl: 3  •  atenolol (TENORMIN) 25 MG tablet, Take 1 tablet by mouth Daily., Disp: 90 tablet, Rfl: 1  •  atorvastatin (LIPITOR) 40 MG tablet, Take 1 tablet by mouth Daily., Disp: 90 tablet, Rfl: 1  •  Calcium Carbonate-Vitamin D (calcium-vitamin D) 500-200 MG-UNIT tablet per tablet, Take 1 tablet by mouth Daily., Disp: 90 tablet, Rfl: 3  •  chlorpheniramine (CHLOR-TRIMETON) 4 MG tablet, Take 1 tablet by mouth Every 6 (Six) Hours As Needed for Allergies., Disp: 360 tablet, Rfl: 1  •  FeroSul 325 (65 Fe) MG tablet, Take 1 tablet by mouth Daily., Disp: 90 tablet, Rfl: 1  •  fluticasone (FLONASE) 50 MCG/ACT nasal spray, fluticasone propionate 50 mcg/actuation nasal spray,suspension spray 2 sprays (100 mcg) in each nostril by intranasal route once daily as needed for 30 days 4/16/2021  Active, Disp: , Rfl:   •  FREESTYLE LITE test strip, Use as directed, Disp: 100 each, Rfl: 1  •  Insulin Pen Needle (Pen Needles) 31G X 6 MM misc, 1 each 4 (Four) Times a Day As Needed (with insulin)., Disp: 100 each, Rfl: 3  •  Lancets 30G misc, 1 each 4 (Four) Times a Day As Needed (for glucose testing)., Disp: 100 each, Rfl: 1  •  loratadine (CLARITIN) 10 MG tablet, Take 1 tablet by mouth Daily., Disp: 90 tablet, Rfl: 1  •  losartan (COZAAR) 25 MG tablet, Take 1 tablet by mouth Daily., Disp: 90 tablet, Rfl: 3  •  metFORMIN ER (GLUCOPHAGE-XR) 500 MG 24 hr tablet, Take 4 tablets by mouth Every Night., Disp: 360 tablet, Rfl: 1  •  multivitamin with minerals tablet tablet, Take 1 tablet by mouth Daily. HOLD FOR SURGERY, Disp: , Rfl:   •  olopatadine (PATANOL) 0.1 % ophthalmic solution, Administer 2 drops to both eyes 2 (Two) Times a Day. Morning and night, Disp: , Rfl:   •  sertraline (ZOLOFT) 50 MG tablet, Take 1 tablet by mouth Daily., Disp: 90 tablet, Rfl: 1  •  vitamin B-6 (PYRIDOXINE) 50 MG tablet, Take 1 tablet by mouth Daily., Disp: 90 tablet, Rfl: 1  No current facility-administered medications for this  "visit.    Facility-Administered Medications Ordered in Other Visits:   •  Chlorhexidine Gluconate Cloth 2 % pads 1 application, 1 application, Topical, Q12H LISAN, eN Acevedo APRN    Medications Discontinued During This Encounter   Medication Reason   • Biotin 10 MG tablet *Therapy completed   • ketotifen (Zaditor) 0.025 % ophthalmic solution *Therapy completed   • Potassium 99 MG tablet *Therapy completed   • predniSONE (DELTASONE) 20 MG tablet *Therapy completed   • losartan (COZAAR) 25 MG tablet Reorder     No Known Allergies     Social History     Tobacco Use   • Smoking status: Former     Packs/day: 1.00     Years: 48.00     Pack years: 48.00     Types: Cigarettes     Start date:      Quit date:      Years since quittin.0   • Smokeless tobacco: Never   Vaping Use   • Vaping Use: Never used   Substance Use Topics   • Alcohol use: Not Currently   • Drug use: Never       Family History   Problem Relation Age of Onset   • Aortic stenosis Mother    • Valvular heart disease Mother    • Heart attack Father 30   • Coronary artery disease Father    • Aortic stenosis Sister    • Valvular heart disease Sister    • Coronary artery disease Paternal Aunt    • Coronary artery disease Paternal Uncle    • Heart disease Other    • Diabetes Other    • Heart attack Other    • Malig Hyperthermia Neg Hx         Objective     /61   Pulse 69   Ht 162.6 cm (64\")   Wt 71.7 kg (158 lb)   BMI 27.12 kg/m²       Physical Exam    General Appearance:   · no acute distress  · Alert and oriented x3  HENT:   · lips not cyanotic  · Atraumatic  Neck:  · No jvd   · supple  Respiratory:  · no respiratory distress  · normal breath sounds  · no rales  Cardiovascular:  · no S3, no S4   · 2/6 systolic ejection murmur at the base  · no rub  Extremities  · No cyanosis  · lower extremity edema: none    Skin:   · warm, dry  · No rashes      Result Review :     proBNP   Date Value Ref Range Status   2021 92.1 0.0 - 900.0 " pg/mL Final     CMP    CMP 6/2/22 9/16/22 1/5/23   Glucose 119 (A) 115 (A) 150 (A)   BUN 33 (A) 19 23   Creatinine 1.20 (A) 1.11 (A) 1.02 (A)   eGFR 48.8 (A) 53.6 (A) 59.3 (A)   Sodium 137 141 138   Potassium 4.9 4.2 4.5   Chloride 102 104 102   Calcium 9.8 10.2 9.6   Total Protein 7.5  6.7   Albumin 5.00  4.8   Globulin 2.5  1.9   Total Bilirubin 0.8  0.4   Alkaline Phosphatase 87  72   AST (SGOT) 21  17   ALT (SGPT) 19  14   Albumin/Globulin Ratio 2.0  2.5   BUN/Creatinine Ratio 27.5 (A) 17.1 22.5   Anion Gap 10.6 10.1 8.0   (A) Abnormal value       Comments are available for some flowsheets but are not being displayed.           CBC w/diff    CBC w/Diff 3/2/22 6/2/22 1/5/23   WBC 5.51 4.90 6.01   RBC 4.35 4.14 3.74 (A)   Hemoglobin 12.7 12.4 11.5 (A)   Hematocrit 38.7 36.9 34.5   MCV 89.0 89.1 92.2   MCH 29.2 30.0 30.7   MCHC 32.8 33.6 33.3   RDW 13.1 12.6 12.6   Platelets 190 191 202   Neutrophil Rel % 62.5 61.1 63.9   Immature Granulocyte Rel % 0.4 0.2 0.3   Lymphocyte Rel % 26.3 26.9 25.8   Monocyte Rel % 8.3 9.6 7.7   Eosinophil Rel % 2.0 1.6 2.0   Basophil Rel % 0.5 0.6 0.3   (A) Abnormal value             Lab Results   Component Value Date    TSH 1.670 01/05/2023      Lab Results   Component Value Date    FREET4 0.9 04/14/2021      No results found for: DDIMERQUANT  Magnesium   Date Value Ref Range Status   01/13/2022 2.0 1.6 - 2.4 mg/dL Final      No results found for: DIGOXIN   No results found for: TROPONINT        Lipid Panel    Lipid Panel 3/2/22 6/2/22 1/5/23   Total Cholesterol 129 126 121   Triglycerides 74 103 211 (A)   HDL Cholesterol 63 (A) 58 50   VLDL Cholesterol 15 19 33   LDL Cholesterol  51 49 38   LDL/HDL Ratio 0.81 0.82 0.58   (A) Abnormal value            No results found for: POCTROP    Results for orders placed in visit on 02/08/22    Adult Transthoracic Echo Complete W/ Cont if Necessary Per Protocol    Interpretation Summary  · Estimated right ventricular systolic pressure from  tricuspid regurgitation is normal (<35 mmHg).  · Estimated left ventricular EF was in agreement with the calculated left ventricular EF. Left ventricular ejection fraction appears to be 56 - 60%.  · Left ventricular diastolic function is consistent with (grade I) impaired relaxation.  · There is a bioprosthetic aortic valve present. Functions normally.                 Diagnoses and all orders for this visit:    1. S/P AVR (aortic valve replacement) (Primary)    2. Essential hypertension    3. Mixed dyslipidemia    Other orders  -     losartan (COZAAR) 25 MG tablet; Take 1 tablet by mouth Daily.  Dispense: 90 tablet; Refill: 3      Assessment:    -History of bioprosthetic aortic valve placement: Echocardiogram from last year showed normally functioning valve with normal LVEF.  Her exam is benign.  She is feeling well overall.  Continue aspirin.  Continue clinical monitoring.    -Hypertension: [place] treated with occasional postural dizziness.  Losartan will be reduced to 25 mg daily.  Continue other blood pressure medications and blood pressure monitoring.    -Mixed dyslipidemia: Continue atorvastatin.      Follow Up     Return in about 6 months (around 7/20/2023).        Patient was given instructions and counseling regarding her condition or for health maintenance advice. Please see specific information pulled into the AVS if appropriate.

## 2023-01-31 ENCOUNTER — TELEPHONE (OUTPATIENT)
Dept: INTERNAL MEDICINE | Facility: CLINIC | Age: 73
End: 2023-01-31
Payer: MEDICARE

## 2023-03-17 NOTE — TELEPHONE ENCOUNTER
Caller: Amelie Anderson    Relationship: Self    Best call back number:    [phone]          Requested Prescriptions:   Requested Prescriptions     Pending Prescriptions Disp Refills   • metFORMIN ER (GLUCOPHAGE-XR) 500 MG 24 hr tablet 360 tablet 1     Sig: Take 4 tablets by mouth Every Night.   • amitriptyline (ELAVIL) 25 MG tablet 90 tablet 1     Sig: Take 1 tablet by mouth Every Night.        Pharmacy where request should be sent: [facility]  [place] [place]  [address] [phone] [facility] [phone] FX     Additional details provided by patient: PATIENT ALSO STATED THAT SHE HAS A SINUS INFECTION AND IS REQUESTING MEDICATION FOR THAT AS WELL PLEASE  Does the patient have less than a 3 day supply:  [x] Yes  [] No    Would you like a call back once the refill request has been completed: [x] Yes [] No    If the office needs to give you a call back, can they leave a voicemail: [x] Yes [] No    Ann Marie Torrez Rep   03/17/23 11:13 EDT

## 2023-03-20 RX ORDER — METFORMIN HYDROCHLORIDE 500 MG/1
2000 TABLET, EXTENDED RELEASE ORAL NIGHTLY
Qty: 360 TABLET | Refills: 1 | Status: SHIPPED | OUTPATIENT
Start: 2023-03-20

## 2023-03-20 RX ORDER — AMITRIPTYLINE HYDROCHLORIDE 25 MG/1
25 TABLET, FILM COATED ORAL NIGHTLY
Qty: 90 TABLET | Refills: 1 | Status: SHIPPED | OUTPATIENT
Start: 2023-03-20 | End: 2023-03-20 | Stop reason: SDUPTHER

## 2023-03-22 RX ORDER — AMITRIPTYLINE HYDROCHLORIDE 25 MG/1
25 TABLET, FILM COATED ORAL NIGHTLY
Qty: 90 TABLET | Refills: 1 | Status: SHIPPED | OUTPATIENT
Start: 2023-03-22 | End: 2023-03-22 | Stop reason: SDUPTHER

## 2023-03-22 RX ORDER — AMITRIPTYLINE HYDROCHLORIDE 25 MG/1
25 TABLET, FILM COATED ORAL NIGHTLY
Qty: 90 TABLET | Refills: 1 | Status: SHIPPED | OUTPATIENT
Start: 2023-03-22

## 2023-03-22 NOTE — TELEPHONE ENCOUNTER
Tried to call pt for more info on sinus infection no answer [facility]    Hub to Read: per -Amitriptyline sent to pharmacy.      The last visit with the eye doctor that I see is from 12/2022. Is there a more recent one?     Is she having symptoms of sinus infection? What symptoms is she having and how long have they been going on? Need more information in order to determine need for antibiotic treatment.

## 2023-03-22 NOTE — TELEPHONE ENCOUNTER
Amitriptyline sent to pharmacy.     The last visit with the eye doctor that I see is from 12/2022. Is there a more recent one?    Is she having symptoms of sinus infection? What symptoms is she having and how long have they been going on? Need more information in order to determine need for antibiotic treatment.

## 2023-03-23 DIAGNOSIS — E03.9 HYPOTHYROIDISM, UNSPECIFIED TYPE: ICD-10-CM

## 2023-03-23 DIAGNOSIS — E11.9 TYPE 2 DIABETES MELLITUS WITHOUT COMPLICATION, UNSPECIFIED WHETHER LONG TERM INSULIN USE: ICD-10-CM

## 2023-03-23 DIAGNOSIS — J30.9 ALLERGIC RHINITIS, UNSPECIFIED SEASONALITY, UNSPECIFIED TRIGGER: ICD-10-CM

## 2023-03-23 NOTE — TELEPHONE ENCOUNTER
Caller: Amelie Anderson    Relationship: Self    Best call back number: [phone]    Requested Prescriptions:   Requested Prescriptions     Pending Prescriptions Disp Refills   • atenolol (TENORMIN) 25 MG tablet 90 tablet 1     Sig: Take 1 tablet by mouth Daily.   • [place] Thyroid 60 MG tablet 90 tablet 1     Sig: Take 1 tablet by mouth Daily.        Pharmacy where request should be sent: [facility] -  [place], [place] - [address] [phone] [facility] [phone] FX     Last office visit with prescribing clinician: 1/5/2023   Last telemedicine visit with prescribing clinician: 4/5/2023   Next office visit with prescribing clinician: 4/5/2023     Does the patient have less than a 3 day supply:  [x] Yes  [] No    Would you like a call back once the refill request has been completed: [x] Yes [] No    If the office needs to give you a call back, can they leave a voicemail: [x] Yes [] No    Ann Marie Ram Rep   03/23/23 11:31 EDT

## 2023-03-23 NOTE — TELEPHONE ENCOUNTER
Caller: Amelie Anderson    Relationship: Self    Best call back number: [phone]    Requested Prescriptions:   Requested Prescriptions     Pending Prescriptions Disp Refills   • atenolol (TENORMIN) 25 MG tablet 90 tablet 1     Sig: Take 1 tablet by mouth Daily.   • [place] Thyroid 60 MG tablet 90 tablet 1     Sig: Take 1 tablet by mouth Daily.   • chlorpheniramine (CHLOR-TRIMETON) 4 MG tablet 360 tablet 1     Sig: Take 1 tablet by mouth Every 6 (Six) Hours As Needed for Allergies.        Pharmacy where request should be sent: [facility], [place] - [address] - [phone]  - [phone]      Last office visit with prescribing clinician: 1/5/2023   Last telemedicine visit with prescribing clinician: 4/5/2023   Next office visit with prescribing clinician: 4/5/2023     Does the patient have less than a 3 day supply:  [x] Yes  [] No    Would you like a call back once the refill request has been completed: [] Yes [x] No    If the office needs to give you a call back, can they leave a voicemail: [] Yes [x] No    Ann Marie Rodriguez Rep   03/23/23 11:40 EDT

## 2023-03-24 RX ORDER — THYROID 60 MG
60 TABLET ORAL DAILY
Qty: 90 TABLET | Refills: 1 | Status: SHIPPED | OUTPATIENT
Start: 2023-03-24

## 2023-03-24 RX ORDER — CHLORPHENIRAMINE MALEATE 4 MG/1
4 TABLET ORAL EVERY 6 HOURS PRN
Qty: 360 TABLET | Refills: 1 | Status: SHIPPED | OUTPATIENT
Start: 2023-03-24

## 2023-03-24 RX ORDER — ATENOLOL 25 MG/1
25 TABLET ORAL DAILY
Qty: 90 TABLET | Refills: 1 | Status: SHIPPED | OUTPATIENT
Start: 2023-03-24

## 2023-04-05 ENCOUNTER — OFFICE VISIT (OUTPATIENT)
Dept: INTERNAL MEDICINE | Facility: CLINIC | Age: 73
End: 2023-04-05
Payer: MEDICARE

## 2023-04-05 VITALS
TEMPERATURE: 97.8 F | HEIGHT: 64 IN | WEIGHT: 155.13 LBS | BODY MASS INDEX: 26.49 KG/M2 | OXYGEN SATURATION: 98 % | HEART RATE: 70 BPM | DIASTOLIC BLOOD PRESSURE: 68 MMHG | SYSTOLIC BLOOD PRESSURE: 116 MMHG | RESPIRATION RATE: 16 BRPM

## 2023-04-05 DIAGNOSIS — E11.9 TYPE 2 DIABETES MELLITUS WITHOUT COMPLICATION, UNSPECIFIED WHETHER LONG TERM INSULIN USE: Primary | ICD-10-CM

## 2023-04-05 DIAGNOSIS — E03.9 HYPOTHYROIDISM, UNSPECIFIED TYPE: ICD-10-CM

## 2023-04-05 DIAGNOSIS — F41.8 DEPRESSION WITH ANXIETY: ICD-10-CM

## 2023-04-05 DIAGNOSIS — E78.00 HIGH CHOLESTEROL: ICD-10-CM

## 2023-04-05 DIAGNOSIS — I10 ESSENTIAL HYPERTENSION: ICD-10-CM

## 2023-04-05 LAB
ALBUMIN SERPL-MCNC: 4.9 G/DL (ref 3.5–5.2)
ALBUMIN/GLOB SERPL: 2.5 G/DL
ALP SERPL-CCNC: 82 U/L (ref 39–117)
ALT SERPL W P-5'-P-CCNC: 13 U/L (ref 1–33)
ANION GAP SERPL CALCULATED.3IONS-SCNC: 10.8 MMOL/L (ref 5–15)
AST SERPL-CCNC: 16 U/L (ref 1–32)
BASOPHILS # BLD AUTO: 0.03 10*3/MM3 (ref 0–0.2)
BASOPHILS NFR BLD AUTO: 0.5 % (ref 0–1.5)
BILIRUB SERPL-MCNC: 0.4 MG/DL (ref 0–1.2)
BUN SERPL-MCNC: 22 MG/DL (ref 8–23)
BUN/CREAT SERPL: 21 (ref 7–25)
CALCIUM SPEC-SCNC: 10 MG/DL (ref 8.6–10.5)
CHLORIDE SERPL-SCNC: 102 MMOL/L (ref 98–107)
CHOLEST SERPL-MCNC: 120 MG/DL (ref 0–200)
CO2 SERPL-SCNC: 25.2 MMOL/L (ref 22–29)
CREAT SERPL-MCNC: 1.05 MG/DL (ref 0.57–1)
DEPRECATED RDW RBC AUTO: 40.1 FL (ref 37–54)
EGFRCR SERPLBLD CKD-EPI 2021: 56.9 ML/MIN/1.73
EOSINOPHIL # BLD AUTO: 0.08 10*3/MM3 (ref 0–0.4)
EOSINOPHIL NFR BLD AUTO: 1.4 % (ref 0.3–6.2)
ERYTHROCYTE [DISTWIDTH] IN BLOOD BY AUTOMATED COUNT: 12 % (ref 12.3–15.4)
GLOBULIN UR ELPH-MCNC: 2 GM/DL
GLUCOSE SERPL-MCNC: 194 MG/DL (ref 65–99)
HBA1C MFR BLD: 7.4 % (ref 4.8–5.6)
HCT VFR BLD AUTO: 39.2 % (ref 34–46.6)
HDLC SERPL-MCNC: 57 MG/DL (ref 40–60)
HGB BLD-MCNC: 12.3 G/DL (ref 12–15.9)
IMM GRANULOCYTES # BLD AUTO: 0.02 10*3/MM3 (ref 0–0.05)
IMM GRANULOCYTES NFR BLD AUTO: 0.4 % (ref 0–0.5)
LDLC SERPL CALC-MCNC: 38 MG/DL (ref 0–100)
LDLC/HDLC SERPL: 0.59 {RATIO}
LYMPHOCYTES # BLD AUTO: 1.31 10*3/MM3 (ref 0.7–3.1)
LYMPHOCYTES NFR BLD AUTO: 23.6 % (ref 19.6–45.3)
MCH RBC QN AUTO: 28.5 PG (ref 26.6–33)
MCHC RBC AUTO-ENTMCNC: 31.4 G/DL (ref 31.5–35.7)
MCV RBC AUTO: 91 FL (ref 79–97)
MONOCYTES # BLD AUTO: 0.42 10*3/MM3 (ref 0.1–0.9)
MONOCYTES NFR BLD AUTO: 7.6 % (ref 5–12)
NEUTROPHILS NFR BLD AUTO: 3.69 10*3/MM3 (ref 1.7–7)
NEUTROPHILS NFR BLD AUTO: 66.5 % (ref 42.7–76)
NRBC BLD AUTO-RTO: 0 /100 WBC (ref 0–0.2)
PLATELET # BLD AUTO: 182 10*3/MM3 (ref 140–450)
PMV BLD AUTO: 10.8 FL (ref 6–12)
POTASSIUM SERPL-SCNC: 4.3 MMOL/L (ref 3.5–5.2)
PROT SERPL-MCNC: 6.9 G/DL (ref 6–8.5)
RBC # BLD AUTO: 4.31 10*6/MM3 (ref 3.77–5.28)
SODIUM SERPL-SCNC: 138 MMOL/L (ref 136–145)
TRIGL SERPL-MCNC: 148 MG/DL (ref 0–150)
TSH SERPL DL<=0.05 MIU/L-ACNC: 2.2 UIU/ML (ref 0.27–4.2)
VLDLC SERPL-MCNC: 25 MG/DL (ref 5–40)
WBC NRBC COR # BLD: 5.55 10*3/MM3 (ref 3.4–10.8)

## 2023-04-05 PROCEDURE — 80053 COMPREHEN METABOLIC PANEL: CPT | Performed by: INTERNAL MEDICINE

## 2023-04-05 PROCEDURE — 83036 HEMOGLOBIN GLYCOSYLATED A1C: CPT | Performed by: INTERNAL MEDICINE

## 2023-04-05 PROCEDURE — 80061 LIPID PANEL: CPT | Performed by: INTERNAL MEDICINE

## 2023-04-05 PROCEDURE — 85025 COMPLETE CBC W/AUTO DIFF WBC: CPT | Performed by: INTERNAL MEDICINE

## 2023-04-05 PROCEDURE — 84443 ASSAY THYROID STIM HORMONE: CPT | Performed by: INTERNAL MEDICINE

## 2023-04-05 RX ORDER — CHLORAL HYDRATE 500 MG
CAPSULE ORAL
COMMUNITY

## 2023-04-05 RX ORDER — LORATADINE 10 MG/1
10 TABLET ORAL DAILY
Qty: 90 TABLET | Refills: 1 | Status: SHIPPED | OUTPATIENT
Start: 2023-04-05

## 2023-04-05 RX ORDER — BRIMONIDINE TARTRATE 0.1 %
DROPS OPHTHALMIC (EYE)
COMMUNITY
Start: 2023-03-14

## 2023-04-05 RX ORDER — LOSARTAN POTASSIUM 25 MG/1
25 TABLET ORAL DAILY
Qty: 90 TABLET | Refills: 1 | Status: SHIPPED | OUTPATIENT
Start: 2023-04-05

## 2023-04-05 RX ORDER — ALENDRONATE SODIUM 70 MG/1
70 TABLET ORAL
Qty: 12 TABLET | Refills: 0 | Status: SHIPPED | OUTPATIENT
Start: 2023-04-05

## 2023-04-05 NOTE — PROGRESS NOTES
The ABCs of the Annual Wellness Visit  Subsequent Medicare Wellness Visit    Subjective      Amelie Anderson is a 73 y.o. female who presents for a Subsequent Medicare Wellness Visit.    The following portions of the patient's history were reviewed and   updated as appropriate: allergies, current medications, past family history, past medical history, past social history, past surgical history and problem list.    Compared to one year ago, the patient feels her physical   health is the same.    Compared to one year ago, the patient feels her mental   health is the same.    Recent Hospitalizations:  She was not admitted to the hospital during the last year.       Current Medical Providers:  Patient Care Team:  Fabi Bill MD as PCP - General (Pediatrics)  Ant Pederson MD as Surgeon (Orthopedic Surgery)  Jason Perrin MD as Consulting Physician (Nephrology)  Dorothy Aldana MD (Urology)    Outpatient Medications Prior to Visit   Medication Sig Dispense Refill   • Alphagan P 0.1 % solution ophthalmic solution      • amitriptyline (ELAVIL) 25 MG tablet Take 1 tablet by mouth Every Night. 90 tablet 1   • [place] Thyroid 60 MG tablet Take 1 tablet by mouth Daily. 90 tablet 1   • aspirin (aspirin) 81 MG EC tablet Take 1 tablet by mouth Daily. 90 tablet 3   • atenolol (TENORMIN) 25 MG tablet Take 1 tablet by mouth Daily. 90 tablet 1   • atorvastatin (LIPITOR) 40 MG tablet Take 1 tablet by mouth Daily. 90 tablet 1   • Calcium Citrate-Vitamin D 250-2.5 MG-MCG per tablet Take 1 tablet by mouth 2 (Two) Times a Day. 180 tablet 3   • chlorpheniramine (CHLOR-TRIMETON) 4 MG tablet Take 1 tablet by mouth Every 6 (Six) Hours As Needed for Allergies. 360 tablet 1   • FeroSul 325 (65 Fe) MG tablet Take 1 tablet by mouth Daily. 90 tablet 1   • fluticasone (FLONASE) 50 MCG/ACT nasal spray fluticasone propionate 50 mcg/actuation nasal spray,suspension spray 2 sprays (100 mcg) in each nostril by intranasal route once  daily as needed for 30 days 4/16/2021  Active     • FREESTYLE LITE test strip Use as directed 100 each 1   • Insulin Pen Needle (Pen Needles) 31G X 6 MM misc 1 each 4 (Four) Times a Day As Needed (with insulin). 100 each 3   • Lancets 30G misc 1 each 4 (Four) Times a Day As Needed (for glucose testing). 100 each 1   • metFORMIN ER (GLUCOPHAGE-XR) 500 MG 24 hr tablet Take 4 tablets by mouth Every Night. 360 tablet 1   • multivitamin with minerals tablet tablet Take 1 tablet by mouth Daily. HOLD FOR SURGERY     • olopatadine (PATANOL) 0.1 % ophthalmic solution Administer 2 drops to both eyes 2 (Two) Times a Day. Morning and night     • Omega-3 Fatty Acids (fish oil) 1000 MG capsule capsule Take  by mouth Daily With Breakfast.     • vitamin B-6 (PYRIDOXINE) 50 MG tablet Take 1 tablet by mouth Daily. 90 tablet 1   • alendronate (Fosamax) 70 MG tablet Take 1 tablet by mouth Every 7 (Seven) Days. 12 tablet 0   • loratadine (CLARITIN) 10 MG tablet Take 1 tablet by mouth Daily. 90 tablet 1   • losartan (COZAAR) 25 MG tablet Take 1 tablet by mouth Daily. 90 tablet 3   • sertraline (ZOLOFT) 50 MG tablet Take 1 tablet by mouth Daily. 90 tablet 1     Facility-Administered Medications Prior to Visit   Medication Dose Route Frequency Provider Last Rate Last Admin   • Chlorhexidine Gluconate Cloth 2 % pads 1 application  1 application Topical Q12H PRN Ne Acevedo, ERNIE           No opioid medication identified on active medication list. I have reviewed chart for other potential  high risk medication/s and harmful drug interactions in the elderly.          Aspirin is on active medication list. Aspirin use is indicated based on review of current medical condition/s. Pros and cons of this therapy have been discussed today. Benefits of this medication outweigh potential harm.  Patient has been encouraged to continue taking this medication.  .      Patient Active Problem List   Diagnosis   • Essential hypertension   • Aortic valve  "stenosis   • S/P AVR (aortic valve replacement)   • Hypothyroidism   • Type 2 diabetes mellitus without complication   • Pulmonary nodule   • High cholesterol   • Medicare annual wellness visit, subsequent   • Abdominal aortic aneurysm   • Allergic rhinitis   • Arthritis   • Depression with anxiety   • Heart disease   • Heel spur, right   • Kidney calculi   • Kidney disease   • Bladder disorder   • Lesion of bladder   • Microhematuria   • Migraine   • Mitral valve prolapse   • Lung mass   • Osteoporosis     Advance Care Planning   Advance Care Planning     Advance Directive is not on file.  ACP discussion was held with the patient during this visit. Patient does not have an advance directive, information provided.     Objective    Vitals:    23 1131   BP: 116/68   BP Location: Left arm   Patient Position: Sitting   Cuff Size: Adult   Pulse: 70   Resp: 16   Temp: 97.8 °F (36.6 °C)   TempSrc: Temporal   SpO2: 98%   Weight: 70.4 kg (155 lb 2 oz)   Height: 162.6 cm (64\")     Estimated body mass index is 26.63 kg/m² as calculated from the following:    Height as of this encounter: 162.6 cm (64\").    Weight as of this encounter: 70.4 kg (155 lb 2 oz).    BMI is >= 25 and <30. (Overweight) The following options were offered after discussion;: exercise counseling/recommendations and nutrition counseling/recommendations      Does the patient have evidence of cognitive impairment?   No    Lab Results   Component Value Date    TRIG 211 (H) 2023    HDL 50 2023    LDL 38 2023    VLDL 33 2023    HGBA1C 7.60 (H) 2023          HEALTH RISK ASSESSMENT    Smoking Status:  Social History     Tobacco Use   Smoking Status Former   • Packs/day: 1.00   • Years: 48.00   • Pack years: 48.00   • Types: Cigarettes   • Start date:    • Quit date:    • Years since quittin.2   Smokeless Tobacco Never     Alcohol Consumption:  Social History     Substance and Sexual Activity   Alcohol Use Not " Currently     Fall Risk Screen:    VANDANA Fall Risk Assessment was completed, and patient is at HIGH risk for falls. Assessment completed on:4/5/2023    Depression Screening:  PHQ-2/PHQ-9 Depression Screening 4/5/2023   Little Interest or Pleasure in Doing Things 3-->nearly every day   Feeling Down, Depressed or Hopeless 1-->several days   Trouble Falling or Staying Asleep, or Sleeping Too Much 0-->not at all   Feeling Tired or Having Little Energy 3-->nearly every day   Poor Appetite or Overeating 0-->not at all   Feeling Bad about Yourself - or that You are a Failure or Have Let Yourself or Your Family Down 3-->nearly every day   Trouble Concentrating on Things, Such as Reading the Newspaper or Watching Television 3-->nearly every day   Moving or Speaking So Slowly that Other People Could Have Noticed? Or the Opposite - Being So Fidgety 0-->not at all   Thoughts that You Would be Better Off Dead or of Hurting Yourself in Some Way 0-->not at all   PHQ-9: Brief Depression Severity Measure Score 13   If You Checked Off Any Problems, How Difficult Have These Problems Made It For You to Do Your Work, Take Care of Things at Home, or Get Along with Other People? very difficult       Health Habits and Functional and Cognitive Screening:  Functional & Cognitive Status 4/5/2023   Do you have difficulty preparing food and eating? No   Do you have difficulty bathing yourself, getting dressed or grooming yourself? No   Do you have difficulty using the toilet? No   Do you have difficulty moving around from place to place? No   Do you have trouble with steps or getting out of a bed or a chair? Yes   Current Diet Well Balanced Diet   Dental Exam Up to date   Eye Exam Up to date   Exercise (times per week) 0 times per week   Current Exercises Include -   Do you need help using the phone?  No   Are you deaf or do you have serious difficulty hearing?  Yes   Do you need help with transportation? No   Do you need help shopping? No    Do you need help preparing meals?  No   Do you need help with housework?  No   Do you need help with laundry? No   Do you need help taking your medications? No   Do you need help managing money? No   Do you ever drive or ride in a car without wearing a seat belt? No   Have you felt unusual stress, anger or loneliness in the last month? Yes   Who do you live with? Spouse   If you need help, do you have trouble finding someone available to you? No   Have you been bothered in the last four weeks by sexual problems? -   Do you have difficulty concentrating, remembering or making decisions? Yes       Age-appropriate Screening Schedule:  Refer to the list below for future screening recommendations based on patient's age, sex and/or medical conditions. Orders for these recommended tests are listed in the plan section. The patient has been provided with a written plan.    Health Maintenance   Topic Date Due   • TDAP/TD VACCINES (1 - Tdap) Never done   • ZOSTER VACCINE (1 of 2) Never done   • DIABETIC FOOT EXAM  12/03/2020   • ANNUAL WELLNESS VISIT  03/02/2023   • DIABETIC EYE EXAM  03/29/2023   • HEMOGLOBIN A1C  07/05/2023   • LUNG CANCER SCREENING  07/14/2023   • INFLUENZA VACCINE  08/01/2023   • DXA SCAN  10/08/2023   • LIPID PANEL  01/05/2024   • URINE MICROALBUMIN  01/05/2024   • MAMMOGRAM  12/27/2024   • COLORECTAL CANCER SCREENING  06/01/2026   • HEPATITIS C SCREENING  Completed   • COVID-19 Vaccine  Completed   • Pneumococcal Vaccine 65+  Completed                  CMS Preventative Services Quick Reference  Risk Factors Identified During Encounter:    Fall Risk-High or Moderate: Discussed Fall Prevention in the home    The above risks/problems have been discussed with the patient.  Pertinent information has been shared with the patient in the After Visit Summary.    Diagnoses and all orders for this visit:    1. Type 2 diabetes mellitus without complication, unspecified whether long term insulin use  (Primary)  Assessment & Plan:  Well controlled on previous labs  Checking follow up labs today    Orders:  -     CBC & Differential  -     Comprehensive Metabolic Panel  -     Hemoglobin A1c  -     Lipid Panel  -     TSH    2. Depression with anxiety  -     sertraline (ZOLOFT) 50 MG tablet; Take 1 tablet by mouth Daily.  Dispense: 90 tablet; Refill: 1    3. Hypothyroidism, unspecified type    4. High cholesterol  Assessment & Plan:  On statin, tolerating well  Checking follow up labs today      5. Essential hypertension  Assessment & Plan:  Well controlled in clinic today  Continue current management      Other orders  -     losartan (COZAAR) 25 MG tablet; Take 1 tablet by mouth Daily.  Dispense: 90 tablet; Refill: 1  -     loratadine (CLARITIN) 10 MG tablet; Take 1 tablet by mouth Daily.  Dispense: 90 tablet; Refill: 1  -     alendronate (Fosamax) 70 MG tablet; Take 1 tablet by mouth Every 7 (Seven) Days.  Dispense: 12 tablet; Refill: 0        Follow Up:   Next Medicare Wellness visit to be scheduled in 1 year.      An After Visit Summary and PPPS were made available to the patient.

## 2023-04-05 NOTE — PROGRESS NOTES
"Chief Complaint  Follow-up (3 month follow up /Also was told she had a sinus infection by the eye dr but was not given any antibiotics for it, has a torn muscle in left foot ) and Medicare Wellness-subsequent    Subjective       Amelie Anderson presents to [facility]    HPI   Presenting for follow up    HTN:  well controlled today, doing well on medication, denies headache, chest pain, dizziness, vision changes    HLD: on statin, tolerating well, denies muscle pain/weakness    DM: well controlled on previous labs,denies numbness/tingling, vision changes, urinary changes, dizziness, nausea    Objective     Vitals:    04/05/23 1131   BP: 116/68   BP Location: Left arm   Patient Position: Sitting   Cuff Size: Adult   Pulse: 70   Resp: 16   Temp: 97.8 °F (36.6 °C)   TempSrc: Temporal   SpO2: 98%   Weight: 70.4 kg (155 lb 2 oz)   Height: 162.6 cm (64\")      Wt Readings from Last 3 Encounters:   04/05/23 70.4 kg (155 lb 2 oz)   01/20/23 71.7 kg (158 lb)   01/10/23 71.6 kg (157 lb 12.8 oz)      BP Readings from Last 3 Encounters:   04/05/23 116/68   01/20/23 103/61   01/10/23 109/67        Body mass index is 26.63 kg/m².    BMI is >= 25 and <30. (Overweight) The following options were offered after discussion;: exercise counseling/recommendations and nutrition counseling/recommendations       Physical Exam  Vitals reviewed.   Constitutional:       Appearance: Normal appearance. She is well-developed.   HENT:      Head: Normocephalic and atraumatic.      Mouth/Throat:      Pharynx: No oropharyngeal exudate.   Eyes:      Conjunctiva/sclera: Conjunctivae normal.      Pupils: Pupils are equal, round, and reactive to light.   Cardiovascular:      Rate and Rhythm: Normal rate and regular rhythm.      Heart sounds: No murmur heard.    No friction rub. No gallop.   Pulmonary:      Effort: Pulmonary effort is normal.      Breath sounds: Normal breath sounds. No wheezing or rhonchi. "   Skin:     General: Skin is warm and dry.   Neurological:      Mental Status: She is alert and oriented to person, place, and time.   Psychiatric:         Mood and Affect: Affect normal.          Result Review :   The following data was reviewed by: Fabi Bill MD on 04/05/2023:  CMP    CMP 6/2/22 9/16/22 1/5/23   Glucose 119 (A) 115 (A) 150 (A)   BUN 33 (A) 19 23   Creatinine 1.20 (A) 1.11 (A) 1.02 (A)   eGFR 48.8 (A) 53.6 (A) 59.3 (A)   Sodium 137 141 138   Potassium 4.9 4.2 4.5   Chloride 102 104 102   Calcium 9.8 10.2 9.6   Total Protein 7.5  6.7   Albumin 5.00  4.8   Globulin 2.5  1.9   Total Bilirubin 0.8  0.4   Alkaline Phosphatase 87  72   AST (SGOT) 21  17   ALT (SGPT) 19  14   Albumin/Globulin Ratio 2.0  2.5   BUN/Creatinine Ratio 27.5 (A) 17.1 22.5   Anion Gap 10.6 10.1 8.0   (A) Abnormal value       Comments are available for some flowsheets but are not being displayed.           CBC    CBC 6/2/22 1/5/23   WBC 4.90 6.01   RBC 4.14 3.74 (A)   Hemoglobin 12.4 11.5 (A)   Hematocrit 36.9 34.5   MCV 89.1 92.2   MCH 30.0 30.7   MCHC 33.6 33.3   RDW 12.6 12.6   Platelets 191 202   (A) Abnormal value            Lipid Panel    Lipid Panel 6/2/22 1/5/23   Total Cholesterol 126 121   Triglycerides 103 211 (A)   HDL Cholesterol 58 50   VLDL Cholesterol 19 33   LDL Cholesterol  49 38   LDL/HDL Ratio 0.82 0.58   (A) Abnormal value            TSH    TSH 6/2/22 1/5/23   TSH 1.510 1.670           Most Recent A1C    HGBA1C Most Recent 1/5/23   Hemoglobin A1C 7.60 (A)   (A) Abnormal value            Microalbumin    Microalbumin 1/5/23   Microalbumin, Urine <1.2               Procedures    Assessment and Plan   Diagnoses and all orders for this visit:    1. Type 2 diabetes mellitus without complication, unspecified whether long term insulin use (Primary)  Assessment & Plan:  Well controlled on previous labs  Checking follow up labs today    Orders:  -     CBC & Differential  -     Comprehensive Metabolic  Panel  -     Hemoglobin A1c  -     Lipid Panel  -     TSH    2. Depression with anxiety  -     sertraline (ZOLOFT) 50 MG tablet; Take 1 tablet by mouth Daily.  Dispense: 90 tablet; Refill: 1    3. Hypothyroidism, unspecified type    4. High cholesterol  Assessment & Plan:  On statin, tolerating well  Checking follow up labs today      5. Essential hypertension  Assessment & Plan:  Well controlled in clinic today  Continue current management      Other orders  -     losartan (COZAAR) 25 MG tablet; Take 1 tablet by mouth Daily.  Dispense: 90 tablet; Refill: 1  -     loratadine (CLARITIN) 10 MG tablet; Take 1 tablet by mouth Daily.  Dispense: 90 tablet; Refill: 1  -     alendronate (Fosamax) 70 MG tablet; Take 1 tablet by mouth Every 7 (Seven) Days.  Dispense: 12 tablet; Refill: 0          Follow Up   Return in about 3 months (around 7/5/2023).  Patient was given instructions and counseling regarding her condition or for health maintenance advice. Please see specific information pulled into the AVS if appropriate.

## 2023-04-10 ENCOUNTER — TELEPHONE (OUTPATIENT)
Dept: INTERNAL MEDICINE | Facility: CLINIC | Age: 73
End: 2023-04-10
Payer: MEDICARE

## 2023-04-10 NOTE — TELEPHONE ENCOUNTER
I left a voicemail for the patient to call the office about lab results.       HUB ADVISED TO READ:    Per :  A1C stable   Kidney function stable. Liver and electrolyte levels normal   Blood counts normal   Thyroid function normal   Cholesterol panel normal

## 2023-04-10 NOTE — TELEPHONE ENCOUNTER
RELAYED MESSAGE. PATIENT EXPRESSED VERBAL UNDERSTANDING.  HUB ADVISED TO READ:       Per :  A1C stable   Kidney function stable. Liver and electrolyte levels normal   Blood counts normal   Thyroid function normal   Cholesterol panel normal     Caller: MILTON SIERRA,     Relationship: SELF    Best call back number: [phone]    What is the best time to reach you: ANY    Who are you requesting to speak with (clinical staff, provider,  specific staff member): CLINICAL    What was the call regarding: PATIENT STATED THAT SHE IS WANTING TO KNOW THE SPECIFIC NUMBERS OF HER LAB RESULTS.     Do you require a callback: YES

## 2023-04-10 NOTE — TELEPHONE ENCOUNTER
----- Message from Fabi Bill MD sent at 4/7/2023  1:00 PM EDT -----  A1C stable  Kidney function stable. Liver and electrolyte levels normal  Blood counts normal  Thyroid function normal  Cholesterol panel normal

## 2023-06-14 RX ORDER — OLOPATADINE HYDROCHLORIDE 1 MG/ML
2 SOLUTION/ DROPS OPHTHALMIC 2 TIMES DAILY
Qty: 15 ML | Refills: 1 | Status: SHIPPED | OUTPATIENT
Start: 2023-06-14

## 2023-06-14 RX ORDER — BRIMONIDINE TARTRATE 1 MG/ML
1 SOLUTION/ DROPS OPHTHALMIC 2 TIMES DAILY
Qty: 15 ML | Refills: 1 | Status: SHIPPED | OUTPATIENT
Start: 2023-06-14 | End: 2023-09-12

## 2023-06-14 RX ORDER — METFORMIN HYDROCHLORIDE 500 MG/1
2000 TABLET, EXTENDED RELEASE ORAL NIGHTLY
Qty: 360 TABLET | Refills: 1 | Status: SHIPPED | OUTPATIENT
Start: 2023-06-14

## 2023-06-14 NOTE — TELEPHONE ENCOUNTER
Caller: Nathan Andersonsandra BENSON    Relationship: Self    Best call back number [phone]     Requested Prescriptions:   Requested Prescriptions     Pending Prescriptions Disp Refills    metFORMIN ER (GLUCOPHAGE-XR) 500 MG 24 hr tablet 360 tablet 1     Sig: Take 4 tablets by mouth Every Night.    olopatadine (PATANOL) 0.1 % ophthalmic solution       Sig: Administer 2 drops to both eyes 2 (Two) Times a Day. Morning and night    Alphagan P 0.1 % solution ophthalmic solution          Pharmacy where request should be sent: [facility] - [address] - [phone]  - [phone] FX     Last office visit with prescribing clinician: 4/5/2023   Last telemedicine visit with prescribing clinician: Visit date not found   Next office visit with prescribing clinician: 7/5/2023     Additional details provided by patient: PATIENT IS NEEDING NEW PRESCRIPTIONS SENT TO PHARMACY.     Does the patient have less than a 3 day supply:  [x] Yes  [] No    Would you like a call back once the refill request has been completed: [x] Yes [] No    If the office needs to give you a call back, can they leave a voicemail: [x] Yes [] No    Ann Marie Suarez Rep   06/14/23 10:46 EDT

## 2023-07-25 ENCOUNTER — OFFICE VISIT (OUTPATIENT)
Dept: PULMONOLOGY | Facility: CLINIC | Age: 73
End: 2023-07-25
Payer: MEDICARE

## 2023-07-25 VITALS
HEART RATE: 64 BPM | WEIGHT: 151.4 LBS | OXYGEN SATURATION: 98 % | TEMPERATURE: 98.2 F | DIASTOLIC BLOOD PRESSURE: 61 MMHG | SYSTOLIC BLOOD PRESSURE: 113 MMHG | HEIGHT: 64 IN | BODY MASS INDEX: 25.85 KG/M2 | RESPIRATION RATE: 16 BRPM

## 2023-07-25 DIAGNOSIS — R06.09 DYSPNEA ON EXERTION: ICD-10-CM

## 2023-07-25 DIAGNOSIS — F17.211 NICOTINE DEPENDENCE, CIGARETTES, IN REMISSION: ICD-10-CM

## 2023-07-25 DIAGNOSIS — J30.9 ALLERGIC RHINITIS, UNSPECIFIED SEASONALITY, UNSPECIFIED TRIGGER: Primary | ICD-10-CM

## 2023-07-25 DIAGNOSIS — R91.8 LUNG NODULES: ICD-10-CM

## 2023-07-25 DIAGNOSIS — J30.2 SEASONAL ALLERGIES: ICD-10-CM

## 2023-07-25 PROCEDURE — 1159F MED LIST DOCD IN RCRD: CPT | Performed by: NURSE PRACTITIONER

## 2023-07-25 PROCEDURE — 3078F DIAST BP <80 MM HG: CPT | Performed by: NURSE PRACTITIONER

## 2023-07-25 PROCEDURE — 1160F RVW MEDS BY RX/DR IN RCRD: CPT | Performed by: NURSE PRACTITIONER

## 2023-07-25 PROCEDURE — 99214 OFFICE O/P EST MOD 30 MIN: CPT | Performed by: NURSE PRACTITIONER

## 2023-07-25 PROCEDURE — 3074F SYST BP LT 130 MM HG: CPT | Performed by: NURSE PRACTITIONER

## 2023-07-25 NOTE — PROGRESS NOTES
Primary Care Provider  Fabi Bill MD     Referring Provider  No ref. provider found     Chief Complaint  Follow-up (1 year f/up ) and Lung Nodule    Subjective          Amelie Anderson presents to [facility] PULMONARY & CRITICAL CARE MEDICINE  History of Present Illness  Amelie Anderson is a 73 y.o. female patient of Dr. Severino here for management of a pulmonary nodule, seasonal allergies/allergic rhinitis and tobacco use of cigarettes in remission.     Patient states she is doing well since her last visit.  She denies any getting antibiotics or steroids for her lungs.  She denies any fevers or chills.  Her shortness of breath is very mild in severity, worse with exertion and improved with rest.  She is currently taking Claritin and Flonase for seasonal allergies/allergic rhinitis.  She is currently not on any medications to help with her breathing.  She recently had a chest CT on 7/14/2023.  This shows a new 2.4 cm nodule in the right lower lobe and a stable 0.4 cm in the left lower lobe.  At this time, it is recommended that patient have a repeat CT scan in 6 months.  These findings were discussed with patient today.  Overall, she states she is doing well and she denies any unintentional weight loss or hemoptysis.  Patient did quit smoking in 2016.  She is agreeable to having a repeat CT scan in 6 months.  Overall, she has no additional concerns at this time.  She is able to perform her ADLs without difficulty.  She is up-to-date with her COVID, flu and pneumonia vaccines.     Her history of smoking is   Tobacco Use: Medium Risk    Smoking Tobacco Use: Former    Smokeless Tobacco Use: Never    Passive Exposure: Past   .    Review of Systems   Constitutional:  Negative for chills, fatigue, fever, unexpected weight gain and unexpected weight loss.   HENT:  Negative for congestion (Nasal), hearing loss, mouth sores, nosebleeds, postnasal drip, sore throat and trouble swallowing.     Respiratory:  Negative for apnea, cough, shortness of breath and wheezing.         Negative for Hemoptysis     Cardiovascular:  Negative for chest pain, palpitations and leg swelling.   Gastrointestinal:  Negative for constipation, diarrhea, nausea, vomiting and GERD.   Skin:  Negative for color change.        Negative for cyanosis   Neurological:  Negative for syncope, weakness, numbness and headache.    Sleep: Negative for Excessive daytime sleepiness  Negative for morning headaches  Negative for Snoring    Family History   Problem Relation Age of Onset    Aortic stenosis Mother     Valvular heart disease Mother     Heart attack Father 30    Coronary artery disease Father     Aortic stenosis Sister     Valvular heart disease Sister     Coronary artery disease Paternal Aunt     Coronary artery disease Paternal Uncle     Heart disease Other     Diabetes Other     Heart attack Other     Malig Hyperthermia Neg Hx         Social History     Socioeconomic History    Marital status:    Tobacco Use    Smoking status: Former     Packs/day: 1.00     Years: 48.00     Pack years: 48.00     Types: Cigarettes     Start date:      Quit date:      Years since quittin.5     Passive exposure: Past    Smokeless tobacco: Never   Vaping Use    Vaping Use: Never used   Substance and Sexual Activity    Alcohol use: Not Currently    Drug use: Never    Sexual activity: Defer        Past Medical History:   Diagnosis Date    Abdominal aortic aneurysm (AAA)     Anemia     Aortic stenosis     AR (allergic rhinitis)     Arthritis     Bladder disorder     Cataract     BILAT    Depression with anxiety     Diabetes     Elevated cholesterol     Environmental allergies     GERD (gastroesophageal reflux disease)     Heel spur, right     High cholesterol     History of anesthesia complications     STATES WAS SLOW TO WAKE UP AFTER SURGERY    History of TB (tuberculosis)     YEARS AGO WHEN FIRST CAME TO [facility], WAS TREATED FOR WITH  INH    Hypertension     Hyperthyroidism     Iron deficiency     Kidney calculi     Kidney cysts     Kidney disease     Lesion of bladder [id number]    Microhematuria 09/25/2015    Migraine     Mitral valve prolapse     Osteoporosis     PONV (postoperative nausea and vomiting)     Pulmonary nodule     S/P AVR (aortic valve replacement) 03/04/2021     Normally functioning prosthetic aortic valve. on echo 5/20/2021 and 2/8/2022    Seasonal allergies     Stroke     had mini stroke behind right eye    Thyroid disorder     Urinary frequency         Immunization History   Administered Date(s) Administered    COVID-19 (PFIZER) BIVALENT 12+YRS 10/13/2022    COVID-19 (PFIZER) Purple Cap Monovalent 04/05/2021, 04/26/2021, 12/02/2021    Covid-19 (Pfizer) Gray Cap Monovalent 06/06/2022    Fluzone High Dose =>65 Years ([employer] ONLY) 11/03/2021, 10/11/2022    Fluzone High-Dose 65+yrs 11/03/2021, 10/11/2022    Influenza, Unspecified 09/22/2020    PEDS-Pneumococcal Conjugate (PCV7) 06/06/2022    Pneumococcal Conjugate 13-Valent (PCV13) 09/15/2017    Pneumococcal Conjugate 20-Valent (PCV20) 06/06/2022    Pneumococcal Polysaccharide (PPSV23) 12/27/2018         No Known Allergies       Current Outpatient Medications:     alendronate (Fosamax) 70 MG tablet, Take 1 tablet by mouth Every 7 (Seven) Days., Disp: 12 tablet, Rfl: 0    Alphagan P 0.1 % solution ophthalmic solution, Administer 1 drop to both eyes 2 (Two) Times a Day for 90 days., Disp: 15 mL, Rfl: 1    amitriptyline (ELAVIL) 25 MG tablet, Take 1 tablet by mouth Every Night., Disp: 90 tablet, Rfl: 1    [place] Thyroid 60 MG tablet, Take 1 tablet by mouth Daily., Disp: 90 tablet, Rfl: 1    aspirin 81 MG EC tablet, Take 1 tablet by mouth Daily., Disp: 90 tablet, Rfl: 3    atenolol (TENORMIN) 25 MG tablet, Take 1 tablet by mouth Daily., Disp: 90 tablet, Rfl: 1    atorvastatin (LIPITOR) 40 MG tablet, Take 1 tablet by mouth Daily., Disp: 90 tablet, Rfl: 1    Calcium Carbonate-Vit D-Min  (QC Calcium/Minerals/Vitamin D) 600-400 MG-UNIT tablet, Take 1 tablet by mouth Daily., Disp: 90 tablet, Rfl: 1    chlorpheniramine (CHLOR-TRIMETON) 4 MG tablet, Take 1 tablet by mouth Every 6 (Six) Hours As Needed for Allergies., Disp: 360 tablet, Rfl: 1    FeroSul 325 (65 Fe) MG tablet, Take 1 tablet by mouth Daily., Disp: 90 tablet, Rfl: 1    fluticasone (FLONASE) 50 MCG/ACT nasal spray, fluticasone propionate 50 mcg/actuation nasal spray,suspension spray 2 sprays (100 mcg) in each nostril by intranasal route once daily as needed for 30 days 4/16/2021  Active, Disp: , Rfl:     FREESTYLE LITE test strip, Use as directed, Disp: 100 each, Rfl: 1    Insulin Pen Needle (Pen Needles) 31G X 6 MM misc, 1 each 4 (Four) Times a Day As Needed (with insulin)., Disp: 100 each, Rfl: 3    Lancets 30G misc, 1 each 4 (Four) Times a Day As Needed (for glucose testing)., Disp: 100 each, Rfl: 1    loratadine (CLARITIN) 10 MG tablet, Take 1 tablet by mouth Daily., Disp: 90 tablet, Rfl: 1    losartan (COZAAR) 25 MG tablet, Take 1 tablet by mouth Daily., Disp: 90 tablet, Rfl: 1    metFORMIN ER (GLUCOPHAGE-XR) 500 MG 24 hr tablet, Take 4 tablets by mouth Every Night., Disp: 360 tablet, Rfl: 1    multivitamin with minerals tablet tablet, Take 1 tablet by mouth Daily. HOLD FOR SURGERY, Disp: , Rfl:     olopatadine (PATANOL) 0.1 % ophthalmic solution, Administer 2 drops to both eyes 2 (Two) Times a Day. Morning and night, Disp: 15 mL, Rfl: 1    Omega-3 Fatty Acids (fish oil) 1000 MG capsule capsule, Take  by mouth Daily With Breakfast., Disp: , Rfl:     sertraline (ZOLOFT) 50 MG tablet, Take 1 tablet by mouth Daily., Disp: 90 tablet, Rfl: 1    vitamin B-6 (PYRIDOXINE) 50 MG tablet, Take 1 tablet by mouth Daily., Disp: 90 tablet, Rfl: 1  No current facility-administered medications for this visit.    Facility-Administered Medications Ordered in Other Visits:     Chlorhexidine Gluconate Cloth 2 % pads 1 application, 1 application , Topical,  "Q12H ISAAC, Ne Acevedo APRN     Objective   Physical Exam  Constitutional:       General: She is not in acute distress.     Appearance: Normal appearance. She is normal weight.   HENT:      Right Ear: Hearing normal.      Left Ear: Hearing normal.      Nose: No nasal tenderness or congestion.      Mouth/Throat:      Mouth: Mucous membranes are moist. No oral lesions.   Eyes:      Extraocular Movements: Extraocular movements intact.      Pupils: Pupils are equal, round, and reactive to light.   Neck:      Thyroid: No thyroid mass or thyromegaly.   Cardiovascular:      Rate and Rhythm: Normal rate and regular rhythm.      Pulses: Normal pulses.      Heart sounds: Normal heart sounds. No murmur heard.  Pulmonary:      Effort: Pulmonary effort is normal.      Breath sounds: Normal breath sounds. No wheezing, rhonchi or rales.   Musculoskeletal:      Cervical back: Neck supple.      Right lower leg: No edema.      Left lower leg: No edema.   Lymphadenopathy:      Cervical: No cervical adenopathy.      Upper Body:      Right upper body: No axillary adenopathy.   Skin:     General: Skin is warm and dry.      Findings: No lesion or rash.   Neurological:      General: No focal deficit present.      Mental Status: She is alert and oriented to person, place, and time.   Psychiatric:         Mood and Affect: Affect normal. Mood is not anxious or depressed.       Vital Signs:   /61 (BP Location: Left arm, Patient Position: Sitting, Cuff Size: Adult)   Pulse 64   Temp 98.2 °F (36.8 °C) (Temporal)   Resp 16   Ht 162.6 cm (64\")   Wt 68.7 kg (151 lb 6.4 oz)   SpO2 98% Comment: room air  BMI 25.99 kg/m²        Result Review :   The following data was reviewed by: ERNIE Daigle on 07/25/2023:  CMP          1/5/2023    15:41 4/5/2023    12:07 7/5/2023    12:23   CMP   Glucose 150  194  121    BUN 23  22  27    Creatinine 1.02  1.05  1.06    EGFR 59.3  56.9  56.3    Sodium 138  138  138    Potassium 4.5  4.3  " 5.1    Chloride 102  102  105    Calcium 9.6  10.0  10.3    Total Protein 6.7  6.9  7.0    Albumin 4.8  4.9  4.7    Globulin 1.9  2.0  2.3    Total Bilirubin 0.4  0.4  0.7    Alkaline Phosphatase 72  82  87    AST (SGOT) 17  16  21    ALT (SGPT) 14  13  18    Albumin/Globulin Ratio 2.5  2.5  2.0    BUN/Creatinine Ratio 22.5  21.0  25.5    Anion Gap 8.0  10.8  8.0      CBC w/diff          1/5/2023    15:41 4/5/2023    12:07 7/5/2023    12:23   CBC w/Diff   WBC 6.01  5.55  6.01    RBC 3.74  4.31  4.27    Hemoglobin 11.5  12.3  12.4    Hematocrit 34.5  39.2  37.8    MCV 92.2  91.0  88.5    MCH 30.7  28.5  29.0    MCHC 33.3  31.4  32.8    RDW 12.6  12.0  13.0    Platelets 202  182  192    Neutrophil Rel % 63.9  66.5  65.7    Immature Granulocyte Rel % 0.3  0.4  0.2    Lymphocyte Rel % 25.8  23.6  23.3    Monocyte Rel % 7.7  7.6  8.8    Eosinophil Rel % 2.0  1.4  1.3    Basophil Rel % 0.3  0.5  0.7      Data reviewed : Radiologic studies chest CT 7/14/2022, chest CT 7/14/2023 and my last office note    Procedures        Assessment and Plan    Diagnoses and all orders for this visit:    1. Allergic rhinitis, unspecified seasonality, unspecified trigger (Primary)    2. Lung nodules  -     CT Chest Low Dose Follow Up Without Contrast; Future    3. Seasonal allergies    4. Nicotine dependence, cigarettes, in remission  -     CT Chest Low Dose Follow Up Without Contrast; Future    5. Dyspnea on exertion    6.  Repeat chest CT in January 2024 to assess pulmonary nodule.  7.  Follow-up in 6 months after CT scan, sooner if needed.        Follow Up   Return in about 6 months (around 1/25/2024) for Recheck after CT with Gloria.  Patient was given instructions and counseling regarding her condition or for health maintenance advice. Please see specific information pulled into the AVS if appropriate.

## 2023-08-08 ENCOUNTER — TELEPHONE (OUTPATIENT)
Dept: CARDIOLOGY | Facility: CLINIC | Age: 73
End: 2023-08-08

## 2023-08-08 NOTE — TELEPHONE ENCOUNTER
----- Message from Dianne Leigh RN sent at 8/8/2023  3:21 PM EDT -----    ----- Message -----  From: Ju Sierra APRN  Sent: 8/8/2023   3:15 PM EDT  To: Dianne Leigh RN    Echo shows normal ejection fraction 51 to 55%.  There is mild to moderate aortic valve stenosis noted.  Continue scheduled follow-up and stress testing.

## 2023-09-08 ENCOUNTER — TELEPHONE (OUTPATIENT)
Dept: CARDIOLOGY | Facility: CLINIC | Age: 73
End: 2023-09-08
Payer: MEDICARE

## 2023-09-08 ENCOUNTER — HOSPITAL ENCOUNTER (OUTPATIENT)
Dept: NUCLEAR MEDICINE | Facility: HOSPITAL | Age: 73
Discharge: HOME | End: 2023-09-08
Payer: MEDICARE

## 2023-09-08 DIAGNOSIS — R06.09 DOE (DYSPNEA ON EXERTION): ICD-10-CM

## 2023-09-08 LAB
BH CV IMMEDIATE POST TECH DATA BLOOD PRESSURE: NORMAL MMHG
BH CV IMMEDIATE POST TECH DATA HEART RATE: 81 BPM
BH CV IMMEDIATE POST TECH DATA OXYGEN SATS: 98 %
BH CV NINE MINUTE RECOVERY TECH DATA BLOOD PRESSURE: NORMAL MMHG
BH CV NINE MINUTE RECOVERY TECH DATA HEART RATE: 73 BPM
BH CV NINE MINUTE RECOVERY TECH DATA OXYGEN SATURATION: 97 %
BH CV REST NUCLEAR ISOTOPE DOSE: 9.5 MCI
BH CV SIX MINUTE RECOVERY TECH DATA BLOOD PRESSURE: NORMAL
BH CV SIX MINUTE RECOVERY TECH DATA HEART RATE: 71 BPM
BH CV SIX MINUTE RECOVERY TECH DATA OXYGEN SATURATION: 97 %
BH CV SIX MINUTE RECOVERY TECH DATA SYMPTOMS: NORMAL
BH CV STRESS BP STAGE 1: NORMAL
BH CV STRESS BP STAGE 2: NORMAL
BH CV STRESS BP STAGE 3: NORMAL
BH CV STRESS COMMENTS STAGE 1: NORMAL
BH CV STRESS COMMENTS STAGE 2: NORMAL
BH CV STRESS DOSE REGADENOSON STAGE 1: 0.4
BH CV STRESS DURATION MIN STAGE 1: 0
BH CV STRESS DURATION MIN STAGE 2: 4
BH CV STRESS DURATION SEC STAGE 1: 10
BH CV STRESS DURATION SEC STAGE 2: 0
BH CV STRESS GRADE STAGE 1: 10
BH CV STRESS HR STAGE 1: 75
BH CV STRESS HR STAGE 2: 98
BH CV STRESS HR STAGE 3: 95
BH CV STRESS METS STAGE 1: 5
BH CV STRESS NUCLEAR ISOTOPE DOSE: 34.7 MCI
BH CV STRESS O2 STAGE 1: 94
BH CV STRESS O2 STAGE 2: 90
BH CV STRESS O2 STAGE 3: 89
BH CV STRESS PROTOCOL 1: NORMAL
BH CV STRESS RECOVERY BP: NORMAL MMHG
BH CV STRESS RECOVERY HR: 73 BPM
BH CV STRESS RECOVERY O2: 97 %
BH CV STRESS SPEED STAGE 1: 1.7
BH CV STRESS STAGE 1: 1
BH CV STRESS STAGE 2: 2
BH CV STRESS STAGE 3: 3
BH CV THREE MINUTE POST TECH DATA BLOOD PRESSURE: NORMAL MMHG
BH CV THREE MINUTE POST TECH DATA HEART RATE: 67 BPM
BH CV THREE MINUTE POST TECH DATA OXYGEN SATURATION: 90 %
LV EF NUC BP: 64 %
MAXIMAL PREDICTED HEART RATE: 149 BPM
PERCENT MAX PREDICTED HR: 75.17 %
STRESS BASELINE BP: NORMAL MMHG
STRESS BASELINE HR: 58 BPM
STRESS O2 SAT REST: 98 %
STRESS PERCENT HR: 88 %
STRESS POST O2 SAT PEAK: 98 %
STRESS POST PEAK BP: NORMAL MMHG
STRESS POST PEAK HR: 112 BPM
STRESS TARGET HR: 127 BPM

## 2023-09-08 PROCEDURE — 25010000002 REGADENOSON 0.4 MG/5ML SOLUTION: Performed by: INTERNAL MEDICINE

## 2023-09-08 PROCEDURE — 78452 HT MUSCLE IMAGE SPECT MULT: CPT

## 2023-09-08 PROCEDURE — 0 TECHNETIUM TETROFOSMIN KIT: Performed by: INTERNAL MEDICINE

## 2023-09-08 PROCEDURE — A9502 TC99M TETROFOSMIN: HCPCS | Performed by: INTERNAL MEDICINE

## 2023-09-08 PROCEDURE — 93017 CV STRESS TEST TRACING ONLY: CPT

## 2023-09-08 RX ORDER — REGADENOSON 0.08 MG/ML
0.4 INJECTION, SOLUTION INTRAVENOUS
Status: COMPLETED | OUTPATIENT
Start: 2023-09-08 | End: 2023-09-08

## 2023-09-08 RX ADMIN — TETROFOSMIN 1 DOSE: 1.38 INJECTION, POWDER, LYOPHILIZED, FOR SOLUTION INTRAVENOUS at 08:10

## 2023-09-08 RX ADMIN — REGADENOSON 0.4 MG: 0.08 INJECTION, SOLUTION INTRAVENOUS at 08:10

## 2023-09-08 RX ADMIN — TETROFOSMIN 1 DOSE: 1.38 INJECTION, POWDER, LYOPHILIZED, FOR SOLUTION INTRAVENOUS at 06:56

## 2023-09-08 NOTE — TELEPHONE ENCOUNTER
----- Message from ERNIE Sanders sent at 9/8/2023  2:21 PM EDT -----  Notify patient the results of her stress test were unremarkable. There were no signs concerning for blockages in her heart. These tests are not 100% accurate so if she has further concerns, let us know.

## 2023-09-08 NOTE — PROGRESS NOTES
Notify patient the results of her stress test were unremarkable. There were no signs concerning for blockages in her heart. These tests are not 100% accurate so if she has further concerns, let us know.

## 2023-09-19 ENCOUNTER — TRANSCRIBE ORDERS (OUTPATIENT)
Dept: ADMINISTRATIVE | Facility: HOSPITAL | Age: 73
End: 2023-09-19
Payer: MEDICARE

## 2023-09-19 DIAGNOSIS — N18.30 STAGE 3 CHRONIC KIDNEY DISEASE, UNSPECIFIED WHETHER STAGE 3A OR 3B CKD: Primary | ICD-10-CM

## 2023-10-05 ENCOUNTER — OFFICE VISIT (OUTPATIENT)
Dept: INTERNAL MEDICINE | Facility: CLINIC | Age: 73
End: 2023-10-05
Payer: MEDICARE

## 2023-10-05 VITALS
SYSTOLIC BLOOD PRESSURE: 116 MMHG | HEIGHT: 64 IN | HEART RATE: 69 BPM | DIASTOLIC BLOOD PRESSURE: 64 MMHG | WEIGHT: 153.13 LBS | OXYGEN SATURATION: 98 % | BODY MASS INDEX: 26.14 KG/M2 | RESPIRATION RATE: 16 BRPM | TEMPERATURE: 98.4 F

## 2023-10-05 DIAGNOSIS — E11.9 TYPE 2 DIABETES MELLITUS WITHOUT COMPLICATION, WITHOUT LONG-TERM CURRENT USE OF INSULIN: Primary | ICD-10-CM

## 2023-10-05 RX ORDER — BRIMONIDINE TARTRATE 1.5 MG/ML
1 SOLUTION/ DROPS OPHTHALMIC 3 TIMES DAILY
COMMUNITY

## 2023-10-05 RX ORDER — CALCIUM CARB/VIT D3/MINERALS 600 MG-400
1 TABLET ORAL DAILY
Qty: 90 TABLET | Refills: 1 | Status: SHIPPED | OUTPATIENT
Start: 2023-10-05

## 2023-10-05 RX ORDER — EMPAGLIFLOZIN 10 MG/1
10 TABLET, FILM COATED ORAL DAILY
COMMUNITY
Start: 2023-09-22 | End: 2024-09-21

## 2023-10-05 RX ORDER — ALENDRONATE SODIUM 70 MG/1
70 TABLET ORAL
Qty: 12 TABLET | Refills: 0 | Status: SHIPPED | OUTPATIENT
Start: 2023-10-05

## 2023-10-05 RX ORDER — LANOLIN ALCOHOL/MO/W.PET/CERES
50 CREAM (GRAM) TOPICAL DAILY
Qty: 90 TABLET | Refills: 1 | Status: SHIPPED | OUTPATIENT
Start: 2023-10-05

## 2023-10-18 ENCOUNTER — OFFICE VISIT (OUTPATIENT)
Dept: CARDIOLOGY | Facility: CLINIC | Age: 73
End: 2023-10-18
Payer: MEDICARE

## 2023-10-18 VITALS
DIASTOLIC BLOOD PRESSURE: 78 MMHG | BODY MASS INDEX: 25.85 KG/M2 | SYSTOLIC BLOOD PRESSURE: 123 MMHG | HEIGHT: 64 IN | WEIGHT: 151.4 LBS | HEART RATE: 55 BPM

## 2023-10-18 DIAGNOSIS — I10 ESSENTIAL HYPERTENSION: ICD-10-CM

## 2023-10-18 DIAGNOSIS — Z95.2 S/P AVR (AORTIC VALVE REPLACEMENT): Primary | ICD-10-CM

## 2023-10-18 DIAGNOSIS — E78.2 MIXED DYSLIPIDEMIA: ICD-10-CM

## 2023-10-18 NOTE — PROGRESS NOTES
Chief Complaint  S/P AVR (aortic valve replacement)    Subjective      Patient is here for follow-up on recent testing which was done for shortness of breath.  She continues to have shortness of breath which had improved since last visit.  She has occasional palpitations, mostly with stress.  She has no chest discomfort, dizziness, presyncope or syncope.    Past Medical History:   Diagnosis Date    Abdominal aortic aneurysm (AAA)     Anemia     Aortic stenosis     AR (allergic rhinitis)     Arthritis     Bladder disorder     Cataract     BILAT    Depression with anxiety     Diabetes     Elevated cholesterol     Environmental allergies     GERD (gastroesophageal reflux disease)     Heel spur, right     High cholesterol     History of anesthesia complications     STATES WAS SLOW TO WAKE UP AFTER SURGERY    History of TB (tuberculosis)     YEARS AGO WHEN FIRST CAME TO [facility], WAS TREATED FOR WITH INH    Hypertension     Hyperthyroidism     Iron deficiency     Kidney calculi     Kidney cysts     Kidney disease     Lesion of bladder [id number]    Microhematuria 09/25/2015    Migraine     Mitral valve prolapse     Osteoporosis     PONV (postoperative nausea and vomiting)     Pulmonary nodule     S/P AVR (aortic valve replacement) 03/04/2021     Normally functioning prosthetic aortic valve. on echo 5/20/2021 and 2/8/2022    Seasonal allergies     Stroke     had mini stroke behind right eye    Thyroid disorder     Urinary frequency          Current Outpatient Medications:     alendronate (Fosamax) 70 MG tablet, Take 1 tablet by mouth Every 7 (Seven) Days., Disp: 12 tablet, Rfl: 0    amitriptyline (ELAVIL) 25 MG tablet, Take 1 tablet by mouth Every Night., Disp: 90 tablet, Rfl: 1    [place] Thyroid 60 MG tablet, Take 1 tablet by mouth Daily., Disp: 90 tablet, Rfl: 1    aspirin 81 MG EC tablet, Take 1 tablet by mouth Daily., Disp: 90 tablet, Rfl: 3    atenolol (TENORMIN) 25 MG tablet, Take 1 tablet by mouth Daily., Disp: 90  tablet, Rfl: 1    atorvastatin (LIPITOR) 40 MG tablet, Take 1 tablet by mouth Daily., Disp: 90 tablet, Rfl: 1    brimonidine (ALPHAGAN) 0.15 % ophthalmic solution, 1 drop 3 (Three) Times a Day., Disp: , Rfl:     Calcium Carbonate-Vit D-Min (QC Calcium/Minerals/Vitamin D) 600-400 MG-UNIT tablet, Take 1 tablet by mouth Daily., Disp: 90 tablet, Rfl: 1    chlorpheniramine (CHLOR-TRIMETON) 4 MG tablet, Take 1 tablet by mouth Every 6 (Six) Hours As Needed for Allergies., Disp: 360 tablet, Rfl: 1    FeroSul 325 (65 Fe) MG tablet, Take 1 tablet by mouth Daily., Disp: 90 tablet, Rfl: 1    FREESTYLE LITE test strip, Use as directed, Disp: 100 each, Rfl: 1    Insulin Pen Needle (Pen Needles) 31G X 6 MM misc, 1 each 4 (Four) Times a Day As Needed (with insulin)., Disp: 100 each, Rfl: 3    [place]iance 10 MG tablet tablet, Take 1 tablet by mouth Daily., Disp: , Rfl:     Lancets 30G misc, 1 each 4 (Four) Times a Day As Needed (for glucose testing)., Disp: 100 each, Rfl: 1    loratadine (CLARITIN) 10 MG tablet, Take 1 tablet by mouth Daily., Disp: 90 tablet, Rfl: 1    losartan (COZAAR) 25 MG tablet, Take 1 tablet by mouth Daily., Disp: 90 tablet, Rfl: 1    metFORMIN ER (GLUCOPHAGE-XR) 500 MG 24 hr tablet, Take 4 tablets by mouth Every Night., Disp: 360 tablet, Rfl: 1    multivitamin with minerals tablet tablet, Take 1 tablet by mouth Daily. HOLD FOR SURGERY, Disp: , Rfl:     olopatadine (PATANOL) 0.1 % ophthalmic solution, Administer 2 drops to both eyes 2 (Two) Times a Day. Morning and night, Disp: 15 mL, Rfl: 1    Omega-3 Fatty Acids (fish oil) 1000 MG capsule capsule, Take  by mouth Daily With Breakfast., Disp: , Rfl:     sertraline (ZOLOFT) 50 MG tablet, Take 1 tablet by mouth Daily., Disp: 90 tablet, Rfl: 1    vitamin B-6 (PYRIDOXINE) 50 MG tablet, Take 1 tablet by mouth Daily., Disp: 90 tablet, Rfl: 1    fluticasone (FLONASE) 50 MCG/ACT nasal spray, , Disp: , Rfl:   No current facility-administered medications for this  "visit.    Facility-Administered Medications Ordered in Other Visits:     Chlorhexidine Gluconate Cloth 2 % pads 1 application, 1 application , Topical, Q12H LISAN, Ne Acevedo APRN    There are no discontinued medications.  No Known Allergies     Social History     Tobacco Use    Smoking status: Former     Packs/day: 1.00     Years: 48.00     Additional pack years: 0.00     Total pack years: 48.00     Types: Cigarettes     Start date:      Quit date:      Years since quittin.8     Passive exposure: Past    Smokeless tobacco: Never   Vaping Use    Vaping Use: Never used   Substance Use Topics    Alcohol use: Not Currently    Drug use: Never       Family History   Problem Relation Age of Onset    Aortic stenosis Mother     Valvular heart disease Mother     Heart attack Father 30    Coronary artery disease Father     Aortic stenosis Sister     Valvular heart disease Sister     Coronary artery disease Paternal Aunt     Coronary artery disease Paternal Uncle     Heart disease Other     Diabetes Other     Heart attack Other     Malig Hyperthermia Neg Hx         Objective     /78   Pulse 55   Ht 162.6 cm (64.02\")   Wt 68.7 kg (151 lb 6.4 oz)   BMI 25.97 kg/m²       Physical Exam    General Appearance:   no acute distress  Alert and oriented x3  HENT:   lips not cyanotic  Atraumatic  Neck:  No jvd   supple  Respiratory:  no respiratory distress  normal breath sounds  no rales  Cardiovascular:  no S3, no S4   2/6 systolic ration murmur at the base  no rub  Extremities  No cyanosis  lower extremity edema: none    Skin:   warm, dry  No rashes      Result Review :     proBNP   Date Value Ref Range Status   2021 92.1 0.0 - 900.0 pg/mL Final     CMP          2023    15:41 2023    12:07 2023    12:23   CMP   Glucose 150  194  121    BUN 23  22  27    Creatinine 1.02  1.05  1.06    EGFR 59.3  56.9  56.3    Sodium 138  138  138    Potassium 4.5  4.3  5.1    Chloride 102  102  105  " "  Calcium 9.6  10.0  10.3    Total Protein 6.7  6.9  7.0    Albumin 4.8  4.9  4.7    Globulin 1.9  2.0  2.3    Total Bilirubin 0.4  0.4  0.7    Alkaline Phosphatase 72  82  87    AST (SGOT) 17  16  21    ALT (SGPT) 14  13  18    Albumin/Globulin Ratio 2.5  2.5  2.0    BUN/Creatinine Ratio 22.5  21.0  25.5    Anion Gap 8.0  10.8  8.0      CBC w/diff          1/5/2023    15:41 4/5/2023    12:07 7/5/2023    12:23   CBC w/Diff   WBC 6.01  5.55  6.01    RBC 3.74  4.31  4.27    Hemoglobin 11.5  12.3  12.4    Hematocrit 34.5  39.2  37.8    MCV 92.2  91.0  88.5    MCH 30.7  28.5  29.0    MCHC 33.3  31.4  32.8    RDW 12.6  12.0  13.0    Platelets 202  182  192    Neutrophil Rel % 63.9  66.5  65.7    Immature Granulocyte Rel % 0.3  0.4  0.2    Lymphocyte Rel % 25.8  23.6  23.3    Monocyte Rel % 7.7  7.6  8.8    Eosinophil Rel % 2.0  1.4  1.3    Basophil Rel % 0.3  0.5  0.7       Lab Results   Component Value Date    TSH 0.848 07/05/2023      Lab Results   Component Value Date    FREET4 0.9 04/14/2021      No results found for: \"DDIMERQUANT\"  Magnesium   Date Value Ref Range Status   01/13/2022 2.0 1.6 - 2.4 mg/dL Final      No results found for: \"DIGOXIN\"   No results found for: \"TROPONINT\"        Lipid Panel          1/5/2023    15:41 4/5/2023    12:07 7/5/2023    12:23   Lipid Panel   Total Cholesterol 121  120  117    Triglycerides 211  148  91    HDL Cholesterol 50  57  52    VLDL Cholesterol 33  25  17    LDL Cholesterol  38  38  48    LDL/HDL Ratio 0.58  0.59  0.90      No results found for: \"POCTROP\"    Results for orders placed in visit on 08/08/23    Adult Transthoracic Echo Complete W/ Cont if Necessary Per Protocol    Interpretation Summary    Left ventricular ejection fraction appears to be 51 - 55%.    Left ventricular diastolic function was normal.    Mild to moderate aortic valve stenosis is present.    There is a prosthetic aortic valve present.  Valve morphology was not well-visualized.  There appears to be " mild to moderate stenosis by Doppler.  Effective orifice area around 1.3 cm² with peak/mean pressure gradient of 15/9 mmHg.                 Diagnoses and all orders for this visit:    1. S/P AVR (aortic valve replacement) (Primary)    2. Essential hypertension    3. Mixed dyslipidemia      Assessment:     Shortness of breath.  She has dyspnea with mild to moderate effort which had improved since last visit.  Her exam is remarkable for grade 2/6 systolic ejection murmur at the base, otherwise benign.  Her ECG shows sinus rhythm with nonspecific T wave changes in the precordial leads.  Cardiac stress test was reviewed and was unremarkable without significant myocardial ischemia.  Echo showed normal LVEF with mildly increased pressure gradients across bioprosthetic aortic valve which is unchanged from before.  These findings were discussed with the patient.  She was reassured.  Continue clinical monitoring.     Hypertension: Stable on current regimen.  Continue the same.     Mixed dyslipidemia: Continue atorvastatin.      Follow Up     No follow-ups on file.        Patient was given instructions and counseling regarding her condition or for health maintenance advice. Please see specific information pulled into the AVS if appropriate.

## 2023-10-25 NOTE — PROGRESS NOTES
"Chief Complaint  Follow-up (3 month follow up diabetes, right hand and fingers are swollen, she did fall about 4-6 weeks ago on the right side and hurt her hand and shoulder and it still bothers her some, wants flu vaccine, wants to see about td vaccine as well ) and Diabetes    Subjective       Amelie Anderson presents to [facility]    Diabetes       Presenting for follow up of diabetes.     Had a fall 4-6 weeks ago and injured her right hand. Still swollen.       Objective     Vitals:    10/05/23 1059   BP: 116/64   BP Location: Left arm   Patient Position: Sitting   Cuff Size: Adult   Pulse: 69   Resp: 16   Temp: 98.4 °F (36.9 °C)   TempSrc: Temporal   SpO2: 98%   Weight: 69.5 kg (153 lb 2 oz)   Height: 162.6 cm (64\")      Wt Readings from Last 3 Encounters:   10/18/23 68.7 kg (151 lb 6.4 oz)   10/05/23 69.5 kg (153 lb 2 oz)   08/08/23 68.5 kg (151 lb)      BP Readings from Last 3 Encounters:   10/18/23 123/78   10/05/23 116/64   08/08/23 113/61        Body mass index is 26.28 kg/m².             Physical Exam  Vitals reviewed.   Constitutional:       Appearance: Normal appearance. She is well-developed.   HENT:      Head: Normocephalic and atraumatic.      Mouth/Throat:      Pharynx: No oropharyngeal exudate.   Eyes:      Conjunctiva/sclera: Conjunctivae normal.      Pupils: Pupils are equal, round, and reactive to light.   Neck:      Thyroid: No thyromegaly or thyroid tenderness.   Cardiovascular:      Rate and Rhythm: Normal rate and regular rhythm.      Heart sounds: No murmur heard.     No friction rub. No gallop.   Pulmonary:      Effort: Pulmonary effort is normal.      Breath sounds: Normal breath sounds. No wheezing or rhonchi.   Lymphadenopathy:      Cervical: No cervical adenopathy.   Skin:     General: Skin is warm and dry.   Neurological:      Mental Status: She is alert and oriented to person, place, and time.   Psychiatric:         Mood and Affect: " Affect normal.          Result Review :   The following data was reviewed by: Fabi Bill MD on 10/05/2023:  CMP          1/5/2023    15:41 4/5/2023    12:07 7/5/2023    12:23   CMP   Glucose 150  194  121    BUN 23  22  27    Creatinine 1.02  1.05  1.06    EGFR 59.3  56.9  56.3    Sodium 138  138  138    Potassium 4.5  4.3  5.1    Chloride 102  102  105    Calcium 9.6  10.0  10.3    Total Protein 6.7  6.9  7.0    Albumin 4.8  4.9  4.7    Globulin 1.9  2.0  2.3    Total Bilirubin 0.4  0.4  0.7    Alkaline Phosphatase 72  82  87    AST (SGOT) 17  16  21    ALT (SGPT) 14  13  18    Albumin/Globulin Ratio 2.5  2.5  2.0    BUN/Creatinine Ratio 22.5  21.0  25.5    Anion Gap 8.0  10.8  8.0      CBC          1/5/2023    15:41 4/5/2023    12:07 7/5/2023    12:23   CBC   WBC 6.01  5.55  6.01    RBC 3.74  4.31  4.27    Hemoglobin 11.5  12.3  12.4    Hematocrit 34.5  39.2  37.8    MCV 92.2  91.0  88.5    MCH 30.7  28.5  29.0    MCHC 33.3  31.4  32.8    RDW 12.6  12.0  13.0    Platelets 202  182  192      Lipid Panel          1/5/2023    15:41 4/5/2023    12:07 7/5/2023    12:23   Lipid Panel   Total Cholesterol 121  120  117    Triglycerides 211  148  91    HDL Cholesterol 50  57  52    VLDL Cholesterol 33  25  17    LDL Cholesterol  38  38  48    LDL/HDL Ratio 0.58  0.59  0.90      TSH          1/5/2023    15:41 4/5/2023    12:07 7/5/2023    12:23   TSH   TSH 1.670  2.200  0.848      A1C Last 3 Results          1/5/2023    15:41 4/5/2023    12:07 7/5/2023    12:23   HGBA1C Last 3 Results   Hemoglobin A1C 7.60  7.40  7.30      Microalbumin          1/5/2023    15:41   Microalbumin   Microalbumin, Urine <1.2          Procedures    Assessment and Plan   Diagnoses and all orders for this visit:    1. Type 2 diabetes mellitus without complication, without long-term current use of insulin (Primary)  Assessment & Plan:  Well controlled  Continue current management      Other orders  -     vitamin B-6 (PYRIDOXINE) 50 MG  tablet; Take 1 tablet by mouth Daily.  Dispense: 90 tablet; Refill: 1  -     alendronate (Fosamax) 70 MG tablet; Take 1 tablet by mouth Every 7 (Seven) Days.  Dispense: 12 tablet; Refill: 0  -     Calcium Carbonate-Vit D-Min (QC Calcium/Minerals/Vitamin D) 600-400 MG-UNIT tablet; Take 1 tablet by mouth Daily.  Dispense: 90 tablet; Refill: 1  -     Fluzone High-Dose 65+yrs          Follow Up   Return in about 3 months (around 1/5/2024) for Next scheduled follow up.  Patient was given instructions and counseling regarding her condition or for health maintenance advice. Please see specific information pulled into the AVS if appropriate.

## 2023-12-01 ENCOUNTER — TELEPHONE (OUTPATIENT)
Dept: URGENT CARE | Facility: CLINIC | Age: 73
End: 2023-12-01

## 2023-12-01 PROCEDURE — 87635 SARS-COV-2 COVID-19 AMP PRB: CPT | Performed by: EMERGENCY MEDICINE

## 2023-12-02 ENCOUNTER — TELEPHONE (OUTPATIENT)
Dept: URGENT CARE | Facility: CLINIC | Age: 73
End: 2023-12-02
Payer: MEDICARE

## 2023-12-02 DIAGNOSIS — U07.1 COVID-19: Primary | ICD-10-CM

## 2023-12-02 NOTE — TELEPHONE ENCOUNTER
Attempted to call patient regarding positive COVID test.  Left message on voicemail requesting patient to call back.  The patient will need to be treated with Paxlovid, although at a renal dose.  She is also on medication such as Lipitor which will need to be held while taking the medication.  The patient is at increased risk due to underlying condition of age and diabetes

## 2023-12-02 NOTE — TELEPHONE ENCOUNTER
Patient is contacted regarding positive COVID test result.  Plan: Begin Paxlovid at renal dosing.  Patient's last GFR was 56.7 in July and the patient states this has been stable for several months as determined by her nephrologist.  She is advised to discontinue Lipitor when taking Paxlovid and to not take Lipitor for 1 week after starting Paxlovid.  Plan: Paxlovid at renal dose is ordered at [facility] pharmacy for 5-day treatment.  The patient is strongly advised to go to the emergency room if she develops any worsening or changing symptoms such as shortness of breath, increased dizziness or chest pain.  The patient voices understanding and I think she understands to stop the Lipitor and hold it while taking Paxlovid.  A note was also sent to the pharmacy to inform the patient to not take Lipitor while taking Paxlovid.

## 2023-12-07 ENCOUNTER — OFFICE VISIT (OUTPATIENT)
Dept: INTERNAL MEDICINE | Facility: CLINIC | Age: 73
End: 2023-12-07
Payer: MEDICARE

## 2023-12-07 VITALS
SYSTOLIC BLOOD PRESSURE: 104 MMHG | RESPIRATION RATE: 20 BRPM | TEMPERATURE: 96.9 F | DIASTOLIC BLOOD PRESSURE: 60 MMHG | OXYGEN SATURATION: 97 % | WEIGHT: 147 LBS | BODY MASS INDEX: 25.1 KG/M2 | HEIGHT: 64 IN | HEART RATE: 76 BPM

## 2023-12-07 DIAGNOSIS — U07.1 COVID-19: Primary | ICD-10-CM

## 2023-12-07 PROCEDURE — 3051F HG A1C>EQUAL 7.0%<8.0%: CPT | Performed by: NURSE PRACTITIONER

## 2023-12-07 PROCEDURE — 3078F DIAST BP <80 MM HG: CPT | Performed by: NURSE PRACTITIONER

## 2023-12-07 PROCEDURE — 99213 OFFICE O/P EST LOW 20 MIN: CPT | Performed by: NURSE PRACTITIONER

## 2023-12-07 PROCEDURE — 3074F SYST BP LT 130 MM HG: CPT | Performed by: NURSE PRACTITIONER

## 2023-12-07 NOTE — PROGRESS NOTES
"Chief Complaint  COVID (Positive at  on 12/1/23), Dizziness, Cough, and Shortness of Breath    Subjective          Amelie Anderson presents to [facility]  She reports onset of weakness on 12/1. She was seen at , positive. She reports feeling somewhat better. She reports ongoing cough. She denies feeling soa except with activity.  Eating and drinking well  Denies fever    Objective   Vital Signs:   /60 (BP Location: Left arm, Patient Position: Sitting, Cuff Size: Adult)   Pulse 76   Temp 96.9 °F (36.1 °C)   Resp 20   Ht 162.6 cm (64.02\")   Wt 66.7 kg (147 lb)   SpO2 97%   BMI 25.22 kg/m²     Physical Exam  Vitals and nursing note reviewed.   Constitutional:       General: She is not in acute distress.     Appearance: Normal appearance.   HENT:      Head: Normocephalic and atraumatic.      Right Ear: Tympanic membrane, ear canal and external ear normal.      Left Ear: Tympanic membrane, ear canal and external ear normal.      Nose: Nose normal.      Mouth/Throat:      Mouth: Mucous membranes are moist.   Eyes:      Conjunctiva/sclera: Conjunctivae normal.   Cardiovascular:      Rate and Rhythm: Normal rate and regular rhythm.      Pulses: Normal pulses.      Heart sounds: Normal heart sounds. No murmur heard.     No friction rub. No gallop.   Pulmonary:      Effort: Pulmonary effort is normal. No respiratory distress.      Breath sounds: No wheezing, rhonchi or rales.   Musculoskeletal:      Cervical back: Neck supple.      Right lower leg: No edema.      Left lower leg: No edema.   Skin:     General: Skin is warm and dry.   Neurological:      General: No focal deficit present.      Mental Status: She is alert and oriented to person, place, and time.   Psychiatric:         Mood and Affect: Mood normal.         Behavior: Behavior normal.        Result Review :          Procedures      Assessment and Plan    Diagnoses and all orders for this visit:    1. " COVID-19 (Primary)    Discussed expected course and supportive care.  She will continue with supportive care at this time and call if symptoms worsen or fail to improve.          Follow Up   No follow-ups on file.  Patient was given instructions and counseling regarding her condition or for health maintenance advice. Please see specific information pulled into the AVS if appropriate.

## 2023-12-20 ENCOUNTER — TELEPHONE (OUTPATIENT)
Dept: INTERNAL MEDICINE | Facility: CLINIC | Age: 73
End: 2023-12-20
Payer: MEDICARE

## 2023-12-20 RX ORDER — METFORMIN HYDROCHLORIDE 500 MG/1
2000 TABLET, EXTENDED RELEASE ORAL NIGHTLY
Qty: 360 TABLET | Refills: 1 | Status: SHIPPED | OUTPATIENT
Start: 2023-12-20

## 2023-12-20 NOTE — TELEPHONE ENCOUNTER
Caller: Amelie Anderson    Relationship: Self    Best call back number: [phone]    Requested Prescriptions:   Requested Prescriptions     Pending Prescriptions Disp Refills    metFORMIN ER (GLUCOPHAGE-XR) 500 MG 24 hr tablet 360 tablet 1     Sig: Take 4 tablets by mouth Every Night.        Pharmacy where request should be sent: [place] [phone] [place] [phone]      Last office visit with prescribing clinician: 10/5/2023   Last telemedicine visit with prescribing clinician: Visit date not found   Next office visit with prescribing clinician: 1/5/2024       Does the patient have less than a 3 day supply:  [x] Yes  [] No        Ann Marie Vincent Rep   12/20/23 10:01 EST

## 2024-01-05 ENCOUNTER — OFFICE VISIT (OUTPATIENT)
Dept: INTERNAL MEDICINE | Facility: CLINIC | Age: 74
End: 2024-01-05
Payer: MEDICARE

## 2024-01-05 VITALS
WEIGHT: 155.13 LBS | HEART RATE: 62 BPM | SYSTOLIC BLOOD PRESSURE: 120 MMHG | TEMPERATURE: 97.2 F | BODY MASS INDEX: 26.49 KG/M2 | DIASTOLIC BLOOD PRESSURE: 68 MMHG | OXYGEN SATURATION: 97 % | HEIGHT: 64 IN | RESPIRATION RATE: 16 BRPM

## 2024-01-05 DIAGNOSIS — M81.0 AGE-RELATED OSTEOPOROSIS WITHOUT CURRENT PATHOLOGICAL FRACTURE: ICD-10-CM

## 2024-01-05 DIAGNOSIS — F41.8 DEPRESSION WITH ANXIETY: ICD-10-CM

## 2024-01-05 DIAGNOSIS — I10 ESSENTIAL HYPERTENSION: ICD-10-CM

## 2024-01-05 DIAGNOSIS — E78.00 HIGH CHOLESTEROL: ICD-10-CM

## 2024-01-05 DIAGNOSIS — Z78.0 POSTMENOPAUSE: ICD-10-CM

## 2024-01-05 DIAGNOSIS — E11.9 TYPE 2 DIABETES MELLITUS WITHOUT COMPLICATION, WITHOUT LONG-TERM CURRENT USE OF INSULIN: Primary | ICD-10-CM

## 2024-01-05 PROBLEM — Z00.00 MEDICARE ANNUAL WELLNESS VISIT, SUBSEQUENT: Status: RESOLVED | Noted: 2022-03-02 | Resolved: 2024-01-05

## 2024-01-05 LAB
ALBUMIN SERPL-MCNC: 4.9 G/DL (ref 3.5–5.2)
ALBUMIN/GLOB SERPL: 2.1 G/DL
ALP SERPL-CCNC: 77 U/L (ref 39–117)
ALT SERPL W P-5'-P-CCNC: 13 U/L (ref 1–33)
ANION GAP SERPL CALCULATED.3IONS-SCNC: 10 MMOL/L (ref 5–15)
AST SERPL-CCNC: 17 U/L (ref 1–32)
BASOPHILS # BLD AUTO: 0.02 10*3/MM3 (ref 0–0.2)
BASOPHILS NFR BLD AUTO: 0.3 % (ref 0–1.5)
BILIRUB SERPL-MCNC: 0.6 MG/DL (ref 0–1.2)
BUN SERPL-MCNC: 22 MG/DL (ref 8–23)
BUN/CREAT SERPL: 21 (ref 7–25)
CALCIUM SPEC-SCNC: 10 MG/DL (ref 8.6–10.5)
CHLORIDE SERPL-SCNC: 102 MMOL/L (ref 98–107)
CHOLEST SERPL-MCNC: 126 MG/DL (ref 0–200)
CO2 SERPL-SCNC: 28 MMOL/L (ref 22–29)
CREAT SERPL-MCNC: 1.05 MG/DL (ref 0.57–1)
DEPRECATED RDW RBC AUTO: 41 FL (ref 37–54)
EGFRCR SERPLBLD CKD-EPI 2021: 56.9 ML/MIN/1.73
EOSINOPHIL # BLD AUTO: 0.24 10*3/MM3 (ref 0–0.4)
EOSINOPHIL NFR BLD AUTO: 4.1 % (ref 0.3–6.2)
ERYTHROCYTE [DISTWIDTH] IN BLOOD BY AUTOMATED COUNT: 12.7 % (ref 12.3–15.4)
GLOBULIN UR ELPH-MCNC: 2.3 GM/DL
GLUCOSE SERPL-MCNC: 114 MG/DL (ref 65–99)
HBA1C MFR BLD: 7.2 % (ref 4.8–5.6)
HCT VFR BLD AUTO: 37.6 % (ref 34–46.6)
HDLC SERPL-MCNC: 56 MG/DL (ref 40–60)
HGB BLD-MCNC: 12.6 G/DL (ref 12–15.9)
IMM GRANULOCYTES # BLD AUTO: 0.01 10*3/MM3 (ref 0–0.05)
IMM GRANULOCYTES NFR BLD AUTO: 0.2 % (ref 0–0.5)
LDLC SERPL CALC-MCNC: 49 MG/DL (ref 0–100)
LDLC/HDLC SERPL: 0.83 {RATIO}
LYMPHOCYTES # BLD AUTO: 1.65 10*3/MM3 (ref 0.7–3.1)
LYMPHOCYTES NFR BLD AUTO: 28.3 % (ref 19.6–45.3)
MCH RBC QN AUTO: 30 PG (ref 26.6–33)
MCHC RBC AUTO-ENTMCNC: 33.5 G/DL (ref 31.5–35.7)
MCV RBC AUTO: 89.5 FL (ref 79–97)
MONOCYTES # BLD AUTO: 0.52 10*3/MM3 (ref 0.1–0.9)
MONOCYTES NFR BLD AUTO: 8.9 % (ref 5–12)
NEUTROPHILS NFR BLD AUTO: 3.4 10*3/MM3 (ref 1.7–7)
NEUTROPHILS NFR BLD AUTO: 58.2 % (ref 42.7–76)
NRBC BLD AUTO-RTO: 0 /100 WBC (ref 0–0.2)
PLATELET # BLD AUTO: 182 10*3/MM3 (ref 140–450)
PMV BLD AUTO: 10.1 FL (ref 6–12)
POTASSIUM SERPL-SCNC: 4.5 MMOL/L (ref 3.5–5.2)
PROT SERPL-MCNC: 7.2 G/DL (ref 6–8.5)
RBC # BLD AUTO: 4.2 10*6/MM3 (ref 3.77–5.28)
SODIUM SERPL-SCNC: 140 MMOL/L (ref 136–145)
TRIGL SERPL-MCNC: 119 MG/DL (ref 0–150)
TSH SERPL DL<=0.05 MIU/L-ACNC: 2.45 UIU/ML (ref 0.27–4.2)
VLDLC SERPL-MCNC: 21 MG/DL (ref 5–40)
WBC NRBC COR # BLD AUTO: 5.84 10*3/MM3 (ref 3.4–10.8)

## 2024-01-05 PROCEDURE — 84443 ASSAY THYROID STIM HORMONE: CPT | Performed by: INTERNAL MEDICINE

## 2024-01-05 PROCEDURE — 83036 HEMOGLOBIN GLYCOSYLATED A1C: CPT | Performed by: INTERNAL MEDICINE

## 2024-01-05 PROCEDURE — 80061 LIPID PANEL: CPT | Performed by: INTERNAL MEDICINE

## 2024-01-05 PROCEDURE — 85025 COMPLETE CBC W/AUTO DIFF WBC: CPT | Performed by: INTERNAL MEDICINE

## 2024-01-05 PROCEDURE — 80053 COMPREHEN METABOLIC PANEL: CPT | Performed by: INTERNAL MEDICINE

## 2024-01-05 RX ORDER — ALENDRONATE SODIUM 70 MG/1
70 TABLET ORAL
Qty: 12 TABLET | Refills: 0 | Status: SHIPPED | OUTPATIENT
Start: 2024-01-05

## 2024-01-05 RX ORDER — AMITRIPTYLINE HYDROCHLORIDE 25 MG/1
25 TABLET, FILM COATED ORAL NIGHTLY
Qty: 90 TABLET | Refills: 1 | Status: SHIPPED | OUTPATIENT
Start: 2024-01-05

## 2024-01-05 NOTE — PROGRESS NOTES
"Chief Complaint  Follow-up (3 month follow up diabetes )    Subjective       Amelie Anderson presents to [facility]    HPI   Presenting for follow up.     DM: well controlled on previous labs, A1C 7.3%, denies numbness/tingling, vision changes, urinary changes, dizziness, nausea    Objective     Vitals:    01/05/24 1008   BP: 120/68   BP Location: Left arm   Patient Position: Sitting   Cuff Size: Adult   Pulse: 62   Resp: 16   Temp: 97.2 °F (36.2 °C)   TempSrc: Temporal   SpO2: 97%   Weight: 70.4 kg (155 lb 2 oz)   Height: 162.6 cm (64.02\")      Wt Readings from Last 3 Encounters:   01/05/24 70.4 kg (155 lb 2 oz)   12/07/23 66.7 kg (147 lb)   12/01/23 70.7 kg (155 lb 12.8 oz)      BP Readings from Last 3 Encounters:   01/05/24 120/68   12/07/23 104/60   12/01/23 107/62        Body mass index is 26.61 kg/m².    Physical Exam  Vitals reviewed.   Constitutional:       Appearance: Normal appearance. She is well-developed.   HENT:      Head: Normocephalic and atraumatic.      Mouth/Throat:      Pharynx: No oropharyngeal exudate.   Eyes:      Conjunctiva/sclera: Conjunctivae normal.      Pupils: Pupils are equal, round, and reactive to light.   Neck:      Thyroid: No thyromegaly or thyroid tenderness.   Cardiovascular:      Rate and Rhythm: Normal rate and regular rhythm.      Heart sounds: No murmur heard.     No friction rub. No gallop.   Pulmonary:      Effort: Pulmonary effort is normal.      Breath sounds: Normal breath sounds. No wheezing or rhonchi.   Lymphadenopathy:      Cervical: No cervical adenopathy.   Skin:     General: Skin is warm and dry.   Neurological:      Mental Status: She is alert and oriented to person, place, and time.   Psychiatric:         Mood and Affect: Affect normal.          Result Review :   The following data was reviewed by: Fabi Bill MD on 01/05/2024:  CMP          4/5/2023    12:07 7/5/2023    12:23   CMP   Glucose 194  " 121    BUN 22  27    Creatinine 1.05  1.06    EGFR 56.9  56.3    Sodium 138  138    Potassium 4.3  5.1    Chloride 102  105    Calcium 10.0  10.3    Total Protein 6.9  7.0    Albumin 4.9  4.7    Globulin 2.0  2.3    Total Bilirubin 0.4  0.7    Alkaline Phosphatase 82  87    AST (SGOT) 16  21    ALT (SGPT) 13  18    Albumin/Globulin Ratio 2.5  2.0    BUN/Creatinine Ratio 21.0  25.5    Anion Gap 10.8  8.0      CBC          4/5/2023    12:07 7/5/2023    12:23   CBC   WBC 5.55  6.01    RBC 4.31  4.27    Hemoglobin 12.3  12.4    Hematocrit 39.2  37.8    MCV 91.0  88.5    MCH 28.5  29.0    MCHC 31.4  32.8    RDW 12.0  13.0    Platelets 182  192      Lipid Panel          4/5/2023    12:07 7/5/2023    12:23   Lipid Panel   Total Cholesterol 120  117    Triglycerides 148  91    HDL Cholesterol 57  52    VLDL Cholesterol 25  17    LDL Cholesterol  38  48    LDL/HDL Ratio 0.59  0.90      TSH          4/5/2023    12:07 7/5/2023    12:23   TSH   TSH 2.200  0.848      A1C Last 3 Results          4/5/2023    12:07 7/5/2023    12:23   HGBA1C Last 3 Results   Hemoglobin A1C 7.40  7.30            Procedures    Assessment and Plan   Diagnoses and all orders for this visit:    1. Type 2 diabetes mellitus without complication, without long-term current use of insulin (Primary)  Assessment & Plan:  Well controlled on previous labs.  Checking follow up labs today    Orders:  -     CBC & Differential  -     Comprehensive Metabolic Panel  -     Hemoglobin A1c  -     Lipid Panel  -     TSH    2. Depression with anxiety  -     sertraline (ZOLOFT) 50 MG tablet; Take 1 tablet by mouth Daily.  Dispense: 90 tablet; Refill: 1    3. Postmenopause  -     DEXA Bone Density Axial; Future    4. Age-related osteoporosis without current pathological fracture  -     alendronate (Fosamax) 70 MG tablet; Take 1 tablet by mouth Every 7 (Seven) Days.  Dispense: 12 tablet; Refill: 0  -     DEXA Bone Density Axial; Future    5. Essential hypertension  Assessment  & Plan:  Well controlled in clinic today  Continue current management      6. High cholesterol  Assessment & Plan:  On statin, tolerating well  Checking follow up labs today      Other orders  -     amitriptyline (ELAVIL) 25 MG tablet; Take 1 tablet by mouth Every Night.  Dispense: 90 tablet; Refill: 1          Follow Up   Return in about 3 months (around 4/5/2024) for Next scheduled follow up.  Patient was given instructions and counseling regarding her condition or for health maintenance advice. Please see specific information pulled into the AVS if appropriate.

## 2024-01-11 ENCOUNTER — TELEPHONE (OUTPATIENT)
Dept: INTERNAL MEDICINE | Facility: CLINIC | Age: 74
End: 2024-01-11
Payer: MEDICARE

## 2024-01-22 ENCOUNTER — HOSPITAL ENCOUNTER (OUTPATIENT)
Dept: CT IMAGING | Facility: HOSPITAL | Age: 74
Discharge: HOME | End: 2024-01-22
Admitting: NURSE PRACTITIONER
Payer: MEDICARE

## 2024-01-22 DIAGNOSIS — F17.211 NICOTINE DEPENDENCE, CIGARETTES, IN REMISSION: ICD-10-CM

## 2024-01-22 DIAGNOSIS — R91.8 LUNG NODULES: ICD-10-CM

## 2024-01-22 PROCEDURE — 71250 CT THORAX DX C-: CPT

## 2024-01-23 DIAGNOSIS — J18.9 PNEUMONIA OF BOTH LOWER LOBES DUE TO INFECTIOUS ORGANISM: Primary | ICD-10-CM

## 2024-01-23 DIAGNOSIS — F17.211 NICOTINE DEPENDENCE, CIGARETTES, IN REMISSION: Primary | ICD-10-CM

## 2024-01-23 RX ORDER — PREDNISONE 20 MG/1
20 TABLET ORAL DAILY
Qty: 5 TABLET | Refills: 0 | Status: SHIPPED | OUTPATIENT
Start: 2024-01-23

## 2024-01-23 RX ORDER — AMOXICILLIN AND CLAVULANATE POTASSIUM 875; 125 MG/1; MG/1
1 TABLET, FILM COATED ORAL 2 TIMES DAILY
Qty: 14 TABLET | Refills: 0 | Status: SHIPPED | OUTPATIENT
Start: 2024-01-23

## 2024-01-25 DIAGNOSIS — E03.9 HYPOTHYROIDISM, UNSPECIFIED TYPE: ICD-10-CM

## 2024-01-25 RX ORDER — ATORVASTATIN CALCIUM 40 MG/1
40 TABLET, FILM COATED ORAL DAILY
Qty: 90 TABLET | Refills: 1 | Status: SHIPPED | OUTPATIENT
Start: 2024-01-25

## 2024-01-25 RX ORDER — THYROID 60 MG
60 TABLET ORAL DAILY
Qty: 90 TABLET | Refills: 1 | Status: SHIPPED | OUTPATIENT
Start: 2024-01-25

## 2024-01-25 NOTE — TELEPHONE ENCOUNTER
Caller: Amelie Anderson    Relationship: Self    Best call back number: [phone]       Requested Prescriptions:   Requested Prescriptions     Pending Prescriptions Disp Refills    atorvastatin (LIPITOR) 40 MG tablet 90 tablet 1     Sig: Take 1 tablet by mouth Daily.    Collins Thyroid 60 MG tablet 90 tablet 1     Sig: Take 1 tablet by mouth Daily.        Pharmacy where request should be sent: [place] [phone] [place] [phone]      Last office visit with prescribing clinician: 1/5/2024   Last telemedicine visit with prescribing clinician: Visit date not found   Next office visit with prescribing clinician: 4/5/2024     Additional details provided by patient: FIVE DAYS LEFT ON HAND.     Does the patient have less than a 3 day supply:  [] Yes  [x] No    Would you like a call back once the refill request has been completed: [x] Yes [] No    If the office needs to give you a call back, can they leave a voicemail: [x] Yes [] No    Ann Marie Osorio Rep   01/25/24 13:14 EST

## 2024-02-02 DIAGNOSIS — J30.9 ALLERGIC RHINITIS, UNSPECIFIED SEASONALITY, UNSPECIFIED TRIGGER: ICD-10-CM

## 2024-02-02 DIAGNOSIS — E11.9 TYPE 2 DIABETES MELLITUS WITHOUT COMPLICATION, UNSPECIFIED WHETHER LONG TERM INSULIN USE: ICD-10-CM

## 2024-02-02 RX ORDER — ATENOLOL 25 MG/1
25 TABLET ORAL DAILY
Qty: 90 TABLET | Refills: 1 | Status: SHIPPED | OUTPATIENT
Start: 2024-02-02

## 2024-02-02 NOTE — TELEPHONE ENCOUNTER
Caller: Amelie Anderson    Relationship: Self    Best call back number: [phone]     Requested Prescriptions:   Requested Prescriptions     Pending Prescriptions Disp Refills    atenolol (TENORMIN) 25 MG tablet 90 tablet 1     Sig: Take 1 tablet by mouth Daily.        Pharmacy where request should be sent: [address] [phone] [place] [phone]      Last office visit with prescribing clinician: 1/5/2024   Last telemedicine visit with prescribing clinician: Visit date not found   Next office visit with prescribing clinician: 4/5/2024     Additional details provided by patient: PATIENT STATES THAT SHE HAS 3 DAYS OF MEDICATION LEFT AT THIS TIME.     Does the patient have less than a 3 day supply:  [] Yes  [] No    Would you like a call back once the refill request has been completed: [] Yes [] No    If the office needs to give you a call back, can they leave a voicemail: [] Yes [] No    Ann Marie Etienne Rep   02/02/24 11:20 EST

## 2024-02-12 ENCOUNTER — HOSPITAL ENCOUNTER (OUTPATIENT)
Dept: BONE DENSITY | Facility: HOSPITAL | Age: 74
Discharge: HOME | End: 2024-02-12
Admitting: INTERNAL MEDICINE
Payer: MEDICARE

## 2024-02-12 DIAGNOSIS — Z78.0 POSTMENOPAUSE: ICD-10-CM

## 2024-02-12 DIAGNOSIS — M81.0 AGE-RELATED OSTEOPOROSIS WITHOUT CURRENT PATHOLOGICAL FRACTURE: ICD-10-CM

## 2024-02-12 PROCEDURE — 77080 DXA BONE DENSITY AXIAL: CPT

## 2024-02-14 ENCOUNTER — TELEPHONE (OUTPATIENT)
Dept: INTERNAL MEDICINE | Facility: CLINIC | Age: 74
End: 2024-02-14
Payer: MEDICARE

## 2024-03-11 RX ORDER — FERROUS SULFATE 325(65) MG
1 TABLET ORAL DAILY
Qty: 90 TABLET | Refills: 1 | Status: SHIPPED | OUTPATIENT
Start: 2024-03-11

## 2024-03-11 NOTE — TELEPHONE ENCOUNTER
Caller: Amelie Anderson    Relationship: Self    Best call back number: [phone]     Requested Prescriptions:   Requested Prescriptions     Pending Prescriptions Disp Refills    FeroSul 325 (65 Fe) MG tablet 90 tablet 1     Sig: Take 1 tablet by mouth Daily.        Pharmacy where request should be sent: [address] [phone] [facility] [phone]      Last office visit with prescribing clinician: 1/5/2024   Last telemedicine visit with prescribing clinician: Visit date not found   Next office visit with prescribing clinician: 4/11/2024     Additional details provided by patient: OUT OF MEDICATION     Does the patient have less than a 3 day supply:  [x] Yes  [] No    Would you like a call back once the refill request has been completed: [] Yes [] No    If the office needs to give you a call back, can they leave a voicemail: [] Yes [] No    Ann Marie Ram Rep   03/11/24 10:59 EDT

## 2024-03-13 RX ORDER — OLOPATADINE HYDROCHLORIDE 1 MG/ML
2 SOLUTION/ DROPS OPHTHALMIC 2 TIMES DAILY
Qty: 15 ML | Refills: 1 | Status: SHIPPED | OUTPATIENT
Start: 2024-03-13

## 2024-03-13 NOTE — TELEPHONE ENCOUNTER
Caller: Amelie Anderson    Relationship: Self    Best call back number: [phone]     Requested Prescriptions:   Requested Prescriptions     Pending Prescriptions Disp Refills    olopatadine (PATANOL) 0.1 % ophthalmic solution 15 mL 1     Sig: Administer 2 drops to both eyes 2 (Two) Times a Day. Morning and night        Pharmacy where request should be sent: [place] [phone] [place] [phone]      Last office visit with prescribing clinician: 1/5/2024   Last telemedicine visit with prescribing clinician: Visit date not found   Next office visit with prescribing clinician: 4/11/2024     Does the patient have less than a 3 day supply:  [x] Yes  [] No    Would you like a call back once the refill request has been completed: [] Yes [] No    If the office needs to give you a call back, can they leave a voicemail: [] Yes [] No    Ann Marie Toro Rep   03/13/24 11:26 EDT

## 2024-03-21 RX ORDER — LOSARTAN POTASSIUM 25 MG/1
25 TABLET ORAL DAILY
Qty: 90 TABLET | Refills: 1 | Status: SHIPPED | OUTPATIENT
Start: 2024-03-21

## 2024-03-21 NOTE — TELEPHONE ENCOUNTER
Caller: Amelie Anderson    Relationship: Self    Best call back number: [phone]     Requested Prescriptions:   Requested Prescriptions     Pending Prescriptions Disp Refills    losartan (COZAAR) 25 MG tablet 90 tablet 1     Sig: Take 1 tablet by mouth Daily.        Pharmacy where request should be sent: [address] [phone] [facility] [phone]      Last office visit with prescribing clinician: 1/5/2024   Last telemedicine visit with prescribing clinician: Visit date not found   Next office visit with prescribing clinician: 4/11/2024     Additional details provided by patient: PATIENT IS OUT OF MEDICATION,.    Does the patient have less than a 3 day supply:  [x] Yes  [] No    Would you like a call back once the refill request has been completed: [x] Yes [] No    If the office needs to give you a call back, can they leave a voicemail: [x] Yes [] No    Ann Marie Toro Rep   03/21/24 15:12 EDT

## 2024-04-11 ENCOUNTER — OFFICE VISIT (OUTPATIENT)
Dept: INTERNAL MEDICINE | Facility: CLINIC | Age: 74
End: 2024-04-11
Payer: MEDICARE

## 2024-04-11 VITALS
OXYGEN SATURATION: 99 % | RESPIRATION RATE: 16 BRPM | HEIGHT: 64 IN | TEMPERATURE: 97.1 F | HEART RATE: 58 BPM | DIASTOLIC BLOOD PRESSURE: 66 MMHG | WEIGHT: 155.13 LBS | BODY MASS INDEX: 26.49 KG/M2 | SYSTOLIC BLOOD PRESSURE: 112 MMHG

## 2024-04-11 DIAGNOSIS — Z00.00 ENCOUNTER FOR SUBSEQUENT ANNUAL WELLNESS VISIT (AWV) IN MEDICARE PATIENT: Primary | ICD-10-CM

## 2024-04-11 DIAGNOSIS — I10 ESSENTIAL HYPERTENSION: ICD-10-CM

## 2024-04-11 DIAGNOSIS — Z12.31 VISIT FOR SCREENING MAMMOGRAM: ICD-10-CM

## 2024-04-11 DIAGNOSIS — E11.9 TYPE 2 DIABETES MELLITUS WITHOUT COMPLICATION, WITHOUT LONG-TERM CURRENT USE OF INSULIN: ICD-10-CM

## 2024-04-11 NOTE — PROGRESS NOTES
The ABCs of the Annual Wellness Visit  Subsequent Medicare Wellness Visit    Subjective      Amelie Anderson is a 74 y.o. female who presents for a Subsequent Medicare Wellness Visit.    The following portions of the patient's history were reviewed and   updated as appropriate: allergies, current medications, past family history, past medical history, past social history, past surgical history, and problem list.    Compared to one year ago, the patient feels her physical   health is the same.    Compared to one year ago, the patient feels her mental   health is the same.    Recent Hospitalizations:  She was not admitted to the hospital during the last year.       Current Medical Providers:  Patient Care Team:  Fabi Bill MD as PCP - General (Pediatrics)  Ant Pederson MD as Surgeon (Orthopedic Surgery)  Jason Perrin MD as Consulting Physician (Nephrology)  Dorothy Aldana MD (Urology)    Outpatient Medications Prior to Visit   Medication Sig Dispense Refill    alendronate (Fosamax) 70 MG tablet Take 1 tablet by mouth Every 7 (Seven) Days. 12 tablet 0    amitriptyline (ELAVIL) 25 MG tablet Take 1 tablet by mouth Every Night. 90 tablet 1    [place] Thyroid 60 MG tablet Take 1 tablet by mouth Daily. 90 tablet 1    aspirin 81 MG EC tablet Take 1 tablet by mouth Daily. 90 tablet 3    atenolol (TENORMIN) 25 MG tablet Take 1 tablet by mouth Daily. 90 tablet 1    atorvastatin (LIPITOR) 40 MG tablet Take 1 tablet by mouth Daily. 90 tablet 1    Calcium Carbonate-Vit D-Min (QC Calcium/Minerals/Vitamin D) 600-400 MG-UNIT tablet Take 1 tablet by mouth Daily. 90 tablet 1    chlorpheniramine (CHLOR-TRIMETON) 4 MG tablet Take 1 tablet by mouth Every 6 (Six) Hours As Needed for Allergies. 360 tablet 1    FeroSul 325 (65 Fe) MG tablet Take 1 tablet by mouth Daily. 90 tablet 1    fluticasone (FLONASE) 50 MCG/ACT nasal spray 2 sprays into the nostril(s) as directed by provider Daily. 2 puffs each nostril 18.2 mL  0    Jardiance 10 MG tablet tablet Take 1 tablet by mouth Daily.      loratadine (CLARITIN) 10 MG tablet Take 1 tablet by mouth Daily. 90 tablet 1    losartan (COZAAR) 25 MG tablet Take 1 tablet by mouth Daily. 90 tablet 1    metFORMIN ER (GLUCOPHAGE-XR) 500 MG 24 hr tablet Take 4 tablets by mouth Every Night. 360 tablet 1    multivitamin with minerals tablet tablet Take 1 tablet by mouth Daily. HOLD FOR SURGERY      Omega-3 Fatty Acids (fish oil) 1000 MG capsule capsule Take  by mouth Daily With Breakfast.      sertraline (ZOLOFT) 50 MG tablet Take 1 tablet by mouth Daily. 90 tablet 1    vitamin B-6 (PYRIDOXINE) 50 MG tablet Take 1 tablet by mouth Daily. 90 tablet 1    amoxicillin-clavulanate (AUGMENTIN) 875-125 MG per tablet Take 1 tablet by mouth 2 (Two) Times a Day. (Patient not taking: Reported on 4/11/2024) 14 tablet 0    benzonatate (TESSALON) 200 MG capsule Take 1 capsule by mouth 3 (Three) Times a Day As Needed for Cough. (Patient not taking: Reported on 1/5/2024) 30 capsule 0    brimonidine (ALPHAGAN) 0.15 % ophthalmic solution 1 drop 3 (Three) Times a Day.      FREESTYLE LITE test strip Use as directed 100 each 1    Insulin Pen Needle (Pen Needles) 31G X 6 MM misc 1 each 4 (Four) Times a Day As Needed (with insulin). (Patient not taking: Reported on 1/5/2024) 100 each 3    Lancets 30G misc 1 each 4 (Four) Times a Day As Needed (for glucose testing). 100 each 1    olopatadine (PATANOL) 0.1 % ophthalmic solution Administer 2 drops to both eyes 2 (Two) Times a Day. Morning and night 15 mL 1    predniSONE (DELTASONE) 20 MG tablet Take 1 tablet by mouth Daily. (Patient not taking: Reported on 4/11/2024) 5 tablet 0     Facility-Administered Medications Prior to Visit   Medication Dose Route Frequency Provider Last Rate Last Admin    Chlorhexidine Gluconate Cloth 2 % pads 1 application  1 application  Topical Q12H PRN Ne Acevedo, ERNIE           No opioid medication identified on active medication list. I  "have reviewed chart for other potential  high risk medication/s and harmful drug interactions in the elderly.        Aspirin is on active medication list. Aspirin use is indicated based on review of current medical condition/s. Pros and cons of this therapy have been discussed today. Benefits of this medication outweigh potential harm.  Patient has been encouraged to continue taking this medication.  .      Patient Active Problem List   Diagnosis    Essential hypertension    Aortic valve stenosis    S/P AVR (aortic valve replacement)    Hypothyroidism    Type 2 diabetes mellitus without complication    Pulmonary nodule    High cholesterol    Abdominal aortic aneurysm    Allergic rhinitis    Arthritis    Depression with anxiety    Heart disease    Heel spur, right    Kidney calculi    Kidney disease    Bladder disorder    Lesion of bladder    Microhematuria    Migraine    Mitral valve prolapse    Lung mass    Osteoporosis     Advance Care Planning   Advance Care Planning     Advance Directive is not on file.  ACP discussion was held with the patient during this visit. Patient does not have an advance directive, information provided.     Objective    Vitals:    04/11/24 1111   BP: 112/66   BP Location: Right arm   Patient Position: Sitting   Cuff Size: Adult   Pulse: 58   Resp: 16   Temp: 97.1 °F (36.2 °C)   TempSrc: Temporal   SpO2: 99%   Weight: 70.4 kg (155 lb 2 oz)   Height: 162.6 cm (64.02\")     Estimated body mass index is 26.61 kg/m² as calculated from the following:    Height as of this encounter: 162.6 cm (64.02\").    Weight as of this encounter: 70.4 kg (155 lb 2 oz).           Does the patient have evidence of cognitive impairment?   No            HEALTH RISK ASSESSMENT    Smoking Status:  Social History     Tobacco Use   Smoking Status Former    Current packs/day: 0.00    Average packs/day: 1 pack/day for 48.0 years (48.0 ttl pk-yrs)    Types: Cigarettes    Start date: 1968    Quit date: 2016    Years " since quittin.2    Passive exposure: Past   Smokeless Tobacco Never     Alcohol Consumption:  Social History     Substance and Sexual Activity   Alcohol Use Not Currently     Fall Risk Screen:    GAUTAMADI Fall Risk Assessment was completed, and patient is at MODERATE risk for falls. Assessment completed on:2024    Depression Screenin/5/2024    10:32 AM   PHQ-2/PHQ-9 Depression Screening   Little Interest or Pleasure in Doing Things 0-->not at all   Feeling Down, Depressed or Hopeless 0-->not at all   PHQ-9: Brief Depression Severity Measure Score 0       Health Habits and Functional and Cognitive Screenin/11/2024    11:06 AM   Functional & Cognitive Status   Do you have difficulty preparing food and eating? No   Do you have difficulty bathing yourself, getting dressed or grooming yourself? No   Do you have difficulty using the toilet? No   Do you have difficulty moving around from place to place? No   Do you have trouble with steps or getting out of a bed or a chair? No   Current Diet Well Balanced Diet   Dental Exam Up to date   Eye Exam Up to date   Exercise (times per week) 7 times per week   Current Exercises Include Walking   Do you need help using the phone?  No   Are you deaf or do you have serious difficulty hearing?  No   Do you need help to go to places out of walking distance? No   Do you need help shopping? No   Do you need help preparing meals?  No   Do you need help with housework?  No   Do you need help with laundry? No   Do you need help taking your medications? No   Do you need help managing money? No   Do you ever drive or ride in a car without wearing a seat belt? No   Have you felt unusual stress, anger or loneliness in the last month? No   Who do you live with? Spouse   If you need help, do you have trouble finding someone available to you? No   Have you been bothered in the last four weeks by sexual problems? No   Do you have difficulty concentrating, remembering or  making decisions? No       Age-appropriate Screening Schedule:  Refer to the list below for future screening recommendations based on patient's age, sex and/or medical conditions. Orders for these recommended tests are listed in the plan section. The patient has been provided with a written plan.    Health Maintenance   Topic Date Due    TDAP/TD VACCINES (1 - Tdap) Never done    RSV Vaccine - Adults (1 - 1-dose 60+ series) Never done    ZOSTER VACCINE (2 of 3) 12/16/2015    DIABETIC FOOT EXAM  12/03/2020    URINE MICROALBUMIN  01/05/2024    DIABETIC EYE EXAM  05/05/2024    HEMOGLOBIN A1C  07/05/2024    COVID-19 Vaccine (6 - 2023-24 season) 05/04/2024 (Originally 9/1/2023)    INFLUENZA VACCINE  08/01/2024    MAMMOGRAM  12/27/2024    LIPID PANEL  01/05/2025    LUNG CANCER SCREENING  01/22/2025    ANNUAL WELLNESS VISIT  04/11/2025    BMI FOLLOWUP  04/11/2025    DXA SCAN  02/12/2026    COLORECTAL CANCER SCREENING  06/01/2026    HEPATITIS C SCREENING  Completed    Pneumococcal Vaccine 65+  Completed                  CMS Preventative Services Quick Reference  Risk Factors Identified During Encounter:    None Identified    The above risks/problems have been discussed with the patient.  Pertinent information has been shared with the patient in the After Visit Summary.    Diagnoses and all orders for this visit:    1. Encounter for subsequent annual wellness visit (AWV) in Medicare patient (Primary)    2. Type 2 diabetes mellitus without complication, without long-term current use of insulin  -     CBC & Differential; Future  -     Comprehensive Metabolic Panel; Future  -     Hemoglobin A1c; Future  -     Lipid Panel; Future  -     TSH; Future    3. Essential hypertension    4. Visit for screening mammogram  -     Mammo Screening Digital Tomosynthesis Bilateral With CAD; Future        Follow Up:   Next Medicare Wellness visit to be scheduled in 1 year.    Return in about 3 months (around 7/11/2024) for Next scheduled follow  up.  An After Visit Summary and PPPS were made available to the patient.

## 2024-04-26 ENCOUNTER — OFFICE VISIT (OUTPATIENT)
Dept: CARDIOLOGY | Facility: CLINIC | Age: 74
End: 2024-04-26
Payer: MEDICARE

## 2024-04-26 VITALS
HEIGHT: 64 IN | BODY MASS INDEX: 26.7 KG/M2 | DIASTOLIC BLOOD PRESSURE: 69 MMHG | HEART RATE: 60 BPM | WEIGHT: 156.4 LBS | SYSTOLIC BLOOD PRESSURE: 115 MMHG

## 2024-04-26 DIAGNOSIS — I35.0 AORTIC VALVE STENOSIS, ETIOLOGY OF CARDIAC VALVE DISEASE UNSPECIFIED: ICD-10-CM

## 2024-04-26 DIAGNOSIS — I10 ESSENTIAL HYPERTENSION: ICD-10-CM

## 2024-04-26 DIAGNOSIS — Z95.2 S/P AVR (AORTIC VALVE REPLACEMENT): Primary | Chronic | ICD-10-CM

## 2024-04-26 NOTE — PROGRESS NOTES
Chief Complaint  Hypertension, Hyperlipidemia, and Follow-up (6 mo f/u. )    Subjective        History of Present Illness  Amelie Anderson presents to [facility] for follow up.  Patient is a 74-year-old female with past medical history outlined below, significant for aortic valve stenosis status post aortic valve replacement in 2021, hypertension who presents for routine follow-up.  She is doing well from a cardiac standpoint.  She has no complaints or concerns today.  She denies any chest pain or discomfort.  She has no dyspnea, orthopnea, edema, palpitations or syncope.      Past Medical History:   Diagnosis Date    Abdominal aortic aneurysm (AAA)     Anemia     Aortic stenosis     AR (allergic rhinitis)     Arthritis     Bladder disorder     Cataract     BILAT    Depression with anxiety     Diabetes     Elevated cholesterol     Environmental allergies     GERD (gastroesophageal reflux disease)     Heel spur, right     High cholesterol     History of anesthesia complications     STATES WAS SLOW TO WAKE UP AFTER SURGERY    History of TB (tuberculosis)     YEARS AGO WHEN FIRST CAME TO [facility], WAS TREATED FOR WITH INH    Hypertension     Hyperthyroidism     Iron deficiency     Kidney calculi     Kidney cysts     Kidney disease     Lesion of bladder [id number]    Microhematuria 09/25/2015    Migraine     Mitral valve prolapse     Osteoporosis     PONV (postoperative nausea and vomiting)     Pulmonary nodule     S/P AVR (aortic valve replacement) 03/04/2021     Normally functioning prosthetic aortic valve. on echo 5/20/2021 and 2/8/2022    Seasonal allergies     Stroke     had mini stroke behind right eye    Thyroid disorder     Urinary frequency        ALLERGY  No Known Allergies     Past Surgical History:   Procedure Laterality Date    ABDOMINAL HYSTERECTOMY      AORTIC VALVE REPAIR/REPLACEMENT N/A 2/2/2021    Procedure: VARUN STERNOTOMY AORTIC VALVE REPLACEMENT AND PRP;  Surgeon: Jesse Coronado  MD Jose M;  Location: [facility] MAIN OR;  Service: Cardiothoracic;  Laterality: N/A;    BREAST BIOPSY      CARDIAC CATHETERIZATION  2020    no stents    CARDIAC CATHETERIZATION      no stents    CARDIAC SURGERY      OPEN HEART SURGERY     COLONOSCOPY  2021    CYSTOSCOPY  2020  WITH DR OSMAN  10/22/2015 WITH FULGURATION; CBF PER DAWSON    HYSTERECTOMY      TEETH EXTRACTION      ALL TEETH ON TOP, ALL BOTTOM TEETH EXCEPT 4    TONSILLECTOMY          Social History     Socioeconomic History    Marital status:    Tobacco Use    Smoking status: Former     Current packs/day: 0.00     Average packs/day: 1 pack/day for 48.0 years (48.0 ttl pk-yrs)     Types: Cigarettes     Start date:      Quit date:      Years since quittin.3     Passive exposure: Past    Smokeless tobacco: Never   Vaping Use    Vaping status: Never Used   Substance and Sexual Activity    Alcohol use: Not Currently    Drug use: Never    Sexual activity: Defer       Family History   Problem Relation Age of Onset    Aortic stenosis Mother     Valvular heart disease Mother     Heart attack Father 30    Coronary artery disease Father     Aortic stenosis Sister     Valvular heart disease Sister     Coronary artery disease Paternal Aunt     Coronary artery disease Paternal Uncle     Heart disease Other     Diabetes Other     Heart attack Other     Malig Hyperthermia Neg Hx         Current Outpatient Medications on File Prior to Visit   Medication Sig    alendronate (Fosamax) 70 MG tablet Take 1 tablet by mouth Every 7 (Seven) Days.    amitriptyline (ELAVIL) 25 MG tablet Take 1 tablet by mouth Every Night.    [place] Thyroid 60 MG tablet Take 1 tablet by mouth Daily.    aspirin 81 MG EC tablet Take 1 tablet by mouth Daily.    atenolol (TENORMIN) 25 MG tablet Take 1 tablet by mouth Daily.    atorvastatin (LIPITOR) 40 MG tablet Take 1 tablet by mouth Daily.    brimonidine (ALPHAGAN) 0.15 % ophthalmic solution 1  drop 3 (Three) Times a Day.    Calcium Carbonate-Vit D-Min (QC Calcium/Minerals/Vitamin D) 600-400 MG-UNIT tablet Take 1 tablet by mouth Daily.    chlorpheniramine (CHLOR-TRIMETON) 4 MG tablet Take 1 tablet by mouth Every 6 (Six) Hours As Needed for Allergies.    FeroSul 325 (65 Fe) MG tablet Take 1 tablet by mouth Daily.    fluticasone (FLONASE) 50 MCG/ACT nasal spray 2 sprays into the nostril(s) as directed by provider Daily. 2 puffs each nostril    FREESTYLE LITE test strip Use as directed    Jardiance 10 MG tablet tablet Take 1 tablet by mouth Daily.    Lancets 30G misc 1 each 4 (Four) Times a Day As Needed (for glucose testing).    loratadine (CLARITIN) 10 MG tablet Take 1 tablet by mouth Daily.    losartan (COZAAR) 25 MG tablet Take 1 tablet by mouth Daily.    metFORMIN ER (GLUCOPHAGE-XR) 500 MG 24 hr tablet Take 4 tablets by mouth Every Night.    multivitamin with minerals tablet tablet Take 1 tablet by mouth Daily. HOLD FOR SURGERY    olopatadine (PATANOL) 0.1 % ophthalmic solution Administer 2 drops to both eyes 2 (Two) Times a Day. Morning and night    Omega-3 Fatty Acids (fish oil) 1000 MG capsule capsule Take  by mouth Daily With Breakfast.    sertraline (ZOLOFT) 50 MG tablet Take 1 tablet by mouth Daily.    vitamin B-6 (PYRIDOXINE) 50 MG tablet Take 1 tablet by mouth Daily.    amoxicillin-clavulanate (AUGMENTIN) 875-125 MG per tablet Take 1 tablet by mouth 2 (Two) Times a Day. (Patient not taking: Reported on 4/11/2024)    benzonatate (TESSALON) 200 MG capsule Take 1 capsule by mouth 3 (Three) Times a Day As Needed for Cough. (Patient not taking: Reported on 1/5/2024)    Insulin Pen Needle (Pen Needles) 31G X 6 MM misc 1 each 4 (Four) Times a Day As Needed (with insulin). (Patient not taking: Reported on 1/5/2024)    predniSONE (DELTASONE) 20 MG tablet Take 1 tablet by mouth Daily. (Patient not taking: Reported on 4/11/2024)     Current Facility-Administered Medications on File Prior to Visit  "  Medication    Chlorhexidine Gluconate Cloth 2 % pads 1 application       Objective   Vitals:    04/26/24 1344   BP: 115/69   Pulse: 60   Weight: 70.9 kg (156 lb 6.4 oz)   Height: 162.6 cm (64.02\")       Physical Exam  Constitutional:       General: She is awake. She is not in acute distress.     Appearance: Normal appearance.   HENT:      Head: Normocephalic.      Nose: Nose normal. No congestion.   Eyes:      Extraocular Movements: Extraocular movements intact.      Conjunctiva/sclera: Conjunctivae normal.      Pupils: Pupils are equal, round, and reactive to light.   Neck:      Thyroid: No thyromegaly.      Vascular: No JVD.   Cardiovascular:      Rate and Rhythm: Normal rate and regular rhythm.      Chest Wall: PMI is not displaced.      Pulses: Normal pulses.      Heart sounds: Normal heart sounds, S1 normal and S2 normal. No murmur heard.     No friction rub. No gallop. No S3 or S4 sounds.   Pulmonary:      Effort: Pulmonary effort is normal.      Breath sounds: Normal breath sounds. No wheezing, rhonchi or rales.   Abdominal:      General: Bowel sounds are normal.      Palpations: Abdomen is soft.      Tenderness: There is no abdominal tenderness.   Musculoskeletal:      Cervical back: No tenderness.      Right lower leg: No edema.      Left lower leg: No edema.   Lymphadenopathy:      Cervical: No cervical adenopathy.   Skin:     General: Skin is warm and dry.      Capillary Refill: Capillary refill takes less than 2 seconds.      Coloration: Skin is not cyanotic.      Findings: No petechiae or rash.      Nails: There is no clubbing.   Neurological:      Mental Status: She is alert.   Psychiatric:         Mood and Affect: Mood normal.         Behavior: Behavior is cooperative.           Result Review     The following data was reviewed by ERNIE Macario on 04/26/24.      CMP          7/5/2023    12:23 1/5/2024    10:44   CMP   Glucose 121  114    BUN 27  22    Creatinine 1.06  1.05    EGFR 56.3  " 56.9    Sodium 138  140    Potassium 5.1  4.5    Chloride 105  102    Calcium 10.3  10.0    Total Protein 7.0  7.2    Albumin 4.7  4.9    Globulin 2.3  2.3    Total Bilirubin 0.7  0.6    Alkaline Phosphatase 87  77    AST (SGOT) 21  17    ALT (SGPT) 18  13    Albumin/Globulin Ratio 2.0  2.1    BUN/Creatinine Ratio 25.5  21.0    Anion Gap 8.0  10.0      CBC w/diff          7/5/2023    12:23 1/5/2024    10:44   CBC w/Diff   WBC 6.01  5.84    RBC 4.27  4.20    Hemoglobin 12.4  12.6    Hematocrit 37.8  37.6    MCV 88.5  89.5    MCH 29.0  30.0    MCHC 32.8  33.5    RDW 13.0  12.7    Platelets 192  182    Neutrophil Rel % 65.7  58.2    Immature Granulocyte Rel % 0.2  0.2    Lymphocyte Rel % 23.3  28.3    Monocyte Rel % 8.8  8.9    Eosinophil Rel % 1.3  4.1    Basophil Rel % 0.7  0.3       Lipid Panel          7/5/2023    12:23 1/5/2024    10:44   Lipid Panel   Total Cholesterol 117  126    Triglycerides 91  119    HDL Cholesterol 52  56    VLDL Cholesterol 17  21    LDL Cholesterol  48  49    LDL/HDL Ratio 0.90  0.83        Results for orders placed in visit on 08/08/23    Adult Transthoracic Echo Complete W/ Cont if Necessary Per Protocol    Interpretation Summary    Left ventricular ejection fraction appears to be 51 - 55%.    Left ventricular diastolic function was normal.    Mild to moderate aortic valve stenosis is present.    There is a prosthetic aortic valve present.  Valve morphology was not well-visualized.  There appears to be mild to moderate stenosis by Doppler.  Effective orifice area around 1.3 cm² with peak/mean pressure gradient of 15/9 mmHg.    Results for orders placed during the hospital encounter of 09/08/23    Stress Test With Myocardial Perfusion One Day    Interpretation Summary  Patient received Lexiscan 0.4 mg IV infusion over 10 seconds.  Baseline ECG showed normal sinus rhythm.  At peak stress, no ischemic ST-T changes were noted.  No significant arrhythmias were noted.  Myocardial perfusion  demonstrates a small area of mild perfusion defect in the distal anterior/apical segment without reversibility on resting images, more likely related to attenuation artifact.  No significant reversible myocardial ischemia was noted on this study.  The left ventricle was normal in size with a calculated ejection fraction of 64%.  No wall motion abnormalities were noted.  Overall, this represents a low risk myocardial perfusion study.    No results found for this or any previous visit.          Procedures    Assessment & Plan  Diagnoses and all orders for this visit:    1. S/P AVR (aortic valve replacement) (Primary)    2. Essential hypertension    3. Aortic valve stenosis, etiology of cardiac valve disease unspecified        1.  Status post aortic valve replacement (tissue) in 2021.  Recent echocardiogram demonstrated a prosthetic aortic valve with mild to moderate stenosis.  Patient is asymptomatic.  Will continue to monitor clinically.  May consider repeat echo at next follow-up visit.  2.  Blood pressure is stable on current regimen.  Continue the same.  3.  Mild to moderate by most recent echocardiogram.  Continue to monitor clinically.    The medical services provided during this encounter are part of ongoing care related to this patient's single serious condition or complex condition.        Follow Up   Return in about 6 months (around 10/26/2024) for With ERNIE Ba.    Patient was given instructions and counseling regarding her condition or for health maintenance advice. Please see specific information pulled into the AVS if appropriate.     ERNIE Macario  04/26/24  13:53 EDT    Dictated Utilizing Dragon Dictation

## 2024-05-30 ENCOUNTER — OFFICE VISIT (OUTPATIENT)
Dept: PULMONOLOGY | Facility: CLINIC | Age: 74
End: 2024-05-30
Payer: MEDICARE

## 2024-05-30 VITALS
HEART RATE: 61 BPM | OXYGEN SATURATION: 95 % | TEMPERATURE: 98 F | BODY MASS INDEX: 25.78 KG/M2 | DIASTOLIC BLOOD PRESSURE: 65 MMHG | HEIGHT: 64 IN | SYSTOLIC BLOOD PRESSURE: 121 MMHG | WEIGHT: 151 LBS | RESPIRATION RATE: 19 BRPM

## 2024-05-30 DIAGNOSIS — R91.1 PULMONARY NODULE: Primary | ICD-10-CM

## 2024-05-30 NOTE — PROGRESS NOTES
"Chief Complaint  Pulmonary Nodule  and Follow-up (6 Month )    Subjective        Amelie Anderson presents to [facility]  History of Present Illness  Follow-up for pulmonary nodule  Doing well at this time  No increased cough  No increased sputum production    Objective   Vital Signs:  /65 (BP Location: Left arm, Patient Position: Sitting, Cuff Size: Large Adult)   Pulse 61   Temp 98 °F (36.7 °C) (Temporal)   Resp 19   Ht 162.6 cm (64.02\")   Wt 68.5 kg (151 lb)   SpO2 95% Comment: room air  BMI 25.90 kg/m²   Estimated body mass index is 25.9 kg/m² as calculated from the following:    Height as of this encounter: 162.6 cm (64.02\").    Weight as of this encounter: 68.5 kg (151 lb).               Physical Exam   Vital Signs Reviewed  General WDWN, Alert, NAD.    Chest:  good aeration, clear to auscultation bilaterally, tympanic to percussion bilaterally, no work of breathing noted  CV: RRR, no MGR, .  EXT:  no clubbing, no cyanosis, no edema, no joint tenderness  Neuro:  A&Ox3, CN grossly intact, no focal deficits.  Skin: No rashes or lesions noted  Result Review :        Data reviewed : Radiologic studies prior CT scan of the chest showing 2  4 mm pulmonary nodules             Assessment and Plan     Diagnoses and all orders for this visit:    1. Pulmonary nodule (Primary)  -     CT Chest Low Dose Follow Up Without Contrast; Future    Plan  Patient past due for CT scan of the chest    Will obtain low-dose lung cancer screening CT scan of the chest    Remains smoke-free    Up-to-date on pulmonary specific vaccines         Follow Up     Return in about 1 year (around 5/30/2025).  Patient was given instructions and counseling regarding her condition or for health maintenance advice. Please see specific information pulled into the AVS if appropriate.         "

## 2024-06-10 RX ORDER — LORATADINE 10 MG/1
10 TABLET ORAL DAILY
Qty: 90 TABLET | Refills: 1 | Status: SHIPPED | OUTPATIENT
Start: 2024-06-10

## 2024-06-10 RX ORDER — METFORMIN HYDROCHLORIDE 500 MG/1
2000 TABLET, EXTENDED RELEASE ORAL NIGHTLY
Qty: 360 TABLET | Refills: 1 | Status: SHIPPED | OUTPATIENT
Start: 2024-06-10

## 2024-06-10 NOTE — TELEPHONE ENCOUNTER
Caller: Amelie Anderson    Relationship: Self    Best call back number:     [phone]       Requested Prescriptions:   Requested Prescriptions     Pending Prescriptions Disp Refills    loratadine (CLARITIN) 10 MG tablet 90 tablet 1     Sig: Take 1 tablet by mouth Daily.    metFORMIN ER (GLUCOPHAGE-XR) 500 MG 24 hr tablet 360 tablet 1     Sig: Take 4 tablets by mouth Every Night.        Pharmacy where request should be sent: [facility] [phone] [place], [place] [phone] FX     Last office visit with prescribing clinician: 4/11/2024   Last telemedicine visit with prescribing clinician: Visit date not found   Next office visit with prescribing clinician: 7/11/2024     Additional details provided by patient:     Does the patient have less than a 3 day supply:  [x] Yes  [] No    Would you like a call back once the refill request has been completed: [] Yes [x] No    If the office needs to give you a call back, can they leave a voicemail: [] Yes [x] No    Ann Marie Torres   06/10/24 12:13 EDT

## 2024-06-11 RX ORDER — CALCIUM CARB/VIT D3/MINERALS 600 MG-400
1 TABLET ORAL DAILY
Qty: 90 TABLET | Refills: 1 | Status: SHIPPED | OUTPATIENT
Start: 2024-06-11

## 2024-06-11 NOTE — TELEPHONE ENCOUNTER
Caller: Amelie Anderson    Relationship: Self    Best call back number: [phone]    Requested Prescriptions:   Requested Prescriptions     Pending Prescriptions Disp Refills    Calcium Carbonate-Vit D-Min (QC Calcium/Minerals/Vitamin D) 600-400 MG-UNIT tablet 90 tablet 1     Sig: Take 1 tablet by mouth Daily.        Pharmacy where request should be sent: [facility] [phone] [place] [phone]      Last office visit with prescribing clinician: 4/11/2024   Last telemedicine visit with prescribing clinician: Visit date not found   Next office visit with prescribing clinician: 7/11/2024     Additional details provided by patient:     Does the patient have less than a 3 day supply:  [x] Yes  [] No      Ann Marie Vincent Rep   06/11/24 09:45 EDT

## 2024-06-21 ENCOUNTER — HOSPITAL ENCOUNTER (OUTPATIENT)
Dept: MAMMOGRAPHY | Facility: HOSPITAL | Age: 74
Discharge: HOME | End: 2024-06-21
Admitting: INTERNAL MEDICINE
Payer: MEDICARE

## 2024-06-21 DIAGNOSIS — Z12.31 VISIT FOR SCREENING MAMMOGRAM: ICD-10-CM

## 2024-06-21 PROCEDURE — 77063 BREAST TOMOSYNTHESIS BI: CPT

## 2024-06-21 PROCEDURE — 77067 SCR MAMMO BI INCL CAD: CPT

## 2024-06-24 ENCOUNTER — TELEPHONE (OUTPATIENT)
Dept: INTERNAL MEDICINE | Facility: CLINIC | Age: 74
End: 2024-06-24
Payer: MEDICARE

## 2024-06-24 NOTE — TELEPHONE ENCOUNTER
Name: Amelie Anderson    Relationship: Self    Best Callback Number: [phone]    HUB PROVIDED THE RELAY MESSAGE FROM THE OFFICE   PATIENT VOICED UNDERSTANDING AND HAS NO FURTHER QUESTIONS AT THIS TIME

## 2024-06-24 NOTE — TELEPHONE ENCOUNTER
I called and left the patient a voicemail to call the office back.    RELAY:  Mammogram normal. Repeat screening mammogram in 1 year. If the patient has any questions or concerns she can call the office. Thank you.       ----- Message from Trish Shields sent at 6/24/2024 12:52 PM EDT -----  Mammogram normal. Repeat screening mammogram in 1 year.

## 2024-06-25 ENCOUNTER — TELEPHONE (OUTPATIENT)
Dept: INTERNAL MEDICINE | Facility: CLINIC | Age: 74
End: 2024-06-25
Payer: MEDICARE

## 2024-06-25 NOTE — TELEPHONE ENCOUNTER
Attempted to call pt with test results. Left voicemail to call the office back.           ----- Message from Trish Shields sent at 6/24/2024 12:52 PM EDT -----  Mammogram normal. Repeat screening mammogram in 1 year.

## 2024-07-11 ENCOUNTER — OFFICE VISIT (OUTPATIENT)
Dept: INTERNAL MEDICINE | Facility: CLINIC | Age: 74
End: 2024-07-11
Payer: MEDICARE

## 2024-07-11 VITALS
DIASTOLIC BLOOD PRESSURE: 64 MMHG | HEIGHT: 64 IN | HEART RATE: 59 BPM | WEIGHT: 149.38 LBS | RESPIRATION RATE: 18 BRPM | SYSTOLIC BLOOD PRESSURE: 118 MMHG | BODY MASS INDEX: 25.5 KG/M2 | OXYGEN SATURATION: 98 % | TEMPERATURE: 97.3 F

## 2024-07-11 DIAGNOSIS — E78.00 HIGH CHOLESTEROL: ICD-10-CM

## 2024-07-11 DIAGNOSIS — M79.675 PAIN OF LEFT GREAT TOE: Primary | ICD-10-CM

## 2024-07-11 DIAGNOSIS — I10 ESSENTIAL HYPERTENSION: ICD-10-CM

## 2024-07-11 DIAGNOSIS — E11.9 TYPE 2 DIABETES MELLITUS WITHOUT COMPLICATION, WITHOUT LONG-TERM CURRENT USE OF INSULIN: ICD-10-CM

## 2024-07-11 LAB
ALBUMIN SERPL-MCNC: 5 G/DL (ref 3.5–5.2)
ALBUMIN/GLOB SERPL: 1.7 G/DL
ALP SERPL-CCNC: 91 U/L (ref 39–117)
ALT SERPL W P-5'-P-CCNC: 14 U/L (ref 1–33)
ANION GAP SERPL CALCULATED.3IONS-SCNC: 12.7 MMOL/L (ref 5–15)
AST SERPL-CCNC: 21 U/L (ref 1–32)
BASOPHILS # BLD AUTO: 0.03 10*3/MM3 (ref 0–0.2)
BASOPHILS NFR BLD AUTO: 0.6 % (ref 0–1.5)
BILIRUB SERPL-MCNC: 0.6 MG/DL (ref 0–1.2)
BUN SERPL-MCNC: 22 MG/DL (ref 8–23)
BUN/CREAT SERPL: 19.3 (ref 7–25)
CALCIUM SPEC-SCNC: 10 MG/DL (ref 8.6–10.5)
CHLORIDE SERPL-SCNC: 101 MMOL/L (ref 98–107)
CHOLEST SERPL-MCNC: 122 MG/DL (ref 0–200)
CO2 SERPL-SCNC: 26.3 MMOL/L (ref 22–29)
CREAT SERPL-MCNC: 1.14 MG/DL (ref 0.57–1)
DEPRECATED RDW RBC AUTO: 40.5 FL (ref 37–54)
EGFRCR SERPLBLD CKD-EPI 2021: 51.3 ML/MIN/1.73
EOSINOPHIL # BLD AUTO: 0.1 10*3/MM3 (ref 0–0.4)
EOSINOPHIL NFR BLD AUTO: 1.9 % (ref 0.3–6.2)
ERYTHROCYTE [DISTWIDTH] IN BLOOD BY AUTOMATED COUNT: 12.4 % (ref 12.3–15.4)
GLOBULIN UR ELPH-MCNC: 2.9 GM/DL
GLUCOSE SERPL-MCNC: 105 MG/DL (ref 65–99)
HBA1C MFR BLD: 7.3 % (ref 4.8–5.6)
HCT VFR BLD AUTO: 42.5 % (ref 34–46.6)
HDLC SERPL-MCNC: 62 MG/DL (ref 40–60)
HGB BLD-MCNC: 14.4 G/DL (ref 12–15.9)
IMM GRANULOCYTES # BLD AUTO: 0.01 10*3/MM3 (ref 0–0.05)
IMM GRANULOCYTES NFR BLD AUTO: 0.2 % (ref 0–0.5)
LDLC SERPL CALC-MCNC: 44 MG/DL (ref 0–100)
LDLC/HDLC SERPL: 0.71 {RATIO}
LYMPHOCYTES # BLD AUTO: 1.22 10*3/MM3 (ref 0.7–3.1)
LYMPHOCYTES NFR BLD AUTO: 22.9 % (ref 19.6–45.3)
MCH RBC QN AUTO: 30.3 PG (ref 26.6–33)
MCHC RBC AUTO-ENTMCNC: 33.9 G/DL (ref 31.5–35.7)
MCV RBC AUTO: 89.3 FL (ref 79–97)
MONOCYTES # BLD AUTO: 0.38 10*3/MM3 (ref 0.1–0.9)
MONOCYTES NFR BLD AUTO: 7.1 % (ref 5–12)
NEUTROPHILS NFR BLD AUTO: 3.58 10*3/MM3 (ref 1.7–7)
NEUTROPHILS NFR BLD AUTO: 67.3 % (ref 42.7–76)
NRBC BLD AUTO-RTO: 0 /100 WBC (ref 0–0.2)
PLATELET # BLD AUTO: 163 10*3/MM3 (ref 140–450)
PMV BLD AUTO: 10.3 FL (ref 6–12)
POTASSIUM SERPL-SCNC: 4.9 MMOL/L (ref 3.5–5.2)
PROT SERPL-MCNC: 7.9 G/DL (ref 6–8.5)
RBC # BLD AUTO: 4.76 10*6/MM3 (ref 3.77–5.28)
SODIUM SERPL-SCNC: 140 MMOL/L (ref 136–145)
TRIGL SERPL-MCNC: 81 MG/DL (ref 0–150)
TSH SERPL DL<=0.05 MIU/L-ACNC: 3.95 UIU/ML (ref 0.27–4.2)
URATE SERPL-MCNC: 5.2 MG/DL (ref 2.4–5.7)
VLDLC SERPL-MCNC: 16 MG/DL (ref 5–40)
WBC NRBC COR # BLD AUTO: 5.32 10*3/MM3 (ref 3.4–10.8)

## 2024-07-11 PROCEDURE — G2211 COMPLEX E/M VISIT ADD ON: HCPCS | Performed by: INTERNAL MEDICINE

## 2024-07-11 PROCEDURE — 85025 COMPLETE CBC W/AUTO DIFF WBC: CPT | Performed by: INTERNAL MEDICINE

## 2024-07-11 PROCEDURE — 1160F RVW MEDS BY RX/DR IN RCRD: CPT | Performed by: INTERNAL MEDICINE

## 2024-07-11 PROCEDURE — 80061 LIPID PANEL: CPT | Performed by: INTERNAL MEDICINE

## 2024-07-11 PROCEDURE — 80053 COMPREHEN METABOLIC PANEL: CPT | Performed by: INTERNAL MEDICINE

## 2024-07-11 PROCEDURE — 99214 OFFICE O/P EST MOD 30 MIN: CPT | Performed by: INTERNAL MEDICINE

## 2024-07-11 PROCEDURE — 36415 COLL VENOUS BLD VENIPUNCTURE: CPT | Performed by: INTERNAL MEDICINE

## 2024-07-11 PROCEDURE — 84443 ASSAY THYROID STIM HORMONE: CPT | Performed by: INTERNAL MEDICINE

## 2024-07-11 PROCEDURE — 3051F HG A1C>EQUAL 7.0%<8.0%: CPT | Performed by: INTERNAL MEDICINE

## 2024-07-11 PROCEDURE — 84550 ASSAY OF BLOOD/URIC ACID: CPT | Performed by: INTERNAL MEDICINE

## 2024-07-11 PROCEDURE — 1126F AMNT PAIN NOTED NONE PRSNT: CPT | Performed by: INTERNAL MEDICINE

## 2024-07-11 PROCEDURE — 3074F SYST BP LT 130 MM HG: CPT | Performed by: INTERNAL MEDICINE

## 2024-07-11 PROCEDURE — 1159F MED LIST DOCD IN RCRD: CPT | Performed by: INTERNAL MEDICINE

## 2024-07-11 PROCEDURE — 3078F DIAST BP <80 MM HG: CPT | Performed by: INTERNAL MEDICINE

## 2024-07-11 PROCEDURE — 83036 HEMOGLOBIN GLYCOSYLATED A1C: CPT | Performed by: INTERNAL MEDICINE

## 2024-07-11 RX ORDER — AMITRIPTYLINE HYDROCHLORIDE 25 MG/1
25 TABLET, FILM COATED ORAL NIGHTLY
Qty: 90 TABLET | Refills: 1 | Status: SHIPPED | OUTPATIENT
Start: 2024-07-11

## 2024-07-11 RX ORDER — LANOLIN ALCOHOL/MO/W.PET/CERES
50 CREAM (GRAM) TOPICAL DAILY
Qty: 90 TABLET | Refills: 1 | Status: SHIPPED | OUTPATIENT
Start: 2024-07-11

## 2024-07-11 RX ORDER — CALCIUM CARBONATE/VITAMIN D3 600 MG-10
1 TABLET ORAL DAILY
COMMUNITY
Start: 2024-06-14

## 2024-07-11 NOTE — PROGRESS NOTES
"Chief Complaint  Follow-up (3 month follow up for diabetes and hypertension, left mid foot down to toes and big toe hurts and is red and swollen, she has made an appointment with podiatry but it is not until next week so just wants it looked at to make sure it is not any kind of infection), Diabetes, and Hypertension    Subjective      Amelie Anderson is a 74 y.o. female who presents to [facility]     Presenting for follow up .    HTN:  well controlled today, doing well on medication, denies headache, chest pain, dizziness, vision changes    DM: well controlled on previous labs, A1C 7.2%, denies numbness/tingling, vision changes, urinary changes, dizziness, nausea    Left foot is painful, this has been present for sometime. Now great toe is also painful. Has appointment with podiatry next week.       Objective   Vital Signs:   Vitals:    07/11/24 1123   BP: 118/64   BP Location: Left arm   Patient Position: Sitting   Cuff Size: Adult   Pulse: 59   Resp: 18   Temp: 97.3 °F (36.3 °C)   TempSrc: Temporal   SpO2: 98%   Weight: 67.8 kg (149 lb 6 oz)   Height: 162.6 cm (64.02\")     Body mass index is 25.62 kg/m².    Wt Readings from Last 3 Encounters:   07/11/24 67.8 kg (149 lb 6 oz)   05/30/24 68.5 kg (151 lb)   04/26/24 70.9 kg (156 lb 6.4 oz)     BP Readings from Last 3 Encounters:   07/11/24 118/64   05/30/24 121/65   04/26/24 115/69       Health Maintenance   Topic Date Due    TDAP/TD VACCINES (1 - Tdap) Never done    ZOSTER VACCINE (2 of 3) 12/16/2015    DIABETIC FOOT EXAM  12/03/2020    URINE MICROALBUMIN  01/05/2024    DIABETIC EYE EXAM  05/05/2024    HEMOGLOBIN A1C  07/05/2024    COVID-19 Vaccine (6 - 2023-24 season) 08/13/2024 (Originally 9/1/2023)    INFLUENZA VACCINE  08/01/2024    LIPID PANEL  01/05/2025    LUNG CANCER SCREENING  01/22/2025    ANNUAL WELLNESS VISIT  04/11/2025    BMI FOLLOWUP  04/11/2025    DXA SCAN  02/12/2026    COLORECTAL CANCER SCREENING  " 06/01/2026    MAMMOGRAM  06/21/2026    HEPATITIS C SCREENING  Completed    Pneumococcal Vaccine 65+  Completed       Physical Exam  Vitals reviewed.   Constitutional:       Appearance: Normal appearance. She is well-developed.   HENT:      Head: Normocephalic and atraumatic.      Mouth/Throat:      Pharynx: No oropharyngeal exudate.   Eyes:      Conjunctiva/sclera: Conjunctivae normal.      Pupils: Pupils are equal, round, and reactive to light.   Neck:      Thyroid: No thyromegaly or thyroid tenderness.   Cardiovascular:      Rate and Rhythm: Normal rate and regular rhythm.      Heart sounds: No murmur heard.     No friction rub. No gallop.   Pulmonary:      Effort: Pulmonary effort is normal.      Breath sounds: Normal breath sounds. No wheezing or rhonchi.   Lymphadenopathy:      Cervical: No cervical adenopathy.   Skin:     General: Skin is warm and dry.   Neurological:      Mental Status: She is alert and oriented to person, place, and time.   Psychiatric:         Mood and Affect: Affect normal.          Result Review :  The following data was reviewed by: Fabi Bill MD on 07/11/2024:  CMP          1/5/2024    10:44   CMP   Glucose 114    BUN 22    Creatinine 1.05    EGFR 56.9    Sodium 140    Potassium 4.5    Chloride 102    Calcium 10.0    Total Protein 7.2    Albumin 4.9    Globulin 2.3    Total Bilirubin 0.6    Alkaline Phosphatase 77    AST (SGOT) 17    ALT (SGPT) 13    Albumin/Globulin Ratio 2.1    BUN/Creatinine Ratio 21.0    Anion Gap 10.0      CBC          1/5/2024    10:44   CBC   WBC 5.84    RBC 4.20    Hemoglobin 12.6    Hematocrit 37.6    MCV 89.5    MCH 30.0    MCHC 33.5    RDW 12.7    Platelets 182      Lipid Panel          1/5/2024    10:44   Lipid Panel   Total Cholesterol 126    Triglycerides 119    HDL Cholesterol 56    VLDL Cholesterol 21    LDL Cholesterol  49    LDL/HDL Ratio 0.83      TSH          1/5/2024    10:44   TSH   TSH 2.450      A1C Last 3 Results          1/5/2024     10:44   HGBA1C Last 3 Results   Hemoglobin A1C 7.20           Procedures          Assessment & Plan  Pain of left great toe  Will check uric acid level. Keep appointment with podiatry  Type 2 diabetes mellitus without complication, without long-term current use of insulin  Previously well controlled. Checking follow up labs today. Plan for follow up in 6 months.   Essential hypertension  Well controlled in clinic today  Continue current management  High cholesterol  On statin, tolerating well.   Checking follow up labs today  Plan for follow up with labs in 6 months  Orders Placed This Encounter   Procedures    Uric Acid    CBC Auto Differential     New Medications Ordered This Visit   Medications    amitriptyline (ELAVIL) 25 MG tablet     Sig: Take 1 tablet by mouth Every Night.     Dispense:  90 tablet     Refill:  1    vitamin B-6 (PYRIDOXINE) 50 MG tablet     Sig: Take 1 tablet by mouth Daily.     Dispense:  90 tablet     Refill:  1                    FOLLOW UP  Return in about 6 months (around 1/11/2025) for Next scheduled follow up.  Patient was given instructions and counseling regarding her condition or for health maintenance advice. Please see specific information pulled into the AVS if appropriate.       Fabi Bill MD  07/11/24  11:49 EDT    CURRENT & DISCONTINUED MEDICATIONS  Current Outpatient Medications   Medication Instructions    alendronate (FOSAMAX) 70 mg, Oral, Every 7 Days    amitriptyline (ELAVIL) 25 mg, Oral, Nightly    [place] Thyroid 60 mg, Oral, Daily    aspirin 81 mg, Oral, Daily    atenolol (TENORMIN) 25 mg, Oral, Daily    atorvastatin (LIPITOR) 40 mg, Oral, Daily    brimonidine (ALPHAGAN) 0.15 % ophthalmic solution 1 drop, 3 Times Daily    calcium carb-cholecalciferol 600-10 MG-MCG tablet per tablet 1 tablet, Oral, Daily    Calcium Carbonate-Vit D-Min (QC Calcium/Minerals/Vitamin D) 600-400 MG-UNIT tablet 1 tablet, Oral, Daily    chlorpheniramine (CHLOR-TRIMETON) 4 mg,  Oral, Every 6 Hours PRN    FeroSul 325 mg, Oral, Daily    fluticasone (FLONASE) 50 MCG/ACT nasal spray 2 sprays, Nasal, Daily, 2 puffs each nostril    FREESTYLE LITE test strip Use as directed    Jardiance 10 mg, Oral, Daily    Lancets 30G misc 1 each, Does not apply, 4 Times Daily PRN    loratadine (CLARITIN) 10 mg, Oral, Daily    losartan (COZAAR) 25 mg, Oral, Daily    metFORMIN ER (GLUCOPHAGE-XR) 2,000 mg, Oral, Nightly    multivitamin with minerals tablet tablet 1 tablet, Oral, Daily, HOLD FOR SURGERY    olopatadine (PATANOL) 0.1 % ophthalmic solution 2 drops, Both Eyes, 2 Times Daily, Morning and night    Omega-3 Fatty Acids (fish oil) 1000 MG capsule capsule Oral, Daily With Breakfast    sertraline (ZOLOFT) 50 mg, Oral, Daily    vitamin B-6 (PYRIDOXINE) 50 mg, Oral, Daily       Medications Discontinued During This Encounter   Medication Reason    vitamin B-6 (PYRIDOXINE) 50 MG tablet Reorder    amitriptyline (ELAVIL) 25 MG tablet Reorder

## 2024-07-11 NOTE — ASSESSMENT & PLAN NOTE
On statin, tolerating well.   Checking follow up labs today  Plan for follow up with labs in 6 months

## 2024-07-16 ENCOUNTER — HOSPITAL ENCOUNTER (OUTPATIENT)
Dept: CT IMAGING | Facility: HOSPITAL | Age: 74
Discharge: HOME | End: 2024-07-16
Admitting: INTERNAL MEDICINE
Payer: MEDICARE

## 2024-07-16 DIAGNOSIS — R91.1 PULMONARY NODULE: ICD-10-CM

## 2024-07-16 PROCEDURE — 71250 CT THORAX DX C-: CPT

## 2024-08-07 DIAGNOSIS — E11.9 TYPE 2 DIABETES MELLITUS WITHOUT COMPLICATION, WITHOUT LONG-TERM CURRENT USE OF INSULIN: ICD-10-CM

## 2024-08-07 RX ORDER — ASPIRIN 81 MG/1
81 TABLET ORAL DAILY
Qty: 90 TABLET | Refills: 3 | Status: SHIPPED | OUTPATIENT
Start: 2024-08-07

## 2024-08-07 RX ORDER — ATORVASTATIN CALCIUM 40 MG/1
40 TABLET, FILM COATED ORAL DAILY
Qty: 90 TABLET | Refills: 1 | Status: SHIPPED | OUTPATIENT
Start: 2024-08-07

## 2024-08-07 RX ORDER — BLOOD-GLUCOSE METER
KIT MISCELLANEOUS
Qty: 100 EACH | Refills: 1 | Status: SHIPPED | OUTPATIENT
Start: 2024-08-07

## 2024-08-07 NOTE — TELEPHONE ENCOUNTER
Caller: Amelie Anderson    Relationship: Self    Best call back number: [phone]     Requested Prescriptions:   Requested Prescriptions     Pending Prescriptions Disp Refills    aspirin 81 MG EC tablet 90 tablet 3     Sig: Take 1 tablet by mouth Daily.    atorvastatin (LIPITOR) 40 MG tablet 90 tablet 1     Sig: Take 1 tablet by mouth Daily.    FREESTYLE LITE test strip 100 each 1     Sig: Use as directed        Pharmacy where request should be sent: [facility] [phone] [place], [place] [phone] FX     Last office visit with prescribing clinician: 7/11/2024   Last telemedicine visit with prescribing clinician: Visit date not found   Next office visit with prescribing clinician: 1/13/2025     Additional details provided by patient: PATIENT NEEDING MEDICATIONS REFILL. PATIENT HAS ENOUGH FOR THE REST OF THE WEEK.    Does the patient have less than a 3 day supply:  [x] Yes  [] No    Would you like a call back once the refill request has been completed: [] Yes [] No    If the office needs to give you a call back, can they leave a voicemail: [] Yes [] No    Ann Marie Calvo Rep   08/07/24 14:39 EDT

## 2024-09-04 ENCOUNTER — TELEPHONE (OUTPATIENT)
Dept: INTERNAL MEDICINE | Facility: CLINIC | Age: 74
End: 2024-09-04
Payer: MEDICARE

## 2024-09-04 NOTE — TELEPHONE ENCOUNTER
Caller: Amelie Anderson    Relationship: Self    Best call back number: [phone]    What medication are you requesting: ANTI-FUNGAL, ANTIBIOTIC     What are your current symptoms: ITCHING, DISCOMFORT AND DISCHARGE IN VAGINAL AREA     How long have you been experiencing symptoms: A FEW WEEKS     Have you had these symptoms before:    [x] Yes  [] No    Have you been treated for these symptoms before:   [x] Yes  [] No    If a prescription is needed, what is your preferred pharmacy and phone number: [facility] [phone] [place] [phone] FX     PATIENT STATE SHE IS ON JARDIANCE FROM HER NEPHROLOGIST AND HAS HAD MULTIPLE YEAST INFECTIONS FROM THIS MEDICATION. PATIENT STATES SHE HAS REACHED OUT TO HER SPECIALTY PROVIDER MULTIPLE TIMES NOW AND HAS NEVER GOTTEN A RESPONSE. PATIENT STATES SHE IS VERY VERY UNCOMFORTABLE AND WOULD LIKE TO KNOW IF THERE IS ANY MEDICATION MD KEARNEY COULD PRESCRIBE HER TO HELP.

## 2024-09-05 RX ORDER — FLUCONAZOLE 150 MG/1
150 TABLET ORAL
Qty: 2 TABLET | Refills: 0 | Status: SHIPPED | OUTPATIENT
Start: 2024-09-05 | End: 2024-09-09

## 2024-09-05 NOTE — TELEPHONE ENCOUNTER
Pt return call to the office, explained to pt provider has sent in medication as well as using wet wipes after urination to help with sugar, pt verbalized understanding and had no further questions at this time.

## 2024-09-05 NOTE — TELEPHONE ENCOUNTER
Prescription for Diflucan sent to pharmacy. It is very common to have yeast infections with this medication due to the excess sugar in the urine. Using wet toilet wipes after each urination can help to clean the sugar from the area more effectively than dry paper and help prevent the recurrence of yeast infections.

## 2024-09-25 DIAGNOSIS — M81.0 AGE-RELATED OSTEOPOROSIS WITHOUT CURRENT PATHOLOGICAL FRACTURE: ICD-10-CM

## 2024-09-25 DIAGNOSIS — J30.9 ALLERGIC RHINITIS, UNSPECIFIED SEASONALITY, UNSPECIFIED TRIGGER: ICD-10-CM

## 2024-09-25 DIAGNOSIS — E03.9 HYPOTHYROIDISM, UNSPECIFIED TYPE: ICD-10-CM

## 2024-09-25 DIAGNOSIS — F41.8 DEPRESSION WITH ANXIETY: ICD-10-CM

## 2024-09-25 DIAGNOSIS — E11.9 TYPE 2 DIABETES MELLITUS WITHOUT COMPLICATION, UNSPECIFIED WHETHER LONG TERM INSULIN USE: ICD-10-CM

## 2024-09-25 RX ORDER — ALENDRONATE SODIUM 70 MG/1
70 TABLET ORAL
Qty: 12 TABLET | Refills: 0 | Status: SHIPPED | OUTPATIENT
Start: 2024-09-25

## 2024-09-25 RX ORDER — ATENOLOL 25 MG/1
25 TABLET ORAL DAILY
Qty: 90 TABLET | Refills: 1 | Status: SHIPPED | OUTPATIENT
Start: 2024-09-25

## 2024-09-25 RX ORDER — THYROID 60 MG
60 TABLET ORAL DAILY
Qty: 90 TABLET | Refills: 1 | Status: SHIPPED | OUTPATIENT
Start: 2024-09-25

## 2024-10-01 DIAGNOSIS — E11.9 TYPE 2 DIABETES MELLITUS WITHOUT COMPLICATION, WITHOUT LONG-TERM CURRENT USE OF INSULIN: ICD-10-CM

## 2024-10-01 RX ORDER — LOSARTAN POTASSIUM 25 MG/1
25 TABLET ORAL DAILY
Qty: 90 TABLET | Refills: 1 | Status: SHIPPED | OUTPATIENT
Start: 2024-10-01

## 2024-10-01 RX ORDER — BLOOD-GLUCOSE METER
KIT MISCELLANEOUS
Qty: 100 EACH | Refills: 1 | Status: SHIPPED | OUTPATIENT
Start: 2024-10-01

## 2024-10-01 NOTE — TELEPHONE ENCOUNTER
Caller: Amelie Anderson    Relationship: Self    Best call back number: [phone]     Requested Prescriptions:   Requested Prescriptions     Pending Prescriptions Disp Refills    FREESTYLE LITE test strip 100 each 1     Sig: Use as directed    losartan (COZAAR) 25 MG tablet 90 tablet 1     Sig: Take 1 tablet by mouth Daily.        Pharmacy where request should be sent: [facility] [phone] [place] [phone]      Last office visit with prescribing clinician: 7/11/2024   Last telemedicine visit with prescribing clinician: Visit date not found   Next office visit with prescribing clinician: 1/13/2025     Additional details provided by patient:     Does the patient have less than a 3 day supply:  [x] Yes  [] No    Would you like a call back once the refill request has been completed: [] Yes [] No    If the office needs to give you a call back, can they leave a voicemail: [] Yes [] No    Ann Marie Etienne Rep   10/01/24 15:24 EDT

## 2024-10-14 RX ORDER — OLOPATADINE HYDROCHLORIDE 1 MG/ML
2 SOLUTION/ DROPS OPHTHALMIC 2 TIMES DAILY
Qty: 15 ML | Refills: 1 | Status: SHIPPED | OUTPATIENT
Start: 2024-10-14

## 2024-10-14 NOTE — TELEPHONE ENCOUNTER
Caller: Anderson, Amelie L    Relationship: Self    Best call back number: [phone]     Requested Prescriptions:   Requested Prescriptions     Pending Prescriptions Disp Refills    olopatadine (PATANOL) 0.1 % ophthalmic solution 15 mL 1     Sig: Administer 2 drops to both eyes 2 (Two) Times a Day. Morning and night    amitriptyline (ELAVIL) 25 MG tablet 90 tablet 1     Sig: Take 1 tablet by mouth Every Night.        Pharmacy where request should be sent: [facility] [phone] [place], [place] [phone]      Last office visit with prescribing clinician: 7/11/2024   Last telemedicine visit with prescribing clinician: Visit date not found   Next office visit with prescribing clinician: 1/13/2025     Does the patient have less than a 3 day supply:  [x] Yes  [] No    Would you like a call back once the refill request has been completed: [x] Yes [] No    If the office needs to give you a call back, can they leave a voicemail: [x] Yes [] No    Ann Marie Suarez Rep   10/14/24 10:59 EDT

## 2024-10-28 ENCOUNTER — OFFICE VISIT (OUTPATIENT)
Dept: CARDIOLOGY | Facility: CLINIC | Age: 74
End: 2024-10-28
Payer: MEDICARE

## 2024-10-28 VITALS
HEART RATE: 68 BPM | WEIGHT: 141.8 LBS | BODY MASS INDEX: 24.21 KG/M2 | OXYGEN SATURATION: 95 % | SYSTOLIC BLOOD PRESSURE: 103 MMHG | HEIGHT: 64 IN | DIASTOLIC BLOOD PRESSURE: 67 MMHG

## 2024-10-28 DIAGNOSIS — Z95.2 S/P AVR (AORTIC VALVE REPLACEMENT): Primary | ICD-10-CM

## 2024-10-28 DIAGNOSIS — I35.0 AORTIC VALVE STENOSIS, ETIOLOGY OF CARDIAC VALVE DISEASE UNSPECIFIED: ICD-10-CM

## 2024-10-28 DIAGNOSIS — E78.2 MIXED DYSLIPIDEMIA: ICD-10-CM

## 2024-10-28 DIAGNOSIS — I10 ESSENTIAL HYPERTENSION: ICD-10-CM

## 2024-10-28 PROCEDURE — 99214 OFFICE O/P EST MOD 30 MIN: CPT

## 2024-10-28 PROCEDURE — 3074F SYST BP LT 130 MM HG: CPT

## 2024-10-28 PROCEDURE — 3078F DIAST BP <80 MM HG: CPT

## 2024-10-28 PROCEDURE — 1159F MED LIST DOCD IN RCRD: CPT

## 2024-10-28 PROCEDURE — G2211 COMPLEX E/M VISIT ADD ON: HCPCS

## 2024-10-28 PROCEDURE — 1160F RVW MEDS BY RX/DR IN RCRD: CPT

## 2024-10-28 RX ORDER — EMPAGLIFLOZIN 10 MG/1
10 TABLET, FILM COATED ORAL
COMMUNITY

## 2024-10-28 RX ORDER — GLUCOSAMINE/D3/BOSWELLIA SERRA 1500MG-400
TABLET ORAL
COMMUNITY

## 2024-10-28 NOTE — PROGRESS NOTES
Chief Complaint  Follow-up (6 month)    Subjective        History of Present Illness  Amelie Anderson presents to [facility] CARDIOLOGY for follow up.   Patient is a 74-year-old female with past medical history outlined below, significant for aortic valve stenosis status post aortic valve replacement in 2021, hypertension who presents for routine follow-up. She is doing well from a cardiac standpoint. She has no complaints or concerns today. She denies any chest pain or discomfort. She has no dyspnea, orthopnea, edema, palpitations or syncope.     Past Medical History:   Diagnosis Date    Abdominal aortic aneurysm (AAA)     Anemia     Aortic stenosis     AR (allergic rhinitis)     Arthritis     Bladder disorder     Cataract     BILAT    Depression with anxiety     Diabetes     Elevated cholesterol     Environmental allergies     GERD (gastroesophageal reflux disease)     Heel spur, right     High cholesterol     History of anesthesia complications     STATES WAS SLOW TO WAKE UP AFTER SURGERY    History of TB (tuberculosis)     YEARS AGO WHEN FIRST CAME TO [facility], WAS TREATED FOR WITH INH    Hypertension     Hyperthyroidism     Iron deficiency     Kidney calculi     Kidney cysts     Kidney disease     Lesion of bladder [id number]    Microhematuria 09/25/2015    Migraine     Mitral valve prolapse     Osteoporosis     PONV (postoperative nausea and vomiting)     Pulmonary nodule     S/P AVR (aortic valve replacement) 03/04/2021     Normally functioning prosthetic aortic valve. on echo 5/20/2021 and 2/8/2022    Seasonal allergies     Stroke     had mini stroke behind right eye    Thyroid disorder     Urinary frequency        ALLERGY  No Known Allergies     Past Surgical History:   Procedure Laterality Date    ABDOMINAL HYSTERECTOMY      AORTIC VALVE REPAIR/REPLACEMENT N/A 2/2/2021    Procedure: VARUN STERNOTOMY AORTIC VALVE REPLACEMENT AND PRP;  Surgeon: Jesse Coronado MD;  Location: [facility] OR;   Service: Cardiothoracic;  Laterality: N/A;    BREAST BIOPSY      CARDIAC CATHETERIZATION  2020    no stents    CARDIAC CATHETERIZATION      no stents    CARDIAC SURGERY      OPEN HEART SURGERY     COLONOSCOPY  2021    CYSTOSCOPY  2020  WITH DR OSMAN  10/22/2015 WITH FULGURATION; CBF PER DAWSON    HYSTERECTOMY      TEETH EXTRACTION      ALL TEETH ON TOP, ALL BOTTOM TEETH EXCEPT 4    TONSILLECTOMY          Social History     Socioeconomic History    Marital status:    Tobacco Use    Smoking status: Former     Current packs/day: 0.00     Average packs/day: 1 pack/day for 48.0 years (48.0 ttl pk-yrs)     Types: Cigarettes     Start date:      Quit date:      Years since quittin.8     Passive exposure: Past    Smokeless tobacco: Never   Vaping Use    Vaping status: Never Used   Substance and Sexual Activity    Alcohol use: Not Currently    Drug use: Never    Sexual activity: Defer       Family History   Problem Relation Age of Onset    Aortic stenosis Mother     Valvular heart disease Mother     Heart attack Father 30    Coronary artery disease Father     Aortic stenosis Sister     Valvular heart disease Sister     Coronary artery disease Paternal Aunt     Coronary artery disease Paternal Uncle     Heart disease Other     Diabetes Other     Heart attack Other     Malig Hyperthermia Neg Hx         Current Outpatient Medications on File Prior to Visit   Medication Sig    alendronate (Fosamax) 70 MG tablet Take 1 tablet by mouth Every 7 (Seven) Days.    amitriptyline (ELAVIL) 25 MG tablet Take 1 tablet by mouth Every Night.    [place] Thyroid 60 MG tablet Take 1 tablet by mouth Daily.    aspirin 81 MG EC tablet Take 1 tablet by mouth Daily.    atenolol (TENORMIN) 25 MG tablet Take 1 tablet by mouth Daily.    atorvastatin (LIPITOR) 40 MG tablet Take 1 tablet by mouth Daily.    brimonidine (ALPHAGAN) 0.15 % ophthalmic solution 1 drop 3 (Three) Times a Day.    calcium  carb-cholecalciferol 600-10 MG-MCG tablet per tablet Take 1 tablet by mouth Daily.    chlorpheniramine (CHLOR-TRIMETON) 4 MG tablet Take 1 tablet by mouth Every 6 (Six) Hours As Needed for Allergies.    FeroSul 325 (65 Fe) MG tablet Take 1 tablet by mouth Daily.    fluticasone (FLONASE) 50 MCG/ACT nasal spray 2 sprays into the nostril(s) as directed by provider Daily. 2 puffs each nostril    FREESTYLE LITE test strip Use as directed    Lancets 30G misc 1 each 4 (Four) Times a Day As Needed (for glucose testing).    loratadine (CLARITIN) 10 MG tablet Take 1 tablet by mouth Daily.    losartan (COZAAR) 25 MG tablet Take 1 tablet by mouth Daily.    metFORMIN ER (GLUCOPHAGE-XR) 500 MG 24 hr tablet Take 4 tablets by mouth Every Night.    multivitamin with minerals tablet tablet Take 1 tablet by mouth Daily. HOLD FOR SURGERY    olopatadine (PATANOL) 0.1 % ophthalmic solution Administer 2 drops to both eyes 2 (Two) Times a Day. Morning and night    Omega-3 Fatty Acids (fish oil) 1000 MG capsule capsule Take  by mouth Daily With Breakfast.    sertraline (ZOLOFT) 50 MG tablet Take 1 tablet by mouth Daily.    vitamin B-6 (PYRIDOXINE) 50 MG tablet Take 1 tablet by mouth Daily.     Current Facility-Administered Medications on File Prior to Visit   Medication    Chlorhexidine Gluconate Cloth 2 % pads 1 application       Objective   There were no vitals filed for this visit.    Physical Exam  Constitutional:       General: She is awake. She is not in acute distress.     Appearance: Normal appearance.   HENT:      Head: Normocephalic.      Nose: Nose normal. No congestion.   Eyes:      Extraocular Movements: Extraocular movements intact.      Conjunctiva/sclera: Conjunctivae normal.      Pupils: Pupils are equal, round, and reactive to light.   Neck:      Thyroid: No thyromegaly.      Vascular: No JVD.   Cardiovascular:      Rate and Rhythm: Normal rate and regular rhythm.      Chest Wall: PMI is not displaced.      Pulses: Normal  pulses.      Heart sounds: S1 normal and S2 normal. Murmur heard.      No friction rub. No gallop. No S3 or S4 sounds.   Pulmonary:      Effort: Pulmonary effort is normal.      Breath sounds: Normal breath sounds. No wheezing, rhonchi or rales.   Abdominal:      General: Bowel sounds are normal.      Palpations: Abdomen is soft.      Tenderness: There is no abdominal tenderness.   Musculoskeletal:      Cervical back: No tenderness.      Right lower leg: No edema.      Left lower leg: No edema.   Lymphadenopathy:      Cervical: No cervical adenopathy.   Skin:     General: Skin is warm and dry.      Capillary Refill: Capillary refill takes less than 2 seconds.      Coloration: Skin is not cyanotic.      Findings: No petechiae or rash.      Nails: There is no clubbing.   Neurological:      Mental Status: She is alert.   Psychiatric:         Mood and Affect: Mood normal.         Behavior: Behavior is cooperative.           Result Review     The following data was reviewed by ERNIE Macario on 10/28/24.      CMP          1/5/2024    10:44 7/11/2024    11:56   CMP   Glucose 114  105    BUN 22  22    Creatinine 1.05  1.14    EGFR 56.9  51.3    Sodium 140  140    Potassium 4.5  4.9    Chloride 102  101    Calcium 10.0  10.0    Total Protein 7.2  7.9    Albumin 4.9  5.0    Globulin 2.3  2.9    Total Bilirubin 0.6  0.6    Alkaline Phosphatase 77  91    AST (SGOT) 17  21    ALT (SGPT) 13  14    Albumin/Globulin Ratio 2.1  1.7    BUN/Creatinine Ratio 21.0  19.3    Anion Gap 10.0  12.7      CBC w/diff          1/5/2024    10:44 7/11/2024    11:56   CBC w/Diff   WBC 5.84  5.32    RBC 4.20  4.76    Hemoglobin 12.6  14.4    Hematocrit 37.6  42.5    MCV 89.5  89.3    MCH 30.0  30.3    MCHC 33.5  33.9    RDW 12.7  12.4    Platelets 182  163    Neutrophil Rel % 58.2  67.3    Immature Granulocyte Rel % 0.2  0.2    Lymphocyte Rel % 28.3  22.9    Monocyte Rel % 8.9  7.1    Eosinophil Rel % 4.1  1.9    Basophil Rel % 0.3  0.6        Lipid Panel          1/5/2024    10:44 7/11/2024    11:56   Lipid Panel   Total Cholesterol 126  122    Triglycerides 119  81    HDL Cholesterol 56  62    VLDL Cholesterol 21  16    LDL Cholesterol  49  44    LDL/HDL Ratio 0.83  0.71        Results for orders placed in visit on 08/08/23    Adult Transthoracic Echo Complete W/ Cont if Necessary Per Protocol    Interpretation Summary    Left ventricular ejection fraction appears to be 51 - 55%.    Left ventricular diastolic function was normal.    Mild to moderate aortic valve stenosis is present.    There is a prosthetic aortic valve present.  Valve morphology was not well-visualized.  There appears to be mild to moderate stenosis by Doppler.  Effective orifice area around 1.3 cm² with peak/mean pressure gradient of 15/9 mmHg.    Results for orders placed during the hospital encounter of 09/08/23    Stress Test With Myocardial Perfusion One Day    Interpretation Summary  Patient received Lexiscan 0.4 mg IV infusion over 10 seconds.  Baseline ECG showed normal sinus rhythm.  At peak stress, no ischemic ST-T changes were noted.  No significant arrhythmias were noted.  Myocardial perfusion demonstrates a small area of mild perfusion defect in the distal anterior/apical segment without reversibility on resting images, more likely related to attenuation artifact.  No significant reversible myocardial ischemia was noted on this study.  The left ventricle was normal in size with a calculated ejection fraction of 64%.  No wall motion abnormalities were noted.  Overall, this represents a low risk myocardial perfusion study.    No results found for this or any previous visit.          Procedures      Assessment & Plan  S/P AVR (aortic valve replacement)  Status post tissue aortic valve replacement in 2021.  Recent echocardiogram demonstrated a prosthetic aortic valve with mild to moderate stenosis.  She has a very soft murmur on exam.  Patient is asymptomatic.  Will  continue to monitor clinically.  May consider repeat echo at next follow-up visit.  Essential hypertension  Well-controlled.  Continue current regiment.  Mixed dyslipidemia  Lab Results   Component Value Date    LDL 44 07/11/2024     LDL is controlled.  Continue statin therapy.  Aortic valve stenosis, etiology of cardiac valve disease unspecified  As above.      The medical services provided during this encounter are part of ongoing care related to this patient's single serious condition or complex condition.  Follow Up   No follow-ups on file.    Patient was given instructions and counseling regarding her condition or for health maintenance advice. Please see specific information pulled into the AVS if appropriate.     Ju Sierra, APRN  10/28/24  13:21 EDT    Dictated Utilizing Dragon Dictation

## 2024-10-28 NOTE — ASSESSMENT & PLAN NOTE
Status post tissue aortic valve replacement in 2021.  Recent echocardiogram demonstrated a prosthetic aortic valve with mild to moderate stenosis.  She has a very soft murmur on exam.  Patient is asymptomatic.  Will continue to monitor clinically.  May consider repeat echo at next follow-up visit.

## 2024-11-26 ENCOUNTER — TELEPHONE (OUTPATIENT)
Dept: CARDIOLOGY | Facility: CLINIC | Age: 74
End: 2024-11-26
Payer: MEDICARE

## 2024-11-26 NOTE — TELEPHONE ENCOUNTER
Agree with ER precautions for  neuro changes/vision changes.   Can schedule f/u if needed for heart rate/rhythm changes.

## 2024-11-26 NOTE — TELEPHONE ENCOUNTER
"Patient called stating she is having issues and voiced.concerned about not feeling right. Patient states her heart has been jumping around. Patient states her vision is coming and going. Patient has history of stroke in her eye- encouraged patient to go to ER if vision changed or having weakness on one side or the other. Patient verbalized understanding and appreciation. Patient's BP has been within normal limits 135/70 and 124/64.     Patient directed if she has increase of heart \"jumping\" to call the office and we can schedule a f/u.       "

## 2024-11-27 RX ORDER — FERROUS SULFATE 325(65) MG
1 TABLET ORAL DAILY
Qty: 90 TABLET | Refills: 1 | Status: SHIPPED | OUTPATIENT
Start: 2024-11-27

## 2024-11-27 RX ORDER — METFORMIN HYDROCHLORIDE 500 MG/1
2000 TABLET, EXTENDED RELEASE ORAL NIGHTLY
Qty: 360 TABLET | Refills: 1 | Status: SHIPPED | OUTPATIENT
Start: 2024-11-27

## 2024-11-27 NOTE — TELEPHONE ENCOUNTER
Caller: Amelie Anderson    Relationship: Self    Best call back number: Telephone Information:  Mobile [phone]       Requested Prescriptions:   Requested Prescriptions     Pending Prescriptions Disp Refills    metFORMIN ER (GLUCOPHAGE-XR) 500 MG 24 hr tablet 360 tablet 1     Sig: Take 4 tablets by mouth Every Night.    FeroSul 325 (65 Fe) MG tablet 90 tablet 1     Sig: Take 1 tablet by mouth Daily.        Pharmacy where request should be sent: [facility] [phone] [place], [place] [phone] [facility]     Last office visit with prescribing clinician: 7/11/2024   Last telemedicine visit with prescribing clinician: Visit date not found   Next office visit with prescribing clinician: 1/13/2025     Does the patient have less than a 3 day supply:  [x] Yes  [] No      Summer Ann Marie Koenig Rep   11/27/24 09:49 EST

## 2024-12-16 RX ORDER — EMPAGLIFLOZIN 10 MG/1
10 TABLET, FILM COATED ORAL DAILY
Qty: 90 TABLET | Refills: 1 | Status: SHIPPED | OUTPATIENT
Start: 2024-12-16

## 2024-12-16 NOTE — TELEPHONE ENCOUNTER
Caller: Amelie Anderson    Relationship: Self    Best call back number: [phone]    Requested Prescriptions:   Requested Prescriptions     Pending Prescriptions Disp Refills    Jardiance 10 MG tablet tablet 30 tablet      Sig: Take 1 tablet by mouth.        Pharmacy where request should be sent: [facility] [address] [phone] [facility] [phone]      Last office visit with prescribing clinician: 7/11/2024   Last telemedicine visit with prescribing clinician: Visit date not found   Next office visit with prescribing clinician: 1/13/2025     Additional details provided by patient: 4 DAYS LEFT OF MEDICATION     Does the patient have less than a 3 day supply:  [] Yes  [x] No    Would you like a call back once the refill request has been completed: [] Yes [] No    If the office needs to give you a call back, can they leave a voicemail: [] Yes [] No    Ann Marie Ram Rep   12/16/24 10:09 EST

## 2024-12-20 ENCOUNTER — OFFICE VISIT (OUTPATIENT)
Dept: UROLOGY | Age: 74
End: 2024-12-20
Payer: MEDICARE

## 2024-12-20 VITALS — BODY MASS INDEX: 24.07 KG/M2 | HEIGHT: 64 IN | WEIGHT: 141 LBS

## 2024-12-20 DIAGNOSIS — R31.29 MICROHEMATURIA: Primary | ICD-10-CM

## 2024-12-20 LAB
BILIRUB BLD-MCNC: NEGATIVE MG/DL
CLARITY, POC: CLEAR
COLOR UR: YELLOW
EXPIRATION DATE: ABNORMAL
GLUCOSE UR STRIP-MCNC: ABNORMAL MG/DL
KETONES UR QL: NEGATIVE
LEUKOCYTE EST, POC: NEGATIVE
Lab: ABNORMAL
NITRITE UR-MCNC: NEGATIVE MG/ML
PH UR: 6 [PH] (ref 5–8)
PROT UR STRIP-MCNC: NEGATIVE MG/DL
RBC # UR STRIP: ABNORMAL /UL
SP GR UR: 1.01 (ref 1–1.03)
UROBILINOGEN UR QL: ABNORMAL

## 2024-12-20 NOTE — PROGRESS NOTES
"Chief Complaint  Blood in Urine    Subjective          Amelie Anderson presents to [facility] UROLOGY    History of Present Illness  Patient had a workup for microscopic hematuria in 2020.  That was negative.  She is continue to have blood in her urine.  She was concerned because her sister was recently diagnosed with bladder cancer.      Objective   Vital Signs:   Ht 162.6 cm (64.02\")   Wt 64 kg (141 lb)   BMI 24.19 kg/m²       Physical Exam  Vitals and nursing note reviewed.   Constitutional:       Appearance: Normal appearance. She is well-developed.   Pulmonary:      Effort: Pulmonary effort is normal.      Breath sounds: Normal air entry.   Neurological:      Mental Status: She is alert and oriented to person, place, and time.      Motor: Motor function is intact.   Psychiatric:         Mood and Affect: Mood normal.         Behavior: Behavior normal.          Result Review :   The following data was reviewed by: Dorothy Aldana MD on 12/20/2024:    Results for orders placed or performed in visit on 12/20/24   POC Urinalysis Dipstick, Automated    Collection Time: 12/20/24 12:59 PM    Specimen: Urine   Result Value Ref Range    Color Yellow Yellow, Straw, Dark Yellow, [place]    Clarity, UA Clear Clear    Specific Gravity  1.015 1.005 - 1.030    pH, Urine 6.0 5.0 - 8.0    Leukocytes Negative Negative    Nitrite, UA Negative Negative    Protein, POC Negative Negative mg/dL    Glucose,  mg/dL (A) Negative mg/dL    Ketones, UA Negative Negative    Urobilinogen, UA 0.2 E.U./dL Normal, 0.2 E.U./dL    Bilirubin Negative Negative    Blood, UA Small (A) Negative    Lot Number [id number]     Expiration Date 102,025             Assessment and Plan    Diagnoses and all orders for this visit:    1. Microhematuria (Primary)  -     POC Urinalysis Dipstick, Automated  -     CT Abdomen Pelvis With & Without Contrast; Future    Will get her set up for repeat workup for blood in her urine.  CT scan with " and without IV contrast as well as a scope in the office.  I will see her back then.          Follow Up       No follow-ups on file.  Patient was given instructions and counseling regarding her condition or for health maintenance advice. Please see specific information pulled into the AVS if appropriate.

## 2025-01-08 ENCOUNTER — HOSPITAL ENCOUNTER (OUTPATIENT)
Dept: CT IMAGING | Facility: HOSPITAL | Age: 75
Discharge: HOME | End: 2025-01-08
Admitting: UROLOGY
Payer: MEDICARE

## 2025-01-08 DIAGNOSIS — R31.29 MICROHEMATURIA: ICD-10-CM

## 2025-01-08 LAB
CREAT BLDA-MCNC: 1.3 MG/DL (ref 0.6–1.3)
EGFRCR SERPLBLD CKD-EPI 2021: 43.8 ML/MIN/1.73

## 2025-01-08 PROCEDURE — 74178 CT ABD&PLV WO CNTR FLWD CNTR: CPT

## 2025-01-08 PROCEDURE — 82565 ASSAY OF CREATININE: CPT

## 2025-01-08 PROCEDURE — 25510000001 IOPAMIDOL PER 1 ML: Performed by: UROLOGY

## 2025-01-08 RX ORDER — IOPAMIDOL 755 MG/ML
100 INJECTION, SOLUTION INTRAVASCULAR
Status: COMPLETED | OUTPATIENT
Start: 2025-01-08 | End: 2025-01-08

## 2025-01-08 RX ADMIN — IOPAMIDOL 100 ML: 755 INJECTION, SOLUTION INTRAVENOUS at 10:16

## 2025-01-13 ENCOUNTER — OFFICE VISIT (OUTPATIENT)
Dept: INTERNAL MEDICINE | Facility: CLINIC | Age: 75
End: 2025-01-13
Payer: MEDICARE

## 2025-01-13 VITALS
SYSTOLIC BLOOD PRESSURE: 112 MMHG | OXYGEN SATURATION: 99 % | BODY MASS INDEX: 25.67 KG/M2 | WEIGHT: 150.38 LBS | RESPIRATION RATE: 16 BRPM | DIASTOLIC BLOOD PRESSURE: 60 MMHG | HEIGHT: 64 IN | TEMPERATURE: 97.8 F | HEART RATE: 61 BPM

## 2025-01-13 DIAGNOSIS — E78.00 HIGH CHOLESTEROL: Primary | ICD-10-CM

## 2025-01-13 DIAGNOSIS — I10 ESSENTIAL HYPERTENSION: ICD-10-CM

## 2025-01-13 DIAGNOSIS — E11.9 TYPE 2 DIABETES MELLITUS WITHOUT COMPLICATION, UNSPECIFIED WHETHER LONG TERM INSULIN USE: ICD-10-CM

## 2025-01-13 DIAGNOSIS — M81.0 AGE-RELATED OSTEOPOROSIS WITHOUT CURRENT PATHOLOGICAL FRACTURE: ICD-10-CM

## 2025-01-13 LAB
ALBUMIN SERPL-MCNC: 4.8 G/DL (ref 3.5–5.2)
ALBUMIN/GLOB SERPL: 1.7 G/DL
ALP SERPL-CCNC: 80 U/L (ref 39–117)
ALT SERPL W P-5'-P-CCNC: 15 U/L (ref 1–33)
ANION GAP SERPL CALCULATED.3IONS-SCNC: 11.3 MMOL/L (ref 5–15)
AST SERPL-CCNC: 23 U/L (ref 1–32)
BASOPHILS # BLD AUTO: 0.03 10*3/MM3 (ref 0–0.2)
BASOPHILS NFR BLD AUTO: 0.5 % (ref 0–1.5)
BILIRUB SERPL-MCNC: 0.6 MG/DL (ref 0–1.2)
BUN SERPL-MCNC: 24 MG/DL (ref 8–23)
BUN/CREAT SERPL: 18.5 (ref 7–25)
CALCIUM SPEC-SCNC: 10.4 MG/DL (ref 8.6–10.5)
CHLORIDE SERPL-SCNC: 100 MMOL/L (ref 98–107)
CHOLEST SERPL-MCNC: 141 MG/DL (ref 0–200)
CO2 SERPL-SCNC: 26.7 MMOL/L (ref 22–29)
CREAT SERPL-MCNC: 1.3 MG/DL (ref 0.57–1)
DEPRECATED RDW RBC AUTO: 40.2 FL (ref 37–54)
EGFRCR SERPLBLD CKD-EPI 2021: 43.8 ML/MIN/1.73
EOSINOPHIL # BLD AUTO: 0.08 10*3/MM3 (ref 0–0.4)
EOSINOPHIL NFR BLD AUTO: 1.3 % (ref 0.3–6.2)
ERYTHROCYTE [DISTWIDTH] IN BLOOD BY AUTOMATED COUNT: 12.3 % (ref 12.3–15.4)
GLOBULIN UR ELPH-MCNC: 2.9 GM/DL
GLUCOSE SERPL-MCNC: 115 MG/DL (ref 65–99)
HBA1C MFR BLD: 7.1 % (ref 4.8–5.6)
HCT VFR BLD AUTO: 43.3 % (ref 34–46.6)
HDLC SERPL-MCNC: 64 MG/DL (ref 40–60)
HGB BLD-MCNC: 14.2 G/DL (ref 12–15.9)
IMM GRANULOCYTES # BLD AUTO: 0.02 10*3/MM3 (ref 0–0.05)
IMM GRANULOCYTES NFR BLD AUTO: 0.3 % (ref 0–0.5)
LDLC SERPL CALC-MCNC: 53 MG/DL (ref 0–100)
LDLC/HDLC SERPL: 0.75 {RATIO}
LYMPHOCYTES # BLD AUTO: 1.54 10*3/MM3 (ref 0.7–3.1)
LYMPHOCYTES NFR BLD AUTO: 25 % (ref 19.6–45.3)
MCH RBC QN AUTO: 29.6 PG (ref 26.6–33)
MCHC RBC AUTO-ENTMCNC: 32.8 G/DL (ref 31.5–35.7)
MCV RBC AUTO: 90.4 FL (ref 79–97)
MONOCYTES # BLD AUTO: 0.39 10*3/MM3 (ref 0.1–0.9)
MONOCYTES NFR BLD AUTO: 6.3 % (ref 5–12)
NEUTROPHILS NFR BLD AUTO: 4.09 10*3/MM3 (ref 1.7–7)
NEUTROPHILS NFR BLD AUTO: 66.6 % (ref 42.7–76)
NRBC BLD AUTO-RTO: 0 /100 WBC (ref 0–0.2)
PLATELET # BLD AUTO: 176 10*3/MM3 (ref 140–450)
PMV BLD AUTO: 10.2 FL (ref 6–12)
POTASSIUM SERPL-SCNC: 4.3 MMOL/L (ref 3.5–5.2)
PROT SERPL-MCNC: 7.7 G/DL (ref 6–8.5)
RBC # BLD AUTO: 4.79 10*6/MM3 (ref 3.77–5.28)
SODIUM SERPL-SCNC: 138 MMOL/L (ref 136–145)
TRIGL SERPL-MCNC: 144 MG/DL (ref 0–150)
TSH SERPL DL<=0.05 MIU/L-ACNC: 3.54 UIU/ML (ref 0.27–4.2)
VLDLC SERPL-MCNC: 24 MG/DL (ref 5–40)
WBC NRBC COR # BLD AUTO: 6.15 10*3/MM3 (ref 3.4–10.8)

## 2025-01-13 PROCEDURE — 80053 COMPREHEN METABOLIC PANEL: CPT | Performed by: INTERNAL MEDICINE

## 2025-01-13 PROCEDURE — 84443 ASSAY THYROID STIM HORMONE: CPT | Performed by: INTERNAL MEDICINE

## 2025-01-13 PROCEDURE — 83036 HEMOGLOBIN GLYCOSYLATED A1C: CPT | Performed by: INTERNAL MEDICINE

## 2025-01-13 PROCEDURE — 85025 COMPLETE CBC W/AUTO DIFF WBC: CPT | Performed by: INTERNAL MEDICINE

## 2025-01-13 PROCEDURE — 80061 LIPID PANEL: CPT | Performed by: INTERNAL MEDICINE

## 2025-01-13 RX ORDER — LANOLIN ALCOHOL/MO/W.PET/CERES
50 CREAM (GRAM) TOPICAL DAILY
Qty: 90 TABLET | Refills: 1 | Status: SHIPPED | OUTPATIENT
Start: 2025-01-13

## 2025-01-13 RX ORDER — ALENDRONATE SODIUM 70 MG/1
70 TABLET ORAL
Qty: 12 TABLET | Refills: 0 | Status: SHIPPED | OUTPATIENT
Start: 2025-01-13

## 2025-01-13 RX ORDER — ATORVASTATIN CALCIUM 40 MG/1
40 TABLET, FILM COATED ORAL DAILY
Qty: 90 TABLET | Refills: 1 | Status: SHIPPED | OUTPATIENT
Start: 2025-01-13

## 2025-01-13 RX ORDER — HYDROXYZINE HYDROCHLORIDE 10 MG/5ML
4 SYRUP ORAL EVERY 6 HOURS PRN
Qty: 360 TABLET | Refills: 1 | Status: SHIPPED | OUTPATIENT
Start: 2025-01-13

## 2025-01-13 RX ORDER — BLOOD-GLUCOSE METER
KIT MISCELLANEOUS
Qty: 100 EACH | Refills: 1 | Status: SHIPPED | OUTPATIENT
Start: 2025-01-13

## 2025-01-13 RX ORDER — BRIMONIDINE TARTRATE 1.5 MG/ML
1 SOLUTION/ DROPS OPHTHALMIC 3 TIMES DAILY
Status: CANCELLED | OUTPATIENT
Start: 2025-01-13

## 2025-01-13 RX ORDER — LORATADINE 10 MG/1
10 TABLET ORAL DAILY
Qty: 90 TABLET | Refills: 1 | Status: SHIPPED | OUTPATIENT
Start: 2025-01-13

## 2025-01-13 NOTE — ASSESSMENT & PLAN NOTE
On statin, tolerating well.   Checking labs today    Orders:    CBC & Differential    Comprehensive Metabolic Panel    Hemoglobin A1c    Lipid Panel    TSH

## 2025-01-13 NOTE — ASSESSMENT & PLAN NOTE
Well controlled in clinic today  Continue current management    Orders:    CBC & Differential    Comprehensive Metabolic Panel    Hemoglobin A1c    Lipid Panel    TSH

## 2025-01-13 NOTE — PROGRESS NOTES
"Chief Complaint  Follow-up (6 month, left hand middle finger has a bump right above the nail that has been there for a while ), Diabetes, and Hypertension    Subjective      Amelie Anderson is a 74 y.o. female who presents to [facility]     Presenting for follow up.     DM: fair control on previous labs, A1C 7.3%, denies numbness/tingling, vision changes, urinary changes, dizziness, nausea    HTN:  well controlled today, doing well on medication, denies headache, chest pain, dizziness, vision changes    HLD: on statin, tolerating well, denies muscle pain/weakness        Objective   Vital Signs:   Vitals:    01/13/25 1120   BP: 112/60   BP Location: Left arm   Patient Position: Sitting   Cuff Size: Adult   Pulse: 61   Resp: 16   Temp: 97.8 °F (36.6 °C)   TempSrc: Temporal   SpO2: 99%   Weight: 68.2 kg (150 lb 6 oz)   Height: 162.6 cm (64.02\")     Body mass index is 25.8 kg/m².    Wt Readings from Last 3 Encounters:   01/13/25 68.2 kg (150 lb 6 oz)   12/20/24 64 kg (141 lb)   10/28/24 64.3 kg (141 lb 12.8 oz)     BP Readings from Last 3 Encounters:   01/13/25 112/60   10/28/24 103/67   07/11/24 118/64       Health Maintenance   Topic Date Due    TDAP/TD VACCINES (1 - Tdap) Never done    ZOSTER VACCINE (2 of 3) 12/16/2015    DIABETIC FOOT EXAM  09/24/2019    DIABETIC EYE EXAM  05/05/2024    HEMOGLOBIN A1C  01/11/2025    ANNUAL WELLNESS VISIT  04/11/2025    BMI FOLLOWUP  04/11/2025    LIPID PANEL  07/11/2025    LUNG CANCER SCREENING  07/16/2025    DXA SCAN  02/12/2026    COLORECTAL CANCER SCREENING  06/01/2026    MAMMOGRAM  06/21/2026    HEPATITIS C SCREENING  Completed    COVID-19 Vaccine  Completed    INFLUENZA VACCINE  Completed    Pneumococcal Vaccine 65+  Completed    URINE MICROALBUMIN  Discontinued       Physical Exam  Vitals reviewed.   Constitutional:       Appearance: Normal appearance. She is well-developed.   HENT:      Head: Normocephalic and atraumatic.     "  Mouth/Throat:      Pharynx: No oropharyngeal exudate.   Eyes:      Conjunctiva/sclera: Conjunctivae normal.      Pupils: Pupils are equal, round, and reactive to light.   Neck:      Thyroid: No thyromegaly or thyroid tenderness.   Cardiovascular:      Rate and Rhythm: Normal rate and regular rhythm.      Heart sounds: No murmur heard.     No friction rub. No gallop.   Pulmonary:      Effort: Pulmonary effort is normal.      Breath sounds: Normal breath sounds. No wheezing or rhonchi.   Lymphadenopathy:      Cervical: No cervical adenopathy.   Skin:     General: Skin is warm and dry.   Neurological:      Mental Status: She is alert and oriented to person, place, and time.   Psychiatric:         Mood and Affect: Affect normal.          Result Review :  The following data was reviewed by: Fabi Bill MD on 01/13/2025:         Procedures          Assessment & Plan  Age-related osteoporosis without current pathological fracture    Orders:    alendronate (Fosamax) 70 MG tablet; Take 1 tablet by mouth Every 7 (Seven) Days.    High cholesterol  On statin, tolerating well.   Checking labs today    Orders:    CBC & Differential    Comprehensive Metabolic Panel    Hemoglobin A1c    Lipid Panel    TSH    Type 2 diabetes mellitus without complication, unspecified whether long term insulin use  Checking follow up labs today. Plan for follow up in 3 months.     Orders:    FREESTYLE LITE test strip; Use as directed    CBC & Differential    Comprehensive Metabolic Panel    Hemoglobin A1c    Lipid Panel    TSH    Essential hypertension  Well controlled in clinic today  Continue current management    Orders:    CBC & Differential    Comprehensive Metabolic Panel    Hemoglobin A1c    Lipid Panel    TSH                   FOLLOW UP  Return in about 3 months (around 4/13/2025) for Medicare Wellness.  Patient was given instructions and counseling regarding her condition or for health maintenance advice. Please see specific  information pulled into the AVS if appropriate.       Fabi Bill MD  01/13/25  13:42 EST    CURRENT & DISCONTINUED MEDICATIONS  Current Outpatient Medications   Medication Instructions    alendronate (FOSAMAX) 70 mg, Oral, Every 7 Days    amitriptyline (ELAVIL) 25 mg, Oral, Nightly    [place] Thyroid 60 mg, Oral, Daily    aspirin 81 mg, Oral, Daily    atenolol (TENORMIN) 25 mg, Oral, Daily    atorvastatin (LIPITOR) 40 mg, Oral, Daily    Biotin 11134 MCG tablet Take  by mouth.    brimonidine (ALPHAGAN) 0.15 % ophthalmic solution 1 drop, 3 Times Daily    calcium carb-cholecalciferol 600-10 MG-MCG tablet per tablet 1 tablet, Daily    chlorpheniramine (CHLOR-TRIMETON) 4 mg, Oral, Every 6 Hours PRN    FeroSul 325 mg, Oral, Daily    fluticasone (FLONASE) 50 MCG/ACT nasal spray 2 sprays, Nasal, Daily, 2 puffs each nostril    FREESTYLE LITE test strip Use as directed    Jardiance 10 mg, Oral, Daily    Lancets 30G misc 1 each, Not Applicable, 4 Times Daily PRN    loratadine (CLARITIN) 10 mg, Oral, Daily    losartan (COZAAR) 25 mg, Oral, Daily    metFORMIN ER (GLUCOPHAGE-XR) 2,000 mg, Oral, Nightly    multivitamin with minerals tablet tablet 1 tablet, Daily    olopatadine (PATANOL) 0.1 % ophthalmic solution 2 drops, Both Eyes, 2 Times Daily, Morning and night    Omega-3 Fatty Acids (fish oil) 1000 MG capsule capsule Daily With Breakfast    sertraline (ZOLOFT) 50 mg, Oral, Daily    vitamin B-6 (PYRIDOXINE) 50 mg, Oral, Daily       Medications Discontinued During This Encounter   Medication Reason    chlorpheniramine (CHLOR-TRIMETON) 4 MG tablet Reorder    loratadine (CLARITIN) 10 MG tablet Reorder    vitamin B-6 (PYRIDOXINE) 50 MG tablet Reorder    atorvastatin (LIPITOR) 40 MG tablet Reorder    alendronate (Fosamax) 70 MG tablet Reorder    FREESTYLE LITE test strip Reorder

## 2025-01-13 NOTE — ASSESSMENT & PLAN NOTE
Checking follow up labs today. Plan for follow up in 3 months.     Orders:    FREESTYLE LITE test strip; Use as directed    CBC & Differential    Comprehensive Metabolic Panel    Hemoglobin A1c    Lipid Panel    TSH

## 2025-01-15 ENCOUNTER — TELEPHONE (OUTPATIENT)
Dept: INTERNAL MEDICINE | Facility: CLINIC | Age: 75
End: 2025-01-15
Payer: MEDICARE

## 2025-01-15 NOTE — TELEPHONE ENCOUNTER
"I called and left the patient a voicemail to call the office back for results.     Relay     \"Your Kidney function is stable.  Electrolytes and liver enzymes are normal   Cholesterol panel normal   A1c stable at 7.1   Thyroid function is normal   Blood counts are normal \"                  "

## 2025-01-15 NOTE — TELEPHONE ENCOUNTER
----- Message from Fabi Bill sent at 1/15/2025  9:52 AM EST -----  Kidney function stable.  Electrolytes and liver enzymes normal  Cholesterol panel normal  A1c stable at 7.1  Thyroid function normal  Blood counts normal

## 2025-01-16 ENCOUNTER — TELEPHONE (OUTPATIENT)
Dept: INTERNAL MEDICINE | Facility: CLINIC | Age: 75
End: 2025-01-16
Payer: MEDICARE

## 2025-01-16 DIAGNOSIS — E03.9 HYPOTHYROIDISM, UNSPECIFIED TYPE: ICD-10-CM

## 2025-01-16 RX ORDER — OLOPATADINE HYDROCHLORIDE 1 MG/ML
2 SOLUTION/ DROPS OPHTHALMIC 2 TIMES DAILY
Qty: 15 ML | Refills: 1 | Status: SHIPPED | OUTPATIENT
Start: 2025-01-16

## 2025-01-16 RX ORDER — THYROID,PORK 60 MG
60 TABLET ORAL DAILY
Qty: 90 TABLET | Refills: 1 | Status: SHIPPED | OUTPATIENT
Start: 2025-01-16

## 2025-01-16 NOTE — TELEPHONE ENCOUNTER
Pt return call to the office, explained to pt results from labs, pt verbalized understanding and had no further questions at this time.

## 2025-01-16 NOTE — TELEPHONE ENCOUNTER
Caller: Amelie Anderson    Relationship to patient: Self    Best call back number: [phone]     Patient is needing: PATIENT WOULD LIKE A CALL BACK TO DISCUSS RESULTS FROM LABS.        CONTACT PATIENT TO ADVISE.   Doxepin Pregnancy And Lactation Text: This medication is Pregnancy Category C and it isn't known if it is safe during pregnancy. It is also excreted in breast milk and breast feeding isn't recommended.

## 2025-01-22 ENCOUNTER — HOSPITAL ENCOUNTER (OUTPATIENT)
Dept: CT IMAGING | Facility: HOSPITAL | Age: 75
Discharge: HOME | End: 2025-01-22
Admitting: NURSE PRACTITIONER
Payer: MEDICARE

## 2025-01-22 DIAGNOSIS — F17.211 NICOTINE DEPENDENCE, CIGARETTES, IN REMISSION: ICD-10-CM

## 2025-01-22 PROCEDURE — 71271 CT THORAX LUNG CANCER SCR C-: CPT

## 2025-01-23 ENCOUNTER — TELEPHONE (OUTPATIENT)
Dept: UROLOGY | Age: 75
End: 2025-01-23
Payer: MEDICARE

## 2025-01-23 NOTE — TELEPHONE ENCOUNTER
Hub staff attempted to follow warm transfer process and was unsuccessful     Caller: MILTON SIERRA    Relationship to patient: SELF    Best call back number: [phone] (home)      Patient is needing: PT NEEDS TO RESCHEDULE CYSTO SCHEDULE ON 2/7/25. CT HAS BEEN COMPLETED. PLEASE CALL PT BACK TO RESCHEDULE. THANK YOU.

## 2025-01-23 NOTE — TELEPHONE ENCOUNTER
CALLED PT TO OFFER CYSTO R/S      2/12 @ 2:30  2/19 @ 2:30  2/24 @ 10:00 AM    PER MARJORIE SCHWARTZ W/ PATIENT AND SHE ACCEPTED 2/12 @ 2:30

## 2025-01-28 DIAGNOSIS — F41.8 DEPRESSION WITH ANXIETY: ICD-10-CM

## 2025-01-28 NOTE — TELEPHONE ENCOUNTER
Caller: Anderson, Amelie L    Relationship: Self    Best call back number: [phone]    Requested Prescriptions:   Requested Prescriptions     Pending Prescriptions Disp Refills    sertraline (ZOLOFT) 50 MG tablet 90 tablet 1     Sig: Take 1 tablet by mouth Daily.        Pharmacy where request should be sent: [address] [phone] [facility] [phone]      Last office visit with prescribing clinician: 1/13/2025   Last telemedicine visit with prescribing clinician: Visit date not found   Next office visit with prescribing clinician: 4/14/2025     Additional details provided by patient: PATIENT IS CURRENTLY OUT OF THIS SCRIPT.     Does the patient have less than a 3 day supply:  [x] Yes  [] No    Would you like a call back once the refill request has been completed: [] Yes [] No    If the office needs to give you a call back, can they leave a voicemail: [] Yes [] No    Ann Marie Asif Rep   01/28/25 14:56 EST

## 2025-02-12 ENCOUNTER — PROCEDURE VISIT (OUTPATIENT)
Dept: UROLOGY | Age: 75
End: 2025-02-12
Payer: MEDICARE

## 2025-02-12 VITALS — HEIGHT: 64 IN | WEIGHT: 150 LBS | BODY MASS INDEX: 25.61 KG/M2

## 2025-02-12 DIAGNOSIS — R31.29 MICROHEMATURIA: Primary | ICD-10-CM

## 2025-02-12 DIAGNOSIS — N32.81 OAB (OVERACTIVE BLADDER): ICD-10-CM

## 2025-02-12 LAB
BILIRUB BLD-MCNC: NEGATIVE MG/DL
CLARITY, POC: CLEAR
COLOR UR: YELLOW
EXPIRATION DATE: ABNORMAL
GLUCOSE UR STRIP-MCNC: ABNORMAL MG/DL
KETONES UR QL: NEGATIVE
LEUKOCYTE EST, POC: NEGATIVE
Lab: ABNORMAL
NITRITE UR-MCNC: NEGATIVE MG/ML
PH UR: 5.5 [PH] (ref 5–8)
PROT UR STRIP-MCNC: NEGATIVE MG/DL
RBC # UR STRIP: ABNORMAL /UL
SP GR UR: 1.01 (ref 1–1.03)
UROBILINOGEN UR QL: ABNORMAL

## 2025-02-12 RX ORDER — OXYBUTYNIN CHLORIDE 5 MG/1
5 TABLET, EXTENDED RELEASE ORAL DAILY
Qty: 30 TABLET | Refills: 11 | Status: SHIPPED | OUTPATIENT
Start: 2025-02-12

## 2025-02-12 NOTE — PROGRESS NOTES
Cystoscopy    Date/Time: 2/12/2025 2:44 PM    Performed by: Dorothy Aldana MD  Authorized by: Dorothy Aldana MD  Preparation: Patient was prepped and draped in the usual sterile fashion.  Local anesthesia used: no    Anesthesia:  Local anesthesia used: no    Sedation:  Patient sedated: no    Patient tolerance: patient tolerated the procedure well with no immediate complications      Cytoscopy Procedure:     Procedure: Flexible cytoscope was passed per urethra into the bladder without difficulty after proper consent. The bladder was inspected in a systematic meridian fashion. There were no tumors, lesions, stones, or other abnormalities noted within the bladder. Of note, there was no increased vascularity as well. Both ureteral orifices were identified and were normal in appearance. The flexible cytoscope was removed. The patient tolerated the procedure well.       Results    Procedure Component Value Ref Range Date/Time   CT Abdomen Pelvis With & Without Contrast [[id number]] Collected: 01/08/25 1047   Order Status: Completed Updated: 01/08/25 1058   Narrative:     CT ABDOMEN PELVIS W WO CONTRAST    Date of Exam: 1/8/2025 9:50 AM EST    Indication: Microhematuria.    Comparison: 1/30/2020    Technique: Axial CT images were obtained of the abdomen and pelvis before and after the uneventful intravenous administration of iodinated contrast. Sagittal and coronal reconstructions were performed.  Automated exposure control and iterative  reconstruction methods were used.      Findings:    Lower Chest: Lung bases clear    Kidneys/collecting system/bladder: No renal or ureteral calculi. No hydronephrosis. Symmetric normal-appearing renal enhancement. Multiple subcentimeter cortical lesions of both kidneys that have the appearance of cysts, up to 7 mm on the left. No  enhancing renal mass. Symmetric excretion of contrast. Normal pelvocaliceal morphology bilaterally. No urothelial thickening or suspicious filling  defect of the opacified portion of either collecting system. No bladder calculus no bladder wall  thickening. No focal bladder lesion demonstrated    Organs: Liver, spleen, pancreas, adrenal glands unremarkable. Gallbladder partly contracted due to recently ingested meal    Gastrointestinal: No intestinal distention or evident wall thickening. Normal appendix    Pelvis: No adenopathy or abnormal fluid collection. Uterus surgically absent    Peritoneum/Retroperitoneum: No retroperitoneal adenopathy. No ascites. Normal caliber aorta    Bones/Soft Tissues: No suspicious bone lesion. Prominent degenerative disc disease at L4-L5 and L5-S1   Impression:     No findings to account for history of microhematuria. There are subcentimeter bilateral renal cysts.                Electronically Signed: Gagan Tenorio   1/8/2025 10:56 AM EST   Workstation ID: [id number]       The patient has frequency urgency and urge incontinence.  She would like to try medication for it.  She is never tried medication previously.  I sent in oxybutynin ER 5 mg once a day for her to try.  I will see her in 2 months to see whether it is effective.

## 2025-04-07 ENCOUNTER — TELEPHONE (OUTPATIENT)
Dept: UROLOGY | Age: 75
End: 2025-04-07
Payer: MEDICARE

## 2025-04-07 NOTE — TELEPHONE ENCOUNTER
Caller: Amelie Anderson     Relationship: SELF    Best call back number: [phone]     What is the best time to reach you: ANYTIME    Who are you requesting to speak with (clinical staff, provider,  specific staff member): CLINICAL    Do you know the name of the person who called: INCOMING CALL     What was the call regarding: PT HAD A CONFLICT AND CANCELLED HER APPT. NO APPTS AVAILABLE UNTIL LATE SEPTEMBER. UNABLE TO VERIFY IF THAT TIMEFRAME IS OK. PLEASE REVIEW AND GIVE PT A CALL BACK TO RESCHEDULE.    Is it okay if the provider responds through MyChart: NO

## 2025-04-14 ENCOUNTER — OFFICE VISIT (OUTPATIENT)
Dept: INTERNAL MEDICINE | Facility: CLINIC | Age: 75
End: 2025-04-14
Payer: MEDICARE

## 2025-04-14 VITALS
HEIGHT: 64 IN | TEMPERATURE: 97.6 F | DIASTOLIC BLOOD PRESSURE: 68 MMHG | BODY MASS INDEX: 25.27 KG/M2 | OXYGEN SATURATION: 97 % | WEIGHT: 148 LBS | RESPIRATION RATE: 16 BRPM | HEART RATE: 69 BPM | SYSTOLIC BLOOD PRESSURE: 110 MMHG

## 2025-04-14 DIAGNOSIS — Z00.00 ENCOUNTER FOR SUBSEQUENT ANNUAL WELLNESS VISIT (AWV) IN MEDICARE PATIENT: Primary | ICD-10-CM

## 2025-04-14 DIAGNOSIS — J30.9 ALLERGIC RHINITIS, UNSPECIFIED SEASONALITY, UNSPECIFIED TRIGGER: ICD-10-CM

## 2025-04-14 DIAGNOSIS — M25.512 CHRONIC LEFT SHOULDER PAIN: ICD-10-CM

## 2025-04-14 DIAGNOSIS — E11.9 TYPE 2 DIABETES MELLITUS WITHOUT COMPLICATION, UNSPECIFIED WHETHER LONG TERM INSULIN USE: ICD-10-CM

## 2025-04-14 DIAGNOSIS — G89.29 CHRONIC LEFT SHOULDER PAIN: ICD-10-CM

## 2025-04-14 LAB
ALBUMIN SERPL-MCNC: 4.7 G/DL (ref 3.5–5.2)
ALBUMIN UR-MCNC: <1.2 MG/DL
ALBUMIN/GLOB SERPL: 1.7 G/DL
ALP SERPL-CCNC: 74 U/L (ref 39–117)
ALT SERPL W P-5'-P-CCNC: 13 U/L (ref 1–33)
ANION GAP SERPL CALCULATED.3IONS-SCNC: 10 MMOL/L (ref 5–15)
AST SERPL-CCNC: 16 U/L (ref 1–32)
BASOPHILS # BLD AUTO: 0.03 10*3/MM3 (ref 0–0.2)
BASOPHILS NFR BLD AUTO: 0.5 % (ref 0–1.5)
BILIRUB SERPL-MCNC: 0.4 MG/DL (ref 0–1.2)
BUN SERPL-MCNC: 24 MG/DL (ref 8–23)
BUN/CREAT SERPL: 23.3 (ref 7–25)
CALCIUM SPEC-SCNC: 10.1 MG/DL (ref 8.6–10.5)
CHLORIDE SERPL-SCNC: 103 MMOL/L (ref 98–107)
CHOLEST SERPL-MCNC: 132 MG/DL (ref 0–200)
CO2 SERPL-SCNC: 27 MMOL/L (ref 22–29)
CREAT SERPL-MCNC: 1.03 MG/DL (ref 0.57–1)
CREAT UR-MCNC: 37.3 MG/DL
DEPRECATED RDW RBC AUTO: 42.5 FL (ref 37–54)
EGFRCR SERPLBLD CKD-EPI 2021: 57.5 ML/MIN/1.73
EOSINOPHIL # BLD AUTO: 0.13 10*3/MM3 (ref 0–0.4)
EOSINOPHIL NFR BLD AUTO: 2 % (ref 0.3–6.2)
ERYTHROCYTE [DISTWIDTH] IN BLOOD BY AUTOMATED COUNT: 12.6 % (ref 12.3–15.4)
GLOBULIN UR ELPH-MCNC: 2.7 GM/DL
GLUCOSE SERPL-MCNC: 195 MG/DL (ref 65–99)
HBA1C MFR BLD: 7.3 % (ref 4.8–5.6)
HCT VFR BLD AUTO: 41.1 % (ref 34–46.6)
HDLC SERPL-MCNC: 63 MG/DL (ref 40–60)
HGB BLD-MCNC: 13.5 G/DL (ref 12–15.9)
IMM GRANULOCYTES # BLD AUTO: 0.02 10*3/MM3 (ref 0–0.05)
IMM GRANULOCYTES NFR BLD AUTO: 0.3 % (ref 0–0.5)
LDLC SERPL CALC-MCNC: 53 MG/DL (ref 0–100)
LDLC/HDLC SERPL: 0.84 {RATIO}
LYMPHOCYTES # BLD AUTO: 1.65 10*3/MM3 (ref 0.7–3.1)
LYMPHOCYTES NFR BLD AUTO: 25.2 % (ref 19.6–45.3)
MCH RBC QN AUTO: 30.4 PG (ref 26.6–33)
MCHC RBC AUTO-ENTMCNC: 32.8 G/DL (ref 31.5–35.7)
MCV RBC AUTO: 92.6 FL (ref 79–97)
MICROALBUMIN/CREAT UR: NORMAL MG/G{CREAT}
MONOCYTES # BLD AUTO: 0.58 10*3/MM3 (ref 0.1–0.9)
MONOCYTES NFR BLD AUTO: 8.8 % (ref 5–12)
NEUTROPHILS NFR BLD AUTO: 4.15 10*3/MM3 (ref 1.7–7)
NEUTROPHILS NFR BLD AUTO: 63.2 % (ref 42.7–76)
NRBC BLD AUTO-RTO: 0 /100 WBC (ref 0–0.2)
PLATELET # BLD AUTO: 176 10*3/MM3 (ref 140–450)
PMV BLD AUTO: 10.3 FL (ref 6–12)
POTASSIUM SERPL-SCNC: 4.3 MMOL/L (ref 3.5–5.2)
PROT SERPL-MCNC: 7.4 G/DL (ref 6–8.5)
RBC # BLD AUTO: 4.44 10*6/MM3 (ref 3.77–5.28)
SODIUM SERPL-SCNC: 140 MMOL/L (ref 136–145)
TRIGL SERPL-MCNC: 80 MG/DL (ref 0–150)
TSH SERPL DL<=0.05 MIU/L-ACNC: 4.79 UIU/ML (ref 0.27–4.2)
VLDLC SERPL-MCNC: 16 MG/DL (ref 5–40)
WBC NRBC COR # BLD AUTO: 6.56 10*3/MM3 (ref 3.4–10.8)

## 2025-04-14 PROCEDURE — 82570 ASSAY OF URINE CREATININE: CPT | Performed by: INTERNAL MEDICINE

## 2025-04-14 PROCEDURE — 84443 ASSAY THYROID STIM HORMONE: CPT | Performed by: INTERNAL MEDICINE

## 2025-04-14 PROCEDURE — 83036 HEMOGLOBIN GLYCOSYLATED A1C: CPT | Performed by: INTERNAL MEDICINE

## 2025-04-14 PROCEDURE — 85025 COMPLETE CBC W/AUTO DIFF WBC: CPT | Performed by: INTERNAL MEDICINE

## 2025-04-14 PROCEDURE — 80061 LIPID PANEL: CPT | Performed by: INTERNAL MEDICINE

## 2025-04-14 PROCEDURE — 80053 COMPREHEN METABOLIC PANEL: CPT | Performed by: INTERNAL MEDICINE

## 2025-04-14 PROCEDURE — 82043 UR ALBUMIN QUANTITATIVE: CPT | Performed by: INTERNAL MEDICINE

## 2025-04-14 RX ORDER — ASPIRIN 81 MG/1
81 TABLET ORAL DAILY
Qty: 90 TABLET | Refills: 3 | Status: SHIPPED | OUTPATIENT
Start: 2025-04-14

## 2025-04-14 RX ORDER — CALCIUM CARBONATE/VITAMIN D3 600 MG-10
1 TABLET ORAL DAILY
Qty: 90 TABLET | Refills: 1 | Status: SHIPPED | OUTPATIENT
Start: 2025-04-14

## 2025-04-14 RX ORDER — BLOOD-GLUCOSE METER
KIT MISCELLANEOUS
Qty: 100 EACH | Refills: 1 | Status: SHIPPED | OUTPATIENT
Start: 2025-04-14

## 2025-04-14 RX ORDER — LOSARTAN POTASSIUM 25 MG/1
25 TABLET ORAL DAILY
Qty: 90 TABLET | Refills: 1 | Status: SHIPPED | OUTPATIENT
Start: 2025-04-14

## 2025-04-14 RX ORDER — ATENOLOL 25 MG/1
25 TABLET ORAL DAILY
Qty: 90 TABLET | Refills: 1 | Status: SHIPPED | OUTPATIENT
Start: 2025-04-14

## 2025-04-14 NOTE — PROGRESS NOTES
Subjective   The ABCs of the Annual Wellness Visit  Medicare Wellness Visit      Amelie Anderson is a 75 y.o. patient who presents for a Medicare Wellness Visit.    The following portions of the patient's history were reviewed and   updated as appropriate: allergies, current medications, past family history, past medical history, past social history, past surgical history, and problem list.    Compared to one year ago, the patient's physical   health is the same.  Compared to one year ago, the patient's mental   health is the same.    Recent Hospitalizations:  She was not admitted to the hospital during the last year.     Current Medical Providers:  Patient Care Team:  Fabi Bill MD as PCP - General (Pediatrics)  Ant Pederson MD as Surgeon (Orthopedic Surgery)  Jason Perrin MD as Consulting Physician (Nephrology)  Dorothy Aldana MD (Urology)  Pradeep Severino DO as Consulting Physician (Pulmonary Disease)    Outpatient Medications Prior to Visit   Medication Sig Dispense Refill    alendronate (Fosamax) 70 MG tablet Take 1 tablet by mouth Every 7 (Seven) Days. 12 tablet 0    Carpenter Thyroid 60 MG tablet Take 1 tablet by mouth Daily. 90 tablet 1    atorvastatin (LIPITOR) 40 MG tablet Take 1 tablet by mouth Daily. 90 tablet 1    Biotin 76308 MCG tablet Take  by mouth.      brimonidine (ALPHAGAN) 0.15 % ophthalmic solution 1 drop 3 (Three) Times a Day.      chlorpheniramine (CHLOR-TRIMETON) 4 MG tablet Take 1 tablet by mouth Every 6 (Six) Hours As Needed for Allergies. 360 tablet 1    FeroSul 325 (65 Fe) MG tablet Take 1 tablet by mouth Daily. 90 tablet 1    fluticasone (FLONASE) 50 MCG/ACT nasal spray 2 sprays into the nostril(s) as directed by provider Daily. 2 puffs each nostril 18.2 mL 0    Jardiance 10 MG tablet tablet Take 1 tablet by mouth Daily. 90 tablet 1    Lancets 30G misc 1 each 4 (Four) Times a Day As Needed (for glucose testing). 100 each 1    loratadine (CLARITIN) 10  MG tablet Take 1 tablet by mouth Daily. 90 tablet 1    metFORMIN ER (GLUCOPHAGE-XR) 500 MG 24 hr tablet Take 4 tablets by mouth Every Night. 360 tablet 1    multivitamin with minerals tablet tablet Take 1 tablet by mouth Daily. HOLD FOR SURGERY      olopatadine (PATANOL) 0.1 % ophthalmic solution Administer 2 drops to both eyes 2 (Two) Times a Day. Morning and night 15 mL 1    Omega-3 Fatty Acids (fish oil) 1000 MG capsule capsule Take  by mouth Daily With Breakfast.      oxybutynin XL (DITROPAN-XL) 5 MG 24 hr tablet Take 1 tablet by mouth Daily. 30 tablet 11    sertraline (ZOLOFT) 50 MG tablet Take 1 tablet by mouth Daily. 90 tablet 1    vitamin B-6 (PYRIDOXINE) 50 MG tablet Take 1 tablet by mouth Daily. 90 tablet 1    amitriptyline (ELAVIL) 25 MG tablet Take 1 tablet by mouth Every Night. 90 tablet 1    aspirin 81 MG EC tablet Take 1 tablet by mouth Daily. 90 tablet 3    atenolol (TENORMIN) 25 MG tablet Take 1 tablet by mouth Daily. 90 tablet 1    calcium carb-cholecalciferol 600-10 MG-MCG tablet per tablet Take 1 tablet by mouth Daily.      FREESTYLE LITE test strip Use as directed 100 each 1    losartan (COZAAR) 25 MG tablet Take 1 tablet by mouth Daily. 90 tablet 1     Facility-Administered Medications Prior to Visit   Medication Dose Route Frequency Provider Last Rate Last Admin    Chlorhexidine Gluconate Cloth 2 % pads 1 application  1 application  Topical Q12H PRN Ne Acevedo, ERNIE         No opioid medication identified on active medication list. I have reviewed chart for other potential  high risk medication/s and harmful drug interactions in the elderly.      Aspirin is on active medication list. Aspirin use is indicated based on review of current medical condition/s. Pros and cons of this therapy have been discussed today. Benefits of this medication outweigh potential harm.  Patient has been encouraged to continue taking this medication.  .      Patient Active Problem List   Diagnosis    Essential  "hypertension    Aortic valve stenosis    S/P AVR (aortic valve replacement)    Hypothyroidism    Type 2 diabetes mellitus without complication    Pulmonary nodule    High cholesterol    Abdominal aortic aneurysm    Allergic rhinitis    Arthritis    Depression with anxiety    Heart disease    Heel spur, right    Kidney calculi    Kidney disease    Bladder disorder    Lesion of bladder    Microhematuria    Migraine    Mitral valve prolapse    Lung mass    Osteoporosis     Advance Care Planning Advance Directive is not on file.  ACP discussion was held with the patient during this visit. Patient does not have an advance directive, information provided.            Objective   Vitals:    25 1042   BP: 110/68   BP Location: Right arm   Patient Position: Sitting   Cuff Size: Adult   Pulse: 69   Resp: 16   Temp: 97.6 °F (36.4 °C)   TempSrc: Temporal   SpO2: 97%   Weight: 67.1 kg (148 lb)   Height: 162.6 cm (64.02\")   PainSc: 2    PainLoc: Shoulder       Estimated body mass index is 25.39 kg/m² as calculated from the following:    Height as of this encounter: 162.6 cm (64.02\").    Weight as of this encounter: 67.1 kg (148 lb).    BMI is >= 25 and <30. (Overweight) The following options were offered after discussion;: exercise counseling/recommendations and nutrition counseling/recommendations           Does the patient have evidence of cognitive impairment? No                                                                                               Health  Risk Assessment    Smoking Status:  Social History     Tobacco Use   Smoking Status Former    Current packs/day: 0.00    Average packs/day: 1 pack/day for 48.0 years (48.0 ttl pk-yrs)    Types: Cigarettes    Start date:     Quit date: 2016    Years since quittin.2    Passive exposure: Past   Smokeless Tobacco Never     Alcohol Consumption:  Social History     Substance and Sexual Activity   Alcohol Use Not Currently       Fall Risk Screen  STEADI Fall " Risk Assessment was completed, and patient is at LOW risk for falls.Assessment completed on:2025    Depression Screening   Little interest or pleasure in doing things? Not at all   Feeling down, depressed, or hopeless? Several days   PHQ-2 Total Score 1      Health Habits and Functional and Cognitive Screenin/14/2025    10:00 AM   Functional & Cognitive Status   Do you have difficulty preparing food and eating? No   Do you have difficulty bathing yourself, getting dressed or grooming yourself? No   Do you have difficulty using the toilet? No   Do you have difficulty moving around from place to place? No   Do you have trouble with steps or getting out of a bed or a chair? No   Current Diet Well Balanced Diet   Dental Exam Up to date   Eye Exam Up to date   Exercise (times per week) 0 times per week   Current Exercises Include No Regular Exercise   Do you need help using the phone?  No   Are you deaf or do you have serious difficulty hearing?  Yes   Do you need help to go to places out of walking distance? No   Do you need help shopping? No   Do you need help preparing meals?  No   Do you need help with housework?  No   Do you need help with laundry? No   Do you need help taking your medications? No   Do you need help managing money? No   Do you ever drive or ride in a car without wearing a seat belt? No   Have you felt unusual stress, anger or loneliness in the last month? Yes   Who do you live with? Spouse   If you need help, do you have trouble finding someone available to you? No   Do you have difficulty concentrating, remembering or making decisions? No           Age-appropriate Screening Schedule:  Refer to the list below for future screening recommendations based on patient's age, sex and/or medical conditions. Orders for these recommended tests are listed in the plan section. The patient has been provided with a written plan.    Health Maintenance List  Health Maintenance   Topic Date Due     URINE MICROALBUMIN-CREATININE RATIO (uACR)  Never done    TDAP/TD VACCINES (1 - Tdap) Never done    ZOSTER VACCINE (2 of 3) 12/16/2015    DIABETIC FOOT EXAM  09/24/2019    DIABETIC EYE EXAM  05/05/2024    ANNUAL WELLNESS VISIT  04/11/2025    HEMOGLOBIN A1C  07/13/2025    COVID-19 Vaccine (7 - 2024-25 season) 04/22/2025    INFLUENZA VACCINE  07/01/2025    LIPID PANEL  01/13/2026    LUNG CANCER SCREENING  01/22/2026    DXA SCAN  02/12/2026    COLORECTAL CANCER SCREENING  06/01/2026    MAMMOGRAM  06/21/2026    HEPATITIS C SCREENING  Completed    Pneumococcal Vaccine 50+  Completed                                                                                                                                                CMS Preventative Services Quick Reference  Risk Factors Identified During Encounter  None Identified    The above risks/problems have been discussed with the patient.  Pertinent information has been shared with the patient in the After Visit Summary.  An After Visit Summary and PPPS were made available to the patient.    Follow Up:   Next Medicare Wellness visit to be scheduled in 1 year.     Assessment & Plan  Encounter for subsequent annual wellness visit (AWV) in Medicare patient  Screening labs reviewed/ordered  Counseling provided regarding age appropriate screenings and immunizations, healthy diet and exercise.        Type 2 diabetes mellitus without complication, unspecified whether long term insulin use  Checking follow up labs today    Orders:    atenolol (TENORMIN) 25 MG tablet; Take 1 tablet by mouth Daily.    FREESTYLE LITE test strip; Use as directed    CBC & Differential    Comprehensive Metabolic Panel    Hemoglobin A1c    Lipid Panel    TSH    Microalbumin / Creatinine Urine Ratio - Urine, Random Void    Allergic rhinitis, unspecified seasonality, unspecified trigger    Orders:    atenolol (TENORMIN) 25 MG tablet; Take 1 tablet by mouth Daily.    Chronic left shoulder pain    Orders:     XR Shoulder 2+ View Left         Follow Up:   No follow-ups on file.

## 2025-04-14 NOTE — ASSESSMENT & PLAN NOTE
Checking follow up labs today    Orders:    atenolol (TENORMIN) 25 MG tablet; Take 1 tablet by mouth Daily.    FREESTYLE LITE test strip; Use as directed    CBC & Differential    Comprehensive Metabolic Panel    Hemoglobin A1c    Lipid Panel    TSH    Microalbumin / Creatinine Urine Ratio - Urine, Random Void

## 2025-05-05 ENCOUNTER — TREATMENT (OUTPATIENT)
Dept: PHYSICAL THERAPY | Facility: CLINIC | Age: 75
End: 2025-05-05
Payer: MEDICARE

## 2025-05-05 DIAGNOSIS — M25.612 DECREASED RANGE OF MOTION OF LEFT SHOULDER: ICD-10-CM

## 2025-05-05 DIAGNOSIS — M25.512 ACUTE PAIN OF LEFT SHOULDER: Primary | ICD-10-CM

## 2025-05-05 PROCEDURE — 97110 THERAPEUTIC EXERCISES: CPT | Performed by: OCCUPATIONAL THERAPIST

## 2025-05-05 PROCEDURE — 97165 OT EVAL LOW COMPLEX 30 MIN: CPT | Performed by: OCCUPATIONAL THERAPIST

## 2025-05-05 NOTE — PROGRESS NOTES
"Occupational Therapy Initial Evaluation and Plan of Care  [place]  OT: [address] [facility] [place] [address]    Patient: Amelie Anderson   : 1950  Diagnosis/ICD-10 Code:  Acute pain of left shoulder [M25.512]  Referring practitioner: Fabi Abad*  Date of Initial Visit: 2025  Today's Date: 2025  Patient seen for 1 sessions           Subjective Questionnaire: QuickDASH: 34/55      Subjective Evaluation    History of Present Illness  Mechanism of injury: Pt is a RHD 74 y/o female who presents to therapy with c/o L shoulder pain and decreased ROM. Pt reports a couple of months ago she was walking her dogs when one of them pulled the leash pulling her arm back behind her. Pt reports she felt a pop. Pt reports approximate 6 week history of pain. Xrays revealed: No acute osseous abnormality of the left shoulder. Mild degenerative spurring at the inferior aspect of the glenoid. Pt reports majority of symptoms are in the anterior shoulder. Pt reports it radiates down the arm and up into the neck. Pt is retired.  PMHx: AAA, arthritis, DM II, osteoporosis, CVA.      Pain  Current pain ratin  At best pain ratin  At worst pain ratin  Location: L shoulder  Quality: radiating and discomfort (stabbing)  Aggravating factors: lifting, movement, overhead activity and sleeping  Progression: no change    Social Support  Lives with: spouse    Hand dominance: right    Diagnostic Tests  X-ray: abnormal    Treatments  No previous or current treatments  Patient Goals  Patient goals for therapy: decreased pain, increased motion, increased strength, independence with ADLs/IADLs and return to sport/leisure activities  Patient goal: \"To get better. For the pain to go away\"         Objective          Static Posture     Shoulders  Rounded.    Tenderness     Additional Tenderness Details  Pt reports severe tenderness at proximal biceps tendon and moderate tenderness throughout remainder of biceps muscle. Pt " reports severe tenderness in triceps muscle belly. Pt reports mild tenderness at AC joint.    Cervical/Thoracic Screen   Cervical range of motion within normal limits    Neurological Testing     Additional Neurological Details  Pt reports no numbness/tingling.    Active Range of Motion   Left Shoulder   Flexion: 98 degrees with pain  Abduction: 83 degrees with pain  External rotation BTH: C2 with pain  Internal rotation BTB: lumbar with pain    Left Elbow   Normal active range of motion  Flexion: with pain  Extension: with pain    Passive Range of Motion   Left Shoulder   Flexion: 90 degrees with pain  Abduction: 80 degrees with pain  External rotation 45°: 60 degrees with pain    Strength/Myotome Testing     Additional Strength Details  Deferred secondary to pain.    Tests     Left Shoulder   Positive belly press and Hawkin's.   Negative drop arm and Speed's.           Assessment & Plan       Assessment  Impairments: abnormal or restricted ROM, activity intolerance, impaired physical strength, lacks appropriate home exercise program and pain with function   Functional limitations: carrying objects, lifting, sleeping, pulling, pushing, uncomfortable because of pain, moving in bed, reaching behind back and reaching overhead   Assessment details: Pt will benefit from skilled OT secondary to decreased strength/ROM and increased pain that limits pt ability to complete ADLs.  Prognosis: good    Goals  Plan Goals: SHOULDER  PROBLEMS:     1. The patient has limited ROM of the L shoulder.    LTG 1: 12 weeks:  The patient will demonstrate 140 degrees of active shoulder flexion, 140 degrees of shoulder abduction, and external rotation to C7 to allow the patient to reach into upper kitchen cabinets and manipulate clothing behind the back with greater ease.    STATUS:  New   STG 1a: 8 weeks:  The patient will demonstrate 120 degrees of active shoulder flexion, 120 degrees of shoulder abduction, and external rotation to C4      STATUS:  New   TREATMENT: Manual therapy, therapeutic exercise, home exercise instruction, and modalities as needed to include: electrical stimulation, ultrasound, moist heat, and ice.    2. The patient has limited strength of the L shoulder.   LTG 2: 12 weeks:  The patient will demonstrate 5 /5 strength for L shoulder flexion, abduction, external rotation, and internal rotation in order to demonstrate improved shoulder stability.    STATUS:  New   STG 2a: 8 weeks:  The patient will demonstrate ability to tolerate MMT for L shoulder flexion, abduction, external rotation, and internal rotation.    STATUS:  New   STG2b:  8 weeks:  The patient will be independent with home exercises.     STATUS:  New   TREATMENT: Manual therapy, therapeutic exercise, home exercise instruction, and modalities as needed to include: electrical stimulation, ultrasound, moist heat, and ice.     3. The patient complains of pain to the L shoulder.   LTG 3: 12 weeks:  The patient will report a pain rating of 1 /10 at worst in order to improve sleep quality and tolerance to performance of activities of daily living.    STATUS:  New   STG 3a: 8 weeks:  The patient will report a pain rating of 4 /10 at worst.     STATUS:  New   TREATMENT: Manual therapy, therapeutic exercise, home exercise instruction, and modalities as needed to include: electrical stimulation, ultrasound, moist heat, and ice.    4. Carrying, Moving, and Handling Objects Functional Limitation     LTG 4: 12 weeks:  The patient will demonstrate 1-19 % limitation by achieving a score of 19 on the QuickDASH.    STATUS:  New   STG 4a: 8 weeks:  The patient will demonstrate 20-39 % limitation by achieving a score of 27 on the QuickDASH.      STATUS:  New   TREATMENT:  Manual therapy, therapeutic exercise, home exercise instruction, and modalities as needed to include: moist heat, electrical stimulation, and ultrasound.     Plan  Planned modality interventions: cryotherapy and  thermotherapy (hydrocollator packs)  Planned therapy interventions: body mechanics training, fine motor coordination training, flexibility, functional ROM exercises, home exercise program, joint mobilization, manual therapy, neuromuscular re-education, postural training, soft tissue mobilization, strengthening, stretching and therapeutic activities  Frequency: 2x week  Duration in weeks: 12  Treatment plan discussed with: patient      Pt is indicated for skilled occupational therapy services.  Timed:           Therapeutic Exercise:    8     mins  [id number];            Un-Timed:  Low Eval     37     Mins  [id number]    Timed Treatment:   8   mins   Total Treatment:     45   mins    Start time: 1310  End time: 1355    OT SIGNATURE: Tr Chiu OT   DATE TREATMENT INITIATED: 5/5/2025  [place] License: [id number]    Initial Certification  Certification Period: 8/2/2025  I certify that the therapy services are furnished while this patient is under my care.  The services outlined above are required by this patient, and will be reviewed every 90 days.     PHYSICIAN: Fabi Bill MD      DATE:   MD NPI: [id number]  Please sign and return via fax to   [phone].. Thank you, [facility] Occupational Therapy.

## 2025-05-13 DIAGNOSIS — M81.0 AGE-RELATED OSTEOPOROSIS WITHOUT CURRENT PATHOLOGICAL FRACTURE: ICD-10-CM

## 2025-05-13 NOTE — TELEPHONE ENCOUNTER
Caller: Amelie Anderson    Relationship: Self    Best call back number: [phone]     Requested Prescriptions:   Requested Prescriptions     Pending Prescriptions Disp Refills    amitriptyline (ELAVIL) 25 MG tablet 90 tablet 1     Sig: Take 1 tablet by mouth Every Night.    alendronate (Fosamax) 70 MG tablet 12 tablet 0     Sig: Take 1 tablet by mouth Every 7 (Seven) Days.        Pharmacy where request should be sent: [place] [phone] [place], [place] [phone]      Last office visit with prescribing clinician: 4/14/2025   Last telemedicine visit with prescribing clinician: Visit date not found   Next office visit with prescribing clinician: 7/14/2025     Additional details provided by patient: CALLER STATED THAT SHE IS OUT OF MEDICATION     Does the patient have less than a 3 day supply:  [x] Yes  [] No    Ann Marie Quintero Rep   05/13/25 09:49 EDT           
Drug-Drug: sertraline and amitriptyline   
ambulatory

## 2025-05-14 ENCOUNTER — TREATMENT (OUTPATIENT)
Dept: PHYSICAL THERAPY | Facility: CLINIC | Age: 75
End: 2025-05-14
Payer: MEDICARE

## 2025-05-14 DIAGNOSIS — M25.612 DECREASED RANGE OF MOTION OF LEFT SHOULDER: ICD-10-CM

## 2025-05-14 DIAGNOSIS — M25.512 ACUTE PAIN OF LEFT SHOULDER: Primary | ICD-10-CM

## 2025-05-14 RX ORDER — ALENDRONATE SODIUM 70 MG/1
70 TABLET ORAL
Qty: 12 TABLET | Refills: 0 | Status: SHIPPED | OUTPATIENT
Start: 2025-05-14

## 2025-05-14 NOTE — PROGRESS NOTES
Occupational Therapy Daily Treatment Note  [facility]: [address] [place]  [place] [address]      Patient: Amelie Anderson   : 1950  Diagnosis/ICD-10 Code:  Acute pain of left shoulder [M25.512]  Referring practitioner: Fabi Abad*  Date of Initial Visit: Type: THERAPY  Noted: 2025  Today's Date: 2025  Patient seen for 2 sessions           Subjective   Amelie Anderson reports: 8/10 pain L shoulder. Pt reports she fell on  and hasn't been able to do any exercises since.    Objective     See Exercise, Manual, and Modality Logs for complete treatment.       Assessment/Plan  Pt tolerated 3 min of UBE before reporting pain in L shoulder (ended). OT graded up sets with scap squeezes. Good tolerance to remainder of treatment. Pt continues with decreased fxl strength/ROM and increased pain that limits ability to complete ADLs/IADLs.  Progress per Plan of Care           Timed:  Therapeutic Exercise:    15     mins  65120;       Therapeutic Activity:     8     mins  42680;         Timed Treatment:   23   mins   Total Treatment:     23   mins    Start time: 1352  End time: 1415    Tr Chiu OT  Occupational Therapist  [place] License: [id number]  NPI: [id number]

## 2025-05-16 ENCOUNTER — OFFICE VISIT (OUTPATIENT)
Dept: CARDIOLOGY | Facility: CLINIC | Age: 75
End: 2025-05-16
Payer: MEDICARE

## 2025-05-16 VITALS
WEIGHT: 147 LBS | DIASTOLIC BLOOD PRESSURE: 69 MMHG | BODY MASS INDEX: 25.1 KG/M2 | SYSTOLIC BLOOD PRESSURE: 121 MMHG | HEART RATE: 67 BPM | HEIGHT: 64 IN

## 2025-05-16 DIAGNOSIS — Z95.2 S/P AVR (AORTIC VALVE REPLACEMENT): Primary | Chronic | ICD-10-CM

## 2025-05-16 DIAGNOSIS — R42 POSTURAL DIZZINESS WITH PRESYNCOPE: ICD-10-CM

## 2025-05-16 DIAGNOSIS — E78.2 MIXED HYPERLIPIDEMIA: ICD-10-CM

## 2025-05-16 DIAGNOSIS — I10 ESSENTIAL HYPERTENSION: ICD-10-CM

## 2025-05-16 DIAGNOSIS — R55 POSTURAL DIZZINESS WITH PRESYNCOPE: ICD-10-CM

## 2025-05-16 NOTE — PROGRESS NOTES
Chief Complaint  S/P AVR (aortic valve replacement) (6m Follow Up)    Subjective        Amelie Anderson presents to [facility] CARDIOLOGY  History of present illness:    Patient states overall she is doing well.  She is following with the pulmonologist.  She does have a 50-year smoking history of up to 1-1/2 packs a day before quitting back in 2016.  She does not have a regular exercise program.  She does not drink a lot of water.  She does note some dizzy spells when she is up walking around.      Past Medical History:   Diagnosis Date    Abdominal aortic aneurysm (AAA)     Anemia     Aortic stenosis     AR (allergic rhinitis)     Arthritis     Bladder disorder     Cataract     BILAT    Depression with anxiety     Diabetes     Elevated cholesterol     Environmental allergies     GERD (gastroesophageal reflux disease)     Heel spur, right     High cholesterol     History of anesthesia complications     STATES WAS SLOW TO WAKE UP AFTER SURGERY    History of TB (tuberculosis)     YEARS AGO WHEN FIRST CAME TO [facility], WAS TREATED FOR WITH INH    Hypertension     Hyperthyroidism     Iron deficiency     Kidney calculi     Kidney cysts     Kidney disease     Lesion of bladder [id number]    Microhematuria 09/25/2015    Migraine     Mitral valve prolapse     Osteoporosis     PONV (postoperative nausea and vomiting)     Pulmonary nodule     S/P AVR (aortic valve replacement) 03/04/2021     Normally functioning prosthetic aortic valve. on echo 5/20/2021 and 2/8/2022    Seasonal allergies     Stroke     had mini stroke behind right eye    Thyroid disorder     Urinary frequency          Past Surgical History:   Procedure Laterality Date    ABDOMINAL HYSTERECTOMY      AORTIC VALVE REPAIR/REPLACEMENT N/A 2/2/2021    Procedure: VARUN STERNOTOMY AORTIC VALVE REPLACEMENT AND PRP;  Surgeon: Jesse Coronado MD;  Location: [facility];  Service: Cardiothoracic;  Laterality: N/A;    BREAST BIOPSY      CARDIAC  CATHETERIZATION  2020    no stents    CARDIAC CATHETERIZATION  1975    no stents    CARDIAC SURGERY      OPEN HEART SURGERY     COLONOSCOPY  ,     CYSTOSCOPY  2020  WITH DR OSMAN  10/22/2015 WITH FULGURATION; CBF PER DAWSON    HYSTERECTOMY      TEETH EXTRACTION      ALL TEETH ON TOP, ALL BOTTOM TEETH EXCEPT 4    TONSILLECTOMY            Social History     Socioeconomic History    Marital status:    Tobacco Use    Smoking status: Former     Current packs/day: 0.00     Average packs/day: 1 pack/day for 48.0 years (48.0 ttl pk-yrs)     Types: Cigarettes     Start date:      Quit date:      Years since quittin.3     Passive exposure: Past    Smokeless tobacco: Never   Vaping Use    Vaping status: Never Used   Substance and Sexual Activity    Alcohol use: Not Currently    Drug use: Never    Sexual activity: Defer         Family History   Problem Relation Age of Onset    Aortic stenosis Mother     Valvular heart disease Mother     Heart attack Father 30    Coronary artery disease Father     Aortic stenosis Sister     Valvular heart disease Sister     Coronary artery disease Paternal Aunt     Coronary artery disease Paternal Uncle     Heart disease Other     Diabetes Other     Heart attack Other     Malig Hyperthermia Neg Hx           No Known Allergies         Current Outpatient Medications:     alendronate (Fosamax) 70 MG tablet, Take 1 tablet by mouth Every 7 (Seven) Days., Disp: 12 tablet, Rfl: 0    amitriptyline (ELAVIL) 25 MG tablet, Take 1 tablet by mouth Every Night., Disp: 90 tablet, Rfl: 1    [place] Thyroid 60 MG tablet, Take 1 tablet by mouth Daily., Disp: 90 tablet, Rfl: 1    aspirin 81 MG EC tablet, Take 1 tablet by mouth Daily., Disp: 90 tablet, Rfl: 3    atenolol (TENORMIN) 25 MG tablet, Take 1 tablet by mouth Daily., Disp: 90 tablet, Rfl: 1    atorvastatin (LIPITOR) 40 MG tablet, Take 1 tablet by mouth Daily., Disp: 90 tablet, Rfl: 1    Biotin 15624  MCG tablet, Take  by mouth., Disp: , Rfl:     brimonidine (ALPHAGAN) 0.15 % ophthalmic solution, 1 drop 3 (Three) Times a Day., Disp: , Rfl:     calcium carb-cholecalciferol 600-10 MG-MCG tablet per tablet, Take 1 tablet by mouth Daily., Disp: 90 tablet, Rfl: 1    chlorpheniramine (CHLOR-TRIMETON) 4 MG tablet, Take 1 tablet by mouth Every 6 (Six) Hours As Needed for Allergies., Disp: 360 tablet, Rfl: 1    FeroSul 325 (65 Fe) MG tablet, Take 1 tablet by mouth Daily., Disp: 90 tablet, Rfl: 1    fluticasone (FLONASE) 50 MCG/ACT nasal spray, 2 sprays into the nostril(s) as directed by provider Daily. 2 puffs each nostril, Disp: 18.2 mL, Rfl: 0    FREESTYLE LITE test strip, Use as directed, Disp: 100 each, Rfl: 1    Jardiance 10 MG tablet tablet, Take 1 tablet by mouth Daily., Disp: 90 tablet, Rfl: 1    Lancets 30G misc, 1 each 4 (Four) Times a Day As Needed (for glucose testing)., Disp: 100 each, Rfl: 1    loratadine (CLARITIN) 10 MG tablet, Take 1 tablet by mouth Daily., Disp: 90 tablet, Rfl: 1    losartan (COZAAR) 25 MG tablet, Take 1 tablet by mouth Daily., Disp: 90 tablet, Rfl: 1    metFORMIN ER (GLUCOPHAGE-XR) 500 MG 24 hr tablet, Take 4 tablets by mouth Every Night., Disp: 360 tablet, Rfl: 1    multivitamin with minerals tablet tablet, Take 1 tablet by mouth Daily. HOLD FOR SURGERY, Disp: , Rfl:     olopatadine (PATANOL) 0.1 % ophthalmic solution, Administer 2 drops to both eyes 2 (Two) Times a Day. Morning and night, Disp: 15 mL, Rfl: 1    Omega-3 Fatty Acids (fish oil) 1000 MG capsule capsule, Take  by mouth Daily With Breakfast., Disp: , Rfl:     oxybutynin XL (DITROPAN-XL) 5 MG 24 hr tablet, Take 1 tablet by mouth Daily., Disp: 30 tablet, Rfl: 11    sertraline (ZOLOFT) 50 MG tablet, Take 1 tablet by mouth Daily., Disp: 90 tablet, Rfl: 1    vitamin B-6 (PYRIDOXINE) 50 MG tablet, Take 1 tablet by mouth Daily., Disp: 90 tablet, Rfl: 1  No current facility-administered medications for this  "visit.    Facility-Administered Medications Ordered in Other Visits:     Chlorhexidine Gluconate Cloth 2 % pads 1 application, 1 application , Topical, Q12H LISAN, Ne Acevedo, ERNIE      ROS:  Cardiac review of systems positive for postural dizziness    Objective     /69   Pulse 67   Ht 162.6 cm (64\")   Wt 66.7 kg (147 lb)   BMI 25.23 kg/m²       General Appearance:   well developed  well nourished  HENT:   oropharynx moist  lips not cyanotic  Respiratory:  no respiratory distress  normal breath sounds  no rales  Cardiovascular:  no jugular venous distention  regular rhythm  S1 normal, S2 normal  no S3, no S4   no murmur  no rub, no thrill  No carotid bruit  pedal pulses normal  lower extremity edema: none    Musculoskeletal:  no clubbing of fingers.   normocephalic, head atraumatic  Skin:   warm, dry  Psychiatric:  judgement and insight appropriate  normal mood and affect    ECHO:  Results for orders placed in visit on 08/08/23    Adult Transthoracic Echo Complete W/ Cont if Necessary Per Protocol    Interpretation Summary    Left ventricular ejection fraction appears to be 51 - 55%.    Left ventricular diastolic function was normal.    Mild to moderate aortic valve stenosis is present.    There is a prosthetic aortic valve present.  Valve morphology was not well-visualized.  There appears to be mild to moderate stenosis by Doppler.  Effective orifice area around 1.3 cm² with peak/mean pressure gradient of 15/9 mmHg.    STRESS:  Results for orders placed during the hospital encounter of 09/08/23    Stress Test With Myocardial Perfusion One Day    Interpretation Summary  Patient received Lexiscan 0.4 mg IV infusion over 10 seconds.  Baseline ECG showed normal sinus rhythm.  At peak stress, no ischemic ST-T changes were noted.  No significant arrhythmias were noted.  Myocardial perfusion demonstrates a small area of mild perfusion defect in the distal anterior/apical segment without reversibility on " resting images, more likely related to attenuation artifact.  No significant reversible myocardial ischemia was noted on this study.  The left ventricle was normal in size with a calculated ejection fraction of 64%.  No wall motion abnormalities were noted.  Overall, this represents a low risk myocardial perfusion study.    CATH:  No results found for this or any previous visit.    BMP:     Glucose   Date Value Ref Range Status   04/14/2025 195 (H) 65 - 99 mg/dL Final     BUN   Date Value Ref Range Status   04/14/2025 24 (H) 8 - 23 mg/dL Final     Creatinine   Date Value Ref Range Status   04/14/2025 1.03 (H) 0.57 - 1.00 mg/dL Final     Sodium   Date Value Ref Range Status   04/14/2025 140 136 - 145 mmol/L Final     Potassium   Date Value Ref Range Status   04/14/2025 4.3 3.5 - 5.2 mmol/L Final     Chloride   Date Value Ref Range Status   04/14/2025 103 98 - 107 mmol/L Final     CO2   Date Value Ref Range Status   04/14/2025 27.0 22.0 - 29.0 mmol/L Final     Calcium   Date Value Ref Range Status   04/14/2025 10.1 8.6 - 10.5 mg/dL Final     BUN/Creatinine Ratio   Date Value Ref Range Status   04/14/2025 23.3 7.0 - 25.0 Final     Anion Gap   Date Value Ref Range Status   04/14/2025 10.0 5.0 - 15.0 mmol/L Final     eGFR   Date Value Ref Range Status   04/14/2025 57.5 (L) >60.0 mL/min/1.73 Final     LIPIDS:  Total Cholesterol   Date Value Ref Range Status   04/14/2025 132 0 - 200 mg/dL Final     Triglycerides   Date Value Ref Range Status   04/14/2025 80 0 - 150 mg/dL Final     HDL Cholesterol   Date Value Ref Range Status   04/14/2025 63 (H) 40 - 60 mg/dL Final     LDL Cholesterol    Date Value Ref Range Status   04/14/2025 53 0 - 100 mg/dL Final     VLDL Cholesterol   Date Value Ref Range Status   04/14/2025 16 5 - 40 mg/dL Final     LDL/HDL Ratio   Date Value Ref Range Status   04/14/2025 0.84  Final         Procedures             ASSESSMENT:  Diagnoses and all orders for this visit:    1. S/P AVR (aortic valve  replacement) (Primary)    2. Essential hypertension    3. Mixed hyperlipidemia    4. Postural dizziness with presyncope         PLAN:    1.  Patient has a lot of blood pressures that are right around 110 mmHg systolic.  I think when she is up moving around the dizziness could be orthostasis.  I did ask her to cut her losartan in half.  I also asked her to greatly increase the amount of water she is drinking.  2.  Patient notes rare palpitations about 1-2 times per week that just last a couple seconds.  I did describe to her the benign nature of this.  We have decided if they started coming more frequently or lasting longer she will give us a call and we could consider placing a monitor.  3.  Patient has no heart murmur even after the aortic valve surgery.  Her valve looked great 8/8/2023 with MAC/mean pressure gradients 15/9 mmHg.  She had normal ejection fraction.  I told her to continue the aspirin.  I also told her she absolutely needs antibiotic prophylaxis before dental procedures.  She has apparently not been getting this.  4.  Continue the Lipitor.  Patient had cholesterol checked 4/14/25 with LDL 53, HDL 63, and triglycerides 80.  These are under excellent control.  5.  Talk to the patient about a regular exercise program 30 minutes 5 days a week.  6.  Patient follows with a kidney doctor for very mild chronic kidney disease.  She also follows with a pulmonologist.  She has a 50+ pack year smoking history and they are watching a pulmonary nodule.  7.  Patient had left heart cath before the bioprosthetic aortic valve replacement that showed a diagonal with 50% ostial stenosis.  The main blood vessels showed no significant disease      Return in about 6 months (around 11/16/2025).     Patient was given instructions and counseling regarding her condition or for health maintenance advice. Please see specific information pulled into the AVS if appropriate.         Meño Greer MD   5/16/2025  13:12 EDT

## 2025-05-22 ENCOUNTER — OFFICE VISIT (OUTPATIENT)
Dept: PULMONOLOGY | Facility: CLINIC | Age: 75
End: 2025-05-22
Payer: MEDICARE

## 2025-05-22 VITALS
DIASTOLIC BLOOD PRESSURE: 75 MMHG | SYSTOLIC BLOOD PRESSURE: 141 MMHG | BODY MASS INDEX: 24.92 KG/M2 | TEMPERATURE: 97.5 F | WEIGHT: 146 LBS | OXYGEN SATURATION: 97 % | HEART RATE: 61 BPM | RESPIRATION RATE: 16 BRPM | HEIGHT: 64 IN

## 2025-05-22 DIAGNOSIS — Z87.891 PERSONAL HISTORY OF NICOTINE DEPENDENCE: Primary | ICD-10-CM

## 2025-05-22 NOTE — PROGRESS NOTES
"Chief Complaint  Follow-up (1 year) and Lung Nodule    Subjective        Amelie Anderson presents to [facility] PULMONARY & CRITICAL CARE MEDICINE  History of Present Illness  History of Present Illness  The patient is a 75-year-old female who presents for follow-up of a pulmonary nodule.    She reports satisfactory respiratory function with no complaints of coughing, except for an occasional cough due to allergies. She has abstained from smoking since her last visit.    She experienced a fall on the sidewalk a few weeks ago, resulting in rib fractures. She is currently undergoing physical therapy for this injury.    Her blood pressure was slightly elevated this morning, which she attributes to anxiety associated with office visits.    SOCIAL HISTORY  She does not smoke.    Objective   Vital Signs:  /75 (BP Location: Left arm, Patient Position: Sitting, Cuff Size: Adult)   Pulse 61   Temp 97.5 °F (36.4 °C) (Temporal)   Resp 16   Ht 162.6 cm (64\")   Wt 66.2 kg (146 lb)   SpO2 97% Comment: room air  BMI 25.06 kg/m²   Estimated body mass index is 25.06 kg/m² as calculated from the following:    Height as of this encounter: 162.6 cm (64\").    Weight as of this encounter: 66.2 kg (146 lb).            Physical Exam   Physical Exam  Lungs were auscultated.    Result Review :         Results  Imaging  CT scan on 01/22/2025 showed no concerning spots or issues.              Assessment and Plan   Diagnoses and all orders for this visit:    1. Personal history of nicotine dependence (Primary)  -     CT Chest Low Dose Follow Up Without Contrast; Future      Assessment & Plan  1. Pulmonary nodule.  A CT scan conducted on 01/22/2025 revealed no abnormalities. A follow-up CT scan is scheduled for January 2026.    2. Rib fractures.  She reported falling a couple of weeks ago, resulting in broken ribs. She is currently attending physical therapy.    3. Elevated blood pressure.  Her blood pressure was " noted to be high during the office visit, which is a common occurrence. She was advised to monitor her blood pressure at home.    Follow-up  The patient will follow up in February 2026.         Follow Up   Return in about 8 months (around 1/22/2026).  Patient was given instructions and counseling regarding her condition or for health maintenance advice. Please see specific information pulled into the AVS if appropriate.         Patient or patient representative verbalized consent for the use of Ambient Listening during the visit with  Pradeep Severino DO for chart documentation. 5/22/2025  12:21 EDT

## 2025-05-23 ENCOUNTER — OFFICE VISIT (OUTPATIENT)
Dept: UROLOGY | Age: 75
End: 2025-05-23
Payer: MEDICARE

## 2025-05-23 VITALS — WEIGHT: 149 LBS | HEART RATE: 61 BPM | BODY MASS INDEX: 25.44 KG/M2 | HEIGHT: 64 IN

## 2025-05-23 DIAGNOSIS — N32.81 OAB (OVERACTIVE BLADDER): Primary | ICD-10-CM

## 2025-05-23 DIAGNOSIS — R31.29 MICROHEMATURIA: ICD-10-CM

## 2025-05-23 LAB
BILIRUB BLD-MCNC: NEGATIVE MG/DL
CLARITY, POC: CLEAR
COLOR UR: YELLOW
EXPIRATION DATE: 326
GLUCOSE UR STRIP-MCNC: ABNORMAL MG/DL
KETONES UR QL: NEGATIVE
LEUKOCYTE EST, POC: NEGATIVE
Lab: ABNORMAL
NITRITE UR-MCNC: NEGATIVE MG/ML
PH UR: 6.5 [PH] (ref 5–8)
PROT UR STRIP-MCNC: NEGATIVE MG/DL
RBC # UR STRIP: ABNORMAL /UL
SP GR UR: 1.01 (ref 1–1.03)
UROBILINOGEN UR QL: ABNORMAL

## 2025-05-23 NOTE — PROGRESS NOTES
"Chief Complaint  Blood in Urine (Follow up)    Subjective            Amelie Anderson presents to [facility] UROLOGY    History of Present Illness  The patient presents for evaluation of urinary incontinence.    She reports a positive response to the prescribed medication, with a significant reduction in urinary frequency and no instances of leakage. She is not experiencing any adverse effects such as dry eyes, dry mouth, or constipation. However, she does report a mild case of dry mouth.       Objective   Vital Signs:   Pulse 61   Ht 162.6 cm (64\")   Wt 67.6 kg (149 lb)   BMI 25.58 kg/m²       Physical Exam  Vitals and nursing note reviewed.   Constitutional:       Appearance: Normal appearance. She is well-developed.   Pulmonary:      Effort: Pulmonary effort is normal.      Breath sounds: Normal air entry.   Neurological:      Mental Status: She is alert and oriented to person, place, and time.      Motor: Motor function is intact.   Psychiatric:         Mood and Affect: Mood normal.         Behavior: Behavior normal.          Result Review :   The following data was reviewed by: Dorothy Aldana MD on 05/23/2025:    Results for orders placed or performed in visit on 05/23/25   POC Urinalysis Dipstick, Automated    Collection Time: 05/23/25 12:41 PM    Specimen: Urine   Result Value Ref Range    Color Yellow Yellow, Straw, Dark Yellow, [place]    Clarity, UA Clear Clear    Specific Gravity  1.015 1.005 - 1.030    pH, Urine 6.5 5.0 - 8.0    Leukocytes Negative Negative    Nitrite, UA Negative Negative    Protein, POC Negative Negative mg/dL    Glucose, UA >=1000 mg/dL (3+) (A) Negative mg/dL    Ketones, UA Negative Negative    Urobilinogen, UA 0.2 E.U./dL Normal, 0.2 E.U./dL    Bilirubin Negative Negative    Blood, UA Trace (A) Negative    Lot Number [id number]     Expiration Date 326          Results  Laboratory Studies  Small amount of blood detected.                Assessment and Plan  "   Diagnoses and all orders for this visit:    1. OAB (overactive bladder) (Primary)  -     POC Urinalysis Dipstick, Automated  -     Cancel: POC Urinalysis Dipstick, Automated    2. Microhematuria  -     Cancel: POC Urinalysis Dipstick, Automated      Assessment & Plan  1. Urinary incontinence.  She reports improvement in symptoms with no leakage and reduced frequency. She is on the lowest dose of medication. She experiences dry mouth as a side effect. She is advised to increase water intake to manage dry mouth. Refills were provided during the last visit to last for the year. A small amount of blood was detected, similar to previous findings. This will be monitored annually.    Follow-up  The patient will follow up in 10 months.          Follow Up       No follow-ups on file.  Patient was given instructions and counseling regarding her condition or for health maintenance advice. Please see specific information pulled into the AVS if appropriate.     Transcribed from ambient dictation for Dorothy Aldana MD by Dorothy Aldana MD.  05/23/25   13:03 EDT    Patient or patient representative verbalized consent for the use of Ambient Listening during the visit with  Dorothy Aldana MD for chart documentation. 5/23/2025  13:03 EDT

## 2025-05-27 RX ORDER — METFORMIN HYDROCHLORIDE 500 MG/1
2000 TABLET, EXTENDED RELEASE ORAL NIGHTLY
Qty: 360 TABLET | Refills: 1 | Status: SHIPPED | OUTPATIENT
Start: 2025-05-27

## 2025-05-27 NOTE — TELEPHONE ENCOUNTER
Caller: Anderson Amelie L    Relationship: Self    Best call back number: [phone]    Requested Prescriptions:   Requested Prescriptions     Pending Prescriptions Disp Refills    metFORMIN ER (GLUCOPHAGE-XR) 500 MG 24 hr tablet 360 tablet 1     Sig: Take 4 tablets by mouth Every Night.        Pharmacy where request should be sent: [place] [phone] [place] [phone]      Last office visit with prescribing clinician: 4/14/2025   Last telemedicine visit with prescribing clinician: Visit date not found   Next office visit with prescribing clinician: 7/7/2025     Additional details provided by patient: PATIENT HAS 2 DAYS LEFT CURRENTLY.     Does the patient have less than a 3 day supply:  [x] Yes  [] No    Would you like a call back once the refill request has been completed: [] Yes [] No    If the office needs to give you a call back, can they leave a voicemail: [] Yes [] No    Ann Marie Asif Rep   05/27/25 11:20 EDT

## 2025-06-04 ENCOUNTER — TREATMENT (OUTPATIENT)
Dept: PHYSICAL THERAPY | Facility: CLINIC | Age: 75
End: 2025-06-04
Payer: MEDICARE

## 2025-06-04 DIAGNOSIS — M25.612 DECREASED RANGE OF MOTION OF LEFT SHOULDER: ICD-10-CM

## 2025-06-04 DIAGNOSIS — M25.512 ACUTE PAIN OF LEFT SHOULDER: Primary | ICD-10-CM

## 2025-06-04 NOTE — PROGRESS NOTES
OT Re-evaluation/Progress Note  [place]  OT: [address]  [place] [address]    Patient: Amelie Anderson   : 1950  Diagnosis/ICD-10 Code:  Acute pain of left shoulder [M25.512]  Referring practitioner: Fabi Abad*  Date of Initial Visit: Type: THERAPY  Noted: 2025  Today's Date: 2025  Patient seen for 3 sessions      Subjective:   Subjective Questionnaire: QuickDASH:   Clinical Progress: improved  Home Program Compliance: No  Treatment has included: therapeutic exercise and therapeutic activity    Subjective Evaluation    Pain  Current pain ratin  At worst pain ratin  Location: L shoulder       Objective          Static Posture     Shoulders  Rounded.    Tenderness     Left Shoulder   No tenderness in the AC joint, biceps tendon (proximal), clavicle, infraspinatus tendon and supraspinatus tendon.     Additional Tenderness Details  Moderate tenderness reported in tricep tendon.    Cervical/Thoracic Screen   Cervical range of motion within normal limits    Neurological Testing     Additional Neurological Details  Pt reports no numbness/tingling.    Active Range of Motion   Left Shoulder   Flexion: 98 degrees with pain  Abduction: 90 degrees with pain  External rotation BTH: C7 with pain  Internal rotation BTB: mid thoracic with pain    Left Elbow   Normal active range of motion  Flexion: with pain  Extension: with pain    Passive Range of Motion   Left Shoulder   Flexion: 130 degrees   Abduction: WFL  External rotation 90°: 58 degrees   Internal rotation 90°: 70 degrees     Strength/Myotome Testing     Additional Strength Details  Deferred secondary to pain.    Tests     Left Shoulder   Positive belly press and Hawkin's.   Negative drop arm and Speed's.       Assessment & Plan       Assessment  Impairments: abnormal or restricted ROM, activity intolerance, impaired physical strength, lacks appropriate home exercise program and pain with function   Functional limitations:  carrying objects, lifting, sleeping, pulling, pushing, uncomfortable because of pain, moving in bed, reaching behind back and reaching overhead   Assessment details: History reviewed. Pt reports non-compliance with HEP. Pt has been seen for one visit since initial eval. Pt also had a fall on 5/5 and reports this increased pain. Pt displays slightly improved AROM. Pt displays much improved PROM. Slight improvement in disability as shown in improved QuickDASH score.  Prognosis: good    Goals  Plan Goals: SHOULDER  PROBLEMS:     1. The patient has limited ROM of the L shoulder.    LTG 1: 12 weeks:  The patient will demonstrate 140 degrees of active shoulder flexion, 140 degrees of shoulder abduction, and external rotation to C7 to allow the patient to reach into upper kitchen cabinets and manipulate clothing behind the back with greater ease.    STATUS:  New   STG 1a: 8 weeks:  The patient will demonstrate 120 degrees of active shoulder flexion, 120 degrees of shoulder abduction, and external rotation to C4     STATUS:  New   TREATMENT: Manual therapy, therapeutic exercise, home exercise instruction, and modalities as needed to include: electrical stimulation, ultrasound, moist heat, and ice.    2. The patient has limited strength of the L shoulder.   LTG 2: 12 weeks:  The patient will demonstrate 5 /5 strength for L shoulder flexion, abduction, external rotation, and internal rotation in order to demonstrate improved shoulder stability.    STATUS:  New   STG 2a: 8 weeks:  The patient will demonstrate ability to tolerate MMT for L shoulder flexion, abduction, external rotation, and internal rotation.    STATUS:  New   STG2b:  8 weeks:  The patient will be independent with home exercises.     STATUS:  New   TREATMENT: Manual therapy, therapeutic exercise, home exercise instruction, and modalities as needed to include: electrical stimulation, ultrasound, moist heat, and ice.     3. The patient complains of pain to the L  shoulder.   LTG 3: 12 weeks:  The patient will report a pain rating of 1 /10 at worst in order to improve sleep quality and tolerance to performance of activities of daily living.    STATUS:  New   STG 3a: 8 weeks:  The patient will report a pain rating of 4 /10 at worst.     STATUS:  New   TREATMENT: Manual therapy, therapeutic exercise, home exercise instruction, and modalities as needed to include: electrical stimulation, ultrasound, moist heat, and ice.    4. Carrying, Moving, and Handling Objects Functional Limitation     LTG 4: 12 weeks:  The patient will demonstrate 1-19 % limitation by achieving a score of 19 on the QuickDASH.    STATUS:  New   STG 4a: 8 weeks:  The patient will demonstrate 20-39 % limitation by achieving a score of 27 on the QuickDASH.      STATUS:  New   TREATMENT:  Manual therapy, therapeutic exercise, home exercise instruction, and modalities as needed to include: moist heat, electrical stimulation, and ultrasound.     Plan  Planned modality interventions: cryotherapy and thermotherapy (hydrocollator packs)  Planned therapy interventions: body mechanics training, fine motor coordination training, flexibility, functional ROM exercises, home exercise program, joint mobilization, manual therapy, neuromuscular re-education, postural training, soft tissue mobilization, strengthening, stretching and therapeutic activities  Frequency: 2x week  Duration in weeks: 12  Treatment plan discussed with: patient      Progress toward previous goals: Not Met      Recommendations: Continue with recommendations to attend visits and complete HEP.  Timeframe: 1 month  Prognosis to achieve goals: fair  Date of last progress/eval note: 5/5/25  OT SIGNATURE: Tr Chiu OT   DATE TREATMENT INITIATED: Type: THERAPY  Noted: 5/5/2025  [place] License: [id number]    Certification Period: 6/4/2025 - 9/1/2025        Based upon review of the patient's progress and continued therapy plan, it is my medical opinion that  Amelie Anderson should continue occupational therapy treatment at [facility]  [address] [place] [place] [id number] [phone].    Signature: __________________________________  Fabi Bill MD MD NPI: [id number]  Timed:  Therapeutic Exercise:    9     mins  31796;     Therapeutic Activity:     6     mins  58310;       Un-timed:  OT re-eval:                    10     mins        39025;    Timed Treatment:   15   mins   Total Treatment:     25   mins    Start time: 1335  End time: 1400    Please sign and return via fax to [phone]  . Thank you, [facility] Occupational Therapy.

## 2025-06-11 ENCOUNTER — TREATMENT (OUTPATIENT)
Dept: PHYSICAL THERAPY | Facility: CLINIC | Age: 75
End: 2025-06-11
Payer: MEDICARE

## 2025-06-11 DIAGNOSIS — M25.512 ACUTE PAIN OF LEFT SHOULDER: Primary | ICD-10-CM

## 2025-06-11 DIAGNOSIS — M25.612 DECREASED RANGE OF MOTION OF LEFT SHOULDER: ICD-10-CM

## 2025-06-11 PROCEDURE — 97530 THERAPEUTIC ACTIVITIES: CPT | Performed by: OCCUPATIONAL THERAPIST

## 2025-06-11 PROCEDURE — 97110 THERAPEUTIC EXERCISES: CPT | Performed by: OCCUPATIONAL THERAPIST

## 2025-06-11 NOTE — PROGRESS NOTES
"Occupational Therapy Daily Treatment Note  [place] OT: [address] [place] [address]      Patient: Amelie Anderson   : 1950  Diagnosis/ICD-10 Code:  Acute pain of left shoulder [M25.512]  Referring practitioner: Fabi Abad*  Date of Initial Visit: Type: THERAPY  Noted: 2025  Today's Date: 2025  Patient seen for 4 sessions           Subjective   Amelie Anderson reports: \"I think I'm getting there\" No current pain.    Objective     See Exercise, Manual, and Modality Logs for complete treatment.       Assessment/Plan  OT graded up sets with shoulder ext and  pulls. OT added wall ladder and no monies to treatment. OT provided no monies HEP. Pt continues with decreased fxl strength/ROM and increased pain that limits ability to complete ADLs/IADLs.  Progress per Plan of Care           Timed:  Therapeutic Exercise:    15     mins  22992;     Therapeutic Activity:     13     mins  15550;       Timed Treatment:   28   mins   Total Treatment:     28   mins    Start time: 0932  End time: 1000    Tr Chiu OT  Occupational Therapist  [place] License: [id number]  NPI: [id number]  "

## 2025-06-12 ENCOUNTER — TRANSCRIBE ORDERS (OUTPATIENT)
Dept: ADMINISTRATIVE | Facility: HOSPITAL | Age: 75
End: 2025-06-12
Payer: MEDICARE

## 2025-06-12 DIAGNOSIS — Z12.31 VISIT FOR SCREENING MAMMOGRAM: Primary | ICD-10-CM

## 2025-07-07 ENCOUNTER — OFFICE VISIT (OUTPATIENT)
Dept: INTERNAL MEDICINE | Facility: CLINIC | Age: 75
End: 2025-07-07
Payer: MEDICARE

## 2025-07-07 VITALS
OXYGEN SATURATION: 98 % | SYSTOLIC BLOOD PRESSURE: 108 MMHG | BODY MASS INDEX: 25.18 KG/M2 | HEIGHT: 64 IN | TEMPERATURE: 97.2 F | HEART RATE: 55 BPM | RESPIRATION RATE: 16 BRPM | DIASTOLIC BLOOD PRESSURE: 60 MMHG | WEIGHT: 147.5 LBS

## 2025-07-07 DIAGNOSIS — M81.0 AGE-RELATED OSTEOPOROSIS WITHOUT CURRENT PATHOLOGICAL FRACTURE: ICD-10-CM

## 2025-07-07 DIAGNOSIS — E11.9 TYPE 2 DIABETES MELLITUS WITHOUT COMPLICATION, UNSPECIFIED WHETHER LONG TERM INSULIN USE: Primary | ICD-10-CM

## 2025-07-07 DIAGNOSIS — E03.9 HYPOTHYROIDISM, UNSPECIFIED TYPE: ICD-10-CM

## 2025-07-07 DIAGNOSIS — G89.29 CHRONIC LEFT SHOULDER PAIN: ICD-10-CM

## 2025-07-07 DIAGNOSIS — I10 ESSENTIAL HYPERTENSION: ICD-10-CM

## 2025-07-07 DIAGNOSIS — M25.512 CHRONIC LEFT SHOULDER PAIN: ICD-10-CM

## 2025-07-07 DIAGNOSIS — E78.00 HIGH CHOLESTEROL: ICD-10-CM

## 2025-07-07 LAB
ALBUMIN SERPL-MCNC: 4.6 G/DL (ref 3.5–5.2)
ALBUMIN/GLOB SERPL: 1.8 G/DL
ALP SERPL-CCNC: 71 U/L (ref 39–117)
ALT SERPL W P-5'-P-CCNC: 16 U/L (ref 1–33)
ANION GAP SERPL CALCULATED.3IONS-SCNC: 11.8 MMOL/L (ref 5–15)
AST SERPL-CCNC: 20 U/L (ref 1–32)
BASOPHILS # BLD AUTO: 0.03 10*3/MM3 (ref 0–0.2)
BASOPHILS NFR BLD AUTO: 0.5 % (ref 0–1.5)
BILIRUB SERPL-MCNC: 0.5 MG/DL (ref 0–1.2)
BUN SERPL-MCNC: 27 MG/DL (ref 8–23)
BUN/CREAT SERPL: 20.1 (ref 7–25)
CALCIUM SPEC-SCNC: 10.2 MG/DL (ref 8.6–10.5)
CHLORIDE SERPL-SCNC: 101 MMOL/L (ref 98–107)
CHOLEST SERPL-MCNC: 125 MG/DL (ref 0–200)
CO2 SERPL-SCNC: 27.2 MMOL/L (ref 22–29)
CREAT SERPL-MCNC: 1.34 MG/DL (ref 0.57–1)
DEPRECATED RDW RBC AUTO: 41.6 FL (ref 37–54)
EGFRCR SERPLBLD CKD-EPI 2021: 42 ML/MIN/1.73
EOSINOPHIL # BLD AUTO: 0.08 10*3/MM3 (ref 0–0.4)
EOSINOPHIL NFR BLD AUTO: 1.4 % (ref 0.3–6.2)
ERYTHROCYTE [DISTWIDTH] IN BLOOD BY AUTOMATED COUNT: 12.3 % (ref 12.3–15.4)
GLOBULIN UR ELPH-MCNC: 2.5 GM/DL
GLUCOSE SERPL-MCNC: 164 MG/DL (ref 65–99)
HBA1C MFR BLD: 7.4 % (ref 4.8–5.6)
HCT VFR BLD AUTO: 39.2 % (ref 34–46.6)
HDLC SERPL-MCNC: 64 MG/DL (ref 40–60)
HGB BLD-MCNC: 12.8 G/DL (ref 12–15.9)
IMM GRANULOCYTES # BLD AUTO: 0.02 10*3/MM3 (ref 0–0.05)
IMM GRANULOCYTES NFR BLD AUTO: 0.3 % (ref 0–0.5)
LDLC SERPL CALC-MCNC: 43 MG/DL (ref 0–100)
LDLC/HDLC SERPL: 0.65 {RATIO}
LYMPHOCYTES # BLD AUTO: 1.32 10*3/MM3 (ref 0.7–3.1)
LYMPHOCYTES NFR BLD AUTO: 22.4 % (ref 19.6–45.3)
MCH RBC QN AUTO: 30.4 PG (ref 26.6–33)
MCHC RBC AUTO-ENTMCNC: 32.7 G/DL (ref 31.5–35.7)
MCV RBC AUTO: 93.1 FL (ref 79–97)
MONOCYTES # BLD AUTO: 0.45 10*3/MM3 (ref 0.1–0.9)
MONOCYTES NFR BLD AUTO: 7.7 % (ref 5–12)
NEUTROPHILS NFR BLD AUTO: 3.98 10*3/MM3 (ref 1.7–7)
NEUTROPHILS NFR BLD AUTO: 67.7 % (ref 42.7–76)
NRBC BLD AUTO-RTO: 0 /100 WBC (ref 0–0.2)
PLATELET # BLD AUTO: 165 10*3/MM3 (ref 140–450)
PMV BLD AUTO: 10 FL (ref 6–12)
POTASSIUM SERPL-SCNC: 4.4 MMOL/L (ref 3.5–5.2)
PROT SERPL-MCNC: 7.1 G/DL (ref 6–8.5)
RBC # BLD AUTO: 4.21 10*6/MM3 (ref 3.77–5.28)
SODIUM SERPL-SCNC: 140 MMOL/L (ref 136–145)
TRIGL SERPL-MCNC: 97 MG/DL (ref 0–150)
TSH SERPL DL<=0.05 MIU/L-ACNC: 2.26 UIU/ML (ref 0.27–4.2)
VLDLC SERPL-MCNC: 18 MG/DL (ref 5–40)
WBC NRBC COR # BLD AUTO: 5.88 10*3/MM3 (ref 3.4–10.8)

## 2025-07-07 PROCEDURE — 83036 HEMOGLOBIN GLYCOSYLATED A1C: CPT | Performed by: INTERNAL MEDICINE

## 2025-07-07 PROCEDURE — 36415 COLL VENOUS BLD VENIPUNCTURE: CPT | Performed by: INTERNAL MEDICINE

## 2025-07-07 PROCEDURE — 1159F MED LIST DOCD IN RCRD: CPT | Performed by: INTERNAL MEDICINE

## 2025-07-07 PROCEDURE — 85025 COMPLETE CBC W/AUTO DIFF WBC: CPT | Performed by: INTERNAL MEDICINE

## 2025-07-07 PROCEDURE — 3078F DIAST BP <80 MM HG: CPT | Performed by: INTERNAL MEDICINE

## 2025-07-07 PROCEDURE — 3051F HG A1C>EQUAL 7.0%<8.0%: CPT | Performed by: INTERNAL MEDICINE

## 2025-07-07 PROCEDURE — 99214 OFFICE O/P EST MOD 30 MIN: CPT | Performed by: INTERNAL MEDICINE

## 2025-07-07 PROCEDURE — 1160F RVW MEDS BY RX/DR IN RCRD: CPT | Performed by: INTERNAL MEDICINE

## 2025-07-07 PROCEDURE — 80061 LIPID PANEL: CPT | Performed by: INTERNAL MEDICINE

## 2025-07-07 PROCEDURE — 1125F AMNT PAIN NOTED PAIN PRSNT: CPT | Performed by: INTERNAL MEDICINE

## 2025-07-07 PROCEDURE — 20610 DRAIN/INJ JOINT/BURSA W/O US: CPT | Performed by: INTERNAL MEDICINE

## 2025-07-07 PROCEDURE — 3074F SYST BP LT 130 MM HG: CPT | Performed by: INTERNAL MEDICINE

## 2025-07-07 PROCEDURE — 84443 ASSAY THYROID STIM HORMONE: CPT | Performed by: INTERNAL MEDICINE

## 2025-07-07 PROCEDURE — 80053 COMPREHEN METABOLIC PANEL: CPT | Performed by: INTERNAL MEDICINE

## 2025-07-07 RX ORDER — ALENDRONATE SODIUM 70 MG/1
70 TABLET ORAL
Qty: 12 TABLET | Refills: 0 | Status: SHIPPED | OUTPATIENT
Start: 2025-07-07

## 2025-07-07 RX ORDER — TRIAMCINOLONE ACETONIDE 40 MG/ML
80 INJECTION, SUSPENSION INTRA-ARTICULAR; INTRAMUSCULAR
Status: COMPLETED | OUTPATIENT
Start: 2025-07-07 | End: 2025-07-07

## 2025-07-07 RX ORDER — DULAGLUTIDE 0.75 MG/.5ML
0.75 INJECTION, SOLUTION SUBCUTANEOUS WEEKLY
Qty: 2 ML | Refills: 0 | Status: SHIPPED | OUTPATIENT
Start: 2025-07-07

## 2025-07-07 RX ORDER — EMPAGLIFLOZIN 10 MG/1
10 TABLET, FILM COATED ORAL DAILY
Qty: 90 TABLET | Refills: 1 | Status: SHIPPED | OUTPATIENT
Start: 2025-07-07

## 2025-07-07 RX ORDER — LIDOCAINE HYDROCHLORIDE 10 MG/ML
5 INJECTION, SOLUTION INFILTRATION; PERINEURAL
Status: COMPLETED | OUTPATIENT
Start: 2025-07-07 | End: 2025-07-07

## 2025-07-07 RX ORDER — CALCIUM CARBONATE/VITAMIN D3 600 MG-10
1 TABLET ORAL DAILY
Qty: 90 TABLET | Refills: 1 | Status: SHIPPED | OUTPATIENT
Start: 2025-07-07

## 2025-07-07 RX ADMIN — TRIAMCINOLONE ACETONIDE 80 MG: 40 INJECTION, SUSPENSION INTRA-ARTICULAR; INTRAMUSCULAR at 13:16

## 2025-07-07 RX ADMIN — LIDOCAINE HYDROCHLORIDE 5 ML: 10 INJECTION, SOLUTION INFILTRATION; PERINEURAL at 13:16

## 2025-07-07 NOTE — ASSESSMENT & PLAN NOTE
A1C not at goal previously and sugars are increasing.   Will add Trulicity  Continue Metformin and Jardiance.  Checking labs today    Orders:    CBC & Differential    Comprehensive Metabolic Panel    Hemoglobin A1c    Lipid Panel    TSH

## 2025-07-07 NOTE — PROGRESS NOTES
"Chief Complaint  Follow-up (3 month follow up, left shoulder pain not better after completing physical therapy, glucose has been running high ) and Diabetes    Subjective      Amelie Anderson is a 75 y.o. female who presents to [facility]     Presenting for follow up.    DM: moderately well controlled on previous labs, A1C 7.3%, blood sugars have been going up. Denies numbness/tingling, vision changes, urinary changes, dizziness, nausea    HTN:  well controlled today, doing well on medication, denies headache, chest pain, dizziness, vision changes    HLD: on statin, tolerating well, denies muscle pain/weakness    Having continued left shoulder pain. Not improved with course of physical therapy.         Objective   Vital Signs:   Vitals:    07/07/25 1122   BP: 108/60   BP Location: Left arm   Patient Position: Sitting   Cuff Size: Adult   Pulse: 55   Resp: 16   Temp: 97.2 °F (36.2 °C)   TempSrc: Temporal   SpO2: 98%   Weight: 66.9 kg (147 lb 8 oz)   Height: 162.6 cm (64\")     Body mass index is 25.32 kg/m².    Wt Readings from Last 3 Encounters:   07/07/25 66.9 kg (147 lb 8 oz)   05/23/25 67.6 kg (149 lb)   05/22/25 66.2 kg (146 lb)     BP Readings from Last 3 Encounters:   07/07/25 108/60   05/22/25 141/75   05/16/25 121/69       Health Maintenance   Topic Date Due    TDAP/TD VACCINES (1 - Tdap) Never done    ZOSTER VACCINE (2 of 3) 12/16/2015    DIABETIC FOOT EXAM  09/24/2019    DIABETIC EYE EXAM  05/05/2024    COVID-19 Vaccine (7 - 2024-25 season) 04/22/2025    INFLUENZA VACCINE  10/01/2025    HEMOGLOBIN A1C  10/14/2025    LUNG CANCER SCREENING  01/22/2026    DXA SCAN  02/12/2026    ANNUAL WELLNESS VISIT  04/14/2026    LIPID PANEL  04/14/2026    URINE MICROALBUMIN-CREATININE RATIO (uACR)  04/14/2026    COLORECTAL CANCER SCREENING  06/01/2026    MAMMOGRAM  06/21/2026    HEPATITIS C SCREENING  Completed    Pneumococcal Vaccine 50+  Completed       Physical " Exam  Vitals reviewed.   Constitutional:       Appearance: Normal appearance. She is well-developed.   HENT:      Head: Normocephalic and atraumatic.      Mouth/Throat:      Pharynx: No oropharyngeal exudate.   Eyes:      Conjunctiva/sclera: Conjunctivae normal.      Pupils: Pupils are equal, round, and reactive to light.   Neck:      Thyroid: No thyromegaly or thyroid tenderness.   Cardiovascular:      Rate and Rhythm: Normal rate and regular rhythm.      Heart sounds: No murmur heard.     No friction rub. No gallop.   Pulmonary:      Effort: Pulmonary effort is normal.      Breath sounds: Normal breath sounds. No wheezing or rhonchi.   Lymphadenopathy:      Cervical: No cervical adenopathy.   Skin:     General: Skin is warm and dry.   Neurological:      Mental Status: She is alert and oriented to person, place, and time.   Psychiatric:         Mood and Affect: Affect normal.          Result Review :  The following data was reviewed by: Fabi Bill MD on 07/07/2025:         Arthrocentesis    Date/Time: 7/7/2025 1:16 PM    Performed by: Fabi Bill MD  Authorized by: Fabi Bill MD  Indications: pain   Body area: shoulder  Joint: left subacromial bursa  Local anesthesia used: no    Anesthesia:  Local anesthesia used: no    Sedation:  Patient sedated: no    Needle size: 22 G  Ultrasound guidance: no  Approach: posterior  Aspirate amount: 0 mL  Meds administered: 80 mg triamcinolone acetonide 40 MG/ML; 5 mL lidocaine 1 %  Patient tolerance: patient tolerated the procedure well with no immediate complications                Assessment & Plan  Type 2 diabetes mellitus without complication, unspecified whether long term insulin use  A1C not at goal previously and sugars are increasing.   Will add Trulicity  Continue Metformin and Jardiance.  Checking labs today    Orders:    CBC & Differential    Comprehensive Metabolic Panel    Hemoglobin A1c    Lipid Panel     TSH    Age-related osteoporosis without current pathological fracture    Orders:    alendronate (Fosamax) 70 MG tablet; Take 1 tablet by mouth Every 7 (Seven) Days.    Hypothyroidism, unspecified type  Checking labs today       High cholesterol  On statin, tolerating well  Checking follow up labs today         Essential hypertension  Well controlled in clinic today  Continue current management         Chronic left shoulder pain  Joint injection performed. Patient tolerated well.  Discussed risks and benefits.   Discussed that this can be repeated as often as every 3 months if it provides good relief of symptoms.                       FOLLOW UP  Return in about 3 months (around 10/7/2025) for Next scheduled follow up.  Patient was given instructions and counseling regarding her condition or for health maintenance advice. Please see specific information pulled into the AVS if appropriate.       Fabi Bill MD  07/07/25  13:14 EDT    CURRENT & DISCONTINUED MEDICATIONS  Current Outpatient Medications   Medication Instructions    alendronate (FOSAMAX) 70 mg, Oral, Every 7 Days    amitriptyline (ELAVIL) 25 mg, Oral, Nightly    Mentone Thyroid 60 mg, Oral, Daily    aspirin 81 mg, Oral, Daily    atenolol (TENORMIN) 25 mg, Oral, Daily    atorvastatin (LIPITOR) 40 mg, Oral, Daily    Biotin 68188 MCG tablet Take  by mouth.    brimonidine (ALPHAGAN) 0.15 % ophthalmic solution 1 drop, 3 Times Daily    calcium carb-cholecalciferol 600-10 MG-MCG tablet per tablet 1 tablet, Oral, Daily    chlorpheniramine (CHLOR-TRIMETON) 4 mg, Oral, Every 6 Hours PRN    FeroSul 325 mg, Oral, Daily    fluticasone (FLONASE) 50 MCG/ACT nasal spray 2 sprays, Nasal, Daily, 2 puffs each nostril    FREESTYLE LITE test strip Use as directed    Jardiance 10 mg, Oral, Daily    Lancets 30G misc 1 each, Not Applicable, 4 Times Daily PRN    loratadine (CLARITIN) 10 mg, Oral, Daily    losartan (COZAAR) 25 mg, Oral, Daily    metFORMIN ER  (GLUCOPHAGE-XR) 2,000 mg, Oral, Nightly    multivitamin with minerals tablet tablet 1 tablet, Daily    olopatadine (PATANOL) 0.1 % ophthalmic solution 2 drops, Both Eyes, 2 Times Daily, Morning and night    Omega-3 Fatty Acids (fish oil) 1000 MG capsule capsule Daily With Breakfast    oxybutynin XL (DITROPAN-XL) 5 mg, Oral, Daily    sertraline (ZOLOFT) 50 mg, Oral, Daily    Trulicity 0.75 mg, Subcutaneous, Weekly    vitamin B-6 (PYRIDOXINE) 50 mg, Oral, Daily       Medications Discontinued During This Encounter   Medication Reason    Jardiance 10 MG tablet tablet Reorder    calcium carb-cholecalciferol 600-10 MG-MCG tablet per tablet Reorder    amitriptyline (ELAVIL) 25 MG tablet Reorder    alendronate (Fosamax) 70 MG tablet Reorder

## 2025-07-18 ENCOUNTER — HOSPITAL ENCOUNTER (OUTPATIENT)
Dept: CT IMAGING | Facility: HOSPITAL | Age: 75
Discharge: HOME | End: 2025-07-18
Payer: MEDICARE

## 2025-07-18 DIAGNOSIS — Z87.891 PERSONAL HISTORY OF NICOTINE DEPENDENCE: ICD-10-CM

## 2025-07-18 PROCEDURE — 71250 CT THORAX DX C-: CPT

## 2025-08-01 RX ORDER — DULAGLUTIDE 0.75 MG/.5ML
0.75 INJECTION, SOLUTION SUBCUTANEOUS WEEKLY
Qty: 2 ML | Refills: 0 | Status: SHIPPED | OUTPATIENT
Start: 2025-08-01

## 2025-08-01 NOTE — TELEPHONE ENCOUNTER
Caller: Amelie Anderson    Relationship: Self    Best call back number: [phone]    Requested Prescriptions:   Requested Prescriptions     Pending Prescriptions Disp Refills    Dulaglutide (Trulicity) 0.75 MG/0.5ML solution auto-injector 2 mL 0     Sig: Inject 0.75 mg under the skin into the appropriate area as directed 1 (One) Time Per Week.        Pharmacy where request should be sent: [facility] [id number] [place], [place] [phone] FX     Last office visit with prescribing clinician: 7/7/2025   Last telemedicine visit with prescribing clinician: Visit date not found   Next office visit with prescribing clinician: 10/16/2025     Does the patient have less than a 3 day supply:  [x] Yes  [] No    Would you like a call back once the refill request has been completed: [x] Yes [] No    If the office needs to give you a call back, can they leave a voicemail: [x] Yes [] No    Ann Marie Yadav Rep   08/01/25 16:30 EDT

## 2025-08-11 DIAGNOSIS — E11.9 TYPE 2 DIABETES MELLITUS WITHOUT COMPLICATION, UNSPECIFIED WHETHER LONG TERM INSULIN USE: ICD-10-CM

## 2025-08-11 DIAGNOSIS — J30.9 ALLERGIC RHINITIS, UNSPECIFIED SEASONALITY, UNSPECIFIED TRIGGER: ICD-10-CM

## 2025-08-11 RX ORDER — CALCIUM CARB/VIT D3/MINERALS 600 MG-400
1 TABLET ORAL DAILY
Qty: 90 TABLET | Refills: 1 | OUTPATIENT
Start: 2025-08-11

## 2025-08-11 RX ORDER — ATENOLOL 25 MG/1
25 TABLET ORAL DAILY
Qty: 90 TABLET | Refills: 1 | Status: SHIPPED | OUTPATIENT
Start: 2025-08-11

## 2025-08-12 ENCOUNTER — TELEPHONE (OUTPATIENT)
Dept: INTERNAL MEDICINE | Facility: CLINIC | Age: 75
End: 2025-08-12
Payer: MEDICARE

## 2025-08-12 ENCOUNTER — HOSPITAL ENCOUNTER (OUTPATIENT)
Dept: MAMMOGRAPHY | Facility: HOSPITAL | Age: 75
Discharge: HOME | End: 2025-08-12
Admitting: INTERNAL MEDICINE
Payer: MEDICARE

## 2025-08-12 DIAGNOSIS — Z12.31 VISIT FOR SCREENING MAMMOGRAM: ICD-10-CM

## 2025-08-12 DIAGNOSIS — R10.2 PELVIC PAIN: Primary | ICD-10-CM

## 2025-08-12 PROCEDURE — 77063 BREAST TOMOSYNTHESIS BI: CPT

## 2025-08-12 PROCEDURE — 77067 SCR MAMMO BI INCL CAD: CPT

## 2025-08-13 ENCOUNTER — TELEPHONE (OUTPATIENT)
Dept: INTERNAL MEDICINE | Facility: CLINIC | Age: 75
End: 2025-08-13
Payer: MEDICARE

## 2025-08-27 ENCOUNTER — DOCUMENTATION (OUTPATIENT)
Dept: PHYSICAL THERAPY | Facility: CLINIC | Age: 75
End: 2025-08-27
Payer: MEDICARE

## 2025-08-29 RX ORDER — DULAGLUTIDE 0.75 MG/.5ML
0.75 INJECTION, SOLUTION SUBCUTANEOUS WEEKLY
Qty: 2 ML | Refills: 0 | Status: SHIPPED | OUTPATIENT
Start: 2025-08-29

## (undated) DEVICE — PK PERFUS CUST W/CARDIOPLEGIA

## (undated) DEVICE — IRRIGATOR BULB ASEPTO 60CC STRL

## (undated) DEVICE — TRY CATH URO TEMP SENSR 16F350ML LF

## (undated) DEVICE — CVR PROB 96IN LF STRL

## (undated) DEVICE — CANN ART DLP 1PC EDPA A/ 20F

## (undated) DEVICE — CLAMP INSERT: Brand: STEALTH® CLAMP INSERT

## (undated) DEVICE — SPNG GZ WOVN 4X4IN 12PLY 10/BX STRL

## (undated) DEVICE — Device

## (undated) DEVICE — BIOPATCH™ ANTIMICROBIAL DRESSING WITH CHLORHEXIDINE GLUCONATE IS A HYDROPHILLIC POLYURETHANE ABSORPTIVE FOAM WITH CHLORHEXIDINE GLUCONATE (CHG) WHICH INHIBITS BACTERIAL GROWTH UNDER THE DRESSING. THE DRESSING IS INTENDED TO BE USED TO ABSORB EXUDATE, COVER A WOUND CAUSED BY VASCULAR AND NONVASCULAR PERCUTANEOUS MEDICAL DEVICES DURING SURGERY, AS WELL AS REDUCE LOCAL INFECTION AND COLONIZATION OF MICROORGANISMS.: Brand: BIOPATCH

## (undated) DEVICE — SCANLAN® SUTURE BOOT™ INSTRUMENT JAW COVERS - ORIGINAL YELLOW, STANDARD PKG (5 PAIR/CARTRIDGE, 1 CARTRIDGE/PKG): Brand: SCANLAN® SUTURE BOOT™ INSTRUMENT JAW COVERS

## (undated) DEVICE — ST TOURNI COMPL A/ 7IN

## (undated) DEVICE — Device: Brand: MEDEX

## (undated) DEVICE — TBG ART PRESS 60 IN

## (undated) DEVICE — OPTIFOAM GENTLE SA, POSTOP, 4X12: Brand: MEDLINE

## (undated) DEVICE — TEMP PACING WIRE: Brand: MYO/WIRE

## (undated) DEVICE — 8 FOOT DISPOSABLE EXTENSION CABLE WITH SAFE CONNECT / ALLIGATOR CLIP

## (undated) DEVICE — SPNG DISECTOR KTNER XRAY COTN 1/4X9/16IN PK/5

## (undated) DEVICE — GLV SURG BIOGEL LTX PF 8

## (undated) DEVICE — ACCESSRAIL PLATFORM (STANDARD BLADE): Brand: ACCESSRAIL PLATFORM (STANDARD BLADE)

## (undated) DEVICE — ORGANIZER SUT SHELIGH 3T 213013

## (undated) DEVICE — SENSR CERBRL O2 PK/2

## (undated) DEVICE — MARKR SKIN W/RULR AND LBL

## (undated) DEVICE — OASIS DRAIN, DUAL, IN-LINE, ATS COMPATIBLE: Brand: OASIS

## (undated) DEVICE — CANN RETRGR STYLET RSCP 15F

## (undated) DEVICE — DRP SLUSH WARMR MACH RECTG 66X44IN

## (undated) DEVICE — GLV SURG BIOGEL LTX PF 6

## (undated) DEVICE — SOL ISO/ALC RUB 70PCT 4OZ

## (undated) DEVICE — 28 FR RIGHT ANGLE – SOFT PVC CATHETER: Brand: PVC THORACIC CATHETERS

## (undated) DEVICE — CANN AORT ROOT DLP VNT 14G 7F

## (undated) DEVICE — ADAPT M/M

## (undated) DEVICE — PK HEART OPN 40

## (undated) DEVICE — PK ATS CUST W CARDIOTOMY RESEVOIR

## (undated) DEVICE — HEMOCONCENTRATOR PERFUS LPS06

## (undated) DEVICE — ST PERFUS M/

## (undated) DEVICE — DRSNG SURESITE WNDW 2.38X2.75

## (undated) DEVICE — SYS PERFUS SEP PLATLT W TIPS CUST

## (undated) DEVICE — NDL PERC 1PRT THNWALL W/BASEPLT 18G 7CM

## (undated) DEVICE — ST. SORBAVIEW ULTIMATE IJ SYSTEM A,C: Brand: CENTURION

## (undated) DEVICE — 28 FR STRAIGHT – SOFT PVC CATHETER: Brand: PVC THORACIC CATHETERS